# Patient Record
Sex: FEMALE | Race: WHITE | NOT HISPANIC OR LATINO | Employment: OTHER | URBAN - METROPOLITAN AREA
[De-identification: names, ages, dates, MRNs, and addresses within clinical notes are randomized per-mention and may not be internally consistent; named-entity substitution may affect disease eponyms.]

---

## 2018-03-22 ENCOUNTER — APPOINTMENT (OUTPATIENT)
Dept: WOMENS IMAGING | Facility: HOSPITAL | Age: 73
End: 2018-03-22

## 2018-03-22 PROCEDURE — 77067 SCR MAMMO BI INCL CAD: CPT | Performed by: RADIOLOGY

## 2018-12-17 ENCOUNTER — OFFICE VISIT (OUTPATIENT)
Dept: CARDIAC SURGERY | Facility: CLINIC | Age: 73
End: 2018-12-17

## 2018-12-17 VITALS
HEIGHT: 62 IN | WEIGHT: 106 LBS | HEART RATE: 84 BPM | DIASTOLIC BLOOD PRESSURE: 50 MMHG | TEMPERATURE: 98 F | SYSTOLIC BLOOD PRESSURE: 130 MMHG | BODY MASS INDEX: 19.51 KG/M2 | OXYGEN SATURATION: 98 %

## 2018-12-17 DIAGNOSIS — I65.23 CAROTID STENOSIS, ASYMPTOMATIC, BILATERAL: Primary | ICD-10-CM

## 2018-12-17 DIAGNOSIS — I65.23 BILATERAL CAROTID ARTERY STENOSIS: Primary | ICD-10-CM

## 2018-12-17 PROCEDURE — 99214 OFFICE O/P EST MOD 30 MIN: CPT | Performed by: THORACIC SURGERY (CARDIOTHORACIC VASCULAR SURGERY)

## 2018-12-17 RX ORDER — ONDANSETRON 4 MG/1
4 TABLET, FILM COATED ORAL EVERY 8 HOURS PRN
COMMUNITY
End: 2021-06-17

## 2018-12-17 RX ORDER — SOLIFENACIN SUCCINATE 5 MG/1
TABLET, FILM COATED ORAL DAILY
COMMUNITY
End: 2021-06-17

## 2018-12-17 RX ORDER — LOSARTAN POTASSIUM 100 MG/1
100 TABLET ORAL DAILY
COMMUNITY
End: 2022-07-21 | Stop reason: HOSPADM

## 2018-12-17 RX ORDER — SIMETHICONE 125 MG
125 TABLET,CHEWABLE ORAL EVERY 6 HOURS PRN
COMMUNITY
End: 2021-06-17

## 2018-12-17 NOTE — PROGRESS NOTES
12/17/2018  Patient Information  Miryam OZUNA [address] [address]   1945  'PCP/Referring Physician'  Rigoberto Chamberlain MD  [phone]  Guillermo Westfall MD  [phone]  Chief Complaint   Patient presents with   • Consult     Old Patient Ref. by Dr. Westfall for Carotid Stenosis-Complains of Right Sided Posterior Neck Pain and Right Arm Pain       History of Present Illness:   This is a patient on whom I performed bilateral carotid endarterectomies.  Her carotid surgery for approximately January and April, 2015.  In the subsequent year, a duplex scan from an outside facility demonstrated early recurrent bilateral stenosis.  We then proceeded with a CT angiogram which indicated the duplex scan was incorrect and the patient had no significant carotid stenosis.  Now the patient is referred to me with another duplex scan from an outside facility demonstrating 80-99% narrowing.  The patient denies stroke, seizures, TIAs, amaurosis fugax, dysarthria or dysphagia.  This patient is deaf and is here with her son and a professional  today for sign language communication.  Patient Active Problem List   Diagnosis   • Carotid stenosis   • Bilateral carotid artery stenosis     Past Medical History:   Diagnosis Date   • Atrial fibrillation (CMS/HCC)    • Borderline diabetes    • Carotid artery stenosis    • CKD (chronic kidney disease)    • Deaf    • Diabetes mellitus (CMS/HCC)    • GERD (gastroesophageal reflux disease)    • Hyperlipidemia    • Hypertension    • TIA (transient ischemic attack)      Past Surgical History:   Procedure Laterality Date   • CAROTID ENDARTERECTOMY Bilateral    • CATARACT EXTRACTION     • CHOLECYSTECTOMY     • KNEE SURGERY Right        Current Outpatient Medications:   •  amLODIPine (NORVASC) 5 MG tablet, , Disp: , Rfl: 0  •  aspirin 81 MG tablet, Take  by mouth., Disp: , Rfl:    •  atorvastatin (LIPITOR) 10 MG tablet, Take  by mouth., Disp: , Rfl:   •  Cholecalciferol (VITAMIN D-3 PO), Take  by mouth., Disp: , Rfl:   •  docusate sodium (COLACE) 100 MG capsule, Take  by mouth., Disp: , Rfl:   •  doxazosin (CARDURA) 2 MG tablet, Take 4 mg by mouth., Disp: , Rfl:   •  ELIQUIS 2.5 MG tablet tablet, , Disp: , Rfl: 0  •  hydrALAZINE (APRESOLINE) 50 MG tablet, Take  by mouth., Disp: , Rfl:   •  losartan (COZAAR) 50 MG tablet, Take 50 mg by mouth Daily., Disp: , Rfl:   •  ondansetron (ZOFRAN) 4 MG tablet, Take 4 mg by mouth Every 8 (Eight) Hours As Needed for Nausea or Vomiting., Disp: , Rfl:   •  pantoprazole (PROTONIX) 40 MG EC tablet, take 1 tablet by mouth once daily, Disp: , Rfl: 0  •  simethicone (MYLICON) 125 MG chewable tablet, Chew 125 mg Every 6 (Six) Hours As Needed for Flatulence., Disp: , Rfl:   •  solifenacin (VESICARE) 5 MG tablet, Take  by mouth Daily., Disp: , Rfl:   •  vitamin B-12 (CYANOCOBALAMIN) 500 MCG tablet, Take  by mouth., Disp: , Rfl:   •  clopidogrel (PLAVIX) 75 MG tablet, Take  by mouth., Disp: , Rfl:   •  metoprolol succinate XL (TOPROL-XL) 100 MG 24 hr tablet, Take 50 mg by mouth., Disp: , Rfl:   •  omeprazole (PriLOSEC) 40 MG capsule, Take  by mouth., Disp: , Rfl:   No Known Allergies  Social History     Socioeconomic History   • Marital status:      Spouse name: Not on file   • Number of children: 2   • Years of education: Not on file   • Highest education level: Not on file   Social Needs   • Financial resource strain: Not on file   • Food insecurity - worry: Not on file   • Food insecurity - inability: Not on file   • Transportation needs - medical: Not on file   • Transportation needs - non-medical: Not on file   Occupational History   • Occupation: [employer]     Employer: RETIRED   Tobacco Use   • Smoking status: Never Smoker   • Smokeless tobacco: Never Used   Substance and Sexual Activity   • Alcohol use: No   • Drug use: No   • Sexual activity:  "Not on file   Other Topics Concern   • Not on file   Social History Narrative    Lives in [place].      Family History   Problem Relation Age of Onset   • Other Mother         enlarged heart   • Stroke Father      Review of Systems   Constitution: Positive for malaise/fatigue and weight loss (45-50 lbs in 1 1/2 years). Negative for chills, fever and night sweats.   HENT: Positive for hearing loss. Negative for odynophagia and sore throat.    Eyes: Positive for blurred vision.   Cardiovascular: Positive for palpitations (rapid heartbeat). Negative for chest pain, dyspnea on exertion, leg swelling and orthopnea.   Respiratory: Negative for cough and hemoptysis.    Endocrine: Negative for cold intolerance, heat intolerance, polydipsia, polyphagia and polyuria.   Hematologic/Lymphatic: Bruises/bleeds easily.   Skin: Negative for itching and rash.   Musculoskeletal: Positive for back pain. Negative for joint pain, joint swelling and myalgias.   Gastrointestinal: Positive for abdominal pain and constipation. Negative for diarrhea, hematemesis, hematochezia, melena, nausea and vomiting.   Genitourinary: Negative for dysuria, frequency and hematuria.   Neurological: Positive for dizziness and light-headedness. Negative for focal weakness, headaches, numbness and seizures.   Psychiatric/Behavioral: Negative for suicidal ideas. The patient is nervous/anxious.    All other systems reviewed and are negative.    Vitals:    12/17/18 0846   BP: 130/50   BP Location: Left arm   Patient Position: Sitting   Pulse: 84   Temp: 98 °F (36.7 °C)   SpO2: 98%   Weight: 48.1 kg (106 lb)   Height: 157.5 cm (62\")      Physical Exam   CONSTITUTIONAL: Alert, Well dressed, Well nourished, No acute distress  EYES: Sclera clean, Anicteric, Pupils equal  ENT: No nasal deviation, Trachea midline  NECK: No neck masses, Supple  LUNGS: No wheezing, Cough, non-congested  HEART: No rubs, No murmurs  GASTROINTESTINAL: Soft, non-distended, No masses, Non " tender  to palpation, normal bowel sounds  NEURO: No motor deficits, No sensory deficits, Cranial Nerves 2 through 12 grossly intact but inability to hear  PSYCHIATRIC: Oriented to person, place and time, No memory deficits, Mood appropriate  VASCULAR: No carotid bruits, Femoral pulses palpable and symmetric  MUSKULOSKELETAL: No extremity trauma or extremity asymmetry    Labs/Imaging:   I reviewed the outside duplex scan demonstrating significant carotid stenosis.    Assessment/Plan:   This patient has undergone bilateral carotid endarterectomies in 2015.  Approximately one year later a duplex scan had demonstrated recurrent early bilateral stenoses.  However, a CT angiogram confirmed no significant recurrent stenoses and therefore the duplex scan was  erroneous.  Now the patient presents with another duplex scan from the outside facility demonstrating early recurrent bilateral stenosis.  I discussed through the , and the patient's son, various options.  I do not believe we should proceed immediately with a CT angiogram.  First, I would like to proceed with a repeat carotid duplex scan in our facility.  If that repeat duplex scan also demonstrates possible carotid stenosis I will then proceed with a CT angiogram.  The patient and family are very agreeable to this plan.    Patient Active Problem List   Diagnosis   • Carotid stenosis   • Bilateral carotid artery stenosis     CC: MD Guillermo Griffith MD Debbie Moore, , editing for Bola Whitaker M.D.    I, Bola Whitaker MD, have read and agree with the editing done by Cat Bullock, .

## 2018-12-27 ENCOUNTER — HOSPITAL ENCOUNTER (OUTPATIENT)
Dept: CARDIOLOGY | Facility: HOSPITAL | Age: 73
Discharge: HOME | End: 2018-12-27
Admitting: PHYSICIAN ASSISTANT

## 2018-12-27 ENCOUNTER — APPOINTMENT (OUTPATIENT)
Dept: CARDIOLOGY | Facility: HOSPITAL | Age: 73
End: 2018-12-27

## 2018-12-27 VITALS — BODY MASS INDEX: 19.51 KG/M2 | HEIGHT: 62 IN | WEIGHT: 106.04 LBS

## 2018-12-27 DIAGNOSIS — I65.23 CAROTID STENOSIS, ASYMPTOMATIC, BILATERAL: ICD-10-CM

## 2018-12-27 LAB
BH CV XLRA MEAS LEFT CCA RATIO VEL: 74.2 CM/SEC
BH CV XLRA MEAS LEFT DIST CCA EDV: 25.3 CM/SEC
BH CV XLRA MEAS LEFT DIST CCA PSV: 88.2 CM/SEC
BH CV XLRA MEAS LEFT DIST ICA EDV: 36.3 CM/SEC
BH CV XLRA MEAS LEFT DIST ICA PSV: 170.9 CM/SEC
BH CV XLRA MEAS LEFT ICA RATIO VEL: 322 CM/SEC
BH CV XLRA MEAS LEFT ICA/CCA RATIO: 4.3
BH CV XLRA MEAS LEFT MID CCA EDV: 19.6 CM/SEC
BH CV XLRA MEAS LEFT MID CCA PSV: 74.6 CM/SEC
BH CV XLRA MEAS LEFT MID ICA EDV: 37.3 CM/SEC
BH CV XLRA MEAS LEFT MID ICA PSV: 324.1 CM/SEC
BH CV XLRA MEAS LEFT PROX CCA EDV: 23.6 CM/SEC
BH CV XLRA MEAS LEFT PROX CCA PSV: 75.5 CM/SEC
BH CV XLRA MEAS LEFT PROX ECA EDV: 15.1 CM/SEC
BH CV XLRA MEAS LEFT PROX ECA PSV: 227.5 CM/SEC
BH CV XLRA MEAS LEFT PROX ICA EDV: 70.7 CM/SEC
BH CV XLRA MEAS LEFT PROX ICA PSV: 316.3 CM/SEC
BH CV XLRA MEAS LEFT PROX SCLA EDV: 2.6 CM/SEC
BH CV XLRA MEAS LEFT PROX SCLA PSV: 142.3 CM/SEC
BH CV XLRA MEAS LEFT VERTEBRAL A EDV: 14.7 CM/SEC
BH CV XLRA MEAS LEFT VERTEBRAL A PSV: 59.4 CM/SEC
BH CV XLRA MEAS RIGHT CCA RATIO VEL: 112 CM/SEC
BH CV XLRA MEAS RIGHT DIST CCA EDV: 18.9 CM/SEC
BH CV XLRA MEAS RIGHT DIST CCA PSV: 106.8 CM/SEC
BH CV XLRA MEAS RIGHT DIST ICA EDV: 40.2 CM/SEC
BH CV XLRA MEAS RIGHT DIST ICA PSV: 183.5 CM/SEC
BH CV XLRA MEAS RIGHT ICA RATIO VEL: 199 CM/SEC
BH CV XLRA MEAS RIGHT ICA/CCA RATIO: 1.8
BH CV XLRA MEAS RIGHT MID CCA EDV: 17.5 CM/SEC
BH CV XLRA MEAS RIGHT MID CCA PSV: 113.1 CM/SEC
BH CV XLRA MEAS RIGHT MID ICA EDV: 55.3 CM/SEC
BH CV XLRA MEAS RIGHT MID ICA PSV: 199.9 CM/SEC
BH CV XLRA MEAS RIGHT PROX CCA EDV: 23 CM/SEC
BH CV XLRA MEAS RIGHT PROX CCA PSV: 125.7 CM/SEC
BH CV XLRA MEAS RIGHT PROX ECA EDV: 12.2 CM/SEC
BH CV XLRA MEAS RIGHT PROX ECA PSV: 254.9 CM/SEC
BH CV XLRA MEAS RIGHT PROX ICA EDV: 40.2 CM/SEC
BH CV XLRA MEAS RIGHT PROX ICA PSV: 181 CM/SEC
BH CV XLRA MEAS RIGHT PROX SCLA EDV: 3.4 CM/SEC
BH CV XLRA MEAS RIGHT PROX SCLA PSV: 145.9 CM/SEC
BH CV XLRA MEAS RIGHT VERTEBRAL A EDV: 23.6 CM/SEC
BH CV XLRA MEAS RIGHT VERTEBRAL A PSV: 143.8 CM/SEC
LEFT ARM BP: NORMAL MMHG
RIGHT ARM BP: NORMAL MMHG

## 2018-12-27 PROCEDURE — 93880 EXTRACRANIAL BILAT STUDY: CPT | Performed by: INTERNAL MEDICINE

## 2018-12-27 PROCEDURE — 93880 EXTRACRANIAL BILAT STUDY: CPT

## 2019-01-04 ENCOUNTER — TELEPHONE (OUTPATIENT)
Dept: CARDIAC SURGERY | Facility: CLINIC | Age: 74
End: 2019-01-04

## 2019-01-04 NOTE — TELEPHONE ENCOUNTER
S/w pts daughter gave her results via Dr. Whitaker will place pt in recall for 5 months. Told to call office if she has any problems prior to that.

## 2019-04-25 ENCOUNTER — TELEPHONE (OUTPATIENT)
Dept: CARDIAC SURGERY | Facility: CLINIC | Age: 74
End: 2019-04-25

## 2019-04-25 DIAGNOSIS — I65.23 CAROTID STENOSIS, ASYMPTOMATIC, BILATERAL: Primary | ICD-10-CM

## 2019-04-25 NOTE — TELEPHONE ENCOUNTER
Pt wanting to go head and come back to see Dr. Whitaker-pt need sign language interp. Verified pt's insurance, phone number and address.

## 2019-04-29 ENCOUNTER — TELEPHONE (OUTPATIENT)
Dept: CARDIAC SURGERY | Facility: CLINIC | Age: 74
End: 2019-04-29

## 2019-04-29 NOTE — TELEPHONE ENCOUNTER
I S/W Rani at our on-site sign language interpreters and I have scheduled someone to be here for the pt on 05/06/19 for her F/U with Dr. Whitaker.

## 2019-05-01 ENCOUNTER — HOSPITAL ENCOUNTER (OUTPATIENT)
Dept: CT IMAGING | Facility: HOSPITAL | Age: 74
Discharge: HOME | End: 2019-05-01
Admitting: PHYSICIAN ASSISTANT

## 2019-05-01 DIAGNOSIS — I65.23 CAROTID STENOSIS, ASYMPTOMATIC, BILATERAL: ICD-10-CM

## 2019-05-01 LAB — CREAT BLDA-MCNC: 1.4 MG/DL (ref 0.6–1.3)

## 2019-05-01 PROCEDURE — 82565 ASSAY OF CREATININE: CPT

## 2019-05-01 PROCEDURE — 0 IOPAMIDOL PER 1 ML: Performed by: PHYSICIAN ASSISTANT

## 2019-05-01 PROCEDURE — 70498 CT ANGIOGRAPHY NECK: CPT

## 2019-05-01 RX ADMIN — IOPAMIDOL 50 ML: 755 INJECTION, SOLUTION INTRAVENOUS at 12:52

## 2019-05-06 ENCOUNTER — OFFICE VISIT (OUTPATIENT)
Dept: CARDIAC SURGERY | Facility: CLINIC | Age: 74
End: 2019-05-06

## 2019-05-06 VITALS
BODY MASS INDEX: 19.91 KG/M2 | HEART RATE: 57 BPM | TEMPERATURE: 98.1 F | WEIGHT: 108.2 LBS | SYSTOLIC BLOOD PRESSURE: 170 MMHG | OXYGEN SATURATION: 99 % | DIASTOLIC BLOOD PRESSURE: 90 MMHG | HEIGHT: 62 IN

## 2019-05-06 DIAGNOSIS — I65.23 BILATERAL CAROTID ARTERY STENOSIS: Primary | ICD-10-CM

## 2019-05-06 PROCEDURE — 99213 OFFICE O/P EST LOW 20 MIN: CPT | Performed by: THORACIC SURGERY (CARDIOTHORACIC VASCULAR SURGERY)

## 2019-05-06 RX ORDER — ONDANSETRON 4 MG/1
1 TABLET, FILM COATED ORAL EVERY 8 HOURS
COMMUNITY
Start: 2019-03-13 | End: 2022-03-30

## 2019-05-06 RX ORDER — DOXAZOSIN MESYLATE 4 MG/1
8 TABLET ORAL NIGHTLY
Refills: 0 | COMMUNITY
Start: 2019-05-01 | End: 2022-06-09 | Stop reason: ALTCHOICE

## 2019-05-06 RX ORDER — LOSARTAN POTASSIUM 50 MG/1
1 TABLET ORAL
COMMUNITY
End: 2021-06-17

## 2019-05-06 RX ORDER — PANTOPRAZOLE SODIUM 40 MG/1
TABLET, DELAYED RELEASE ORAL
COMMUNITY
Start: 2017-12-15 | End: 2021-06-17

## 2019-05-06 RX ORDER — AMLODIPINE BESYLATE 5 MG/1
1 TABLET ORAL
COMMUNITY
Start: 2019-01-17 | End: 2021-06-17

## 2019-05-06 RX ORDER — HYDRALAZINE HYDROCHLORIDE 50 MG/1
TABLET, FILM COATED ORAL
COMMUNITY
Start: 2019-02-20 | End: 2021-06-17

## 2019-05-06 RX ORDER — OXYBUTYNIN CHLORIDE 5 MG/1
5 TABLET, EXTENDED RELEASE ORAL 2 TIMES DAILY
Refills: 0 | COMMUNITY
Start: 2019-03-19 | End: 2022-04-11

## 2019-05-06 RX ORDER — ATORVASTATIN CALCIUM 10 MG/1
TABLET, FILM COATED ORAL
COMMUNITY
Start: 2019-01-28 | End: 2021-06-17

## 2019-05-06 RX ORDER — LANOLIN ALCOHOL/MO/W.PET/CERES
5000 CREAM (GRAM) TOPICAL DAILY
COMMUNITY

## 2019-05-06 NOTE — PROGRESS NOTES
05/06/2019  Patient Information  Miryam OZUNA [facility] [place] [address]   1945  'PCP/Referring Physician'  Rigoberto Chamberlain MD  [phone]  No ref. provider found    Chief Complaint   Patient presents with   • Follow-up     Follow up for carotid stenosis       History of Present Illness:   This is a patient on whom I have previously performed bilateral carotid endarterectomies in approximately January and April ,2015.  She is now over 4 years out from surgery.  She has had a number of carotid duplex scans that demonstrate stenosis anywhere in the 80 to 99% range.  However, we follow this up with CT angiograms that always demonstrate the arteries are widely patent.  I believe it is the tortuosity of her arteries, as well as the small size of the arteries, that lead to the erroneous duplex scan findings.  The patient has had another duplex scan which has demonstrated bilateral carotid disease so we have obtained another CT angiogram and we are seeing the patient back in follow-up.  She is here with her .  There has no evidence of stroke, seizures or TIAs.      Patient Active Problem List   Diagnosis   • Carotid stenosis   • Bilateral carotid artery stenosis     Past Medical History:   Diagnosis Date   • Atrial fibrillation (CMS/HCC)    • Borderline diabetes    • Carotid artery stenosis    • CKD (chronic kidney disease)    • Deaf    • Diabetes mellitus (CMS/HCC)    • GERD (gastroesophageal reflux disease)    • Hyperlipidemia    • Hypertension    • TIA (transient ischemic attack)      Past Surgical History:   Procedure Laterality Date   • CAROTID ENDARTERECTOMY Bilateral    • CATARACT EXTRACTION     • CHOLECYSTECTOMY     • KNEE SURGERY Right        Current Outpatient Medications:   •  amLODIPine (NORVASC) 5 MG tablet, Take 1 tablet by mouth., Disp: , Rfl:   •  apixaban (ELIQUIS) 2.5  MG tablet tablet, Take 1 tablet by mouth Every 12 (Twelve) Hours., Disp: , Rfl:   •  aspirin 81 MG tablet, Take  by mouth., Disp: , Rfl:   •  atorvastatin (LIPITOR) 10 MG tablet, Take  by mouth., Disp: , Rfl:   •  Cholecalciferol (VITAMIN D-3 PO), Take  by mouth., Disp: , Rfl:   •  Cholecalciferol (VITAMIN D3) 5000 units tablet dispersible, 1 po qd, Disp: , Rfl:   •  docusate sodium (COLACE) 100 MG capsule, Take  by mouth., Disp: , Rfl:   •  doxazosin (CARDURA) 4 MG tablet, take 2 tablets by mouth every morning and 2 tablets every evening, Disp: , Rfl: 0  •  ELIQUIS 2.5 MG tablet tablet, , Disp: , Rfl: 0  •  hydrALAZINE (APRESOLINE) 50 MG tablet, Take  by mouth., Disp: , Rfl:   •  losartan (COZAAR) 50 MG tablet, Take 50 mg by mouth Daily., Disp: , Rfl:   •  ondansetron (ZOFRAN) 4 MG tablet, Take 4 mg by mouth Every 8 (Eight) Hours As Needed for Nausea or Vomiting., Disp: , Rfl:   •  ondansetron (ZOFRAN) 4 MG tablet, Take 1 tablet by mouth Every 8 (Eight) Hours., Disp: , Rfl:   •  oxybutynin XL (DITROPAN-XL) 5 MG 24 hr tablet, Take 5 mg by mouth 2 (Two) Times a Day., Disp: , Rfl: 0  •  pantoprazole (PROTONIX) 40 MG EC tablet, take 1 tablet by mouth once daily, Disp: , Rfl: 0  •  pantoprazole (PROTONIX) 40 MG EC tablet, take 1 tablet by oral route 30-60 min before breakfast and 30-60 min before dinner, Disp: , Rfl:   •  Polyethylene Glycol 3350 (MIRALAX PO), Take  by mouth Daily., Disp: , Rfl:   •  vitamin B-12 (CYANOCOBALAMIN) 1000 MCG tablet, Take 1 mcg by mouth Daily., Disp: , Rfl:   •  vitamin B-12 (CYANOCOBALAMIN) 500 MCG tablet, Take  by mouth., Disp: , Rfl:   •  amLODIPine (NORVASC) 5 MG tablet, , Disp: , Rfl: 0  •  aspirin 81 MG tablet, Take 1 tablet by mouth., Disp: , Rfl:   •  atorvastatin (LIPITOR) 10 MG tablet, take 1 tablet by mouth once daily, Disp: , Rfl:   •  clopidogrel (PLAVIX) 75 MG tablet, Take  by mouth., Disp: , Rfl:   •  doxazosin (CARDURA) 2 MG tablet, Take 4 mg by mouth., Disp: , Rfl:   •   hydrALAZINE (APRESOLINE) 50 MG tablet, take 2 tablets by mouth three times a day, Disp: , Rfl:   •  losartan (COZAAR) 50 MG tablet, Take 1 tablet by mouth., Disp: , Rfl:   •  metoprolol succinate XL (TOPROL-XL) 100 MG 24 hr tablet, Take 50 mg by mouth., Disp: , Rfl:   •  omeprazole (PriLOSEC) 40 MG capsule, Take  by mouth., Disp: , Rfl:   •  simethicone (MYLICON) 125 MG chewable tablet, Chew 125 mg Every 6 (Six) Hours As Needed for Flatulence., Disp: , Rfl:   •  solifenacin (VESICARE) 5 MG tablet, Take  by mouth Daily., Disp: , Rfl:   No Known Allergies  Social History     Socioeconomic History   • Marital status:      Spouse name: Not on file   • Number of children: 2   • Years of education: Not on file   • Highest education level: Not on file   Occupational History   • Occupation: [employer]     Employer: RETIRED   Tobacco Use   • Smoking status: Never Smoker   • Smokeless tobacco: Never Used   Substance and Sexual Activity   • Alcohol use: No   • Drug use: No   Social History Narrative    Lives in [place].      Family History   Problem Relation Age of Onset   • Other Mother         enlarged heart   • Stroke Father      Review of Systems   Constitution: Positive for chills and malaise/fatigue. Negative for diaphoresis, fever and weight loss.   HENT: Positive for hearing loss (deaf about age of 1 or 1). Negative for congestion, hoarse voice and sore throat.    Eyes: Positive for blurred vision. Negative for double vision.   Cardiovascular: Positive for chest pain (Chest pressure), leg swelling (right leg more than left) and near-syncope. Negative for claudication, dyspnea on exertion, palpitations and syncope.   Respiratory: Negative for cough, hemoptysis, shortness of breath and wheezing.    Hematologic/Lymphatic: Bruises/bleeds easily.   Skin: Negative for itching, poor wound healing and rash.   Musculoskeletal: Positive for arthritis, back pain, joint pain (right side), muscle weakness and neck pain  "(on right side). Negative for falls, joint swelling and muscle cramps.   Gastrointestinal: Positive for constipation (miralax daily) and nausea ( taking zofran as needed). Negative for abdominal pain, diarrhea, hematemesis and vomiting.   Genitourinary: Negative for dysuria, frequency, hematuria, hesitancy, incomplete emptying and urgency.   Neurological: Positive for dizziness, light-headedness and loss of balance. Negative for headaches, numbness and vertigo.   Psychiatric/Behavioral: Negative for depression, memory loss and substance abuse. The patient is not nervous/anxious.    Allergic/Immunologic: Negative for environmental allergies and HIV exposure.     Vitals:    05/06/19 0851   BP: 170/90   Pulse: 57   Temp: 98.1 °F (36.7 °C)   TempSrc: Temporal   SpO2: 99%   Weight: 49.1 kg (108 lb 3.2 oz)   Height: 157.5 cm (62\")      Physical Exam   CONSTITUTIONAL: Alert and conversant, Well dressed, Well nourished, No acute distress  EYES: Sclera clean, Anicteric, Pupils equal  ENT: No nasal deviation, Trachea midline  NECK: No neck masses, Supple  LUNGS: No wheezing, Cough, non-congested  HEART: No rubs, No murmurs  GASTROINTESTINAL: Soft, non-distended, No masses, Non tender  to palpation, normal bowel sounds  NEURO: No motor deficits, No sensory deficits, Cranial Nerves 2 through 12 grossly intact  PSYCHIATRIC: Oriented to person, place and time, No memory deficits, Mood appropriate  VASCULAR: No carotid bruits, Femoral pulses palpable and symmetric  MUSKULOSKELETAL: No extremity trauma or extremity asymmetry    Labs/Imaging:   I have reviewed the recent CT angiogram.  There is no significant carotid disease and it is unchanged from the exam performed in August, 2016, which also demonstrated no significant disease.    Assessment/Plan:   This patient is here with her  today.  She had bilateral carotid endarterectomies in 2015.  Her recent CT scan demonstrates no significant carotid artery disease and this " is completely stable from the scan nearly 3 years ago.  All of her duplex scans have been erroneous secondary to artery tortuosity and overall small size of her arteries.  She also has underlying renal insufficiency.  At this time, my recommendation is to proceed with no further imaging studies.  All the duplex scanning at a number of different facilities have all been erroneous.  CT angiogram will require radiation exposure and contrast administration which could be damaging to her already impaired renal function.  At 73 years of age, I would continue her statin and antiplatelet therapy.  We know that her carotid arteries have been clean for the nearly 4 years since her surgery and at this point I would perform no further surveillance.  Should the patient disagree with my opinion, she could seek that of other physicians and she is welcome and encouraged to do so.  However, at this time I would not obtain additional scans.    Patient Active Problem List   Diagnosis   • Carotid stenosis   • Bilateral carotid artery stenosis     CC: MD Cat Griffith, , editing for Bola Whitaker M.D.    I, Bola Whitaker MD, have read and agree with the editing done by minor Gravesist.

## 2021-01-01 ENCOUNTER — TRANSCRIBE ORDERS (OUTPATIENT)
Dept: CARDIOLOGY | Facility: CLINIC | Age: 76
End: 2021-01-01

## 2021-01-01 DIAGNOSIS — I65.23 CAROTID STENOSIS, ASYMPTOMATIC, BILATERAL: Primary | ICD-10-CM

## 2021-03-02 DIAGNOSIS — E78.2 MIXED HYPERLIPIDEMIA: Primary | ICD-10-CM

## 2021-03-02 DIAGNOSIS — I10 ESSENTIAL HYPERTENSION, BENIGN: ICD-10-CM

## 2021-03-02 RX ORDER — METOPROLOL SUCCINATE 50 MG/1
50 TABLET, EXTENDED RELEASE ORAL DAILY
Qty: 90 TABLET | Refills: 3 | Status: SHIPPED | OUTPATIENT
Start: 2021-03-02 | End: 2021-09-20 | Stop reason: ALTCHOICE

## 2021-03-02 RX ORDER — AMLODIPINE BESYLATE 10 MG/1
10 TABLET ORAL DAILY
Qty: 90 TABLET | Refills: 3 | Status: SHIPPED | OUTPATIENT
Start: 2021-03-02 | End: 2022-03-14

## 2021-06-17 ENCOUNTER — OFFICE VISIT (OUTPATIENT)
Dept: CARDIOLOGY | Facility: CLINIC | Age: 76
End: 2021-06-17

## 2021-06-17 VITALS
TEMPERATURE: 97 F | DIASTOLIC BLOOD PRESSURE: 74 MMHG | HEART RATE: 54 BPM | WEIGHT: 125 LBS | HEIGHT: 63 IN | SYSTOLIC BLOOD PRESSURE: 128 MMHG | BODY MASS INDEX: 22.15 KG/M2 | RESPIRATION RATE: 11 BRPM | OXYGEN SATURATION: 96 %

## 2021-06-17 DIAGNOSIS — I35.0 AORTIC STENOSIS, MODERATE: ICD-10-CM

## 2021-06-17 DIAGNOSIS — I49.5 SICK SINUS SYNDROME (HCC): ICD-10-CM

## 2021-06-17 DIAGNOSIS — I65.23 BILATERAL CAROTID ARTERY STENOSIS: Primary | ICD-10-CM

## 2021-06-17 DIAGNOSIS — I48.0 PAROXYSMAL ATRIAL FIBRILLATION (HCC): ICD-10-CM

## 2021-06-17 DIAGNOSIS — E78.5 HYPERLIPIDEMIA LDL GOAL <70: ICD-10-CM

## 2021-06-17 PROBLEM — I25.10 CORONARY ARTERY DISEASE INVOLVING NATIVE CORONARY ARTERY OF NATIVE HEART WITHOUT ANGINA PECTORIS: Status: ACTIVE | Noted: 2021-06-17

## 2021-06-17 PROCEDURE — 93000 ELECTROCARDIOGRAM COMPLETE: CPT | Performed by: INTERNAL MEDICINE

## 2021-06-17 PROCEDURE — 99204 OFFICE O/P NEW MOD 45 MIN: CPT | Performed by: INTERNAL MEDICINE

## 2021-06-18 NOTE — PROGRESS NOTES
[facility]  [facility] CARDIOLOGY  [address] [place] [address]  Dept: [phone]  Dept Fax: [phone]    Miryam Mckeon  1945    New Patient Office Note    History of Present Illness:  Miryam Mckeon is a 75 y.o. female who presents to the clinic for new patient, Shortness of Breath, and Dizziness. She has seen [facility] cardiology but her records are not available to me at this time, she has apparently CAD but never has had any stent, Carotid disease s.p bilateral carotid endarterectomy, aortic stenosis mild to moderate and PAF.,  She is on Eliquis 2,5 bid,and Metoprolol xl 50 mg, her main complaints for few years is dizziness, and also palpitations fast heart rate and slow heart rate, she has had Holter with some AF but not significant bradycardia, last EKG 2015 sinus bradycardia HR 52., her EKG today sinus bradycardia HR 44,, BP is 150.80 she takes Multiples meds for BP,Cardura 8 mg bid,Hydralazine 100 tid,Metoprolol xl 50 mg Losartan 100 mg Amlodipine 10 mg, will get a Holter Likely she needs a pacemaker,to be able to use AA    The following portions of the patient's history were reviewed and updated as appropriate: allergies, current medications, past family history, past medical history, past social history, past surgical history and problem list.    Medications:  amLODIPine  apixaban tablet  aspirin  atorvastatin  doxazosin  Eliquis tablet  hydrALAZINE  losartan  metoprolol succinate XL  MIRALAX PO  omeprazole  ondansetron  oxybutynin XL  vitamin B-12  VITAMIN D-3 PO    Subjective  No Known Allergies     Past Medical History:   Diagnosis Date   • Atrial fibrillation (CMS/HCC)    • Borderline diabetes    • Carotid artery stenosis    • CKD (chronic kidney disease)    • Deaf    • Diabetes mellitus (CMS/HCC)    • GERD (gastroesophageal reflux disease)    • Hyperlipidemia    • Hypertension    • TIA (transient ischemic attack)        Past Surgical History:  "  Procedure Laterality Date   • CAROTID ENDARTERECTOMY Bilateral    • CATARACT EXTRACTION     • CHOLECYSTECTOMY     • KNEE SURGERY Right        Family History   Problem Relation Age of Onset   • Other Mother         enlarged heart   • Stroke Father         Social History     Socioeconomic History   • Marital status:      Spouse name: Not on file   • Number of children: 2   • Years of education: Not on file   • Highest education level: Not on file   Tobacco Use   • Smoking status: Never Smoker   • Smokeless tobacco: Never Used   Substance and Sexual Activity   • Alcohol use: Never   • Drug use: Never   • Sexual activity: Defer       Review of Systems   Constitutional: Negative.    HENT: Negative.    Respiratory: Negative.    Cardiovascular: Positive for palpitations.   Endocrine: Negative.    Genitourinary: Negative.    Musculoskeletal: Negative.    Skin: Negative.    Allergic/Immunologic: Negative.    Neurological: Positive for dizziness.   Hematological: Negative.    Psychiatric/Behavioral: Negative.    All other systems reviewed and are negative.      Cardiovascular Procedures    ECHO/MUGA:   STRESS TESTS:   CARDIAC CATH:   DEVICES:   HOLTER:   CT/MRI:   VASCULAR:   CARDIOTHORACIC:     Objective  Vitals:    06/17/21 1307   BP: 128/74   BP Location: Left arm   Patient Position: Sitting   Cuff Size: Adult   Pulse: 54   Resp: 11   Temp: 97 °F (36.1 °C)   TempSrc: Infrared   SpO2: 96%   Weight: 56.7 kg (125 lb)   Height: 158.8 cm (62.5\")   PainSc: 0-No pain       Physical Exam  Constitutional:       Appearance: Healthy appearance. Not in distress.   Neck:      Vascular: No JVR. JVD normal.   Pulmonary:      Effort: Pulmonary effort is normal.      Breath sounds: Normal breath sounds. No wheezing. No rhonchi. No rales.   Chest:      Chest wall: Not tender to palpatation.   Cardiovascular:      PMI at left midclavicular line. Normal rate. Regular rhythm. Normal S1. Normal S2.      Murmurs: There is a harsh mid to " late systolic murmur at the URSB, radiating to the neck.      No gallop. No click. No rub.   Pulses:     Intact distal pulses.   Edema:     Peripheral edema absent.   Abdominal:      General: Bowel sounds are normal.      Palpations: Abdomen is soft.      Tenderness: There is no abdominal tenderness.   Musculoskeletal: Normal range of motion.         General: No tenderness. Skin:     General: Skin is warm and dry.   Neurological:      General: No focal deficit present.      Mental Status: Alert and oriented to person, place and time.          Diagnostic Data    ECG 12 Lead    Date/Time: 6/17/2021 8:28 PM  Performed by: Vito Cuba MD  Authorized by: Vito Cuba MD   Comparison: compared with previous ECG   Similar to previous ECG  Rhythm: sinus bradycardia  Rate: bradycardic  BPM: 47  QRS axis: normal    Clinical impression: abnormal EKG            Assessment and Plan  Diagnoses and all orders for this visit:    Bilateral carotid artery stenosis--She is s/p bilateral carotid endarterectomy last carotid has significant velocity but CTA did not show significant lesion,     Aortic stenosis, moderate- the Last echo GISELLE 1,0 Cm, 2020 asymptomatic, for now    Paroxysmal atrial fibrillation (CMS/HCC)- She has fast heart rate and slow, today HR is 44, on Metoprolol , will hold    Sick sinus syndrome (CMS/HCC)- Complaints of weakness, dizziness, fast and slow heart rate, Hr today 44 buy EKG, will get a Holter, will hold Toprol    Hyperlipidemia LDL goal <70 on Lipitor 10 mg    Other orders  -     Holter Monitor - 72 Hour Up To 15 Days; Future        No follow-ups on file.    Vito Cuba MD  06/17/2021

## 2021-07-22 ENCOUNTER — OFFICE VISIT (OUTPATIENT)
Dept: CARDIOLOGY | Facility: CLINIC | Age: 76
End: 2021-07-22

## 2021-07-22 VITALS
RESPIRATION RATE: 11 BRPM | HEIGHT: 63 IN | TEMPERATURE: 97 F | DIASTOLIC BLOOD PRESSURE: 74 MMHG | BODY MASS INDEX: 20.73 KG/M2 | HEART RATE: 86 BPM | WEIGHT: 117 LBS | OXYGEN SATURATION: 96 % | SYSTOLIC BLOOD PRESSURE: 126 MMHG

## 2021-07-22 DIAGNOSIS — E78.5 HYPERLIPIDEMIA LDL GOAL <70: ICD-10-CM

## 2021-07-22 DIAGNOSIS — I65.23 BILATERAL CAROTID ARTERY STENOSIS: ICD-10-CM

## 2021-07-22 DIAGNOSIS — I49.5 SICK SINUS SYNDROME (HCC): ICD-10-CM

## 2021-07-22 DIAGNOSIS — I25.10 CORONARY ARTERY DISEASE INVOLVING NATIVE CORONARY ARTERY OF NATIVE HEART WITHOUT ANGINA PECTORIS: ICD-10-CM

## 2021-07-22 DIAGNOSIS — I35.0 AORTIC STENOSIS, MODERATE: ICD-10-CM

## 2021-07-22 DIAGNOSIS — I48.0 PAROXYSMAL ATRIAL FIBRILLATION (HCC): Primary | ICD-10-CM

## 2021-07-22 PROCEDURE — 99214 OFFICE O/P EST MOD 30 MIN: CPT | Performed by: INTERNAL MEDICINE

## 2021-07-22 PROCEDURE — 93000 ELECTROCARDIOGRAM COMPLETE: CPT | Performed by: INTERNAL MEDICINE

## 2021-07-22 NOTE — PROGRESS NOTES
[facility] [place]  [facility] CARDIOLOGY  [address] [place] [address]  Dept: [phone]  Dept Fax: [phone]    Miryam Mckeon  1945    Follow Up Office Visit Note    History of Present Illness:  Miryam Mckeon is a 75 y.o. female who presents to the clinic for dizziness and also   palpitations she stop the Toprol due to low Hr 44 and today Hr is better 65, , she keeps having palpitations and dizziness, , I did a Holter with short episodes of PAF, I have given the choice to see a EP Dr Hussein and she would prefer to see him, I discuss with him today about the patient and he will graciously see her.     The following portions of the patient's history were reviewed and updated as appropriate: allergies, current medications, past family history, past medical history, past social history, past surgical history and problem list.    Medications:  amLODIPine  apixaban tablet  aspirin  atorvastatin  doxazosin  Eliquis tablet  hydrALAZINE  losartan  metoprolol succinate XL  MIRALAX PO  omeprazole  ondansetron  oxybutynin XL  vitamin B-12  VITAMIN D-3 PO    Subjective  No Known Allergies     Past Medical History:   Diagnosis Date   • Atrial fibrillation (CMS/HCC)    • Borderline diabetes    • Carotid artery stenosis    • CKD (chronic kidney disease)    • Deaf    • Diabetes mellitus (CMS/HCC)    • GERD (gastroesophageal reflux disease)    • Hyperlipidemia    • Hypertension    • TIA (transient ischemic attack)        Past Surgical History:   Procedure Laterality Date   • CAROTID ENDARTERECTOMY Bilateral    • CATARACT EXTRACTION     • CHOLECYSTECTOMY     • KNEE SURGERY Right        Family History   Problem Relation Age of Onset   • Other Mother         enlarged heart   • Stroke Father         Social History     Socioeconomic History   • Marital status:      Spouse name: Not on file   • Number of children: 2   • Years of education: Not on file   • Highest education level:  "Not on file   Tobacco Use   • Smoking status: Never Smoker   • Smokeless tobacco: Never Used   Substance and Sexual Activity   • Alcohol use: Never   • Drug use: Never   • Sexual activity: Defer       Review of Systems   Constitutional: Negative.    HENT: Negative.    Respiratory: Negative.    Cardiovascular: Positive for palpitations.   Endocrine: Negative.    Genitourinary: Negative.    Musculoskeletal: Negative.    Skin: Negative.    Allergic/Immunologic: Negative.    Neurological: Positive for light-headedness.   Hematological: Negative.    Psychiatric/Behavioral: Negative.        Cardiovascular Procedures    ECHO/MUGA:   STRESS TESTS:   CARDIAC CATH:   DEVICES:   HOLTER:   CT/MRI:   VASCULAR:   CARDIOTHORACIC:     Objective  Vitals:    07/22/21 1539   BP: 126/74   BP Location: Left arm   Patient Position: Sitting   Cuff Size: Adult   Pulse: 86   Resp: 11   Temp: 97 °F (36.1 °C)   TempSrc: Infrared   SpO2: 96%   Weight: 53.1 kg (117 lb)   Height: 158.8 cm (62.5\")   PainSc:   8   PainLoc: Leg     Body mass index is 21.06 kg/m².     Physical Exam  Constitutional:       Appearance: Healthy appearance. Not in distress.   Neck:      Vascular: No JVR. JVD normal.   Pulmonary:      Effort: Pulmonary effort is normal.      Breath sounds: Normal breath sounds. No wheezing. No rhonchi. No rales.   Chest:      Chest wall: Not tender to palpatation.   Cardiovascular:      PMI at left midclavicular line. Normal rate. Regular rhythm. Normal S1. Normal S2.      Murmurs: There is a harsh midsystolic murmur at the URSB, radiating to the neck.      No gallop. No click. No rub.   Pulses:     Intact distal pulses.   Edema:     Peripheral edema absent.   Abdominal:      General: Bowel sounds are normal.      Palpations: Abdomen is soft.      Tenderness: There is no abdominal tenderness.   Musculoskeletal: Normal range of motion.         General: No tenderness. Skin:     General: Skin is warm and dry.   Neurological:      General: No " focal deficit present.      Mental Status: Alert and oriented to person, place and time.          Diagnostic Data    ECG 12 Lead    Date/Time: 7/22/2021 4:35 PM  Performed by: Vito Cuba MD  Authorized by: Vito Cuba MD   Rhythm: sinus rhythm  Rate: normal  BPM: 65  QRS axis: normal  Other findings: left ventricular hypertrophy    Clinical impression: abnormal EKG            Assessment and Plan  Diagnoses and all orders for this visit:    Paroxysmal atrial fibrillation (CMS/HCC)- will sent her to see Dr Hussein, her HR is better after we dgalilea Toprol, but she is having complaints of palpitations,    Coronary artery disease involving native coronary artery of native heart without angina pectoris- Mild disease by cath     Sick sinus syndrome (CMS/HCC)- she has fast and slow heart rate , will see Dr Hussein    Hyperlipidemia LDL goal <70- on lipitor    Aortic stenosis, moderate- Mild AS, Linda 1.3cm     Bilateral carotid artery stenosis- she is s/p bilateral carotid endarterectomy, last CTA no significant disease          No follow-ups on file.    Vito Cuba MD  07/22/2021

## 2021-09-20 ENCOUNTER — OFFICE VISIT (OUTPATIENT)
Dept: CARDIOLOGY | Facility: CLINIC | Age: 76
End: 2021-09-20

## 2021-09-20 VITALS
OXYGEN SATURATION: 98 % | HEART RATE: 64 BPM | HEIGHT: 63 IN | SYSTOLIC BLOOD PRESSURE: 126 MMHG | DIASTOLIC BLOOD PRESSURE: 48 MMHG | BODY MASS INDEX: 20.91 KG/M2 | WEIGHT: 118 LBS

## 2021-09-20 DIAGNOSIS — I25.10 CORONARY ARTERY DISEASE INVOLVING NATIVE CORONARY ARTERY OF NATIVE HEART WITHOUT ANGINA PECTORIS: ICD-10-CM

## 2021-09-20 DIAGNOSIS — I10 ESSENTIAL HYPERTENSION: ICD-10-CM

## 2021-09-20 DIAGNOSIS — I49.5 SICK SINUS SYNDROME (HCC): Primary | ICD-10-CM

## 2021-09-20 DIAGNOSIS — I49.5 TACHY-BRADY SYNDROME (HCC): ICD-10-CM

## 2021-09-20 DIAGNOSIS — I48.0 PAROXYSMAL ATRIAL FIBRILLATION (HCC): ICD-10-CM

## 2021-09-20 PROCEDURE — 93000 ELECTROCARDIOGRAM COMPLETE: CPT | Performed by: PHYSICIAN ASSISTANT

## 2021-09-20 PROCEDURE — 99214 OFFICE O/P EST MOD 30 MIN: CPT | Performed by: INTERNAL MEDICINE

## 2021-09-20 RX ORDER — LATANOPROST 50 UG/ML
1 SOLUTION/ DROPS OPHTHALMIC NIGHTLY
COMMUNITY
End: 2022-08-15

## 2021-09-20 RX ORDER — SIMETHICONE 125 MG
125 TABLET,CHEWABLE ORAL EVERY 6 HOURS PRN
COMMUNITY

## 2021-09-20 NOTE — PROGRESS NOTES
Cardiac Electrophysiology Outpatient Consult Note            [facility]    Consult Note     Miryam Mckeon  [id number]    Primary Care Physician: Rigoberto Chamberlain MD    Referred By: Vito Cuba, *    Subjective     Chief Complaint:   Diagnoses and all orders for this visit:    1. Sick sinus syndrome (CMS/HCC) (Primary)  -     ECG 12 Lead  -     Case Request EP Lab: Pacemaker DC new, hold eliquis X 2 days prior, needs     2. Paroxysmal atrial fibrillation (CMS/HCC)  -     ECG 12 Lead    3. Tachy-alfosno syndrome (CMS/HCC)  -     ECG 12 Lead  -     Case Request EP Lab: Pacemaker DC new, hold eliquis X 2 days prior, needs     4. Coronary artery disease involving native coronary artery of native heart without angina pectoris    5. Essential hypertension      Chief Complaint   Patient presents with   • PAF     CONSULT   • Coronary Artery Disease   • SSS       History of Present Illness:   Miryam Mckeon is a 75 y.o. female who presents to my electrophysiology clinic for evaluation of Tachybrady syndrome and Afib. She is hearing impaired  and her family member is able act as a . She states she started have palpitations for a couple of years but recently these have started getting worse.  She wore a monitor [employer] in 2017 revealing episodes of SVT, and bradycardia. She was started on BB months ago for palpitations but presented to Dr. Sewell's office with significant severe Bradycardia. Her BB was stopped and a follow up monitor was placed 7/2021.  This  revealed episodes of AFib, SVT, and PAC's and well as day time bradycardia.  She has been on Eliquis since her CVA in 2002. She denies any chest pain or sob with exertion suggesting angina . She denies any sudden dizziness, near syncope or syncope. She does her own house work but does get tired easily.     Problem List:  1. Afib/Tachybrady syndrome   a. Palpitations,  Cardiac Monitors back in 2017 revealing Tachybrady cardia.    b. Holter monitor revealing PAF, AT, and PAC's, episodes of bradycardia 7/9/2021  2. CAD  a. [facility] Moderate CAD 2009.   b. Echocardiogram 2/2020 Normal EF, Moderate AS.   3. VHD: Moderate AS, GISELLE 1.3cm  4. Bilateral CEA's 2015  5. CVA 2002-Eliquis  6. Symptomatic Bradycardia  7. HTN  8. GERD      Review of Systems:   Negative except what is in HPI    Past Medical History:   Past Medical History:   Diagnosis Date   • Atrial fibrillation (CMS/HCC)    • Borderline diabetes    • Carotid artery stenosis    • CKD (chronic kidney disease)    • Deaf    • Diabetes mellitus (CMS/HCC)    • GERD (gastroesophageal reflux disease)    • Hyperlipidemia    • Hypertension    • TIA (transient ischemic attack)        Past Surgical History:   Past Surgical History:   Procedure Laterality Date   • CAROTID ENDARTERECTOMY Bilateral    • CATARACT EXTRACTION     • CHOLECYSTECTOMY     • KNEE SURGERY Right        Family History:   Family History   Problem Relation Age of Onset   • Other Mother         enlarged heart   • Stroke Father        Social History:   Social History     Socioeconomic History   • Marital status:      Spouse name: Not on file   • Number of children: 2   • Years of education: Not on file   • Highest education level: Not on file   Tobacco Use   • Smoking status: Never Smoker   • Smokeless tobacco: Never Used   Vaping Use   • Vaping Use: Never used   Substance and Sexual Activity   • Alcohol use: Never   • Drug use: Never   • Sexual activity: Defer       Medications:     Current Outpatient Medications:   •  amLODIPine (NORVASC) 10 MG tablet, Take 1 tablet by mouth Daily., Disp: 90 tablet, Rfl: 3  •  apixaban (ELIQUIS) 2.5 MG tablet tablet, Take 1 tablet by mouth Every 12 (Twelve) Hours., Disp: , Rfl:   •  aspirin 81 MG tablet, Take  by mouth., Disp: , Rfl:   •  atorvastatin (LIPITOR) 10 MG tablet, Take 10 mg by mouth Every Night., Disp: , Rfl:   •   "Cholecalciferol (VITAMIN D-3 PO), Take 2,000 Units by mouth Daily., Disp: , Rfl:   •  doxazosin (CARDURA) 4 MG tablet, Take 4 mg by mouth. 2 TABLETS bid, Disp: , Rfl: 0  •  ELIQUIS 2.5 MG tablet tablet, , Disp: , Rfl: 0  •  hydrALAZINE (APRESOLINE) 50 MG tablet, Take 50 mg by mouth. 2 TABLETS TID, Disp: , Rfl:   •  latanoprost (XALATAN) 0.005 % ophthalmic solution, Administer 1 drop to both eyes Every Night., Disp: , Rfl:   •  losartan (COZAAR) 100 MG tablet, Take 100 mg by mouth Daily., Disp: , Rfl:   •  ondansetron (ZOFRAN) 4 MG tablet, Take 1 tablet by mouth Every 8 (Eight) Hours., Disp: , Rfl:   •  oxybutynin XL (DITROPAN-XL) 5 MG 24 hr tablet, Take 5 mg by mouth 2 (Two) Times a Day., Disp: , Rfl: 0  •  Polyethylene Glycol 3350 (MIRALAX PO), Take  by mouth Daily., Disp: , Rfl:   •  simethicone (MYLICON) 125 MG chewable tablet, Chew 125 mg Every 6 (Six) Hours As Needed for Flatulence. PRN, Disp: , Rfl:   •  vitamin B-12 (CYANOCOBALAMIN) 1000 MCG tablet, Take 5,000 mcg by mouth Daily., Disp: , Rfl:   •  omeprazole (PriLOSEC) 40 MG capsule, Take  by mouth., Disp: , Rfl:     Allergies:   No Known Allergies    Objective   Vital Signs:   Vitals:    09/20/21 1450   BP: 126/48   Pulse: 64   SpO2: 98%   Weight: 53.5 kg (118 lb)   Height: 158.8 cm (62.52\")       PHYSICAL EXAM  General appearance: Awake, alert, cooperative  Head: Normocephalic, without obvious abnormality, atraumatic  Eyes: Conjunctivae/corneas clear, EOMs intact  Neck: no adenopathy, no carotid bruit, no JVD and thyroid: not enlarged  Lungs: clear to auscultation bilaterally and no rhonchi or crackles\", ' symmetric  Heart: systolic murmur: systolic ejection 2/6, harsh at apex  Abdomen: Soft, non-tender, bowel sounds normal,  no organomegaly  Extremities: extremities normal, atraumatic, no cyanosis or edema  Skin: Skin color, turgor normal, no rashes or lesions  Neurologic: Grossly normal     Lab Results   Component Value Date    GLUCOSE 90 04/09/2015    " CALCIUM 8.7 04/09/2015     04/09/2015    K 3.7 04/09/2015    CO2 27 04/09/2015     04/09/2015    BUN 16 04/09/2015    CREATININE 1.40 (H) 05/01/2019    ANIONGAP 8 04/09/2015     Lab Results   Component Value Date    WBC 5.14 04/09/2015    HGB 8.6 (L) 04/09/2015    HCT 26.5 (L) 04/09/2015    MCV 95.0 04/09/2015     (L) 04/09/2015     Lab Results   Component Value Date    INR 1.04 04/06/2015    INR 1.06 01/13/2015    PROTIME 10.9 04/06/2015    PROTIME 11.4 01/13/2015     No results found for: TSH, F1ZYNOV, O0JMCUI, THYROIDAB    Cardiac Testing:      I personally viewed and interpreted the patient's EKG/Telemetry/lab data      ECG 12 Lead    Date/Time: 9/20/2021 3:27 PM  Performed by: Thad Valenzuela PA  Authorized by: Thad Valenzuela PA   Rhythm: sinus rhythm  Rate: normal  Conduction: conduction normal  ST Segments: ST segments normal  T Waves: T waves normal  QRS axis: normal  Other: no other findings    Clinical impression: normal ECG            Tobacco Cessation: N/A  Obstructive Sleep Apnea Screening: Completed    Assessment & Plan    Diagnoses and all orders for this visit:    1. Sick sinus syndrome (CMS/HCC) (Primary)  -     ECG 12 Lead  -     Case Request EP Lab: Pacemaker DC new, hold eliquis X 2 days prior, needs     2. Paroxysmal atrial fibrillation (CMS/HCC)  -     ECG 12 Lead    3. Tachy-alfnoso syndrome (CMS/HCC)  -     ECG 12 Lead  -     Case Request EP Lab: Pacemaker DC new, hold eliquis X 2 days prior, needs     4. Coronary artery disease involving native coronary artery of native heart without angina pectoris    5. Essential hypertension         Diagnosis Plan   1. Sick sinus syndrome (CMS/HCC)   symptomatic sinus bradycardia.  Chronotropic incompetence.  Strong indication for permanent pacemaker.        Risk-benefit for transvenous pacemaker discussed in detail.    American  is the daughter.  Our American sign language   that had been scheduled for the office visit was unable to come due to scheduling difficulties.  After discussion with the patient the patient agreed to have her daughter stand and is a American .    Risk-benefit profile of pacemaker discussed in detail via .    She wishes to proceed.    After pacemaker implantation will likely transition from metoprolol over to Cardizem.    Hold Eliquis for 2 days preoperatively.    The patient and I made a mutually shared decision ( shared decision making model ) that the patient would be best served by proceeding with the above invasive heart procedure.  This is a high risk invasive cardiac procedure which comes with significant risk of morbidity and mortality.  This patient has significant underlying  heart disease.  Miryam OZUNA Yadieltruman understands these risks and   has made an informed decision to proceed.         2. Paroxysmal atrial fibrillation (CMS/HCC)   unclear whether she is significantly symptomatic from rapid A. fib.  The pacemaker will help delineate this after its implanted.  We will combine this with Cardizem for rate control.  Unclear if she will benefit from rhythm control strategy with pharmacotherapy at this point.    [id number] significantly elevated.  Recommend lifelong anticoagulation.  Tolerating apixaban well.   3. Tachy-alfonso syndrome (CMS/HCC)   rapid A. fib alternating with sinus bradycardia.  See above.       4. Coronary artery disease involving native coronary artery of native heart without angina pectoris   stable.  No symptoms.  Aspirin.  Lipitor.   5. Essential hypertension   blood pressure doing well.     Body mass index is 21.22 kg/m².    I spent 48 minutes in consultation with this patient which included more than 65% of this time in direct face-to-face counseling, physical examination and discussion of my assessment and findings and shared decision making with the patient.  The remainder of the time  not spent face to face was performing one, some or all of the following actions:  preparing to see this patient ( eg. Review of tests),  ordering medications, tests or procedures ), care coordination, discussion of the plan with other healthcare providers, documenting clinical information in Epic well as independently interpreting results and communicating results to patient, family and or caregiver.  All time noted occurred on the date of service.    Follow Up:       Thank you for allowing me to participate in the care of your patient. Please to not hesitate to contact me with additional questions or concerns.      Troy Hussein DO, FACC, RS  Cardiac Electrophysiologist  [facility] / [facility]

## 2021-09-21 RX ORDER — OXYBUTYNIN CHLORIDE 5 MG/1
TABLET, EXTENDED RELEASE ORAL
Qty: 60 TABLET | OUTPATIENT
Start: 2021-09-21

## 2021-09-21 NOTE — TELEPHONE ENCOUNTER
Patient called about medication because she missed a call... I informed her that she will need to contact her PCP for this refill

## 2021-09-28 DIAGNOSIS — R00.1 BRADYCARDIA, SINUS: Primary | ICD-10-CM

## 2022-01-27 ENCOUNTER — OFFICE VISIT (OUTPATIENT)
Dept: CARDIOLOGY | Facility: CLINIC | Age: 77
End: 2022-01-27

## 2022-01-27 VITALS
HEART RATE: 63 BPM | RESPIRATION RATE: 12 BRPM | SYSTOLIC BLOOD PRESSURE: 122 MMHG | WEIGHT: 121 LBS | OXYGEN SATURATION: 99 % | BODY MASS INDEX: 21.44 KG/M2 | DIASTOLIC BLOOD PRESSURE: 74 MMHG | HEIGHT: 63 IN | TEMPERATURE: 97 F

## 2022-01-27 DIAGNOSIS — I49.5 TACHY-BRADY SYNDROME: ICD-10-CM

## 2022-01-27 DIAGNOSIS — I35.0 AORTIC STENOSIS, MODERATE: ICD-10-CM

## 2022-01-27 DIAGNOSIS — I65.23 BILATERAL CAROTID ARTERY STENOSIS: ICD-10-CM

## 2022-01-27 DIAGNOSIS — E78.5 HYPERLIPIDEMIA LDL GOAL <70: ICD-10-CM

## 2022-01-27 DIAGNOSIS — I10 ESSENTIAL HYPERTENSION: ICD-10-CM

## 2022-01-27 DIAGNOSIS — I48.0 PAROXYSMAL ATRIAL FIBRILLATION: Primary | ICD-10-CM

## 2022-01-27 DIAGNOSIS — I25.10 CORONARY ARTERY DISEASE INVOLVING NATIVE CORONARY ARTERY OF NATIVE HEART WITHOUT ANGINA PECTORIS: ICD-10-CM

## 2022-01-27 PROCEDURE — 93000 ELECTROCARDIOGRAM COMPLETE: CPT | Performed by: INTERNAL MEDICINE

## 2022-01-27 PROCEDURE — 99214 OFFICE O/P EST MOD 30 MIN: CPT | Performed by: INTERNAL MEDICINE

## 2022-01-27 NOTE — PROGRESS NOTES
[facility] [place]  [facility] CARDIOLOGY  [address] [place] [address]  Dept: [phone]  Dept Fax: [phone]    Miryam Mckeon  1945    Follow Up Office Visit Note    History of Present Illness:  Miryam Mckeon is a 76 y.o. female who presents to the clinic for Follow-up. SSS- She has low and fast Hr, has seen Dr Hussein and he thinks, she will need to have a Pacemaker but hut she is not willing to have it yet, denies any chest pain, palpitations, dizziness, syncope deepa    The following portions of the patient's history were reviewed and updated as appropriate: allergies, current medications, past family history, past medical history, past social history, past surgical history and problem list.    Medications:  amLODIPine  apixaban tablet  aspirin  atorvastatin  doxazosin  hydrALAZINE  latanoprost  losartan  MIRALAX PO  omeprazole  ondansetron  oxybutynin XL  simethicone  vitamin B-12  VITAMIN D-3 PO    Subjective  No Known Allergies     Past Medical History:   Diagnosis Date   • Atrial fibrillation (CMS/HCC)    • Borderline diabetes    • Carotid artery stenosis    • CKD (chronic kidney disease)    • Deaf    • Diabetes mellitus (CMS/HCC)    • GERD (gastroesophageal reflux disease)    • Hyperlipidemia    • Hypertension    • TIA (transient ischemic attack)        Past Surgical History:   Procedure Laterality Date   • CAROTID ENDARTERECTOMY Bilateral    • CATARACT EXTRACTION     • CHOLECYSTECTOMY     • KNEE SURGERY Right        Family History   Problem Relation Age of Onset   • Other Mother         enlarged heart   • Stroke Father         Social History     Socioeconomic History   • Marital status:    • Number of children: 2   Tobacco Use   • Smoking status: Never Smoker   • Smokeless tobacco: Never Used   Vaping Use   • Vaping Use: Never used   Substance and Sexual Activity   • Alcohol use: Never   • Drug use: Never   • Sexual activity: Defer       Review of  "Systems   Constitutional: Negative.    HENT: Negative.    Respiratory: Negative.    Cardiovascular: Negative.    Endocrine: Negative.    Genitourinary: Negative.    Musculoskeletal: Negative.    Skin: Negative.    Allergic/Immunologic: Negative.    Neurological: Negative.    Hematological: Negative.    Psychiatric/Behavioral: Negative.    All other systems reviewed and are negative.      Cardiovascular Procedures    ECHO/MUGA:   STRESS TESTS:   CARDIAC CATH:   DEVICES:   HOLTER:   CT/MRI:   VASCULAR:   CARDIOTHORACIC:     Objective  Vitals:    01/27/22 1451   BP: 122/74   BP Location: Right arm   Patient Position: Sitting   Cuff Size: Adult   Pulse: 63   Resp: 12   Temp: 97 °F (36.1 °C)   TempSrc: Infrared   SpO2: 99%   Weight: 54.9 kg (121 lb)   Height: 158.8 cm (62.5\")   PainSc: 0-No pain     Body mass index is 21.78 kg/m².     Physical Exam  Constitutional:       Appearance: Healthy appearance. Not in distress.   Neck:      Vascular: No JVR. JVD normal.   Pulmonary:      Effort: Pulmonary effort is normal.      Breath sounds: Normal breath sounds. No wheezing. No rhonchi. No rales.   Chest:      Chest wall: Not tender to palpatation.   Cardiovascular:      PMI at left midclavicular line. Normal rate. Regular rhythm. Normal S1. Normal S2.      Murmurs: There is no murmur.      No gallop. No click. No rub.   Pulses:     Intact distal pulses.   Edema:     Peripheral edema absent.   Abdominal:      General: Bowel sounds are normal.      Palpations: Abdomen is soft.      Tenderness: There is no abdominal tenderness.   Musculoskeletal: Normal range of motion.         General: No tenderness. Skin:     General: Skin is warm and dry.   Neurological:      General: No focal deficit present.      Mental Status: Alert and oriented to person, place and time.          Diagnostic Data    ECG 12 Lead    Date/Time: 1/27/2022 3:26 PM  Performed by: Vito Cuba MD  Authorized by: Vito Cuba MD   Comparison: " compared with previous ECG from 9/20/2021  Rhythm: sinus rhythm  Rate: normal  BPM: 61  Conduction: left posterior fascicular block  QRS axis: normal and right    Clinical impression: abnormal EKG            Assessment and Plan  Diagnoses and all orders for this visit:    Paroxysmal atrial fibrillation (HCC)--Only on Blood thinner , peña not decided yet about pacer, denies any complaints  On Eliquis 2,5 bid  -     CBC & Differential; Future  -     Comprehensive Metabolic Panel; Future    Tachy-alfonso syndrome (HCC)- Waiting for her decision about pacer, has seen Dr Hussein  -     CBC & Differential; Future  -     Comprehensive Metabolic Panel; Future    Essential hypertension- BP is good 135.80, on Cardura 4 mg, hydralazine 50 mg bid, Amlodipine 10 mg, Losartan 100 mg     Hyperlipidemia LDL goal <70- On Lipitor   -     Lipid Panel; Future    Coronary artery disease involving native coronary artery of native heart without angina pectoris- Mild disease by cath, denies any chest pain    Aortic stenosis, moderate- GISELLE 1,3 in 2020, will get an echo  -     Adult Transthoracic Echo Complete W/ Cont if Necessary Per Protocol; Future    Bilateral carotid artery stenosis- Mild disease last carotid doppler, prior bilateral carotid endarterectomy , will get a doppler   -     Duplex Carotid Ultrasound CAR; Future         Return in about 6 months (around 7/27/2022) for Recheck.    Vito Cuba MD  01/27/2022

## 2022-02-18 ENCOUNTER — LAB (OUTPATIENT)
Dept: CARDIOLOGY | Facility: CLINIC | Age: 77
End: 2022-02-18

## 2022-02-18 DIAGNOSIS — I48.0 PAROXYSMAL ATRIAL FIBRILLATION: ICD-10-CM

## 2022-02-18 DIAGNOSIS — E78.5 HYPERLIPIDEMIA LDL GOAL <70: ICD-10-CM

## 2022-02-18 DIAGNOSIS — I49.5 TACHY-BRADY SYNDROME: ICD-10-CM

## 2022-02-19 LAB
ALBUMIN SERPL-MCNC: 4.3 G/DL (ref 3.7–4.7)
ALBUMIN/GLOB SERPL: 1.8 {RATIO} (ref 1.2–2.2)
ALP SERPL-CCNC: 130 IU/L (ref 44–121)
ALT SERPL-CCNC: 15 IU/L (ref 0–32)
AST SERPL-CCNC: 22 IU/L (ref 0–40)
BASOPHILS # BLD AUTO: 0 X10E3/UL (ref 0–0.2)
BASOPHILS NFR BLD AUTO: 1 %
BILIRUB SERPL-MCNC: 0.5 MG/DL (ref 0–1.2)
BUN SERPL-MCNC: 39 MG/DL (ref 8–27)
BUN/CREAT SERPL: 31 (ref 12–28)
CALCIUM SERPL-MCNC: 10.1 MG/DL (ref 8.7–10.3)
CHLORIDE SERPL-SCNC: 108 MMOL/L (ref 96–106)
CHOLEST SERPL-MCNC: 112 MG/DL (ref 100–199)
CO2 SERPL-SCNC: 20 MMOL/L (ref 20–29)
CREAT SERPL-MCNC: 1.27 MG/DL (ref 0.57–1)
EOSINOPHIL # BLD AUTO: 0.1 X10E3/UL (ref 0–0.4)
EOSINOPHIL NFR BLD AUTO: 2 %
ERYTHROCYTE [DISTWIDTH] IN BLOOD BY AUTOMATED COUNT: 11.7 % (ref 11.7–15.4)
GLOBULIN SER CALC-MCNC: 2.4 G/DL (ref 1.5–4.5)
GLUCOSE SERPL-MCNC: 107 MG/DL (ref 65–99)
HCT VFR BLD AUTO: 34.4 % (ref 34–46.6)
HDLC SERPL-MCNC: 55 MG/DL
HGB BLD-MCNC: 11.6 G/DL (ref 11.1–15.9)
IMM GRANULOCYTES # BLD AUTO: 0 X10E3/UL (ref 0–0.1)
IMM GRANULOCYTES NFR BLD AUTO: 0 %
LDLC SERPL CALC-MCNC: 45 MG/DL (ref 0–99)
LYMPHOCYTES # BLD AUTO: 1.5 X10E3/UL (ref 0.7–3.1)
LYMPHOCYTES NFR BLD AUTO: 35 %
MCH RBC QN AUTO: 31.9 PG (ref 26.6–33)
MCHC RBC AUTO-ENTMCNC: 33.7 G/DL (ref 31.5–35.7)
MCV RBC AUTO: 95 FL (ref 79–97)
MONOCYTES # BLD AUTO: 0.4 X10E3/UL (ref 0.1–0.9)
MONOCYTES NFR BLD AUTO: 9 %
NEUTROPHILS # BLD AUTO: 2.2 X10E3/UL (ref 1.4–7)
NEUTROPHILS NFR BLD AUTO: 53 %
PLATELET # BLD AUTO: 141 X10E3/UL (ref 150–450)
POTASSIUM SERPL-SCNC: 5.2 MMOL/L (ref 3.5–5.2)
PROT SERPL-MCNC: 6.7 G/DL (ref 6–8.5)
RBC # BLD AUTO: 3.64 X10E6/UL (ref 3.77–5.28)
SODIUM SERPL-SCNC: 146 MMOL/L (ref 134–144)
TRIGL SERPL-MCNC: 48 MG/DL (ref 0–149)
VLDLC SERPL CALC-MCNC: 12 MG/DL (ref 5–40)
WBC # BLD AUTO: 4.2 X10E3/UL (ref 3.4–10.8)

## 2022-02-21 ENCOUNTER — TELEPHONE (OUTPATIENT)
Dept: CARDIOLOGY | Facility: CLINIC | Age: 77
End: 2022-02-21

## 2022-02-21 NOTE — TELEPHONE ENCOUNTER
Spoke with daughter and advised her that we need to schedule an office visit to discuss Ms. Chun's ECHO results and develop a plan.  Pt is coming on Friday, February 25th @ 2pm

## 2022-02-21 NOTE — TELEPHONE ENCOUNTER
----- Message from Vito Cuba MD sent at 2/19/2022  7:51 PM EST -----  Please talk to her daughter and ask her that we need to discusses findings on the echo , , the best is she will come for an appointment, and we make decision and we plan

## 2022-02-25 ENCOUNTER — OFFICE VISIT (OUTPATIENT)
Dept: CARDIOLOGY | Facility: CLINIC | Age: 77
End: 2022-02-25

## 2022-02-25 VITALS
WEIGHT: 119 LBS | TEMPERATURE: 97.1 F | DIASTOLIC BLOOD PRESSURE: 60 MMHG | OXYGEN SATURATION: 98 % | RESPIRATION RATE: 15 BRPM | SYSTOLIC BLOOD PRESSURE: 162 MMHG | BODY MASS INDEX: 21.09 KG/M2 | HEART RATE: 62 BPM | HEIGHT: 63 IN

## 2022-02-25 DIAGNOSIS — I48.0 PAROXYSMAL ATRIAL FIBRILLATION: Primary | ICD-10-CM

## 2022-02-25 DIAGNOSIS — E78.5 HYPERLIPIDEMIA LDL GOAL <70: ICD-10-CM

## 2022-02-25 DIAGNOSIS — I65.23 BILATERAL CAROTID ARTERY STENOSIS: ICD-10-CM

## 2022-02-25 DIAGNOSIS — I35.0 AORTIC STENOSIS, MODERATE: ICD-10-CM

## 2022-02-25 DIAGNOSIS — I10 ESSENTIAL HYPERTENSION: ICD-10-CM

## 2022-02-25 PROCEDURE — 99214 OFFICE O/P EST MOD 30 MIN: CPT | Performed by: INTERNAL MEDICINE

## 2022-02-25 NOTE — PROGRESS NOTES
[facility] [place]  [facility]OLOG[facility]  [address] [place] [address]  Dept: [phone]  Dept Fax: [phone]    Miryam Mckeon  1945    Follow Up Office Visit Note    History of Present Illness:  Miryam Mckeon is a 76 y.o. female who presents to the clinic for Follow-up.Aortic stenosis- The GISELLE is now severe 1,0 cm, mean gradient 48, but she denies any chest pain, syncope, or SOB, will set her to have an echo in 6 month unless she has complaints earlier,    The following portions of the patient's history were reviewed and updated as appropriate: allergies, current medications, past family history, past medical history, past social history, past surgical history and problem list.    Medications:  amLODIPine  apixaban tablet  aspirin  atorvastatin  doxazosin  hydrALAZINE  latanoprost  losartan  MIRALAX PO  ondansetron  oxybutynin XL  simethicone  vitamin B-12    Subjective  No Known Allergies     Past Medical History:   Diagnosis Date   • Atrial fibrillation (HCC)    • Borderline diabetes    • Carotid artery stenosis    • CKD (chronic kidney disease)    • Deaf    • Diabetes mellitus (HCC)    • GERD (gastroesophageal reflux disease)    • Hyperlipidemia    • Hypertension    • TIA (transient ischemic attack)        Past Surgical History:   Procedure Laterality Date   • CAROTID ENDARTERECTOMY Bilateral    • CATARACT EXTRACTION     • CHOLECYSTECTOMY     • KNEE SURGERY Right        Family History   Problem Relation Age of Onset   • Other Mother         enlarged heart   • Stroke Father         Social History     Socioeconomic History   • Marital status:    • Number of children: 2   Tobacco Use   • Smoking status: Never Smoker   • Smokeless tobacco: Never Used   Vaping Use   • Vaping Use: Never used   Substance and Sexual Activity   • Alcohol use: Never   • Drug use: Never   • Sexual activity: Defer       Review of Systems   Constitutional: Negative.    HENT:  "Negative.    Respiratory: Negative.    Cardiovascular: Negative.    Endocrine: Negative.    Genitourinary: Negative.    Musculoskeletal: Negative.    Skin: Negative.    Allergic/Immunologic: Negative.    Neurological: Negative.    Hematological: Negative.    Psychiatric/Behavioral: Negative.        Cardiovascular Procedures    ECHO/MUGA:   STRESS TESTS:   CARDIAC CATH:   DEVICES:   HOLTER:   CT/MRI:   VASCULAR:   CARDIOTHORACIC:     Objective  Vitals:    02/25/22 1353   BP: 162/60   BP Location: Right arm   Patient Position: Sitting   Cuff Size: Adult   Pulse: 62   Resp: 15   Temp: 97.1 °F (36.2 °C)   TempSrc: Infrared   SpO2: 98%   Weight: 54 kg (119 lb)   Height: 158.8 cm (62.5\")   PainSc: 0-No pain     Body mass index is 21.42 kg/m².     Physical Exam  Constitutional:       Appearance: Healthy appearance. Not in distress.   Neck:      Vascular: No JVR. JVD normal.   Pulmonary:      Effort: Pulmonary effort is normal.      Breath sounds: Normal breath sounds. No wheezing. No rhonchi. No rales.   Chest:      Chest wall: Not tender to palpatation.   Cardiovascular:      PMI at left midclavicular line. Normal rate. Regular rhythm. Normal S1. Normal S2.      Murmurs: There is a grade 4/6 harsh midsystolic murmur at the URSB, radiating to the neck.      No gallop. No click. No rub.   Pulses:     Intact distal pulses.   Edema:     Peripheral edema absent.   Abdominal:      General: Bowel sounds are normal.      Palpations: Abdomen is soft.      Tenderness: There is no abdominal tenderness.   Musculoskeletal: Normal range of motion.         General: No tenderness. Skin:     General: Skin is warm and dry.   Neurological:      General: No focal deficit present.      Mental Status: Alert and oriented to person, place and time.          Diagnostic Data  Procedures    Assessment and Plan  Diagnoses and all orders for this visit:    Paroxysmal atrial fibrillation (HCC)- No major complaints, she is considering now the pacer., " On Eliquis 2,5 bid     Aortic stenosis, Severe, no complaints will keep watching, repeat Echo 6 months   -     Adult Transthoracic Echo Complete W/ Cont if Necessary Per Protocol; Future    Bilateral carotid artery stenosis- Mild to moderate disease bilateral, will keep watching     Hyperlipidemia LDL goal <70- On lipitor    Essential hypertension- Bp seems fine on Multiples meds  Cardura 4 mg, Hydralazine 50 tid, Amlodipine 10 mg and Losartan 100 mg         Return in about 6 months (around 8/25/2022) for Recheck.    Vito Cuba MD  02/25/2022

## 2022-03-12 DIAGNOSIS — E78.2 MIXED HYPERLIPIDEMIA: ICD-10-CM

## 2022-03-14 ENCOUNTER — OFFICE VISIT (OUTPATIENT)
Dept: CARDIOLOGY | Facility: CLINIC | Age: 77
End: 2022-03-14

## 2022-03-14 VITALS
OXYGEN SATURATION: 97 % | HEIGHT: 63 IN | WEIGHT: 123 LBS | SYSTOLIC BLOOD PRESSURE: 132 MMHG | BODY MASS INDEX: 21.79 KG/M2 | DIASTOLIC BLOOD PRESSURE: 40 MMHG | HEART RATE: 62 BPM

## 2022-03-14 DIAGNOSIS — I49.5 TACHY-BRADY SYNDROME: ICD-10-CM

## 2022-03-14 DIAGNOSIS — I10 ESSENTIAL HYPERTENSION: ICD-10-CM

## 2022-03-14 DIAGNOSIS — I49.5 SICK SINUS SYNDROME: ICD-10-CM

## 2022-03-14 DIAGNOSIS — I35.0 AORTIC STENOSIS, MODERATE: ICD-10-CM

## 2022-03-14 DIAGNOSIS — I48.0 PAROXYSMAL ATRIAL FIBRILLATION: Primary | ICD-10-CM

## 2022-03-14 PROCEDURE — 99214 OFFICE O/P EST MOD 30 MIN: CPT | Performed by: INTERNAL MEDICINE

## 2022-03-14 RX ORDER — MELATONIN
1000 DAILY
COMMUNITY

## 2022-03-14 RX ORDER — AMLODIPINE BESYLATE 10 MG/1
10 TABLET ORAL DAILY
Qty: 90 TABLET | Refills: 3 | Status: SHIPPED | OUTPATIENT
Start: 2022-03-14 | End: 2022-07-21 | Stop reason: HOSPADM

## 2022-03-14 NOTE — PROGRESS NOTES
Cardiac Electrophysiology Outpatient Follow Up Note            [facility] at [facility]    Follow Up Office Visit      Miryam Mckeon  [id number]  03/14/2022  [unfilled]  [unfilled]    Primary Care Physician: Rigoberto Chamberlain MD    Referred By: No ref. provider found    Subjective     Chief Complaint:   Diagnoses and all orders for this visit:    1. Paroxysmal atrial fibrillation (HCC) (Primary)  -     Case Request EP Lab: DDD PPM Implant ([facility]), DNS Eliquis    2. Sick sinus syndrome (HCC)  -     Case Request EP Lab: DDD PPM Implant ([facility]), DNS Eliquis    3. Aortic stenosis, moderate  -     Ambulatory Referral to Cardiology    4. Essential hypertension    5. Tachy-alfonso syndrome (HCC)      Chief Complaint   Patient presents with   • Sick sinus syndrome (CMS/HCC)       History of Present Illness:   Miryam Mckeon is a 76 y.o. female who presents to my electrophysiology clinic for follow up of above complaints.  Interview facilitated with American  and daughter.    Miryam is continue to feel progressively more short of breath with exertion.  She is tired all the time.  Her heart rate is slow.  She does not feel very good.    Doing any kind of activity climbing stairs walking around the house makes her feel even more fatigued.  At this time she tells me she is open to receiving a pacemaker.    Informs me that Dr. Cuba has told her that she has a valve which is quite narrow which will likely need an intervention.    Denies any kinds of syncopal episode.  Denies heart failure symptoms.  Occasionally has some chest pressure in the center of her chest.      Review of Systems:   Constitutional: No fevers or chills, no recent weight gain or weight loss or fatigue  Eyes: No visual loss, blurred vision, double vision, yellow sclerae.  ENT: No headaches, hearing loss, vertigo, congestion or sore throat.   Cardiovascular: Per HPI  Respiratory: No  cough or wheezing, no sputum production, no hematemesis   Gastrointestinal: No abdominal pain, no nausea, vomiting, constipation, diarrhea, melena.   Genitourinary: No dysuria, hematuria or increased frequency.  Musculoskeletal:  No gait disturbance, weakness or joint pain or stiffness  Integumentary: No rashes, urticaria, ulcers or sores.   Neurological: No headache, dizziness, syncope, paralysis, ataxia, no prior CVA/TIA      Past Medical History:   Past Medical History:   Diagnosis Date   • Atrial fibrillation (HCC)    • Borderline diabetes    • Carotid artery stenosis    • CKD (chronic kidney disease)    • Deaf    • Diabetes mellitus (HCC)    • GERD (gastroesophageal reflux disease)    • Hyperlipidemia    • Hypertension    • TIA (transient ischemic attack)        Past Surgical History:   Past Surgical History:   Procedure Laterality Date   • CAROTID ENDARTERECTOMY Bilateral    • CATARACT EXTRACTION     • CHOLECYSTECTOMY     • KNEE SURGERY Right        Family History:   Family History   Problem Relation Age of Onset   • Other Mother         enlarged heart   • Stroke Father        Social History:   Social History     Socioeconomic History   • Marital status:    • Number of children: 2   Tobacco Use   • Smoking status: Never Smoker   • Smokeless tobacco: Never Used   Vaping Use   • Vaping Use: Never used   Substance and Sexual Activity   • Alcohol use: Never   • Drug use: Never   • Sexual activity: Defer       Medications:     Current Outpatient Medications:   •  amLODIPine (NORVASC) 10 MG tablet, TAKE 1 TABLET BY MOUTH DAILY, Disp: 90 tablet, Rfl: 3  •  apixaban (ELIQUIS) 2.5 MG tablet tablet, Take 1 tablet by mouth Every 12 (Twelve) Hours., Disp: , Rfl:   •  aspirin 81 MG tablet, Take  by mouth., Disp: , Rfl:   •  cholecalciferol (VITAMIN D3) 25 MCG (1000 UT) tablet, Take 2,000 Units by mouth Daily., Disp: , Rfl:   •  doxazosin (CARDURA) 4 MG tablet, Take 4 mg by mouth. 2 TABLETS bid, Disp: , Rfl: 0  •   "hydrALAZINE (APRESOLINE) 50 MG tablet, Take 100 mg by mouth every night at bedtime., Disp: , Rfl:   •  latanoprost (XALATAN) 0.005 % ophthalmic solution, Administer 1 drop to both eyes Every Night., Disp: , Rfl:   •  losartan (COZAAR) 100 MG tablet, Take 100 mg by mouth Daily., Disp: , Rfl:   •  ondansetron (ZOFRAN) 4 MG tablet, Take 1 tablet by mouth Every 8 (Eight) Hours., Disp: , Rfl:   •  Polyethylene Glycol 3350 (MIRALAX PO), Take  by mouth Daily., Disp: , Rfl:   •  simethicone (MYLICON) 125 MG chewable tablet, Chew 125 mg Every 6 (Six) Hours As Needed for Flatulence. PRN, Disp: , Rfl:   •  vitamin B-12 (CYANOCOBALAMIN) 1000 MCG tablet, Take 5,000 mcg by mouth Daily., Disp: , Rfl:   •  atorvastatin (LIPITOR) 10 MG tablet, Take 10 mg by mouth Every Night., Disp: , Rfl:   •  oxybutynin XL (DITROPAN-XL) 5 MG 24 hr tablet, Take 5 mg by mouth 2 (Two) Times a Day., Disp: , Rfl: 0    Allergies:   No Known Allergies    Objective   Vital Signs:   Vitals:    03/14/22 1540   BP: 132/40   BP Location: Right arm   Patient Position: Sitting   Pulse: 62   SpO2: 97%   Weight: 55.8 kg (123 lb)   Height: 158.8 cm (62.5\")       PHYSICAL EXAM  General appearance: Awake, alert, cooperative  Head: Normocephalic, without obvious abnormality, atraumatic  Eyes: Conjunctivae/corneas clear, EOMs intact  Neck: no adenopathy, no carotid bruit, no JVD and thyroid: not enlarged  Lungs: clear to auscultation bilaterally and no rhonchi or crackles\", ' symmetric  Heart: 3 out of 6 systolic ejection blowing murmur.  Diminished A2.  Abdomen: Soft, non-tender, bowel sounds normal,  no organomegaly  Extremities: extremities normal, atraumatic, no cyanosis or edema  Skin: Skin color, turgor normal, no rashes or lesions  Neurologic: Grossly normal     Lab Results   Component Value Date    GLUCOSE 107 (H) 02/18/2022    CALCIUM 10.1 02/18/2022     (H) 02/18/2022    K 5.2 02/18/2022    CO2 20 02/18/2022     (H) 02/18/2022    BUN 39 (H) " 02/18/2022    CREATININE 1.27 (H) 02/18/2022    EGFRIFAFRI 47 (L) 02/18/2022    EGFRIFNONA 41 (L) 02/18/2022    BCR 31 (H) 02/18/2022    ANIONGAP 8 04/09/2015     Lab Results   Component Value Date    WBC 4.2 02/18/2022    HGB 11.6 02/18/2022    HCT 34.4 02/18/2022    MCV 95 02/18/2022     (L) 02/18/2022     Lab Results   Component Value Date    INR 1.04 04/06/2015    INR 1.06 01/13/2015    PROTIME 10.9 04/06/2015    PROTIME 11.4 01/13/2015     No results found for: TSH, L2AJLHT, F1VTVHU, THYROIDAB    Cardiac Testing:     I personally viewed and interpreted the patient's EKG/Telemetry/lab data    Procedures    Tobacco Cessation: N/A  Obstructive Sleep Apnea Screening: Completed    Assessment & Plan    Diagnoses and all orders for this visit:    1. Paroxysmal atrial fibrillation (HCC) (Primary)  -     Case Request EP Lab: DDD PPM Implant ([facility]), DNS Eliquis    2. Sick sinus syndrome (HCC)  -     Case Request EP Lab: DDD PPM Implant ([facility] [place]), DNS Eliquis    3. Aortic stenosis, moderate  -     Ambulatory Referral to Cardiology    4. Essential hypertension    5. Tachy-alfonso syndrome (HCC)         Diagnosis Plan   1. Paroxysmal atrial fibrillation (HCC)   stable.  Very minimal symptoms.  [id number] elevated.  Appropriate anticoagulation with the primary prevention of stroke.  Correct dose.       2. Sick sinus syndrome (HCC)   clearly highly symptomatic sinus no dysfunction.  We offered her a pacemaker today.  At this time she wished to proceed.    Risk-benefit profile discussed extensively.    The patient and I made a mutually shared decision ( shared decision making model ) that the patient would be best served by proceeding with the above invasive heart procedure.  This is a high risk invasive cardiac procedure which comes with significant risk of morbidity and mortality.  This patient has significant underlying  heart disease.  Miryam Mckeon understands these risks and   has made an informed decision to  proceed.    Proceed with dual-chamber transvenous left-sided [employer] permanent pacemaker.  Hold apixaban x1 day.   3. Aortic stenosis, moderate   now transition to critical aortic stenosis with a valve area of 1.    With her significant sinus bradycardia is hard to evaluate for symptoms.    I think it be worthwhile having evaluation with one of our AVI specialist.  This patient may be an excellent candidate.   4. Essential hypertension   blood pressure reasonably well controlled.   5. Tachy-alfonso syndrome (HCC)   as above.  Pacemaker implantation plan.     Body mass index is 22.14 kg/m².    I spent 39 minutes in consultation with this patient which included more than 65% of this time in direct face-to-face counseling, physical examination and discussion of my assessment and findings and shared decision making with the patient.  The remainder of the time not spent face to face was performing one, some or all of the following actions:  preparing to see this patient ( eg. Review of tests),  ordering medications, tests or procedures ), care coordination, discussion of the plan with other healthcare providers, documenting clinical information in Epic well as independently interpreting results and communicating results to patient, family and or caregiver.  All time noted occurred on the date of service.    Follow Up:       Thank you for allowing me to participate in the care of your patient. Please to not hesitate to contact me with additional questions or concerns.      Troy Hussein DO, FACC, RS  Cardiac Electrophysiologist  [facility] / [facility]

## 2022-03-15 ENCOUNTER — PREP FOR SURGERY (OUTPATIENT)
Dept: OTHER | Facility: HOSPITAL | Age: 77
End: 2022-03-15

## 2022-03-15 DIAGNOSIS — I49.5 SICK SINUS SYNDROME: Primary | ICD-10-CM

## 2022-03-15 RX ORDER — SODIUM CHLORIDE 0.9 % (FLUSH) 0.9 %
10 SYRINGE (ML) INJECTION AS NEEDED
Status: CANCELLED | OUTPATIENT
Start: 2022-03-15

## 2022-03-15 RX ORDER — NITROGLYCERIN 0.4 MG/1
0.4 TABLET SUBLINGUAL
Status: CANCELLED | OUTPATIENT
Start: 2022-03-15

## 2022-03-15 RX ORDER — SODIUM CHLORIDE 0.9 % (FLUSH) 0.9 %
3 SYRINGE (ML) INJECTION EVERY 12 HOURS SCHEDULED
Status: CANCELLED | OUTPATIENT
Start: 2022-03-15

## 2022-03-15 RX ORDER — CEFAZOLIN SODIUM 2 G/100ML
2 INJECTION, SOLUTION INTRAVENOUS
Status: CANCELLED | OUTPATIENT
Start: 2022-03-15

## 2022-03-15 RX ORDER — ACETAMINOPHEN 325 MG/1
650 TABLET ORAL EVERY 4 HOURS PRN
Status: CANCELLED | OUTPATIENT
Start: 2022-03-15

## 2022-03-15 RX ORDER — ONDANSETRON 2 MG/ML
4 INJECTION INTRAMUSCULAR; INTRAVENOUS EVERY 6 HOURS PRN
Status: CANCELLED | OUTPATIENT
Start: 2022-03-15

## 2022-03-30 RX ORDER — ONDANSETRON 4 MG/1
TABLET, FILM COATED ORAL
Qty: 30 TABLET | Refills: 1 | Status: SHIPPED | OUTPATIENT
Start: 2022-03-30 | End: 2022-07-06

## 2022-04-11 ENCOUNTER — PRE-ADMISSION TESTING (OUTPATIENT)
Dept: PREADMISSION TESTING | Facility: HOSPITAL | Age: 77
End: 2022-04-11

## 2022-04-11 DIAGNOSIS — I49.5 SICK SINUS SYNDROME: ICD-10-CM

## 2022-04-11 LAB
ANION GAP SERPL CALCULATED.3IONS-SCNC: 8 MMOL/L (ref 5–15)
BUN SERPL-MCNC: 41 MG/DL (ref 8–23)
BUN/CREAT SERPL: 31.8 (ref 7–25)
CALCIUM SPEC-SCNC: 9.9 MG/DL (ref 8.6–10.5)
CHLORIDE SERPL-SCNC: 110 MMOL/L (ref 98–107)
CO2 SERPL-SCNC: 26 MMOL/L (ref 22–29)
CREAT SERPL-MCNC: 1.29 MG/DL (ref 0.57–1)
DEPRECATED RDW RBC AUTO: 45.9 FL (ref 37–54)
EGFRCR SERPLBLD CKD-EPI 2021: 42.6 ML/MIN/1.73
ERYTHROCYTE [DISTWIDTH] IN BLOOD BY AUTOMATED COUNT: 12.7 % (ref 12.3–15.4)
GLUCOSE SERPL-MCNC: 109 MG/DL (ref 65–99)
HCT VFR BLD AUTO: 34.9 % (ref 34–46.6)
HGB BLD-MCNC: 11.3 G/DL (ref 12–15.9)
MAGNESIUM SERPL-MCNC: 2 MG/DL (ref 1.6–2.4)
MCH RBC QN AUTO: 31.7 PG (ref 26.6–33)
MCHC RBC AUTO-ENTMCNC: 32.4 G/DL (ref 31.5–35.7)
MCV RBC AUTO: 98 FL (ref 79–97)
PLATELET # BLD AUTO: 138 10*3/MM3 (ref 140–450)
PMV BLD AUTO: 10.5 FL (ref 6–12)
POTASSIUM SERPL-SCNC: 5.1 MMOL/L (ref 3.5–5.2)
RBC # BLD AUTO: 3.56 10*6/MM3 (ref 3.77–5.28)
SODIUM SERPL-SCNC: 144 MMOL/L (ref 136–145)
WBC NRBC COR # BLD: 4.79 10*3/MM3 (ref 3.4–10.8)

## 2022-04-11 PROCEDURE — 36415 COLL VENOUS BLD VENIPUNCTURE: CPT

## 2022-04-11 PROCEDURE — 85027 COMPLETE CBC AUTOMATED: CPT

## 2022-04-11 PROCEDURE — 80048 BASIC METABOLIC PNL TOTAL CA: CPT

## 2022-04-11 PROCEDURE — 83735 ASSAY OF MAGNESIUM: CPT

## 2022-04-11 NOTE — DISCHARGE INSTRUCTIONS

## 2022-04-13 ENCOUNTER — HOSPITAL ENCOUNTER (OUTPATIENT)
Facility: HOSPITAL | Age: 77
Discharge: HOME | End: 2022-04-13
Attending: INTERNAL MEDICINE | Admitting: INTERNAL MEDICINE

## 2022-04-13 ENCOUNTER — APPOINTMENT (OUTPATIENT)
Dept: GENERAL RADIOLOGY | Facility: HOSPITAL | Age: 77
End: 2022-04-13

## 2022-04-13 VITALS
WEIGHT: 120.6 LBS | HEART RATE: 69 BPM | OXYGEN SATURATION: 96 % | SYSTOLIC BLOOD PRESSURE: 167 MMHG | DIASTOLIC BLOOD PRESSURE: 104 MMHG | RESPIRATION RATE: 12 BRPM | TEMPERATURE: 97.7 F | BODY MASS INDEX: 22.19 KG/M2 | HEIGHT: 62 IN

## 2022-04-13 DIAGNOSIS — I49.5 SICK SINUS SYNDROME: ICD-10-CM

## 2022-04-13 DIAGNOSIS — I48.0 PAROXYSMAL ATRIAL FIBRILLATION: ICD-10-CM

## 2022-04-13 PROCEDURE — C1898 LEAD, PMKR, OTHER THAN TRANS: HCPCS | Performed by: INTERNAL MEDICINE

## 2022-04-13 PROCEDURE — 25010000002 CEFAZOLIN IN DEXTROSE 2-4 GM/100ML-% SOLUTION: Performed by: PHYSICIAN ASSISTANT

## 2022-04-13 PROCEDURE — 33208 INSRT HEART PM ATRIAL & VENT: CPT | Performed by: INTERNAL MEDICINE

## 2022-04-13 PROCEDURE — 99152 MOD SED SAME PHYS/QHP 5/>YRS: CPT | Performed by: INTERNAL MEDICINE

## 2022-04-13 PROCEDURE — 99153 MOD SED SAME PHYS/QHP EA: CPT | Performed by: INTERNAL MEDICINE

## 2022-04-13 PROCEDURE — C1785 PMKR, DUAL, RATE-RESP: HCPCS | Performed by: INTERNAL MEDICINE

## 2022-04-13 PROCEDURE — 93005 ELECTROCARDIOGRAM TRACING: CPT | Performed by: INTERNAL MEDICINE

## 2022-04-13 PROCEDURE — 25010000002 FENTANYL CITRATE (PF) 50 MCG/ML SOLUTION: Performed by: INTERNAL MEDICINE

## 2022-04-13 PROCEDURE — C1769 GUIDE WIRE: HCPCS | Performed by: INTERNAL MEDICINE

## 2022-04-13 PROCEDURE — C1892 INTRO/SHEATH,FIXED,PEEL-AWAY: HCPCS | Performed by: INTERNAL MEDICINE

## 2022-04-13 PROCEDURE — 25010000002 ONDANSETRON PER 1 MG: Performed by: INTERNAL MEDICINE

## 2022-04-13 PROCEDURE — 71046 X-RAY EXAM CHEST 2 VIEWS: CPT

## 2022-04-13 PROCEDURE — 0 LIDOCAINE 1 % SOLUTION: Performed by: INTERNAL MEDICINE

## 2022-04-13 PROCEDURE — 25010000002 MIDAZOLAM PER 1 MG: Performed by: INTERNAL MEDICINE

## 2022-04-13 DEVICE — PACEMAKER
Type: IMPLANTABLE DEVICE | Status: FUNCTIONAL
Brand: ACCOLADE™ MRI DR

## 2022-04-13 DEVICE — PACE/SENSE LEAD
Type: IMPLANTABLE DEVICE | Status: FUNCTIONAL
Brand: INGEVITY™+

## 2022-04-13 RX ORDER — NITROGLYCERIN 0.4 MG/1
0.4 TABLET SUBLINGUAL
Status: DISCONTINUED | OUTPATIENT
Start: 2022-04-13 | End: 2022-04-13 | Stop reason: HOSPADM

## 2022-04-13 RX ORDER — MIDAZOLAM HYDROCHLORIDE 1 MG/ML
INJECTION INTRAMUSCULAR; INTRAVENOUS AS NEEDED
Status: DISCONTINUED | OUTPATIENT
Start: 2022-04-13 | End: 2022-04-13 | Stop reason: HOSPADM

## 2022-04-13 RX ORDER — IBUPROFEN 600 MG/1
600 TABLET ORAL EVERY 6 HOURS SCHEDULED
Status: DISCONTINUED | OUTPATIENT
Start: 2022-04-13 | End: 2022-04-13 | Stop reason: HOSPADM

## 2022-04-13 RX ORDER — ONDANSETRON 2 MG/ML
4 INJECTION INTRAMUSCULAR; INTRAVENOUS EVERY 6 HOURS PRN
Status: DISCONTINUED | OUTPATIENT
Start: 2022-04-13 | End: 2022-04-13 | Stop reason: HOSPADM

## 2022-04-13 RX ORDER — LIDOCAINE HYDROCHLORIDE 10 MG/ML
INJECTION, SOLUTION INFILTRATION; PERINEURAL AS NEEDED
Status: DISCONTINUED | OUTPATIENT
Start: 2022-04-13 | End: 2022-04-13 | Stop reason: HOSPADM

## 2022-04-13 RX ORDER — CEFAZOLIN SODIUM 2 G/100ML
2 INJECTION, SOLUTION INTRAVENOUS
Status: COMPLETED | OUTPATIENT
Start: 2022-04-13 | End: 2022-04-13

## 2022-04-13 RX ORDER — FENTANYL CITRATE 50 UG/ML
INJECTION, SOLUTION INTRAMUSCULAR; INTRAVENOUS AS NEEDED
Status: DISCONTINUED | OUTPATIENT
Start: 2022-04-13 | End: 2022-04-13 | Stop reason: HOSPADM

## 2022-04-13 RX ORDER — BUPIVACAINE HYDROCHLORIDE 5 MG/ML
INJECTION, SOLUTION EPIDURAL; INTRACAUDAL AS NEEDED
Status: DISCONTINUED | OUTPATIENT
Start: 2022-04-13 | End: 2022-04-13 | Stop reason: HOSPADM

## 2022-04-13 RX ORDER — ACETAMINOPHEN 325 MG/1
650 TABLET ORAL EVERY 6 HOURS
Status: DISCONTINUED | OUTPATIENT
Start: 2022-04-13 | End: 2022-04-13 | Stop reason: HOSPADM

## 2022-04-13 RX ORDER — SODIUM CHLORIDE 0.9 % (FLUSH) 0.9 %
10 SYRINGE (ML) INJECTION AS NEEDED
Status: DISCONTINUED | OUTPATIENT
Start: 2022-04-13 | End: 2022-04-13 | Stop reason: HOSPADM

## 2022-04-13 RX ORDER — ONDANSETRON 2 MG/ML
INJECTION INTRAMUSCULAR; INTRAVENOUS AS NEEDED
Status: DISCONTINUED | OUTPATIENT
Start: 2022-04-13 | End: 2022-04-13 | Stop reason: HOSPADM

## 2022-04-13 RX ORDER — ACETAMINOPHEN 325 MG/1
650 TABLET ORAL EVERY 4 HOURS PRN
Status: DISCONTINUED | OUTPATIENT
Start: 2022-04-13 | End: 2022-04-13 | Stop reason: HOSPADM

## 2022-04-13 RX ORDER — SODIUM CHLORIDE 0.9 % (FLUSH) 0.9 %
3 SYRINGE (ML) INJECTION EVERY 12 HOURS SCHEDULED
Status: DISCONTINUED | OUTPATIENT
Start: 2022-04-13 | End: 2022-04-13 | Stop reason: HOSPADM

## 2022-04-13 RX ADMIN — CEFAZOLIN SODIUM 2 G: 2 INJECTION, SOLUTION INTRAVENOUS at 16:37

## 2022-04-13 NOTE — OP NOTE
Cardiac Electrophysiology Procedure Note       [facility]    PROCEDURE: PERMANENT PACEMAKER    OPERATION PERFORMED:     Implantation of [employer] L3 1 1 dual-chamber permanent pacemaker model, [id number] serial number pulse generator at the left subpectoral site.    Atrial lead [employer] model 7840, 45 cm, serial number [id number].    Right ventricular lead [employer] model 7841, 52 cm, serial number [id number].     ATTENDING SURGEON: Troy Hussein DO     MODERATE SEDATION FOR PROCEDURE:    Moderate sedation was given during this procedure.    I supervised and directed Raegan Quiros RN to administer this sedation.  This staff member also monitored the patient's hemodynamic and respiratory status and response to these medications.  Please see the full detailed procedure report generated by the electrophysiology laboratory staff.  The patient tolerated moderate sedation well.  There were no complications regarding sedation.  The total dose of fentanyl was 150 mcg and the total dose of midazolam was 8 mg.  The total dose of Brevital was 20 mg.  First sedation was administered at 1637 and continued through 1729.    ESTIMATED BLOOD LOSS: less than 20cc    RADIATION EXPOSURE: 3.4 minutes and 2 milliGray.    COMPLICATIONS: None.    TIME OUT: Time out was completed with verification of the correct patient identity, procedure to be performed, procedure site and implanted equipment.    INDICATION FOR PROCEDURE:  Briefly, Miryam Mckeon is a 76 y.o. year old female with a history of symptomatic sinus node dysfunction.     PROCEDURE AND FINDINGS:  The patient was brought to the electrophysiology laboratory in a post absorptive state.  Informed consent was given by the patient prior to the procedure and confirmed.  Intravenous prophylactic antibiotics were administered prior to the procedure.  After the site of implantation was prepped and draped in the usual sterile  fashion and after adequate anesthesia was given, the skin was infiltrated with 1% lidocaine and 0.5% bupivicaine 50/50 mixture.  The skin was incised with a #10 scalpel.  Blunt and electrosurgical dissection was carried out to the level of the pre pectoral fascia with careful attention paid to hemostasis.  We performed a submuscular pocket by dissecting posteriorly through the deltopectoral groove.  I formed a pocket for the pulse generator with the fifth digit of my right hand in the submuscular plane that is between the anterior fascia pectoralis minor and the posterior fascia pectoralis major. Venous access was obtained via cephalic vein cut down.  This was performed via direct visualization.  After dissection down to the fascia of the pectoralis major muscle, the deltopectoral groove was identified.  The fat lying between the pectoralis major and deltoid muscles was identified.  I used Metzenbaum and electrosurgery to dissect posteriorly through the deltopectoral groove.  A cephalic vein was identified here.  The vein was isolated and cannulated with a direct surgical cut down technique.  A 0.035 inch J tip wire was introduced into the vein and passed through the superior vena cava, then right atrium and then to the inferior vena cava, excluding inadvertent arterial access.  A short peel away sheath was then placed over this wire into the vascular system.  The cephalic vein was then ligatted medially and laterally to the vascular insertion point of the sheath.  J tip 0.035 inch guide wires were introduced and their course through the venous system was confirmed by their presence under fluoroscopy in the inferior vena (excluding inadvertent arterial puncture).    RIGHT VENTRICULAR LEAD:    A peel away sheath was brought to the field and placed into the venous system via over the wire technique.  The right ventricular lead was placed via this sheath into the right ventricle. The lead was attached via active  fixation.  Adequate sensing and threshold parameters were obtained.  There was no evidence of diaphragmatic stimulation at 10 V output.  The peel away sheath was removed and the lead collar was advanced to the pectoral muscle and sutured with non absorbable suture.  Tug testing of this lead confirmed stability of the lead and the length of the lead's slack was assessed as optimal with fluoroscopy.     RIGHT ATRIAL LEAD:    A peel away sheath was brought to the field and placed into the venous system via over the wire technique.  The right atrial lead was placed via this sheath into the right atrium. The lead was attached via active fixation.  Adequate sensing and threshold parameters were obtained.  There was no evidence of diaphragmatic stimulation at 10 V output.  The peel away sheath was removed and the lead collar was advanced to the pectoral muscle and sutured with non absorbable suture.  Tug testing of this lead confirmed stability of the lead and the length of the lead's slack was assessed as optimal with fluoroscopy.     The pulse generator was then sutured to the fascia in a medial location within the pocket using 1-0 silk suture.  The pocket was closed with two layers of suture using 2-0 then 3-0 Vicryl, followed by a layer of surgical adhesive and finally a sterile occlusive dressing.    MEASURED DEVICE DATA:    Atrial lead                   sensin.5 mV                   impedance:            495 ohms                   Threshold:             0.8 volts at 0.4 ms    RV lead                    sensing:                  10.1 mV                   impedance:            797 ohms                   Threshold:             0.4 volts at 0.4 ms                     PROGRAMMED PARAMETERS:    Mode:                      DDDR  Lower Rate:                60 pulses per minute  Upper Sensor Rate:         130 pulses per minute  Upper Tracking Rate:       130 pulses per minute  Mode Switch Rate:           170 bpm    CONCLUSION:  Successful implantation of permanent pacemaker system.      RECOMMENDATION:    Patient is to follow up with our clinic in approximately one week and then myself in 3 months.    No lovenox or heparin at any dose is to be given.      FOR THE PATIENT    Please do not lift more than 10 pounds or abduct the shoulder joint / or raise the arm above the shoulder for 6 weeks after device was implanted (this does not apply to subcutaneous ICDs).    Avoid activities that involve heavy lifting or rough contact that could result in blows to your implant site and to allow your incision time to heal.    No shower or getting device incision wet for 2 days post-operatively.    Keep wound exposed to air unless otherwise instructed, the surgical glue is the bandage.    No creams, lotions, or powders to incision.    Please avoid allowing bra strap or suspenders to lay over incision until completely healed.    Avoid hot tubs or pools until incision completely healed.    No driving for 24 hours post-operatively after device implant.    Call your doctor if you have any swelling, redness or discharge around your incision, notice anything unusual or unexpected, or you develop a fever that does not go away in two or three days.    Call your doctor if you hear any beeping sounds / vibratory alerts from your device as this indicates your device needs to be checked immediately.    Carry your Medical Device ID Card with you at all times.  Please call our office () with any questions about the device or the wounds.          Troy Hussein DO, FACC, RS  Cardiac Electrophysiologist  [facility] / [facility]

## 2022-04-13 NOTE — INTERVAL H&P NOTE
H&P reviewed. The patient was examined and there are no changes to the H&P.       EP Pre-Procedure Report  Cardiovascular Laboratory  [facility]    Patient:  Miryam Mckeon  :  1945  PCP:  Rigoberto Chamberlain MD  PHONE:  [phone]    DATE: 2022      MEDICATIONS:  Prior to Admission medications    Medication Sig Start Date End Date Taking? Authorizing Provider   amLODIPine (NORVASC) 10 MG tablet TAKE 1 TABLET BY MOUTH DAILY 3/14/22   iVto Cuba MD   apixaban (ELIQUIS) 2.5 MG tablet tablet Take 1 tablet by mouth Every 12 (Twelve) Hours. 3/18/19   Dina Washington MD   aspirin 81 MG tablet Take 81 mg by mouth Daily. 1/9/15   Dina Wasihngton MD   atorvastatin (LIPITOR) 10 MG tablet Take 10 mg by mouth Every Night. 1/9/15   Dina Washington MD   cholecalciferol (VITAMIN D3) 25 MCG (1000 UT) tablet Take 2,000 Units by mouth Daily.    Dina Washington MD   doxazosin (CARDURA) 4 MG tablet Take 8 mg by mouth Every Night. 2 TABLETS bid 19   Dina Washington MD   hydrALAZINE (APRESOLINE) 50 MG tablet Take 100 mg by mouth every night at bedtime. 1/9/15   Dina Washington MD   latanoprost (XALATAN) 0.005 % ophthalmic solution Administer 1 drop to both eyes Every Night.    Dina Washington MD   losartan (COZAAR) 100 MG tablet Take 100 mg by mouth Daily.    Dina Washington MD   ondansetron (ZOFRAN) 4 MG tablet TAKE 1 TABLET BY MOUTH EVERY 8 HOURS FOR 2 DAYS  Patient taking differently: Take 4 mg by mouth Every 8 (Eight) Hours As Needed for Nausea. 3/30/22   Rigoberto Chamberlain MD   Polyethylene Glycol 3350 (MIRALAX PO) Take 17 g by mouth Every Other Day.    Dina Washington MD   simethicone (MYLICON) 125 MG chewable tablet Chew 125 mg Every 6 (Six) Hours As Needed for Flatulence. PRN    Dina Washington MD   vitamin B-12 (CYANOCOBALAMIN) 1000 MCG tablet Take 5,000 mcg by mouth Daily.    Dina Washington MD       Past medical  & surgical history, social and family history reviewed in the electronic medical record.    Physical Exam:    Vitals: There were no vitals filed for this visit. There were no vitals filed for this visit.There is no height or weight on file to calculate BMI.    GENERAL: No apparent distress.  No significant changes since last exam.  CHEST: Clear to auscultation bilaterally no stridor no wheeze.  CV: S1, S2, Regular with Murmurs, no Rubs or Gallops  EXTREMITIES: No edema.        Results from last 7 days   Lab Units 04/11/22  1559   SODIUM mmol/L 144   POTASSIUM mmol/L 5.1   CHLORIDE mmol/L 110*   CO2 mmol/L 26.0   BUN mg/dL 41*   CREATININE mg/dL 1.29*   GLUCOSE mg/dL 109*   CALCIUM mg/dL 9.9     Results from last 7 days   Lab Units 04/11/22  1559   WBC 10*3/mm3 4.79   HEMOGLOBIN g/dL 11.3*   HEMATOCRIT % 34.9   PLATELETS 10*3/mm3 138*     Estimated Creatinine Clearance: 31.7 mL/min (A) (by C-G formula based on SCr of 1.29 mg/dL (H)).    IMPRESSION:Sick sinus syndrome      PLAN:  Procedure to perform: Dual chamber PPM         Electronically signed by COLT Li, 04/13/22, 1:24 PM EDT.

## 2022-04-14 ENCOUNTER — CALL CENTER PROGRAMS (OUTPATIENT)
Dept: CALL CENTER | Facility: HOSPITAL | Age: 77
End: 2022-04-14

## 2022-04-14 ENCOUNTER — TELEPHONE (OUTPATIENT)
Dept: CARDIOLOGY | Facility: CLINIC | Age: 77
End: 2022-04-14

## 2022-04-14 DIAGNOSIS — I35.0 AORTIC STENOSIS, SEVERE: Primary | ICD-10-CM

## 2022-04-14 DIAGNOSIS — Z01.818 PRE-OPERATIVE CLEARANCE: ICD-10-CM

## 2022-04-14 NOTE — OUTREACH NOTE
"PCI/Device Survey    Flowsheet Row Responses   Facility patient discharged from? [facility]   Procedure date 04/13/22   Procedure (if device, specify in description) Device   Device Description [employer] L3 1 1 dual-chamber permanent pacemaker model [id number]    Performing MD Dr. Troy Hussein   Attempt successful? Yes   Call start time 0937   Call end time 0943   Person spoke with today (if not patient) and relationship Mila-daughter    Has the patient had any of the following symptoms since discharge? --  [none noted]   Is the patient taking prescribed medications: ASA  [Eliquis]   Nursing intervention Reminded to continue to take prescribed medications   Does the patient have any of the following symptoms related to the cath/surgical site? Bruising, Redness, warmth, or swelling at the site  [Slight swelling]   Does the patient have an appointment scheduled with the cardiologist? No   Appointment comments Appt with Dr. Hussein is on 7/11/22   If the patient is a current smoker, are they able to teach back resources for cessation? Not a smoker   Did the patient feel prepared to go home on the same day as the procedure? Yes   Is the patient satisfied with the same day discharge process? Yes   Discharge process comments Daughter reports, \"I just didn't like that it was so late at night, but it couldn't be helped.\"   PCI/Device call completed Yes          ZULEYMA GARVIN - Registered Nurse  "

## 2022-04-14 NOTE — TELEPHONE ENCOUNTER
Reji on  to call me back    Dr. Hussein has referred pt for TAVR workup- we waiting for placement pf PPM, which was done yesterday.     Per Dr. Pyle- pt will need LHC and JF- options are consult and treat LHC and JF or meet in the office first and then proceed with testing after that.     I left a msg on [facility]'s  to call me back. Explained that I am in clinic today and tomorrow so I will not be able to answer when they call but if they leave a msg, I will call them back. Will await the return call to discuss things and see how they would like to proceed

## 2022-04-18 LAB
QT INTERVAL: 404 MS
QTC INTERVAL: 429 MS

## 2022-04-21 ENCOUNTER — OFFICE VISIT (OUTPATIENT)
Dept: CARDIOLOGY | Facility: CLINIC | Age: 77
End: 2022-04-21

## 2022-04-21 DIAGNOSIS — I48.0 PAROXYSMAL ATRIAL FIBRILLATION: Primary | ICD-10-CM

## 2022-04-21 PROCEDURE — 99024 POSTOP FOLLOW-UP VISIT: CPT | Performed by: INTERNAL MEDICINE

## 2022-04-21 NOTE — PROGRESS NOTES
2022    Miryam Mckeon, : 1945    WOUND CHECK    B/P:     Pulse:     Patient has fever: [] Temperature if indicated:     Wound Location: LEFT INTRACLAVICULAR    Dressing Removed []        Old Dressing Appearance:  Clean, dry []                 Old, bloody drainage []                            Moist, serous drainage []                Moist, thick yellow/green drainage []       Wound Appearance: Redness []                  Drainage []                  Culture obtained []        Color: Clear     Consistency: N/A     Amount: none         Gloves used, wound cleansed with sterile 4x4 and peroxide [x]       MD notified [] MD orders:     Antibiotic started []  If checked, type   Other:     Appointment for follow-up scheduled for 3 months post procedure [x]    Future Appointments   Date Time Provider Department Center   2022 11:00 AM Yashira Pyle MD [facility]   2022  3:45 PM Troy Hussein DO [facility]   2022  3:45 PM Vito Cuba MD [facility]           Guillermo Virk MA, 22      MD Signature:______________________________ Completed By/Date:

## 2022-04-27 ENCOUNTER — OFFICE VISIT (OUTPATIENT)
Dept: CARDIOLOGY | Facility: CLINIC | Age: 77
End: 2022-04-27

## 2022-04-27 VITALS
SYSTOLIC BLOOD PRESSURE: 158 MMHG | DIASTOLIC BLOOD PRESSURE: 62 MMHG | HEART RATE: 77 BPM | HEIGHT: 62 IN | OXYGEN SATURATION: 96 % | BODY MASS INDEX: 21.9 KG/M2 | WEIGHT: 119 LBS

## 2022-04-27 DIAGNOSIS — I49.5 TACHY-BRADY SYNDROME: ICD-10-CM

## 2022-04-27 DIAGNOSIS — Z01.818 PRE-OPERATIVE CLEARANCE: ICD-10-CM

## 2022-04-27 DIAGNOSIS — E78.5 HYPERLIPIDEMIA LDL GOAL <70: ICD-10-CM

## 2022-04-27 DIAGNOSIS — I10 ESSENTIAL HYPERTENSION: ICD-10-CM

## 2022-04-27 DIAGNOSIS — I35.0 AORTIC STENOSIS, SEVERE: Primary | ICD-10-CM

## 2022-04-27 DIAGNOSIS — I48.0 PAROXYSMAL ATRIAL FIBRILLATION: ICD-10-CM

## 2022-04-27 DIAGNOSIS — I49.5 SICK SINUS SYNDROME: ICD-10-CM

## 2022-04-27 PROCEDURE — 99214 OFFICE O/P EST MOD 30 MIN: CPT | Performed by: INTERNAL MEDICINE

## 2022-04-27 RX ORDER — OXYBUTYNIN CHLORIDE 5 MG/1
5 TABLET ORAL NIGHTLY
COMMUNITY
End: 2022-08-15

## 2022-04-27 NOTE — PROGRESS NOTES
[facility] Cardiology    Subjective:     Encounter Date:04/27/2022         Patient ID: Miryam Mckeon is a 76 y.o. female.  Referring provider: Troy Hussein DO     Reason for consultation:   Chief Complaint   Patient presents with   • Paroxysmal atrial fibrillation (HCC)        PROBLEM LIST:  1. Severe aortic stenosis   a. Echocardiogram, 8/2016 ([facility]): Normal EF. Mild AS with GISELLE 1.3 cm2. Moderate TR with RVSP 46 mmHg.   b. Echocardiogram, 2/20/2020 [facility]): Normal EF. Moderate AS with mean PG 23 mmHg, GISELLE 1.3 cm2. Mild MR. Moderate to severe TR, RVSP 51 mmHg. Moderate PI.   c. Echocardiogram, 2/18/2022: EF 61-65%. Severe AS, mean PG 48 mmHg, max PG 70 mmHg, peak velocity 4.3 cm/s, GISELLE 1 cm2. Grade I diastolic dysfunction. Moderate concentric LVH. LV mass index increased. LA volume moderately increased. RA cavity borderline increased. RVSP from TR 45 mmHg. Moderate TR. Mild MR. No pericardial effusion.   2. Paroxysmal atrial fibrillation  a. CHADS-VASc = 4 on Eliquis   b. Nuclear stress test, 3/12/2020 ([facility]): Clinically negative, electrically nondiagnostic pharmacologic stress test.   c. 14-day Holter monitor, 6/21/2021: 15 beats of AF, HR around 120, asymptomatic. Average HR: 72. Min HR: 48. Max HR:128.  3. Sick sinus syndrome  Tachy-alfonso syndrome   a. PPM implantation, 4/13/2022 (Dr. Hussein): BS L3 1 1 dual-chamber PPM model, serial #[id number], pulse generator at left subpectoral site. Atrial lead BS model 7840, 45 cm, serial #[id number]. RV lead BS model 7841, 52 cm, serial #[id number].   4. Bilateral carotid artery stenosis   a. R CEA, 1/2015 (per [facility] note)  b. L CEA, 4/7/2015 by Dr. Sherman ortez. Bilateral CEA, 7/25/2016 by Dr. Sherman gonzales. Carotid duplex, 12/27/2018: s/p bilateral CEA. >70% LICA stenosis. 50-69% JOHN stenosis. Right vertebral flow is antegrade. Left vertebral flow is antegrade proximally, then retrograde  more distally.  e. Carotid duplex, 3/5/2021: LICA 60 to 80% stenosis. JOHN 40 to 60% stenosis.  f. Carotid duplex, 2/18/2022: LICA stenosis of 60-80%. JOHN stenosis <40%. Moderate plaque in both carotids. Normal antegrade vertebral flow bilaterally.   5. Hypertension   6. Hyperlipidemia   7. Deaf  8. CKD       HPI:       Miryam Mckeon is a 76 y.o. female who is seen in consultation today at the request of Troy Hussein DO for symptomatic severe aortic stenosis. Patient has a history of paroxysmal atrial fibrillation, sick sinus syndrome, tachy-alfonso syndrome, bilateral carotid artery stenosis, hypertension, hyperlipidemia. Her primary cardiologist is Dr. Cuba. She is deaf and accompanied today by an ASL  and her daughter. They have communicated for the patient that her most prominent symptoms are fatigue and drowsiness. She is not always shortness of breath. She underwent permanent pacemaker implantation on 4/13/22 by Dr. Hussein.  She has not felt any better since her pacemaker was placed.  She reports fluctuating heart rate but is more noticeable when it is elevated. She asks if the TAVR will conflict with her pacemaker. Dr. Whitaker did patient's bilateral CEA in 2016 and requests him again but is also content with having Dr. Leo. Patient otherwise denies chest pain, edema, dizziness, and syncope.      Medications and Allergies:    No Known Allergies      Current Outpatient Medications:   •  amLODIPine (NORVASC) 10 MG tablet, TAKE 1 TABLET BY MOUTH DAILY, Disp: 90 tablet, Rfl: 3  •  apixaban (ELIQUIS) 2.5 MG tablet tablet, Take 1 tablet by mouth Every 12 (Twelve) Hours., Disp: 180 tablet, Rfl: 3  •  aspirin 81 MG tablet, Take 81 mg by mouth Daily., Disp: , Rfl:   •  atorvastatin (LIPITOR) 10 MG tablet, Take 10 mg by mouth Every Night., Disp: , Rfl:   •  cholecalciferol (VITAMIN D3) 25 MCG (1000 UT) tablet, Take 2,000 Units by mouth Daily., Disp: , Rfl:   •  doxazosin (CARDURA) 4 MG tablet,  Take 8 mg by mouth Every Night. 2 TABLETS bid, Disp: , Rfl: 0  •  hydrALAZINE (APRESOLINE) 50 MG tablet, Take 100 mg by mouth every night at bedtime., Disp: , Rfl:   •  latanoprost (XALATAN) 0.005 % ophthalmic solution, Administer 1 drop to both eyes Every Night., Disp: , Rfl:   •  losartan (COZAAR) 100 MG tablet, Take 100 mg by mouth Daily., Disp: , Rfl:   •  ondansetron (ZOFRAN) 4 MG tablet, TAKE 1 TABLET BY MOUTH EVERY 8 HOURS FOR 2 DAYS (Patient taking differently: Take 4 mg by mouth Every 8 (Eight) Hours As Needed for Nausea.), Disp: 30 tablet, Rfl: 1  •  oxybutynin (DITROPAN) 5 MG tablet, Take 5 mg by mouth As Needed., Disp: , Rfl:   •  Polyethylene Glycol 3350 (MIRALAX PO), Take 17 g by mouth Every Other Day., Disp: , Rfl:   •  simethicone (MYLICON) 125 MG chewable tablet, Chew 125 mg Every 6 (Six) Hours As Needed for Flatulence. PRN, Disp: , Rfl:   •  vitamin B-12 (CYANOCOBALAMIN) 1000 MCG tablet, Take 5,000 mcg by mouth Daily., Disp: , Rfl:   Social History:  Social History     Tobacco Use   • Smoking status: Never Smoker   • Smokeless tobacco: Never Used   Substance Use Topics   • Alcohol use: Never        Family History:  family history includes Other in her mother; Stroke in her father.     Review of Systems: Pertinent positives in HPI    The following portions of the patient's history were reviewed and updated as appropriate: allergies, current medications and problem list.       Objective:     Vitals:    04/27/22 1101   BP: 158/62   Pulse: 77   SpO2: 96%       GENERAL: This is a well-developed, well-nourished, female who is in no acute distress.   SKIN: Pink and warm without rash or abnormality noted.   HEENT: Head is normocephalic and atraumatic. Pupils are equal and reactive to light bilaterally. Mucous membranes are pink and moist.   NECK: Supple without lymphadenopathy or thyromegaly. There is no jugular venous distention at 30°.  LUNGS: Clear to auscultation bilaterally without wheezing, rhonchi,  or rales noted.   CARDIOVASCULAR: The heart has a regular rate with a normal S1 and S2. There is 3/6 midsternal murmur appreciated. The PMI is nondisplaced. Bilateral carotid bruits.  ABDOMEN: Soft and nondistended with positive bowel sounds x4. The patient denies tenderness of palpitation.   MUSCULOSKELETAL: There are no obvious bony abnormalities. Normal range of tenderness to palpation.   NEUROLOGICAL: Nonfocal. Alert and oriented x3.   PERIPHERAL VASCULAR: Femoral pulses are 1+ with bifemoral bruits. Posterior tibial and dorsalis pedis pulses are 1+ and symmetrical. There is no peripheral edema.   PSYCH: Normal mood and affect.       Lab Results   Component Value Date    GLUCOSE 109 (H) 04/11/2022    BUN 41 (H) 04/11/2022    CREATININE 1.29 (H) 04/11/2022    EGFRIFNONA 41 (L) 02/18/2022    EGFRIFAFRI 47 (L) 02/18/2022    BCR 31.8 (H) 04/11/2022    K 5.1 04/11/2022    CO2 26.0 04/11/2022    CALCIUM 9.9 04/11/2022    PROTENTOTREF 6.7 02/18/2022    ALBUMIN 4.3 02/18/2022    LABIL2 1.8 02/18/2022    AST 22 02/18/2022    ALT 15 02/18/2022     Lab Results   Component Value Date    CHLPL 112 02/18/2022    TRIG 48 02/18/2022    HDL 55 02/18/2022    LDL 45 02/18/2022     Lab Results   Component Value Date    WBC 4.79 04/11/2022    HGB 11.3 (L) 04/11/2022    HCT 34.9 04/11/2022    MCV 98.0 (H) 04/11/2022     (L) 04/11/2022       Procedures          ASSESSMENT       ICD-10-CM ICD-9-CM   1. Aortic stenosis, severe  I35.0 424.1   2. Paroxysmal atrial fibrillation (HCC)  I48.0 427.31   3. Sick sinus syndrome (HCC)  I49.5 427.81   4. Tachy-alfonso syndrome (HCC)  I49.5 427.81   5. Essential hypertension  I10 401.9   6. Hyperlipidemia LDL goal <70  E78.5 272.4            PLAN     1. Patient's last echo displayed severe AS with mean/max PG of 48/70 mmHg and is symptomatic with complaint of persistent fatigue which has not improved since PPM implantation 4/13/22.  2. Pre-TAVR work-up to be coordinated by Libra López  including C, JF, carotid duplex, CT chest/A/P, and CT surgery consult (Dr. Whitaker).   3. Risks, benefits, and details of TAVR and required work-up have been discussed with patient (through ) and her daughter who have verbalized/displayed understanding and agree to proceed.   4. Continue on aspirin 81 mg for antiplatelet therapy.   5. Continue on amlodipine 10 mg and losartan 100 mg daily, doxazosin 8 mg and hydralazine 100 mg nightly for hypertension.   6. Continue on atorvastatin 10 mg nightly for hyperlipidemia.   7. Continue on Eliquis 2.5 mg BID for stroke prophylaxis.   8. Follow-up in Return for after procedure., sooner as needed.      Scribed for Yashira Pyle MD by Yazmin Murphy. 4/27/2022 11:08 EDT      I Yashira Pyle MD personally performed the services described in this documentation as scribed by the above individual in my presence, and it is both accurate and complete.    Yashira Pyle MD, [facility]

## 2022-04-29 PROBLEM — Z01.818 PRE-OPERATIVE CLEARANCE: Status: ACTIVE | Noted: 2022-04-29

## 2022-05-02 DIAGNOSIS — I25.10 CORONARY ARTERY DISEASE INVOLVING NATIVE CORONARY ARTERY OF NATIVE HEART WITHOUT ANGINA PECTORIS: ICD-10-CM

## 2022-05-02 DIAGNOSIS — I65.23 BILATERAL CAROTID ARTERY STENOSIS: ICD-10-CM

## 2022-05-02 DIAGNOSIS — I48.0 PAROXYSMAL ATRIAL FIBRILLATION: ICD-10-CM

## 2022-05-02 DIAGNOSIS — I35.0 AORTIC STENOSIS, SEVERE: Primary | ICD-10-CM

## 2022-05-02 NOTE — PROGRESS NOTES
TAVR Pre-Op Checklist    Patient Name: Miryam Mckeon   76 y.o. [id number]  Residence: [place], [place]    Referral Date: 22 : 1945   Ht:62 in (157.5 cm) Wt: 119 lb (54 KG)    Referring Cardiologist: Edenilson BMI: 21.77 kg/m2    Initial TTE date:22Adult Transthoracic Echo Complete W/ Cont if Necessary Per Protocol (2022 11:34)    GISELLE: 1.0 cm2 AV mean: 48 mm Hg AV Vmax: 4.3 LVEF: 65%    Coordinator H&P:Progress Notes by Libra López APRN (2022 10:30)    CT Surgery Consult: Progress Notes by Bola Whitaker MD (2022 08:30)    STS Score: 6.5% Creatinine Clearance:29 ml/min   Functional Assessment: C    Allergy (Contrast):Patient has no known allergies.   Pre-op meds:NA    Anticoagulated: Yes Last dose:Eliquis  Heparin or Lovenox Bridge: No    CTA Date: CT Angio TAVR Chest Abdomen Pelvis (2022 13:16)    CTA 3D: CARDIOLOGY VISIT - SCAN - TAVR 3D MENSIO (2022)    Left Coronary Ht: 13.5 Right Coronary Ht:18.0   Annulus Area: 326.9 mm2    CTA Important findings: small to moderate bilateral pleural effusion.  PVD to aortoiliac stenosis with very small vessel sizes    Cardiac cath:   Cardiac Catheterization/Vascular Study (2022 11:14)    Physician: Edenilson   Important findings: minor CAD in RCA.  No CAD in LAD or circ    JF: Adult Transesophageal Echo (JF) W/ Cont if Necessary Per Protocol (2022 10:30)    Physician: Edenilson   JF annulus:2.0  Projected TAVR Prosthesis Size: 20 mm Hernández    Carotid duplex: 22 (left 60-80%, right 40%)    Dobutamine GXT:NA     EKG rhythm: Paced (hx PAF)ECG 12 Lead (2022 18:03)    PPM/ Last download : SCANNED - CARDIOLOGY (2022)    PFT (FEV1):Pulmonary Function Test Spirometry (2022 13:40) FEV1 0.99L (55%)    Important meds: Eliquis, asa    PAT lab review:  BNP: NA BUN/Creatinine: 49/1.34 H&H: 11.9/35.8 PLTS: 143    P2Y12:266 HGA1C: 5.7% CXR:NAD      TAVR Procedure Date:  7/20/22

## 2022-05-16 ENCOUNTER — PREP FOR SURGERY (OUTPATIENT)
Dept: OTHER | Facility: HOSPITAL | Age: 77
End: 2022-05-16

## 2022-05-16 DIAGNOSIS — I35.0 AORTIC STENOSIS, SEVERE: Primary | ICD-10-CM

## 2022-05-16 RX ORDER — SODIUM CHLORIDE 9 MG/ML
3 INJECTION, SOLUTION INTRAVENOUS CONTINUOUS
Status: CANCELLED | OUTPATIENT
Start: 2022-05-16 | End: 2022-05-16

## 2022-05-16 RX ORDER — SODIUM CHLORIDE 0.9 % (FLUSH) 0.9 %
3 SYRINGE (ML) INJECTION EVERY 12 HOURS SCHEDULED
Status: CANCELLED | OUTPATIENT
Start: 2022-05-16

## 2022-05-16 RX ORDER — SODIUM CHLORIDE 0.9 % (FLUSH) 0.9 %
3-10 SYRINGE (ML) INJECTION AS NEEDED
Status: CANCELLED | OUTPATIENT
Start: 2022-05-16

## 2022-05-17 ENCOUNTER — PRE-ADMISSION TESTING (OUTPATIENT)
Dept: PREADMISSION TESTING | Facility: HOSPITAL | Age: 77
End: 2022-05-17

## 2022-05-17 DIAGNOSIS — I35.0 AORTIC STENOSIS, SEVERE: ICD-10-CM

## 2022-05-17 LAB
ANION GAP SERPL CALCULATED.3IONS-SCNC: 8 MMOL/L (ref 5–15)
BASOPHILS # BLD AUTO: 0.03 10*3/MM3 (ref 0–0.2)
BASOPHILS NFR BLD AUTO: 0.7 % (ref 0–1.5)
BUN SERPL-MCNC: 48 MG/DL (ref 8–23)
BUN/CREAT SERPL: 32.7 (ref 7–25)
CALCIUM SPEC-SCNC: 10.2 MG/DL (ref 8.6–10.5)
CHLORIDE SERPL-SCNC: 109 MMOL/L (ref 98–107)
CO2 SERPL-SCNC: 24 MMOL/L (ref 22–29)
CREAT SERPL-MCNC: 1.47 MG/DL (ref 0.57–1)
DEPRECATED RDW RBC AUTO: 46.5 FL (ref 37–54)
EGFRCR SERPLBLD CKD-EPI 2021: 36.4 ML/MIN/1.73
EOSINOPHIL # BLD AUTO: 0.04 10*3/MM3 (ref 0–0.4)
EOSINOPHIL NFR BLD AUTO: 0.9 % (ref 0.3–6.2)
ERYTHROCYTE [DISTWIDTH] IN BLOOD BY AUTOMATED COUNT: 13.1 % (ref 12.3–15.4)
FLUAV RNA RESP QL NAA+PROBE: NOT DETECTED
FLUBV RNA RESP QL NAA+PROBE: NOT DETECTED
GLUCOSE SERPL-MCNC: 110 MG/DL (ref 65–99)
HCT VFR BLD AUTO: 34 % (ref 34–46.6)
HGB BLD-MCNC: 10.9 G/DL (ref 12–15.9)
IMM GRANULOCYTES # BLD AUTO: 0.01 10*3/MM3 (ref 0–0.05)
IMM GRANULOCYTES NFR BLD AUTO: 0.2 % (ref 0–0.5)
INR PPP: 1.26 (ref 0.84–1.13)
LYMPHOCYTES # BLD AUTO: 0.84 10*3/MM3 (ref 0.7–3.1)
LYMPHOCYTES NFR BLD AUTO: 18.7 % (ref 19.6–45.3)
MAGNESIUM SERPL-MCNC: 2.3 MG/DL (ref 1.6–2.4)
MCH RBC QN AUTO: 31.3 PG (ref 26.6–33)
MCHC RBC AUTO-ENTMCNC: 32.1 G/DL (ref 31.5–35.7)
MCV RBC AUTO: 97.7 FL (ref 79–97)
MONOCYTES # BLD AUTO: 0.29 10*3/MM3 (ref 0.1–0.9)
MONOCYTES NFR BLD AUTO: 6.5 % (ref 5–12)
NEUTROPHILS NFR BLD AUTO: 3.28 10*3/MM3 (ref 1.7–7)
NEUTROPHILS NFR BLD AUTO: 73 % (ref 42.7–76)
NRBC BLD AUTO-RTO: 0 /100 WBC (ref 0–0.2)
PLATELET # BLD AUTO: 142 10*3/MM3 (ref 140–450)
PMV BLD AUTO: 11.4 FL (ref 6–12)
POTASSIUM SERPL-SCNC: 5.1 MMOL/L (ref 3.5–5.2)
PROTHROMBIN TIME: 15.8 SECONDS (ref 11.4–14.4)
RBC # BLD AUTO: 3.48 10*6/MM3 (ref 3.77–5.28)
RSV RNA NPH QL NAA+NON-PROBE: NOT DETECTED
SARS-COV-2 RNA RESP QL NAA+PROBE: NOT DETECTED
SODIUM SERPL-SCNC: 141 MMOL/L (ref 136–145)
WBC NRBC COR # BLD: 4.49 10*3/MM3 (ref 3.4–10.8)

## 2022-05-17 PROCEDURE — 85025 COMPLETE CBC W/AUTO DIFF WBC: CPT

## 2022-05-17 PROCEDURE — 36415 COLL VENOUS BLD VENIPUNCTURE: CPT

## 2022-05-17 PROCEDURE — 87637 SARSCOV2&INF A&B&RSV AMP PRB: CPT

## 2022-05-17 PROCEDURE — 80048 BASIC METABOLIC PNL TOTAL CA: CPT

## 2022-05-17 PROCEDURE — C9803 HOPD COVID-19 SPEC COLLECT: HCPCS

## 2022-05-17 PROCEDURE — 83735 ASSAY OF MAGNESIUM: CPT

## 2022-05-17 PROCEDURE — 85610 PROTHROMBIN TIME: CPT

## 2022-05-17 NOTE — PAT
COVID test and blood work collected in PAT.    Patient and daughter were under the impression that an  wound be provided for today's PAT appointment.  The iPad was offered but declined due to patient preference.  After speaking with the sign language agency, it was determined that she was not on the schedule for an  today.  Daughter was available to interpret and written instructions were provided.  I have personally notified the [employer] [place] to ensure that this patient is on the schedule to have an  for her CVOU procedures on 5/20/22.

## 2022-05-17 NOTE — DISCHARGE INSTRUCTIONS
"Dear Patient,    Do NOT eat, drink, or smoke after midnight the night before your procedure.   Take your medications as instructed by your doctor.    Glasses and jewelry may be worn, but dentures must be removed prior to your procedure.    Leave any items you consider valuable at home.      MORNING of your Procedure, please bring the following:     -Photo ID and insurance card(s)    -ALL medications in their ORIGINAL CONTAINERS    -Co-pay and/or deductible required by your insurance   -Copy of living will or power of  document (if not brought to    Pre-Admission Testing department)   -CPAP mask and tubing, not your machine (if applicable)    -Relaxation aids (music, books, magazines)   -Skin Prep Instruction Sheet (if applicable)   -Relaxation Aids    Check in on the 2nd floor in the [address] [facility].  Your procedure will be performed in the cath lab or EP lab.  During your procedure, your family will wait in the cath lab waiting area where you checked in.      Need to make arrangements for transportation prior to discharge.    A handout regarding \"Heart Healthy Eating\" was provided today to encourage healthy eating habits.    Booklet published by [employer] was given in Pre-Admission testing.  This booklet is for informational purposes only.  If you have any questions about your procedure, please speak with your physician.      Please note:  If you are scheduled to have one of the following procedures: Pulmonary Vein Ablation, Lead Extraction, MitraClip, Cerebral Coilings or Embolization, please let your family know that after your procedure you will be going to recovery unit on the 2nd floor of the [facility].  When the physician is finished speaking with your family after your procedure is completed, your family will be directed or escorted to the surgery waiting area in the [facility].  This is where your family will wait until you are given a room assignment and then your family will be directed to the " appropriate unit.

## 2022-05-18 ENCOUNTER — TELEPHONE (OUTPATIENT)
Dept: CARDIOLOGY | Facility: CLINIC | Age: 77
End: 2022-05-18

## 2022-05-18 NOTE — TELEPHONE ENCOUNTER
Called to check in with Mrs Mckeon in regards to [employer] home monitor reading showing afib since 5/13/22.  No answer.  Left message for a return call.

## 2022-05-20 ENCOUNTER — HOSPITAL ENCOUNTER (OUTPATIENT)
Facility: HOSPITAL | Age: 77
Setting detail: HOSPITAL OUTPATIENT SURGERY
Discharge: HOME | End: 2022-05-20
Attending: INTERNAL MEDICINE | Admitting: INTERNAL MEDICINE

## 2022-05-20 ENCOUNTER — HOSPITAL ENCOUNTER (OUTPATIENT)
Dept: CARDIOLOGY | Facility: HOSPITAL | Age: 77
Discharge: HOME | End: 2022-05-20

## 2022-05-20 VITALS
WEIGHT: 122.4 LBS | HEIGHT: 61 IN | OXYGEN SATURATION: 97 % | DIASTOLIC BLOOD PRESSURE: 61 MMHG | HEART RATE: 85 BPM | SYSTOLIC BLOOD PRESSURE: 122 MMHG | RESPIRATION RATE: 16 BRPM | TEMPERATURE: 97 F | BODY MASS INDEX: 23.11 KG/M2

## 2022-05-20 VITALS — BODY MASS INDEX: 21.9 KG/M2 | HEIGHT: 62 IN | WEIGHT: 119 LBS

## 2022-05-20 DIAGNOSIS — I35.0 AORTIC STENOSIS, SEVERE: ICD-10-CM

## 2022-05-20 DIAGNOSIS — Z01.818 PRE-OPERATIVE CLEARANCE: ICD-10-CM

## 2022-05-20 LAB
ANION GAP SERPL CALCULATED.3IONS-SCNC: 10 MMOL/L (ref 5–15)
BASOPHILS # BLD AUTO: 0.03 10*3/MM3 (ref 0–0.2)
BASOPHILS NFR BLD AUTO: 0.6 % (ref 0–1.5)
BH CV ECHO MEAS - AO MAX PG: 67.5 MMHG
BH CV ECHO MEAS - AO MEAN PG: 38.9 MMHG
BH CV ECHO MEAS - AO V2 MAX: 410.7 CM/SEC
BH CV ECHO MEAS - AO V2 VTI: 80.1 CM
BH CV ECHO MEAS - AVA(I,D): 1.14 CM2
BH CV ECHO MEAS - LV MAX PG: 4.5 MMHG
BH CV ECHO MEAS - LV MEAN PG: 2.13 MMHG
BH CV ECHO MEAS - LV V1 MAX: 105.6 CM/SEC
BH CV ECHO MEAS - LV V1 VTI: 25 CM
BH CV ECHO MEAS - LVOT AREA: 3.7 CM2
BH CV ECHO MEAS - LVOT DIAM: 2.16 CM
BH CV ECHO MEAS - SV(LVOT): 91.2 ML
BH CV VAS BP LEFT ARM: NORMAL MMHG
BUN SERPL-MCNC: 34 MG/DL (ref 8–23)
BUN/CREAT SERPL: 24.6 (ref 7–25)
CALCIUM SPEC-SCNC: 10 MG/DL (ref 8.6–10.5)
CHLORIDE SERPL-SCNC: 105 MMOL/L (ref 98–107)
CHOLEST SERPL-MCNC: 96 MG/DL (ref 0–200)
CO2 SERPL-SCNC: 23 MMOL/L (ref 22–29)
CREAT BLDA-MCNC: 1.4 MG/DL (ref 0.6–1.3)
CREAT SERPL-MCNC: 1.38 MG/DL (ref 0.57–1)
DEPRECATED RDW RBC AUTO: 45.9 FL (ref 37–54)
EGFRCR SERPLBLD CKD-EPI 2021: 39.3 ML/MIN/1.73
EOSINOPHIL # BLD AUTO: 0.09 10*3/MM3 (ref 0–0.4)
EOSINOPHIL NFR BLD AUTO: 1.7 % (ref 0.3–6.2)
ERYTHROCYTE [DISTWIDTH] IN BLOOD BY AUTOMATED COUNT: 12.9 % (ref 12.3–15.4)
GLUCOSE SERPL-MCNC: 111 MG/DL (ref 65–99)
HCT VFR BLD AUTO: 30.4 % (ref 34–46.6)
HDLC SERPL-MCNC: 55 MG/DL (ref 40–60)
HGB BLD-MCNC: 9.9 G/DL (ref 12–15.9)
IMM GRANULOCYTES # BLD AUTO: 0.03 10*3/MM3 (ref 0–0.05)
IMM GRANULOCYTES NFR BLD AUTO: 0.6 % (ref 0–0.5)
INR PPP: 1.2 (ref 0.84–1.13)
LDLC SERPL CALC-MCNC: 29 MG/DL (ref 0–100)
LDLC/HDLC SERPL: 0.57 {RATIO}
LV EF 2D ECHO EST: 60 %
LYMPHOCYTES # BLD AUTO: 1.54 10*3/MM3 (ref 0.7–3.1)
LYMPHOCYTES NFR BLD AUTO: 28.5 % (ref 19.6–45.3)
MAGNESIUM SERPL-MCNC: 2.1 MG/DL (ref 1.6–2.4)
MAXIMAL PREDICTED HEART RATE: 142 BPM
MCH RBC QN AUTO: 31.9 PG (ref 26.6–33)
MCHC RBC AUTO-ENTMCNC: 32.6 G/DL (ref 31.5–35.7)
MCV RBC AUTO: 98.1 FL (ref 79–97)
MONOCYTES # BLD AUTO: 0.4 10*3/MM3 (ref 0.1–0.9)
MONOCYTES NFR BLD AUTO: 7.4 % (ref 5–12)
NEUTROPHILS NFR BLD AUTO: 3.31 10*3/MM3 (ref 1.7–7)
NEUTROPHILS NFR BLD AUTO: 61.2 % (ref 42.7–76)
NRBC BLD AUTO-RTO: 0 /100 WBC (ref 0–0.2)
PLATELET # BLD AUTO: 114 10*3/MM3 (ref 140–450)
PMV BLD AUTO: 11.2 FL (ref 6–12)
POTASSIUM SERPL-SCNC: 4.5 MMOL/L (ref 3.5–5.2)
PROTHROMBIN TIME: 15.1 SECONDS (ref 11.4–14.4)
RBC # BLD AUTO: 3.1 10*6/MM3 (ref 3.77–5.28)
SODIUM SERPL-SCNC: 138 MMOL/L (ref 136–145)
STRESS TARGET HR: 121 BPM
TRIGL SERPL-MCNC: 48 MG/DL (ref 0–150)
VLDLC SERPL-MCNC: 12 MG/DL (ref 5–40)
WBC NRBC COR # BLD: 5.4 10*3/MM3 (ref 3.4–10.8)

## 2022-05-20 PROCEDURE — 93312 ECHO TRANSESOPHAGEAL: CPT | Performed by: INTERNAL MEDICINE

## 2022-05-20 PROCEDURE — 93321 DOPPLER ECHO F-UP/LMTD STD: CPT

## 2022-05-20 PROCEDURE — C1894 INTRO/SHEATH, NON-LASER: HCPCS | Performed by: INTERNAL MEDICINE

## 2022-05-20 PROCEDURE — 93312 ECHO TRANSESOPHAGEAL: CPT

## 2022-05-20 PROCEDURE — 85610 PROTHROMBIN TIME: CPT | Performed by: NURSE PRACTITIONER

## 2022-05-20 PROCEDURE — 0 IOPAMIDOL PER 1 ML: Performed by: INTERNAL MEDICINE

## 2022-05-20 PROCEDURE — 25010000002 MIDAZOLAM PER 1 MG: Performed by: INTERNAL MEDICINE

## 2022-05-20 PROCEDURE — 93454 CORONARY ARTERY ANGIO S&I: CPT | Performed by: INTERNAL MEDICINE

## 2022-05-20 PROCEDURE — 85025 COMPLETE CBC W/AUTO DIFF WBC: CPT | Performed by: NURSE PRACTITIONER

## 2022-05-20 PROCEDURE — 93325 DOPPLER ECHO COLOR FLOW MAPG: CPT | Performed by: INTERNAL MEDICINE

## 2022-05-20 PROCEDURE — 93321 DOPPLER ECHO F-UP/LMTD STD: CPT | Performed by: INTERNAL MEDICINE

## 2022-05-20 PROCEDURE — 99152 MOD SED SAME PHYS/QHP 5/>YRS: CPT | Performed by: INTERNAL MEDICINE

## 2022-05-20 PROCEDURE — 83735 ASSAY OF MAGNESIUM: CPT | Performed by: NURSE PRACTITIONER

## 2022-05-20 PROCEDURE — 25010000002 FENTANYL CITRATE (PF) 50 MCG/ML SOLUTION: Performed by: INTERNAL MEDICINE

## 2022-05-20 PROCEDURE — 80048 BASIC METABOLIC PNL TOTAL CA: CPT | Performed by: NURSE PRACTITIONER

## 2022-05-20 PROCEDURE — 36415 COLL VENOUS BLD VENIPUNCTURE: CPT

## 2022-05-20 PROCEDURE — 80061 LIPID PANEL: CPT | Performed by: NURSE PRACTITIONER

## 2022-05-20 PROCEDURE — 99152 MOD SED SAME PHYS/QHP 5/>YRS: CPT

## 2022-05-20 PROCEDURE — 0 LIDOCAINE 1 % SOLUTION: Performed by: INTERNAL MEDICINE

## 2022-05-20 PROCEDURE — 82565 ASSAY OF CREATININE: CPT

## 2022-05-20 PROCEDURE — 93325 DOPPLER ECHO COLOR FLOW MAPG: CPT

## 2022-05-20 PROCEDURE — C1769 GUIDE WIRE: HCPCS | Performed by: INTERNAL MEDICINE

## 2022-05-20 PROCEDURE — 25010000002 AMIODARONE IN DEXTROSE 5% 150-4.21 MG/100ML-% SOLUTION: Performed by: INTERNAL MEDICINE

## 2022-05-20 RX ORDER — MIDAZOLAM HYDROCHLORIDE 1 MG/ML
INJECTION INTRAMUSCULAR; INTRAVENOUS
Status: COMPLETED | OUTPATIENT
Start: 2022-05-20 | End: 2022-05-20

## 2022-05-20 RX ORDER — MIDAZOLAM HYDROCHLORIDE 1 MG/ML
INJECTION INTRAMUSCULAR; INTRAVENOUS AS NEEDED
Status: DISCONTINUED | OUTPATIENT
Start: 2022-05-20 | End: 2022-05-20 | Stop reason: HOSPADM

## 2022-05-20 RX ORDER — FENTANYL CITRATE 50 UG/ML
INJECTION, SOLUTION INTRAMUSCULAR; INTRAVENOUS AS NEEDED
Status: DISCONTINUED | OUTPATIENT
Start: 2022-05-20 | End: 2022-05-20 | Stop reason: HOSPADM

## 2022-05-20 RX ORDER — NALOXONE HYDROCHLORIDE 0.4 MG/ML
INJECTION, SOLUTION INTRAMUSCULAR; INTRAVENOUS; SUBCUTANEOUS
Status: DISCONTINUED
Start: 2022-05-20 | End: 2022-05-20 | Stop reason: WASHOUT

## 2022-05-20 RX ORDER — LIDOCAINE HYDROCHLORIDE 10 MG/ML
INJECTION, SOLUTION INFILTRATION; PERINEURAL AS NEEDED
Status: DISCONTINUED | OUTPATIENT
Start: 2022-05-20 | End: 2022-05-20 | Stop reason: HOSPADM

## 2022-05-20 RX ORDER — MIDAZOLAM HYDROCHLORIDE 1 MG/ML
INJECTION INTRAMUSCULAR; INTRAVENOUS
Status: DISCONTINUED
Start: 2022-05-20 | End: 2022-05-20 | Stop reason: HOSPADM

## 2022-05-20 RX ORDER — SODIUM CHLORIDE 9 MG/ML
100 INJECTION, SOLUTION INTRAVENOUS CONTINUOUS
Status: ACTIVE | OUTPATIENT
Start: 2022-05-20 | End: 2022-05-20

## 2022-05-20 RX ORDER — ALPRAZOLAM 0.25 MG/1
0.25 TABLET ORAL 3 TIMES DAILY PRN
Status: DISCONTINUED | OUTPATIENT
Start: 2022-05-20 | End: 2022-05-20 | Stop reason: HOSPADM

## 2022-05-20 RX ORDER — SODIUM CHLORIDE 0.9 % (FLUSH) 0.9 %
3 SYRINGE (ML) INJECTION EVERY 12 HOURS SCHEDULED
Status: DISCONTINUED | OUTPATIENT
Start: 2022-05-20 | End: 2022-05-20 | Stop reason: HOSPADM

## 2022-05-20 RX ORDER — HYDROCODONE BITARTRATE AND ACETAMINOPHEN 7.5; 325 MG/1; MG/1
1 TABLET ORAL EVERY 4 HOURS PRN
Status: DISCONTINUED | OUTPATIENT
Start: 2022-05-20 | End: 2022-05-20 | Stop reason: HOSPADM

## 2022-05-20 RX ORDER — SODIUM CHLORIDE 0.9 % (FLUSH) 0.9 %
3-10 SYRINGE (ML) INJECTION AS NEEDED
Status: DISCONTINUED | OUTPATIENT
Start: 2022-05-20 | End: 2022-05-20 | Stop reason: HOSPADM

## 2022-05-20 RX ORDER — ACETAMINOPHEN 325 MG/1
650 TABLET ORAL EVERY 4 HOURS PRN
Status: DISCONTINUED | OUTPATIENT
Start: 2022-05-20 | End: 2022-05-20 | Stop reason: HOSPADM

## 2022-05-20 RX ORDER — SODIUM CHLORIDE 9 MG/ML
3 INJECTION, SOLUTION INTRAVENOUS CONTINUOUS
Status: ACTIVE | OUTPATIENT
Start: 2022-05-20 | End: 2022-05-20

## 2022-05-20 RX ORDER — SODIUM CHLORIDE 9 MG/ML
250 INJECTION, SOLUTION INTRAVENOUS ONCE AS NEEDED
Status: DISCONTINUED | OUTPATIENT
Start: 2022-05-20 | End: 2022-05-20 | Stop reason: HOSPADM

## 2022-05-20 RX ORDER — FENTANYL CITRATE 50 UG/ML
INJECTION, SOLUTION INTRAMUSCULAR; INTRAVENOUS
Status: DISCONTINUED
Start: 2022-05-20 | End: 2022-05-20 | Stop reason: HOSPADM

## 2022-05-20 RX ORDER — FENTANYL CITRATE 50 UG/ML
INJECTION, SOLUTION INTRAMUSCULAR; INTRAVENOUS
Status: COMPLETED | OUTPATIENT
Start: 2022-05-20 | End: 2022-05-20

## 2022-05-20 RX ORDER — FLUMAZENIL 0.1 MG/ML
INJECTION INTRAVENOUS
Status: DISCONTINUED
Start: 2022-05-20 | End: 2022-05-20 | Stop reason: WASHOUT

## 2022-05-20 RX ORDER — MORPHINE SULFATE 2 MG/ML
1 INJECTION, SOLUTION INTRAMUSCULAR; INTRAVENOUS EVERY 4 HOURS PRN
Status: DISCONTINUED | OUTPATIENT
Start: 2022-05-20 | End: 2022-05-20 | Stop reason: HOSPADM

## 2022-05-20 RX ORDER — NALOXONE HCL 0.4 MG/ML
0.4 VIAL (ML) INJECTION
Status: DISCONTINUED | OUTPATIENT
Start: 2022-05-20 | End: 2022-05-20 | Stop reason: HOSPADM

## 2022-05-20 RX ADMIN — FENTANYL CITRATE 50 MCG: 50 INJECTION, SOLUTION INTRAMUSCULAR; INTRAVENOUS at 10:06

## 2022-05-20 RX ADMIN — MIDAZOLAM 2 MG: 1 INJECTION INTRAMUSCULAR; INTRAVENOUS at 10:06

## 2022-05-20 RX ADMIN — FENTANYL CITRATE 50 MCG: 50 INJECTION, SOLUTION INTRAMUSCULAR; INTRAVENOUS at 10:03

## 2022-05-20 RX ADMIN — AMIODARONE HYDROCHLORIDE 150 MG: 1.5 INJECTION, SOLUTION INTRAVENOUS at 11:55

## 2022-05-20 RX ADMIN — MIDAZOLAM 2 MG: 1 INJECTION INTRAMUSCULAR; INTRAVENOUS at 10:03

## 2022-05-20 NOTE — INTERVAL H&P NOTE
H&P reviewed. The patient was examined and there are no changes to the H&P.  Patient presents today for left heart catheterization +/- PCI and transesophageal echocardiogram with Dr. Pyle as part of her evaluation for potential TAVR procedure.  Patient is deaf, requiring .   at bedside assisting in communication with patient.  Labs reviewed.  Right radial Barbeau type a, right femoral pulse 2+.  Since her last office visit the patient has continued to have fatigue, weakness and intermittent dyspnea. The risks, benefits, and alternatives of the procedure have been reviewed and the patient wishes to proceed.     Electronically signed by BALJIT Rivera, 05/20/22, 9:44 AM EDT.    Yashira Pyle MD, FACC

## 2022-05-23 ENCOUNTER — OFFICE VISIT (OUTPATIENT)
Dept: CARDIAC SURGERY | Facility: CLINIC | Age: 77
End: 2022-05-23

## 2022-05-23 VITALS
BODY MASS INDEX: 23 KG/M2 | WEIGHT: 125 LBS | TEMPERATURE: 97.1 F | HEIGHT: 62 IN | OXYGEN SATURATION: 95 % | SYSTOLIC BLOOD PRESSURE: 140 MMHG | DIASTOLIC BLOOD PRESSURE: 50 MMHG | HEART RATE: 63 BPM

## 2022-05-23 DIAGNOSIS — I35.0 AORTIC STENOSIS, SEVERE: Primary | ICD-10-CM

## 2022-05-23 PROCEDURE — 99205 OFFICE O/P NEW HI 60 MIN: CPT | Performed by: THORACIC SURGERY (CARDIOTHORACIC VASCULAR SURGERY)

## 2022-05-23 NOTE — PROGRESS NOTES
05/23/2022  Patient Information  Miryam Mckeon                                                                                          [address] [facility] [address]   1945  'PCP/Referring Physician'  Rigoberto Chamberlain MD  [phone]  Libra López, APRN  [phone]  Chief Complaint   Patient presents with   • Consult     Former Patient Ref by Libra LUI for TAVR Eval-Aortic Valve Stenosis-Complains of Dizziness, SOB, Chest Pain       History of Present Illness: This patient is a 76-year-old female who was referred to me to evaluate for transcatheter aortic valve replacement.  She is fairly well-known to me, as in years past I have performed bilateral carotid endarterectomies.  Today she is here with her son and her  because she is deaf and uses sign language.  She has had a worsening aortic valvular stenosis, which has been followed by echocardiogram since at least August, 2016.  She recently underwent a permanent pacemaker placement with the hopes that some of her symptoms would improve; however she still becomes short of breath with minimal activity.  Recent echocardiogram demonstrates a peak gradient across her valve of 70 mmHg with a velocity of 430 mm/s.  She has normal ventricular function and cardiac catheterization demonstrates no critical coronary disease.  Carotid duplex scan demonstrates moderate disease in one carotid artery but this has been detected previously on duplex scans and subsequent CT scans demonstrates those duplex readings are narrow.  She has no neurologic symptoms.  She has yet to undergo CT angiogram to evaluate for access      Patient Active Problem List   Diagnosis   • Bilateral carotid artery stenosis   • Aortic stenosis, severe   • Paroxysmal atrial fibrillation (HCC)   • Coronary artery disease involving native coronary artery of native heart without angina pectoris   • Sick sinus syndrome (HCC)   • Hyperlipidemia LDL goal <70   •  Essential hypertension   • Pre-operative clearance     Past Medical History:   Diagnosis Date   • Anxiety    • Aortic valve stenosis    • Atrial fibrillation (HCC)    • Borderline diabetes     ???   • Carotid artery stenosis    • CKD (chronic kidney disease)    • Deaf    • GERD (gastroesophageal reflux disease)    • Heart murmur    • Hyperlipidemia    • Hypertension    • Irregular heartbeat    • PONV (postoperative nausea and vomiting)    • Stroke (HCC)    • TIA (transient ischemic attack)      Past Surgical History:   Procedure Laterality Date   • CARDIAC CATHETERIZATION      05/20/2022 per dr. chow   • CARDIAC ELECTROPHYSIOLOGY PROCEDURE N/A 04/13/2022    Procedure: DDD PPM Implant (BSC), DNS Eliquis;  Surgeon: Troy Hussein DO;  Location: [facility] INVASIVE LOCATION;  Service: Cardiology;  Laterality: N/A;   • CAROTID ENDARTERECTOMY Bilateral    • CATARACT EXTRACTION     • CHOLECYSTECTOMY     • COLONOSCOPY     • KNEE SURGERY Right    • PACEMAKER IMPLANTATION     • JF      05/20/2022 per dr. chow       Current Outpatient Medications:   •  amLODIPine (NORVASC) 10 MG tablet, TAKE 1 TABLET BY MOUTH DAILY, Disp: 90 tablet, Rfl: 3  •  apixaban (ELIQUIS) 2.5 MG tablet tablet, Take 1 tablet by mouth Every 12 (Twelve) Hours. May restart the evening of 5/21/2022 (Patient taking differently: Take 2.5 mg by mouth Daily. May restart the evening of 5/21/2022), Disp: 180 tablet, Rfl: 3  •  aspirin 81 MG tablet, Take 81 mg by mouth Daily., Disp: , Rfl:   •  atorvastatin (LIPITOR) 10 MG tablet, Take 10 mg by mouth Every Night., Disp: , Rfl:   •  cholecalciferol (VITAMIN D3) 25 MCG (1000 UT) tablet, Take 2,000 Units by mouth Daily., Disp: , Rfl:   •  doxazosin (CARDURA) 4 MG tablet, Take 8 mg by mouth Every Night. 2 TABLETS bid, Disp: , Rfl: 0  •  hydrALAZINE (APRESOLINE) 50 MG tablet, Take 100 mg by mouth every night at bedtime., Disp: , Rfl:   •  latanoprost (XALATAN) 0.005 % ophthalmic solution, Administer  1 drop to both eyes Every Night., Disp: , Rfl:   •  losartan (COZAAR) 100 MG tablet, Take 100 mg by mouth Daily., Disp: , Rfl:   •  Polyethylene Glycol 3350 (MIRALAX PO), Take 17 g by mouth Every Other Day., Disp: , Rfl:   •  simethicone (MYLICON) 125 MG chewable tablet, Chew 125 mg Every 6 (Six) Hours As Needed for Flatulence. PRN, Disp: , Rfl:   •  Solifenacin Succinate (VESICARE PO), Take  by mouth As Needed. 4 mg, Disp: , Rfl:   •  vitamin B-12 (CYANOCOBALAMIN) 1000 MCG tablet, Take 5,000 mcg by mouth Daily., Disp: , Rfl:   •  ondansetron (ZOFRAN) 4 MG tablet, TAKE 1 TABLET BY MOUTH EVERY 8 HOURS FOR 2 DAYS (Patient taking differently: Take 4 mg by mouth Every 8 (Eight) Hours As Needed for Nausea.), Disp: 30 tablet, Rfl: 1  •  oxybutynin (DITROPAN) 5 MG tablet, Take 5 mg by mouth As Needed., Disp: , Rfl:   No Known Allergies  Social History     Socioeconomic History   • Marital status:    • Number of children: 2   Tobacco Use   • Smoking status: Never Smoker   • Smokeless tobacco: Never Used   Vaping Use   • Vaping Use: Never used   Substance and Sexual Activity   • Alcohol use: Never   • Drug use: Never   • Sexual activity: Defer     Family History   Problem Relation Age of Onset   • Other Mother         enlarged heart   • Stroke Father      Review of Systems   Constitutional: Positive for malaise/fatigue. Negative for chills, fever, night sweats and weight loss.   HENT: Positive for tinnitus. Negative for hearing loss, odynophagia and sore throat.    Cardiovascular: Positive for chest pain and dyspnea on exertion. Negative for leg swelling, orthopnea and palpitations.   Respiratory: Positive for shortness of breath. Negative for cough and hemoptysis.    Endocrine: Negative for cold intolerance, heat intolerance, polydipsia, polyphagia and polyuria.   Hematologic/Lymphatic: Bruises/bleeds easily.   Skin: Negative for itching and rash.   Musculoskeletal: Positive for myalgias, neck pain and stiffness.  "Negative for joint pain and joint swelling.   Gastrointestinal: Positive for constipation and heartburn. Negative for abdominal pain, diarrhea, hematemesis, hematochezia, melena, nausea and vomiting.   Genitourinary: Negative for dysuria, frequency and hematuria.   Neurological: Positive for dizziness, light-headedness, loss of balance and weakness. Negative for focal weakness, headaches, numbness and seizures.   Psychiatric/Behavioral: Negative for suicidal ideas.   All other systems reviewed and are negative.    Vitals:    05/23/22 0706   BP: 140/50   BP Location: Right arm   Patient Position: Sitting   Pulse: 63   Temp: 97.1 °F (36.2 °C)   SpO2: 95%   Weight: 56.7 kg (125 lb)   Height: 157.5 cm (62\")      Physical Exam   CONSTITUTIONAL: Alert and conversant, Well dressed, Well nourished, No acute distress  EYES: Sclera clean, Anicteric, Pupils equal  ENT: No nasal deviation, Trachea midline  NECK: No neck masses, Supple  LUNGS: No wheezing, Cough, non-congested  HEART: No rubs,   GASTROINTESTINAL: Soft, non-distended, No masses, Non tender  to palpation, normal bowel sounds  NEURO: No motor deficits, No sensory deficits, Cranial Nerves 2 through 12 grossly intact  PSYCHIATRIC: Oriented to person, place and time, No memory deficits, Mood appropriate  VASCULAR: No carotid bruits, Femoral pulses palpable and symmetric  MUSKULOSKELETAL: No extremity trauma or extremity asymmetry    The review of systems, past medical history, surgical history, family history, social history and vitals were reviewed by myself and corrected as needed.      Labs/Imaging:  I have reviewed the cardiac catheterization image as well as the echo cardiogram report.  I have also reviewed images and reports from prior carotid studies.    Assessment/Plan:   This patient is a 76-year-old female who has severe aortic valvular stenosis with a peak gradient across the valve of 70 mmHg and a velocity of 430 mm/s.  Catheterization demonstrates normal " coronary arteries.  She has no neurologic symptoms and I believe her carotid duplex scan does not require further work-up.  She still has not undergone CT angiogram to evaluate for femoral and iliac access and that is pending.  Today she asks multiple questions as did her son and these were answered to her satisfaction through her .  She agrees to proceed with surgery.  I made this extremely clear that there are absolutely no guarantees made as to outcome.  I explained the potential risk of stroke, bleeding, infection, death and anxious complications and she is agreeable to proceed.  I have indicated the typical hospital stay is 1 or 2 days.  I also described that a common reason for extended stay is the need for permanent pacemaker; however, she already has one of these implanted.  We will await the results of her CT angiogram, but as of now she is agreeable to proceed.      Cat Bullock editing behalf of Bola Whitaker MD.         I, Bola Whitaker MD, have read and agree with the editing done by Cat Bullock, .            Patient Active Problem List   Diagnosis   • Bilateral carotid artery stenosis   • Aortic stenosis, severe   • Paroxysmal atrial fibrillation (HCC)   • Coronary artery disease involving native coronary artery of native heart without angina pectoris   • Sick sinus syndrome (HCC)   • Hyperlipidemia LDL goal <70   • Essential hypertension   • Pre-operative clearance

## 2022-05-24 ENCOUNTER — TELEPHONE (OUTPATIENT)
Dept: CARDIOLOGY | Facility: HOSPITAL | Age: 77
End: 2022-05-24

## 2022-05-24 NOTE — TELEPHONE ENCOUNTER
TAVR BALJIT    Scheduled  for CTA TAVR protocol scheduled for 6/3/22.    Spoke with Marcela @ [employer] [phone].    Libra SMILEY

## 2022-05-26 ENCOUNTER — TELEPHONE (OUTPATIENT)
Dept: CARDIOLOGY | Facility: CLINIC | Age: 77
End: 2022-05-26

## 2022-05-29 PROCEDURE — 93296 REM INTERROG EVL PM/IDS: CPT | Performed by: INTERNAL MEDICINE

## 2022-05-29 PROCEDURE — 93294 REM INTERROG EVL PM/LDLS PM: CPT | Performed by: INTERNAL MEDICINE

## 2022-06-03 ENCOUNTER — HOSPITAL ENCOUNTER (OUTPATIENT)
Dept: CT IMAGING | Facility: HOSPITAL | Age: 77
Discharge: HOME | End: 2022-06-03
Admitting: NURSE PRACTITIONER

## 2022-06-03 VITALS
OXYGEN SATURATION: 97 % | WEIGHT: 124.4 LBS | RESPIRATION RATE: 18 BRPM | HEART RATE: 81 BPM | TEMPERATURE: 97.1 F | HEIGHT: 62 IN | BODY MASS INDEX: 22.89 KG/M2 | DIASTOLIC BLOOD PRESSURE: 75 MMHG | SYSTOLIC BLOOD PRESSURE: 128 MMHG

## 2022-06-03 DIAGNOSIS — Z86.73 STATUS POST CVA: ICD-10-CM

## 2022-06-03 DIAGNOSIS — I50.32 CHRONIC HEART FAILURE WITH PRESERVED EJECTION FRACTION: ICD-10-CM

## 2022-06-03 DIAGNOSIS — I48.0 PAROXYSMAL ATRIAL FIBRILLATION: Primary | ICD-10-CM

## 2022-06-03 DIAGNOSIS — I65.23 BILATERAL CAROTID ARTERY STENOSIS: ICD-10-CM

## 2022-06-03 DIAGNOSIS — I35.0 AORTIC STENOSIS, SEVERE: ICD-10-CM

## 2022-06-03 DIAGNOSIS — I49.5 SICK SINUS SYNDROME: ICD-10-CM

## 2022-06-03 DIAGNOSIS — I10 ESSENTIAL HYPERTENSION: ICD-10-CM

## 2022-06-03 DIAGNOSIS — I25.10 CORONARY ARTERY DISEASE INVOLVING NATIVE CORONARY ARTERY OF NATIVE HEART WITHOUT ANGINA PECTORIS: ICD-10-CM

## 2022-06-03 PROCEDURE — 82565 ASSAY OF CREATININE: CPT

## 2022-06-03 PROCEDURE — 63710000001 METOPROLOL TARTRATE 50 MG TABLET: Performed by: NURSE PRACTITIONER

## 2022-06-03 PROCEDURE — 74174 CTA ABD&PLVS W/CONTRAST: CPT

## 2022-06-03 PROCEDURE — 99215 OFFICE O/P EST HI 40 MIN: CPT | Performed by: NURSE PRACTITIONER

## 2022-06-03 PROCEDURE — 71275 CT ANGIOGRAPHY CHEST: CPT

## 2022-06-03 PROCEDURE — 0 IOPAMIDOL PER 1 ML: Performed by: NURSE PRACTITIONER

## 2022-06-03 PROCEDURE — A9270 NON-COVERED ITEM OR SERVICE: HCPCS | Performed by: NURSE PRACTITIONER

## 2022-06-03 RX ORDER — SODIUM CHLORIDE 0.9 % (FLUSH) 0.9 %
3 SYRINGE (ML) INJECTION EVERY 12 HOURS SCHEDULED
Status: DISCONTINUED | OUTPATIENT
Start: 2022-06-03 | End: 2022-06-04 | Stop reason: HOSPADM

## 2022-06-03 RX ORDER — METOPROLOL TARTRATE 50 MG/1
100 TABLET, FILM COATED ORAL ONCE AS NEEDED
Status: COMPLETED | OUTPATIENT
Start: 2022-06-03 | End: 2022-06-03

## 2022-06-03 RX ORDER — METOPROLOL TARTRATE 50 MG/1
50 TABLET, FILM COATED ORAL ONCE AS NEEDED
Status: COMPLETED | OUTPATIENT
Start: 2022-06-03 | End: 2022-06-03

## 2022-06-03 RX ORDER — FUROSEMIDE 20 MG/1
20 TABLET ORAL DAILY
Qty: 30 TABLET | Refills: 1 | Status: SHIPPED | OUTPATIENT
Start: 2022-06-03 | End: 2022-06-06

## 2022-06-03 RX ORDER — SODIUM CHLORIDE 0.9 % (FLUSH) 0.9 %
10 SYRINGE (ML) INJECTION AS NEEDED
Status: DISCONTINUED | OUTPATIENT
Start: 2022-06-03 | End: 2022-06-04 | Stop reason: HOSPADM

## 2022-06-03 RX ORDER — LIDOCAINE HYDROCHLORIDE 10 MG/ML
5 INJECTION, SOLUTION EPIDURAL; INFILTRATION; INTRACAUDAL; PERINEURAL AS NEEDED
Status: DISCONTINUED | OUTPATIENT
Start: 2022-06-03 | End: 2022-06-04 | Stop reason: HOSPADM

## 2022-06-03 RX ADMIN — IOPAMIDOL 100 ML: 755 INJECTION, SOLUTION INTRAVENOUS at 13:17

## 2022-06-03 RX ADMIN — METOPROLOL TARTRATE 50 MG: 50 TABLET ORAL at 10:33

## 2022-06-03 NOTE — PROGRESS NOTES
TAVR BALJIT    Afib with RVR noted during ROBERT visit today for CTA TAVR protocol.  Tolerated metoprolol tartrate.  Also peripheral edema noted along with bi-basilar rales on exam.  Rx for lasix 20 mg QD.  Refilled Eliquis per daughter request also.      Will call and re-check with patient on Monday re: AF/CHF symptoms.    Libra SMILEY

## 2022-06-03 NOTE — PROGRESS NOTES
TAVR APRN Evaluation    Miryam Mckeon, 1945, [id number]     06/03/22    PCP: Rigoberto Chamberlain MD  Primary Cardiologist: Vito Cuba MD    TAVR Team:  1.  Rigoberto Caban MD  2.  Otilio Leo MD  3.  Yashira Pyle MD  4.  Femi Ken MD  5.  Hubert Hoffman MD  6.  Bola Whitaker MD    Chief Complaint: Aortic stenosis/ SSS/ AF/ HFpEF      Identification: This is a 76 y.o. year old female from [address] [place] [address].    History of Present Illness: Ms. Mckeon was referred for consideration of TAVR due to persistent fatigue and BERMEO despite recently addressing atrial fibrillation/SSS with PPM placement per Dr. Hussein.   Problem List:   1. Severe aortic stenosis   a. Echocardiogram, 8/2016 ([facility]): Normal EF. Mild AS with GISELLE 1.3 cm2. Moderate TR with RVSP 46 mmHg.   b. Echocardiogram, 2/20/2020 [facility]): Normal EF. Moderate AS with mean PG 23 mmHg, GISELLE 1.3 cm2. Mild MR. Moderate to severe TR, RVSP 51 mmHg. Moderate PI.   c. Echocardiogram, 2/18/2022: EF 61-65%. Severe AS, mean PG 48 mmHg, max PG 70 mmHg, peak velocity 4.3 cm/s, GISELLE 1 cm2. Grade I diastolic dysfunction. Moderate concentric LVH. LV mass index increased. LA volume moderately increased. RA cavity borderline increased. RVSP from TR 45 mmHg. Moderate TR. Mild MR. No pericardial effusion.   d. Cardiac cath 5/20/22: Minor CAD.  20% stenosis in RCA.    e. JF 5/20/22: LVEF 60%, Aortic annulus 2.0 cm, Vmax 4.1 m/sec, AV mean 38.9 mm Hg, STJ 2.0 cm, Sinus of Valsalva 2.5 cm  2. Paroxysmal atrial fibrillation  a. CHADS-VASc = 4 on Eliquis   b. Nuclear stress test, 3/12/2020 ([facility]): Clinically negative, electrically nondiagnostic pharmacologic stress test.   c. 14-day Holter monitor, 6/21/2021: 15 beats of AF, HR around 120, asymptomatic. Average HR: 72. Min HR: 48. Max HR:128.  d. Afib with RVR 6/3/22.  Metoprolol re-initiated  3. Sick sinus syndrome  Tachy-alfonso syndrome   a. PPM  implantation, 4/13/2022 (Dr. Hussein): [facility] L3 1 1 dual-chamber PPM model, serial #[id number], pulse generator at left subpectoral site. Atrial lead [facility] model 7840, 45 cm, serial #[id number]. RV lead [facility] model 7841, 52 cm, serial #[id number].   4. Bilateral carotid artery stenosis   a. R CEA, 1/2015 (per [facility] note)  b. L CEA, 4/7/2015 by Dr. Whitaker   c. Bilateral CEA, 7/25/2016 by Dr. Whitaker   d. Carotid duplex, 12/27/2018: s/p bilateral CEA. >70% LICA stenosis. 50-69% JOHN stenosis. Right vertebral flow is antegrade. Left vertebral flow is antegrade proximally, then retrograde more distally.  e. Carotid duplex, 3/5/2021: LICA 60 to 80% stenosis. JOHN 40 to 60% stenosis.  f. Carotid duplex, 2/18/2022: LICA stenosis of 60-80%. JOHN stenosis <40%. Moderate plaque in both carotids. Normal antegrade vertebral flow bilaterally.   5. Hypertension   6. HFpEF   A.  NYHA class III-IV (orthopnea, BERMEO, near syncope, chest pressure)  7. Hyperlipidemia   8. Deafness   A.  Since age 1-2 related to febrile illness (whooping cough reported)   B.  Uses sign language/    C.  Attended [employer] in [place], [place]  9. CKD stage 3    A.  eGFR 39.3 as of 5/20/22  10.  Hx CVA 2002    A.  Right hemiparesis with aphasia.  [facility] + outpatient therapy until recovered    Patient Active Problem List   Diagnosis   • Bilateral carotid artery stenosis   • Aortic stenosis, severe   • Paroxysmal atrial fibrillation (HCC)   • Coronary artery disease involving native coronary artery of native heart without angina pectoris   • Sick sinus syndrome (HCC)   • Hyperlipidemia LDL goal <70   • Essential hypertension   • Pre-operative clearance   • Chronic heart failure with preserved ejection fraction (HCC)   • Status post CVA       Past Surgical History:  Past Surgical History:   Procedure Laterality Date   • CARDIAC CATHETERIZATION      05/20/2022 per dr. chow   • CARDIAC CATHETERIZATION Left 5/20/2022     Procedure: Left Heart Cath;  Surgeon: Yashira Pyle MD;  Location:  [facility];  Service: Cardiology;  Laterality: Left;   • CARDIAC ELECTROPHYSIOLOGY PROCEDURE N/A 04/13/2022    Procedure: DDD PPM Implant (BSC), DNS Eliquis;  Surgeon: Troy Hussein DO;  Location:  [facility] EP INVASIVE [facility];  Service: Cardiology;  Laterality: N/A;   • CAROTID ENDARTERECTOMY Bilateral    • CATARACT EXTRACTION     • CHOLECYSTECTOMY     • COLONOSCOPY     • KNEE SURGERY Right    • PACEMAKER IMPLANTATION     • JF      05/20/2022 per dr. pyle       Allergies:  Patient has no known allergies.    Social History:  Social History     Socioeconomic History   • Marital status:    • Number of children: 2   • Highest education level: High school graduate   Tobacco Use   • Smoking status: Never Smoker   • Smokeless tobacco: Never Used   Vaping Use   • Vaping Use: Never used   Substance and Sexual Activity   • Alcohol use: Never   • Drug use: Never   • Sexual activity: Defer       Last Dental Exam: 5/11/22  Requires pre-op Dental Referral: no    Current Medications:  Medication Sig Start Date End Date Taking? Authorizing Provider   amLODIPine (NORVASC) 10 MG tablet TAKE 1 TABLET BY MOUTH DAILY 3/14/22   Vito Cuba MD   apixaban (ELIQUIS) 2.5 MG tablet tablet Take 1 tablet by mouth Every 12 (Twelve) Hours. May restart the evening of 5/21/2022  Patient taking differently: Take 2.5 mg by mouth Daily. May restart the evening of 5/21/2022 5/20/22   Yashira Pyle MD   aspirin 81 MG tablet Take 81 mg by mouth Daily. 1/9/15   Dina Washington MD   atorvastatin (LIPITOR) 10 MG tablet Take 10 mg by mouth Every Night. 1/9/15   Dina Washington MD   cholecalciferol (VITAMIN D3) 25 MCG (1000 UT) tablet Take 2,000 Units by mouth Daily.    Dina Washington MD   doxazosin (CARDURA) 4 MG tablet Take 8 mg by mouth Every Night. 2 TABLETS bid 5/1/19   Dina Washington MD    hydrALAZINE (APRESOLINE) 50 MG tablet Take 100 mg by mouth every night at bedtime. 1/9/15   Dina Washington MD   latanoprost (XALATAN) 0.005 % ophthalmic solution Administer 1 drop to both eyes Every Night.    Dina Washington MD   losartan (COZAAR) 100 MG tablet Take 100 mg by mouth Daily.    Dina Washington MD   ondansetron (ZOFRAN) 4 MG tablet TAKE 1 TABLET BY MOUTH EVERY 8 HOURS FOR 2 DAYS  Patient taking differently: Take 4 mg by mouth Every 8 (Eight) Hours As Needed for Nausea. 3/30/22   Rigoberto Chamberlain MD   oxybutynin (DITROPAN) 5 MG tablet Take 5 mg by mouth As Needed.    Dina Washington MD   Polyethylene Glycol 3350 (MIRALAX PO) Take 17 g by mouth Every Other Day.    Dina Washington MD   simethicone (MYLICON) 125 MG chewable tablet Chew 125 mg Every 6 (Six) Hours As Needed for Flatulence. PRN    Dina Washington MD   Solifenacin Succinate (VESICARE PO) Take  by mouth As Needed. 4 mg    Dina Washington MD   vitamin B-12 (CYANOCOBALAMIN) 1000 MCG tablet Take 5,000 mcg by mouth Daily.    Dina Washington MD       Review of Systems:  Review of Systems   Constitutional: Positive for malaise/fatigue.   HENT: Positive for hearing loss.    Eyes: Negative.    Cardiovascular: Positive for chest pain, dyspnea on exertion, irregular heartbeat, leg swelling, near-syncope, orthopnea, palpitations and paroxysmal nocturnal dyspnea. Negative for claudication, cyanosis and syncope.   Respiratory: Positive for shortness of breath and sleep disturbances due to breathing.    Endocrine: Positive for cold intolerance.   Hematologic/Lymphatic: Bruises/bleeds easily.   Skin: Negative.    Musculoskeletal: Negative.    Gastrointestinal: Negative.    Genitourinary: Negative.    Neurological: Positive for dizziness, light-headedness, loss of balance and weakness.   Psychiatric/Behavioral: The patient is nervous/anxious.    Allergic/Immunologic: Negative.         Physical Exam:  Vitals  "reviewed.   Constitutional:       Appearance: Not in distress. Frail. Chronically ill-appearing.   Eyes:      Pupils: Pupils are equal, round, and reactive to light.   HENT:         Comments: Deaf.    Neck:      Thyroid: No thyromegaly.      Lymphadenopathy: No cervical adenopathy.   Pulmonary:      Effort: Pulmonary effort is normal.      Breath sounds: No wheezing. No rhonchi. Bibasilar Rales present.   Cardiovascular:      PMI at left midclavicular line. Tachycardia present. Irregularly irregular rhythm.      Murmurs: There is a grade 3/6 high frequency harsh, blowing midsystolic murmur at the URSB, radiating to the neck.      No gallop. No click. No rub.   Pulses:     Intact distal pulses.   Edema:     Peripheral edema present.     Pretibial: bilateral 1+ edema of the pretibial area.     Ankle: bilateral 1+ edema of the ankle.     Feet: bilateral 1+ edema of the feet.  Abdominal:      General: Bowel sounds are normal.      Palpations: Abdomen is soft.   Musculoskeletal: Normal range of motion.         General: No deformity.      Extremities: No clubbing present.     Cervical back: Normal range of motion. Skin:     General: Skin is warm and dry.   Neurological:      Mental Status: Alert and oriented to person, place and time.         Vitals:    06/03/22 0940 06/03/22 1033 06/03/22 1122 06/03/22 1330   BP: 135/73 135/73 115/73 128/75   BP Location: Left arm      Pulse: 118 114 107 81   Resp: 18      Temp: 97.1 °F (36.2 °C)      TempSrc: Tympanic      SpO2: 97%      Weight: 56.4 kg (124 lb 6.4 oz)      Height: 157.5 cm (62\")          Diagnostic Data:  Transthoracic echo: 2/18/22  · Estimated left ventricular EF = 65% Estimated left ventricular EF was in agreement with the calculated left ventricular EF. Left ventricular ejection fraction appears to be 61 - 65%.  · Left ventricular diastolic function is consistent with (grade I) impaired relaxation.  · Left ventricular wall thickness is " consistent with moderate concentric hypertrophy.  · Left ventricular mass index is increased.  · Severe aortic valve stenosis is present. Aortic valve area is 1 cm2.  · Left atrial volume is moderately increased.  · The right atrial cavity is borderline dilated.  · Peak velocity of the flow distal to the aortic valve is 4.3 cm/s. Aortic valve maximum pressure gradient is 70 mmHg. Aortic valve mean pressure gradient is 48 mmHg.  · Estimated right ventricular systolic pressure from tricuspid regurgitation is moderately elevated (45-55 mmHg). Calculated right ventricular systolic pressure from tricuspid regurgitation is 45 mmHg.  · Moderate TR, Severe AS GISELLE 1.0 cm, Mild MR  · No pericardial effusion        Transesophageal echo: 5/20/22     · Estimated left ventricular EF = 60% Left ventricular systolic function is normal.  · Left ventricular wall thickness is consistent with mild concentric hypertrophy.  · Severe aortic valve stenosis is present. Aortic annulus 2.0 cm.  · Peak velocity of the flow distal to the aortic valve is 410.7 cm/s. Aortic valve mean pressure gradient is 38.9 mmHg.  · Mild mitral valve regurgitation is present.  · Trace tricuspid valve regurgitation is present.        Cardiac Cath: 5/20/22    FINAL IMPRESSION:  · Minor coronary artery disease in the RCA with no disease in the LAD or circumflex vessels.  · Severe aortic stenosis     RECOMMENDATIONS:  · The patient may proceed with TAVR from a coronary artery standpoint.     Indications: Severe aortic stenosis.  Evaluation for TAVR.     Access: Right femoral  Procedure narrative:  The patient was brought to the catheterization lab in a fasting condition.  Access site was prepped and draped in standard sterile fashion.  Lidocaine was injected and arterial access was obtained by percutaneous anterior wall puncture technique.  A 6 Czech arterial sheath was placed in the right femoral artery using a modified Seldinger technique.  Selective coronary  arteriography was performed using the Martha technique with a 6 [place] 4 curved Martha right catheter and a 6 [place] 4 curved Martha left catheter.  Nonionic contrast was used and was injected manually.  No left ventriculogram was performed.  No left heart pressures were obtained.                  Contrast: 35 ml     Angiographic Findings:    LMCA: The left main coronary artery gives rise to LAD and circumflex vessels and is free of disease.     LAD: The LAD gives rise to a moderate first diagonal branch moderate second diagonal branch several small diagonal branches and terminates as a moderate apical recurrent branch.  The LAD and its branches contain no disease.     Circumflex: The circumflex coronary artery is small and nondominant and gives rise to 2 moderate obtuse marginal branches.  The circumflex and its branches contain no disease.     RCA: The right coronary artery is a large dominant vessel giving rise to the PDA and a large posterolateral branch.  The right coronary artery contains a 20% irregularity in the mid vessel and is otherwise free of disease.      CTA Chest, Abdomen, and Pelvis:  6/3/2022    FINDINGS:  History indicates severe aortic stenosis. Unenhanced images show dense  and extensive aortic valve calcification and moderate coronary artery  calcification.     Angiographic images show maximal diameter of the ascending thoracic  aorta to be approximately 2.5 cm. No dissection is seen. There is  moderate atherosclerotic calcification of the thoracoabdominal aorta and  iliac vessels but no evidence of significant aortoiliac stenosis or  proximal superficial femoral stenosis. There is no evidence of aneurysm.     No thrombus is seen in the atrial appendages. Contrast opacification of  the pulmonary arteries is sufficient to exclude any large emboli. No  mediastinal mass or adenopathy is seen. There are small to moderate  bilateral pleural effusions. No distinct pericardial effusion.  Evaluation  of the lung fields demonstrates bilateral lower lobe volume  loss adjacent to the pleural effusions.     There is diffuse fatty liver change. Prior cholecystectomy. Spleen,  pancreas, adrenal glands, and kidneys appear within normal limits. No  upper abdominal adenopathy or acute inflammatory focus is seen. Small  volume ascites is present in the dependent pelvis. Large and small bowel  loops are normal in caliber and normal in appearance.     Regarding the lower abdomen and pelvis, the bladder is nondistended and  normal in appearance. Terminal ileum and cecum appear normal. The pelvic  viscera are unremarkable. Bony structures appear to be intact.     IMPRESSION:  1. CTA of the chest, abdomen and pelvis, with image data saved per TAVR  protocol.  2. Dense calcification of the aortic valve leaflets. No evidence of  thoracic aortic aneurysm or dissection. No evidence of significant  aortoiliac luminal stenosis.  3. Small moderate bilateral pleural effusions are present with adjacent  bilateral lower lobe volume loss. There is also small volume ascites  present in the dependent pelvis.     This report was finalized on 6/3/2022 2:55 PM by Thad Solomon.    Carotid Doppler: 2/18/22  · There is 60 to 80% stenosis LICA  · Less than 40% stenosis in the JOHN  · Moderate plaques in both primary carotid arteries  · Normal antegrade vertebral flow bilateral            Functional Assessment Data:    KCCQ12 Questionnaire Score: (see scanned copy):20/70 = NYHA class III - IV      [place] Basic Activities of Daily Living (ADL) Scale    Bathing (sponge bath, tub bath, or shower).  Receives either no assistance,   or assistance with bathing only on body part.   Yes    Dressing Gets clothes and dresses without any assistance, except for  tying shoes.        Yes    Toileting Goes to toilet room, uses toilet, arranges clothes, and returns  without any assistance (may use cane or walker for support and may use  bedpan / urinal at  night.      Yes    Transferring Moves into and out of bed and chair without assistance  (may use cane or walker)      Yes    Continence Controls bowel and bladder completely without occasional  accidents        Yes    Feeding Feeds self without assistance (except for help with cutting meat  or buttering bread)       Yes        Total (Number of Yesses of 6) 6/6      [place] Instrumental Activities of Daily Living Scale (IADL)    Ability to Use Telephone   Operates telephone on own initiative.  Looks up and dials numbers, etc.  ·        1    · Shopping  · Completely unable to shop    0    Food Preparation  Plans, prepares, and serves adequate meals independently  ·         1    Housekeeping  Performs light daily tasks such as dish washing, bed making  ·         1    · Laundry  All laundry must be done by others   0    Mode of Transportation  · Travels independently on public transportation or drives own car  ·         1    Responsibility for Own Medications  · Is responsible for taking medications in correct dosages at correct time  ·         1    Ability to Handle Finances  · Manages financial matters independently (budgets, writes checks,   pays rent, bills, goes to bank), collects and keeps track of income          1     Total (Number of Instrumental Activities of 8) 6/8         Five Meter Walk Test    Utilized Walking Aid? No     Walk 1: 10.3 s/5m     Walk 2: 11.9 s/5m     Walk 3: 11.2 s/5m    Five Meter Walk Average: 11.1 s/5m    Gait Speed: Slow (Average > 6 s/5m)       STS risk of mortality with open AVR    http://riskcalc.sts.org/stswebriskcalc/calculate       Creatinine Clearance: 29 ml/min  https://reference.[employer]/calculator/creatinine-clearance-cockcroft-gault    Assessment/ Plan:     ICD-10-CM ICD-9-CM   1. Paroxysmal atrial fibrillation (HCC).  CHADS VASC = 4.   Eliquis 2.5 mg BID.  Re-educated patient regarding importance of taking this medication Q12 hrs and not just @ HS to help prevent  "stroke.  Also, re-adding metoprolol tartrate today due to Afib RVR noted.  I48.0 427.31   2. Aortic stenosis, severe.  Elderly high risk patient with prior CEA and prior CVA.  Patient has met with Dr. Whitaker and with Cardiology.  She wishes to proceed with TAVR following review of today's CTA scan. I35.0 424.1   3. Bilateral carotid artery stenosis.  S/P CEA 2015.  Moderate recurrent left ICA stenosis  I65.23 433.10     433.30   4. Coronary artery disease, non-obstructive  I25.10 414.01   5. Sick sinus syndrome (HCC) s/p PPM 4/13/22 [employer] device I49.5 427.81   6. Essential hypertension I10 401.9   7. Chronic heart failure with preserved ejection fraction (HCC).  Treating Afib with RVR and adding low dose Lasix today should improve symptoms until TAVR can be performed July.  Discussed s/sx for which to present to ER that may signal HF would require admission:  Syncope, dyspnea at rest, chest pain. I50.32 428.9   8. Status post CVA 2002.  Eliquis. Z86.73 V12.54       TAVR education materials reviewed with patient/ daughter today.   present.  This included printed brochure detailing pathophysiology of aortic stenosis, patient specific aortic stenosis symptoms, and treatment options (medical therapy, surgical aortic valve replacement, and transcatheter aortic valve replacement).  A model of the valve also used to explain TAVR.      We discussed patient's goals of care: \"to feel better and remain independent\".  We reviewed the [facility] Shared Decision Making Tool for treatment of Aortic Stenosis to again compare/contrast the options of TAVR/ SAVR/ medical management.  Mrs. Mckeon does not wish to pursue medical management alone.  She understands she is high risk for SAVR.  Patient states she understands the risks associated with TAVR as explained by Dr. Whitaker.  We briefly reviewed these.       Patient has Living Will (Y) and Power of  (Y).     Our talk also " included procedural details, procedure risks, anticipated pre-op and post-op expectations, as well as follow up visit schedule @ one month and one year.  Further discussion and final decision making will occur with Multi- Disciplinary Heart Team following the completion and review of pre-requisite testing.  Tentative date for TAVR 7/18/22.    BALJIT Sahu, 06/06/22, 11:32 EDT

## 2022-06-06 ENCOUNTER — TELEPHONE (OUTPATIENT)
Dept: CARDIOLOGY | Facility: HOSPITAL | Age: 77
End: 2022-06-06

## 2022-06-06 RX ORDER — FUROSEMIDE 20 MG/1
TABLET ORAL
Qty: 90 TABLET | Refills: 1 | Status: SHIPPED | OUTPATIENT
Start: 2022-06-06 | End: 2022-08-01 | Stop reason: SDUPTHER

## 2022-06-06 NOTE — TELEPHONE ENCOUNTER
"TAVR APRN    Telephone follow up w/ daughter, Mila re: new meds for Mrs. Mckeon.  Mila states patient reports feeling \"the same\".  Advised CTA report to evaluate the valve & arterial measurements is still pending but CTA images did identify small to moderate bilateral pleural effusions.  The lasix will definitely help relieve this process until TAVR completed.  A small dose was prescribed to begin with since she is naive to diuretics.  This can be adjusted PRN.      Please call me @ [facility] is BP seems low or is she needs to come in a be seen for her breathing/ afib in the next few weeks.    Libra López APRN  "

## 2022-06-09 NOTE — PROGRESS NOTES
CHONG SMILEY    Patient's daughter called to report BP and pulse numbers since addition of metoprolol tartrate and lasix last week.      6/5 101/57 pulse 97, 6/6 88/53 pulse 89, 6/7 94/56 pulse 84, and 6/8 90/56 pulse 77.      Daughter states she is feeling a little better.    Advised hold doxazosin 8 mg QPM and continue to monitor BP/ pulse daily.  Goal SBP is 100-130 mm Hg and HR < 100 bpm.    Libra SMILEY

## 2022-06-11 LAB — CREAT BLDA-MCNC: 1.3 MG/DL (ref 0.6–1.3)

## 2022-06-23 DIAGNOSIS — I35.0 AORTIC STENOSIS, SEVERE: Primary | ICD-10-CM

## 2022-06-27 ENCOUNTER — OFFICE VISIT (OUTPATIENT)
Dept: CARDIAC SURGERY | Facility: CLINIC | Age: 77
End: 2022-06-27

## 2022-06-27 VITALS
BODY MASS INDEX: 20.8 KG/M2 | SYSTOLIC BLOOD PRESSURE: 117 MMHG | OXYGEN SATURATION: 98 % | WEIGHT: 113 LBS | TEMPERATURE: 97.5 F | DIASTOLIC BLOOD PRESSURE: 72 MMHG | HEIGHT: 62 IN | HEART RATE: 135 BPM

## 2022-06-27 DIAGNOSIS — I35.9 AORTIC VALVE DISORDER: Primary | ICD-10-CM

## 2022-06-27 PROCEDURE — 99212 OFFICE O/P EST SF 10 MIN: CPT | Performed by: THORACIC SURGERY (CARDIOTHORACIC VASCULAR SURGERY)

## 2022-06-27 NOTE — PROGRESS NOTES
06/27/2022  Patient Information  Miryam Mckeon                                                                                          [address] [facility] [address]   1945  'PCP/Referring Physician'  Rigoberto Chamberlain MD  [phone]  No ref. provider found    Chief Complaint   Patient presents with   • Consult     Patient referred for aortic valve stenosis,complains of fatigue,shortness of breath and dizziness.       History of Present Illness:   The patient is a 76-year-old female who comes in with her family and her  today to discuss her transcatheter valve.  She has already seen me and other members of the TAVR team, however, at that time, the only test that had not been completed was a CT angiography.  Her left femoral artery and her subclavian arteries are too small to deploy the valve and also her carotids have had previous carotid surgery.  Her right iliac and femoral system are also borderline and extremely small and there is a chance that we may not be able to have adequate access to this valve and I wanted to discuss that with the patient and her family.      Patient Active Problem List   Diagnosis   • Bilateral carotid artery stenosis   • Aortic stenosis, severe   • Paroxysmal atrial fibrillation (HCC)   • Coronary artery disease involving native coronary artery of native heart without angina pectoris   • Sick sinus syndrome (HCC)   • Hyperlipidemia LDL goal <70   • Essential hypertension   • Pre-operative clearance   • Chronic heart failure with preserved ejection fraction (HCC)   • Status post CVA   • Aortic valve disorder     Past Medical History:   Diagnosis Date   • Anxiety    • Aortic valve stenosis    • Atrial fibrillation (HCC)    • Borderline diabetes     ???   • Carotid artery stenosis    • CKD (chronic kidney disease)    • Deaf    • GERD (gastroesophageal reflux disease)    • Heart murmur    • Hyperlipidemia    • Hypertension    • Irregular heartbeat    • PONV  (postoperative nausea and vomiting)    • Stroke (HCC)    • TIA (transient ischemic attack)      Past Surgical History:   Procedure Laterality Date   • CARDIAC CATHETERIZATION      05/20/2022 per dr. chow   • CARDIAC CATHETERIZATION Left 5/20/2022    Procedure: Left Heart Cath;  Surgeon: Yashira Chow MD;  Location:  [facility];  Service: Cardiology;  Laterality: Left;   • CARDIAC ELECTROPHYSIOLOGY PROCEDURE N/A 04/13/2022    Procedure: DDD PPM Implant (BSC), DNS Eliquis;  Surgeon: Troy Hussein DO;  Location:  [facility];  Service: Cardiology;  Laterality: N/A;   • CAROTID ENDARTERECTOMY Bilateral    • CATARACT EXTRACTION     • CHOLECYSTECTOMY     • COLONOSCOPY     • KNEE SURGERY Right    • PACEMAKER IMPLANTATION     • JF      05/20/2022 per dr. chow       Current Outpatient Medications:   •  amLODIPine (NORVASC) 10 MG tablet, TAKE 1 TABLET BY MOUTH DAILY, Disp: 90 tablet, Rfl: 3  •  apixaban (ELIQUIS) 2.5 MG tablet tablet, Take 1 tablet by mouth Every 12 (Twelve) Hours. For afib, Disp: 180 tablet, Rfl: 1  •  aspirin 81 MG tablet, Take 81 mg by mouth Daily., Disp: , Rfl:   •  atorvastatin (LIPITOR) 10 MG tablet, Take 10 mg by mouth Every Night., Disp: , Rfl:   •  cholecalciferol (VITAMIN D3) 25 MCG (1000 UT) tablet, Take 2,000 Units by mouth Daily., Disp: , Rfl:   •  furosemide (LASIX) 20 MG tablet, TAKE 1 TABLET BY MOUTH DAILY FOR FLUID RETENTION, Disp: 90 tablet, Rfl: 1  •  hydrALAZINE (APRESOLINE) 50 MG tablet, Take 100 mg by mouth every night at bedtime., Disp: , Rfl:   •  latanoprost (XALATAN) 0.005 % ophthalmic solution, Administer 1 drop to both eyes Every Night., Disp: , Rfl:   •  losartan (COZAAR) 100 MG tablet, Take 100 mg by mouth Daily., Disp: , Rfl:   •  metoprolol tartrate (LOPRESSOR) 25 MG tablet, Take 1 tablet by mouth 2 (Two) Times a Day. For afib, Disp: 60 tablet, Rfl: 2  •  ondansetron (ZOFRAN) 4 MG tablet, TAKE 1 TABLET BY MOUTH  EVERY 8 HOURS FOR 2 DAYS (Patient taking differently: Take 4 mg by mouth Every 8 (Eight) Hours As Needed for Nausea.), Disp: 30 tablet, Rfl: 1  •  oxybutynin (DITROPAN) 5 MG tablet, Take 5 mg by mouth As Needed., Disp: , Rfl:   •  Polyethylene Glycol 3350 (MIRALAX PO), Take 17 g by mouth Every Other Day., Disp: , Rfl:   •  simethicone (MYLICON) 125 MG chewable tablet, Chew 125 mg Every 6 (Six) Hours As Needed for Flatulence. PRN, Disp: , Rfl:   •  Solifenacin Succinate (VESICARE PO), Take  by mouth As Needed. 4 mg, Disp: , Rfl:   •  vitamin B-12 (CYANOCOBALAMIN) 1000 MCG tablet, Take 5,000 mcg by mouth Daily., Disp: , Rfl:   No Known Allergies  Social History     Socioeconomic History   • Marital status:    • Number of children: 2   • Highest education level: High school graduate   Tobacco Use   • Smoking status: Never Smoker   • Smokeless tobacco: Never Used   Vaping Use   • Vaping Use: Never used   Substance and Sexual Activity   • Alcohol use: Never   • Drug use: Never   • Sexual activity: Defer     Family History   Problem Relation Age of Onset   • Other Mother         enlarged heart   • Stroke Father      Review of Systems   Constitutional: Positive for malaise/fatigue. Negative for chills, fever, night sweats and weight loss.   HENT: Positive for hearing loss (deaf). Negative for odynophagia and sore throat.    Cardiovascular: Positive for chest pain, dyspnea on exertion, leg swelling and palpitations. Negative for orthopnea.   Respiratory: Negative.  Negative for cough and hemoptysis.    Endocrine: Negative for cold intolerance, heat intolerance, polydipsia, polyphagia and polyuria.   Hematologic/Lymphatic: Negative.  Does not bruise/bleed easily.   Skin: Negative.  Negative for itching and rash.   Musculoskeletal: Positive for joint pain and muscle cramps. Negative for joint swelling and myalgias.   Gastrointestinal: Positive for abdominal pain. Negative for constipation, diarrhea, hematemesis,  "hematochezia, melena, nausea and vomiting.   Genitourinary: Positive for frequency and nocturia. Negative for dysuria and hematuria.   Neurological: Positive for dizziness, light-headedness, loss of balance and weakness. Negative for focal weakness, headaches, numbness and seizures.   Psychiatric/Behavioral: Negative for suicidal ideas. The patient is nervous/anxious.    Allergic/Immunologic: Negative.    All other systems reviewed and are negative.    Vitals:    06/27/22 0823   BP: 117/72   BP Location: Left arm   Patient Position: Sitting   Pulse: (!) 135   Temp: 97.5 °F (36.4 °C)   SpO2: 98%   Weight: 51.3 kg (113 lb)   Height: 157.5 cm (62\")      Physical Exam   CONSTITUTIONAL: Alert and conversant, Well dressed, Well nourished, No acute distress  EYES: Sclera clean, Anicteric, Pupils equal  ENT: No nasal deviation, Trachea midline  NECK: No neck masses, Supple  LUNGS: No wheezing, Cough, non-congested  HEART: No rubs,   GASTROINTESTINAL: Soft, non-distended, No masses, Non tender  to palpation, normal bowel sounds  NEURO: No motor deficits, No sensory deficits, Cranial Nerves 2 through 12 grossly intact  PSYCHIATRIC: Oriented to person, place and time, No memory deficits, Mood appropriate  VASCULAR: No carotid bruits, Femoral pulses palpable and symmetric  MUSKULOSKELETAL: No extremity trauma or extremity asymmetry    The ROS, past medical history, surgical history, family history, social history and vitals were reviewed by myself and corrected as needed.      Labs/Imaging:  I reviewed the CT angiogram images.    Assessment/Plan:   The patient is a 76-year-old female who presents today to discuss her transcatheter valve. She, her family and her  are here today so I can discuss the access difficulties with the patient.  We believe her right iliac and femoral system are borderline and there is a very reasonable chance we will not be able to have access for this valve placement and deployment.  My plan " is to perform a right femoral cutdown and then we will carefully try to proceed and possibly have to open this artery with catheter-based means.  They are very well informed that this procedure could result in arterial rupture.  I have indicated these can often be repaired but there is always a risk of death and limb loss and that absolutely no guarantees can be made.  They understand there is a possibility that we will leave the room with the valve not deployed.  At this point, they are thinking over their options and want to remain on the schedule for surgery and if they decide against surgery they will notify us.    Patient Active Problem List   Diagnosis   • Bilateral carotid artery stenosis   • Aortic stenosis, severe   • Paroxysmal atrial fibrillation (HCC)   • Coronary artery disease involving native coronary artery of native heart without angina pectoris   • Sick sinus syndrome (HCC)   • Hyperlipidemia LDL goal <70   • Essential hypertension   • Pre-operative clearance   • Chronic heart failure with preserved ejection fraction (HCC)   • Status post CVA   • Aortic valve disorder         CC: MD Cristina Mondragon editing for Bola Whitaker MD        I, Bola Whitaker MD, have read and agree with the editing done by Cristina Mosqueda, .

## 2022-07-06 RX ORDER — ONDANSETRON 4 MG/1
4 TABLET, FILM COATED ORAL EVERY 8 HOURS PRN
Qty: 90 TABLET | Refills: 0 | Status: SHIPPED | OUTPATIENT
Start: 2022-07-06 | End: 2023-01-11 | Stop reason: SDUPTHER

## 2022-07-11 ENCOUNTER — OFFICE VISIT (OUTPATIENT)
Dept: CARDIOLOGY | Facility: CLINIC | Age: 77
End: 2022-07-11

## 2022-07-11 VITALS
OXYGEN SATURATION: 98 % | HEIGHT: 62 IN | DIASTOLIC BLOOD PRESSURE: 78 MMHG | WEIGHT: 114.6 LBS | HEART RATE: 75 BPM | SYSTOLIC BLOOD PRESSURE: 120 MMHG | BODY MASS INDEX: 21.09 KG/M2

## 2022-07-11 DIAGNOSIS — I49.5 SICK SINUS SYNDROME: ICD-10-CM

## 2022-07-11 DIAGNOSIS — I35.0 AORTIC STENOSIS, SEVERE: Primary | ICD-10-CM

## 2022-07-11 DIAGNOSIS — Z95.0 PRESENCE OF CARDIAC PACEMAKER: ICD-10-CM

## 2022-07-11 DIAGNOSIS — I48.0 PAROXYSMAL ATRIAL FIBRILLATION: ICD-10-CM

## 2022-07-11 DIAGNOSIS — E78.5 HYPERLIPIDEMIA LDL GOAL <70: ICD-10-CM

## 2022-07-11 PROBLEM — I35.9 AORTIC VALVE DISORDER: Status: RESOLVED | Noted: 2022-06-27 | Resolved: 2022-07-11

## 2022-07-11 PROBLEM — Z01.818 PRE-OPERATIVE CLEARANCE: Status: RESOLVED | Noted: 2022-04-29 | Resolved: 2022-07-11

## 2022-07-11 PROCEDURE — 93280 PM DEVICE PROGR EVAL DUAL: CPT | Performed by: INTERNAL MEDICINE

## 2022-07-11 PROCEDURE — 99024 POSTOP FOLLOW-UP VISIT: CPT | Performed by: INTERNAL MEDICINE

## 2022-07-11 NOTE — PROGRESS NOTES
Cardiac Electrophysiology Outpatient Follow Up Note            [facility]    Follow Up Office Visit      Miryam Mckeon  [id number]  07/11/2022  [unfilled]  [unfilled]    Primary Care Physician: Rigoberto Chamberlain MD    Referred By: No ref. provider found    Subjective     Chief Complaint:   Diagnoses and all orders for this visit:    1. Aortic stenosis, severe (Primary)    2. Paroxysmal atrial fibrillation (HCC)    3. Sick sinus syndrome (HCC)    4. Hyperlipidemia LDL goal <70    5. Presence of cardiac pacemaker      Chief Complaint   Patient presents with   • Sick sinus syndrome    • Paroxysmal atrial fibrillation        History of Present Illness:   Miryam Mckeon is a 76 y.o. female who presents to my electrophysiology clinic for follow up of above complaints.  She is here with her daughter.  American  as well.  Feels a lot better since receiving her pacemaker.  Looking forward to her TAVR procedure next week.      Review of Systems:   Constitutional: No fevers or chills, no recent weight gain or weight loss or fatigue  Eyes: No visual loss, blurred vision, double vision, yellow sclerae.  ENT: No headaches, hearing loss, vertigo, congestion or sore throat.   Cardiovascular: Per HPI  Respiratory: No cough or wheezing, no sputum production, no hematemesis   Past Medical History:   Past Medical History:   Diagnosis Date   • Anxiety    • Aortic valve stenosis    • Atrial fibrillation (HCC)    • Borderline diabetes     ???   • Carotid artery stenosis    • CKD (chronic kidney disease)    • Deaf    • GERD (gastroesophageal reflux disease)    • Heart murmur    • Hyperlipidemia    • Hypertension    • Irregular heartbeat    • PONV (postoperative nausea and vomiting)    • Stroke (HCC)    • TIA (transient ischemic attack)        Past Surgical History:   Past Surgical History:   Procedure Laterality Date   • CARDIAC CATHETERIZATION       05/20/2022 per dr. chow   • CARDIAC CATHETERIZATION Left 5/20/2022    Procedure: Left Heart Cath;  Surgeon: Yashira Chow MD;  Location:  [facility];  Service: Cardiology;  Laterality: Left;   • CARDIAC ELECTROPHYSIOLOGY PROCEDURE N/A 04/13/2022    Procedure: DDD PPM Implant (BSC), DNS Eliquis;  Surgeon: Troy Hussein DO;  Location:  [facility];  Service: Cardiology;  Laterality: N/A;   • CAROTID ENDARTERECTOMY Bilateral    • CATARACT EXTRACTION     • CHOLECYSTECTOMY     • COLONOSCOPY     • KNEE SURGERY Right    • PACEMAKER IMPLANTATION     • JF      05/20/2022 per dr. chow       Family History:   Family History   Problem Relation Age of Onset   • Other Mother         enlarged heart   • Stroke Father        Social History:   Social History     Socioeconomic History   • Marital status:    • Number of children: 2   • Highest education level: High school graduate   Tobacco Use   • Smoking status: Never Smoker   • Smokeless tobacco: Never Used   Vaping Use   • Vaping Use: Never used   Substance and Sexual Activity   • Alcohol use: Never   • Drug use: Never   • Sexual activity: Defer       Medications:     Current Outpatient Medications:   •  amLODIPine (NORVASC) 10 MG tablet, TAKE 1 TABLET BY MOUTH DAILY, Disp: 90 tablet, Rfl: 3  •  apixaban (ELIQUIS) 2.5 MG tablet tablet, Take 1 tablet by mouth Every 12 (Twelve) Hours. For afib, Disp: 180 tablet, Rfl: 1  •  aspirin 81 MG tablet, Take 81 mg by mouth Daily., Disp: , Rfl:   •  atorvastatin (LIPITOR) 10 MG tablet, Take 10 mg by mouth Every Night., Disp: , Rfl:   •  cholecalciferol (VITAMIN D3) 25 MCG (1000 UT) tablet, Take 2,000 Units by mouth Daily., Disp: , Rfl:   •  furosemide (LASIX) 20 MG tablet, TAKE 1 TABLET BY MOUTH DAILY FOR FLUID RETENTION, Disp: 90 tablet, Rfl: 1  •  hydrALAZINE (APRESOLINE) 50 MG tablet, Take 100 mg by mouth every night at bedtime., Disp: , Rfl:   •  latanoprost  "(XALATAN) 0.005 % ophthalmic solution, Administer 1 drop to both eyes Every Night., Disp: , Rfl:   •  losartan (COZAAR) 100 MG tablet, Take 100 mg by mouth Daily., Disp: , Rfl:   •  metoprolol tartrate (LOPRESSOR) 25 MG tablet, Take 1 tablet by mouth 2 (Two) Times a Day. For afib, Disp: 60 tablet, Rfl: 2  •  ondansetron (ZOFRAN) 4 MG tablet, Take 1 tablet by mouth Every 8 (Eight) Hours As Needed for Nausea., Disp: 90 tablet, Rfl: 0  •  vitamin B-12 (CYANOCOBALAMIN) 1000 MCG tablet, Take 5,000 mcg by mouth Daily., Disp: , Rfl:   •  oxybutynin (DITROPAN) 5 MG tablet, Take 5 mg by mouth As Needed., Disp: , Rfl:   •  Polyethylene Glycol 3350 (MIRALAX PO), Take 17 g by mouth Every Other Day., Disp: , Rfl:   •  simethicone (MYLICON) 125 MG chewable tablet, Chew 125 mg Every 6 (Six) Hours As Needed for Flatulence. PRN, Disp: , Rfl:   •  Solifenacin Succinate (VESICARE PO), Take  by mouth As Needed. 4 mg, Disp: , Rfl:     Allergies:   No Known Allergies    Objective   Vital Signs:   Vitals:    07/11/22 1549   BP: 120/78   BP Location: Right arm   Patient Position: Sitting   Pulse: 75   SpO2: 98%   Weight: 52 kg (114 lb 9.6 oz)   Height: 157.5 cm (62\")       PHYSICAL EXAM  General appearance: Awake, alert, cooperative  Head: Normocephalic, without obvious abnormality, atraumatic  Eyes: Conjunctivae/corneas clear, EOMs intact  Neck: no adenopathy, no carotid bruit, no JVD and thyroid: not enlarged  Lungs: clear to auscultation bilaterally and no rhonchi or crackles\", ' symmetric  Heart: Regular rate and rhythm with 3 out of 6 systolic ejection murmur diminished A2  Abdomen: Soft, non-tender, bowel sounds normal,  no organomegaly  Extremities: extremities normal, atraumatic, no cyanosis or edema  Skin: Skin color, turgor normal, no rashes or lesions  Neurologic: Grossly normal     Lab Results   Component Value Date    GLUCOSE 111 (H) 05/20/2022    CALCIUM 10.0 05/20/2022     05/20/2022    K 4.5 05/20/2022    CO2 23.0 " 05/20/2022     05/20/2022    BUN 34 (H) 05/20/2022    CREATININE 1.30 06/03/2022    EGFRIFAFRI 47 (L) 02/18/2022    EGFRIFNONA 41 (L) 02/18/2022    BCR 24.6 05/20/2022    ANIONGAP 10.0 05/20/2022     Lab Results   Component Value Date    WBC 5.40 05/20/2022    HGB 9.9 (L) 05/20/2022    HCT 30.4 (L) 05/20/2022    MCV 98.1 (H) 05/20/2022     (L) 05/20/2022     Lab Results   Component Value Date    INR 1.20 (H) 05/20/2022    INR 1.26 (H) 05/17/2022    INR 1.04 04/06/2015    PROTIME 15.1 (H) 05/20/2022    PROTIME 15.8 (H) 05/17/2022    PROTIME 10.9 04/06/2015     No results found for: TSH, O4TNMMF, X7NPBMP, THYROIDAB    Cardiac Testing:     I personally viewed and interpreted the patient's EKG/Telemetry/lab data    Procedures    Tobacco Cessation: N/A  Obstructive Sleep Apnea Screening: Completed    Assessment & Plan    Diagnoses and all orders for this visit:    1. Aortic stenosis, severe (Primary)    2. Paroxysmal atrial fibrillation (HCC)    3. Sick sinus syndrome (HCC)    4. Hyperlipidemia LDL goal <70    5. Presence of cardiac pacemaker         Diagnosis Plan   1. Aortic stenosis, severe   plan for transcatheter aortic valve replacement on July 20.   2. Paroxysmal atrial fibrillation (HCC)   paroxysmal.  Paucity of symptoms.  Anticoagulation.  No indication for aggressive rhythm control strategy at this point.    Anticoagulation for the secondary prevention of stroke.   3. Sick sinus syndrome (HCC)   dual-chamber permanent pacemaker.  Left-sided submuscular.  Significant symptomatic improvement since receiving her pacemaker.  Feels great.   4. Hyperlipidemia LDL goal <70   noted   5. Presence of cardiac pacemaker   [employer] dual-chamber permanent pacemaker.  Device interrogation reveals appropriate pacing sensing and impedance values.      This patient's Cardiac Implanted Electronic Device was manually interrogated and reprogrammed during the patient encounter today.  Iterative programming  changes were manually made to determine the sensing threshold, pacing threshold, lead impedance as well as underlying cardiac rhythm.  These programming changes were not limited to but included some or all of the following when appropriate: pacing mode, programmed AV delays, blanking periods, and refractory periods.  Data obtained as a result of these manual programing changes informed the patient's CIED permanent programming.     Body mass index is 20.96 kg/m².    I spent 38 minutes in consultation with this patient which included more than 65% of this time in direct face-to-face counseling, physical examination and discussion of my assessment and findings and shared decision making with the patient.  The remainder of the time not spent face to face was performing one, some or all of the following actions:  preparing to see this patient ( eg. Review of tests),  ordering medications, tests or procedures ), care coordination, discussion of the plan with other healthcare providers, documenting clinical information in Epic well as independently interpreting results and communicating results to patient, family and or caregiver.  All time noted occurred on the date of service.    Follow Up:       Thank you for allowing me to participate in the care of your patient. Please to not hesitate to contact me with additional questions or concerns.      Troy Hussein, DO, FACC, RS  Cardiac Electrophysiologist  [facility] / [facility]

## 2022-07-13 ENCOUNTER — PREP FOR SURGERY (OUTPATIENT)
Dept: OTHER | Facility: HOSPITAL | Age: 77
End: 2022-07-13

## 2022-07-13 DIAGNOSIS — I35.0 AORTIC STENOSIS, SEVERE: Primary | ICD-10-CM

## 2022-07-13 RX ORDER — CHLORHEXIDINE GLUCONATE 500 MG/1
1 CLOTH TOPICAL EVERY 12 HOURS PRN
Status: CANCELLED | OUTPATIENT
Start: 2022-07-20

## 2022-07-13 RX ORDER — NITROGLYCERIN 0.4 MG/1
0.4 TABLET SUBLINGUAL
Status: CANCELLED | OUTPATIENT
Start: 2022-07-20

## 2022-07-13 RX ORDER — ASPIRIN 325 MG
325 TABLET ORAL NIGHTLY
Status: CANCELLED | OUTPATIENT
Start: 2022-07-19 | End: 2022-07-20

## 2022-07-13 RX ORDER — CHLORHEXIDINE GLUCONATE 0.12 MG/ML
15 RINSE ORAL ONCE
Status: CANCELLED | OUTPATIENT
Start: 2022-07-20 | End: 2022-07-20

## 2022-07-13 RX ORDER — CHLORHEXIDINE GLUCONATE 500 MG/1
1 CLOTH TOPICAL EVERY 12 HOURS PRN
Status: CANCELLED | OUTPATIENT
Start: 2022-07-19

## 2022-07-19 ENCOUNTER — HOSPITAL ENCOUNTER (OUTPATIENT)
Dept: PULMONOLOGY | Facility: HOSPITAL | Age: 77
Discharge: HOME | End: 2022-07-19

## 2022-07-19 ENCOUNTER — HOSPITAL ENCOUNTER (OUTPATIENT)
Dept: GENERAL RADIOLOGY | Facility: HOSPITAL | Age: 77
Discharge: HOME | End: 2022-07-19

## 2022-07-19 ENCOUNTER — ANESTHESIA EVENT (OUTPATIENT)
Dept: PERIOP | Facility: HOSPITAL | Age: 77
End: 2022-07-19

## 2022-07-19 ENCOUNTER — PRE-ADMISSION TESTING (OUTPATIENT)
Dept: PREADMISSION TESTING | Facility: HOSPITAL | Age: 77
End: 2022-07-19

## 2022-07-19 VITALS — WEIGHT: 115.52 LBS | OXYGEN SATURATION: 99 % | HEIGHT: 62 IN | BODY MASS INDEX: 21.26 KG/M2

## 2022-07-19 DIAGNOSIS — I35.0 AORTIC STENOSIS, SEVERE: ICD-10-CM

## 2022-07-19 LAB
ABO GROUP BLD: NORMAL
ALBUMIN SERPL-MCNC: 4.4 G/DL (ref 3.5–5.2)
ALBUMIN/GLOB SERPL: 1.8 G/DL
ALP SERPL-CCNC: 124 U/L (ref 39–117)
ALT SERPL W P-5'-P-CCNC: 15 U/L (ref 1–33)
AMPHET+METHAMPHET UR QL: NEGATIVE
AMPHETAMINES UR QL: NEGATIVE
ANION GAP SERPL CALCULATED.3IONS-SCNC: 8 MMOL/L (ref 5–15)
APTT PPP: 26.7 SECONDS (ref 22–39)
AST SERPL-CCNC: 19 U/L (ref 1–32)
BARBITURATES UR QL SCN: NEGATIVE
BASOPHILS # BLD AUTO: 0.03 10*3/MM3 (ref 0–0.2)
BASOPHILS NFR BLD AUTO: 0.7 % (ref 0–1.5)
BENZODIAZ UR QL SCN: NEGATIVE
BILIRUB SERPL-MCNC: 0.6 MG/DL (ref 0–1.2)
BLD GP AB SCN SERPL QL: NEGATIVE
BUN SERPL-MCNC: 49 MG/DL (ref 8–23)
BUN/CREAT SERPL: 36.6 (ref 7–25)
BUPRENORPHINE SERPL-MCNC: NEGATIVE NG/ML
CALCIUM SPEC-SCNC: 10.3 MG/DL (ref 8.6–10.5)
CANNABINOIDS SERPL QL: NEGATIVE
CHLORIDE SERPL-SCNC: 98 MMOL/L (ref 98–107)
CO2 SERPL-SCNC: 30 MMOL/L (ref 22–29)
COCAINE UR QL: NEGATIVE
CREAT SERPL-MCNC: 1.34 MG/DL (ref 0.57–1)
DEPRECATED RDW RBC AUTO: 43.8 FL (ref 37–54)
EGFRCR SERPLBLD CKD-EPI 2021: 40.7 ML/MIN/1.73
EOSINOPHIL # BLD AUTO: 0.09 10*3/MM3 (ref 0–0.4)
EOSINOPHIL NFR BLD AUTO: 2.2 % (ref 0.3–6.2)
ERYTHROCYTE [DISTWIDTH] IN BLOOD BY AUTOMATED COUNT: 12.6 % (ref 12.3–15.4)
FLUAV RNA RESP QL NAA+PROBE: NOT DETECTED
FLUBV RNA RESP QL NAA+PROBE: NOT DETECTED
GLOBULIN UR ELPH-MCNC: 2.5 GM/DL
GLUCOSE SERPL-MCNC: 119 MG/DL (ref 65–99)
HBA1C MFR BLD: 5.7 % (ref 4.8–5.6)
HCT VFR BLD AUTO: 35.8 % (ref 34–46.6)
HGB BLD-MCNC: 11.9 G/DL (ref 12–15.9)
IMM GRANULOCYTES # BLD AUTO: 0.02 10*3/MM3 (ref 0–0.05)
IMM GRANULOCYTES NFR BLD AUTO: 0.5 % (ref 0–0.5)
INR PPP: 1.3 (ref 0.84–1.13)
LYMPHOCYTES # BLD AUTO: 0.9 10*3/MM3 (ref 0.7–3.1)
LYMPHOCYTES NFR BLD AUTO: 22.1 % (ref 19.6–45.3)
MAGNESIUM SERPL-MCNC: 2 MG/DL (ref 1.6–2.4)
MCH RBC QN AUTO: 31.8 PG (ref 26.6–33)
MCHC RBC AUTO-ENTMCNC: 33.2 G/DL (ref 31.5–35.7)
MCV RBC AUTO: 95.7 FL (ref 79–97)
METHADONE UR QL SCN: NEGATIVE
MONOCYTES # BLD AUTO: 0.35 10*3/MM3 (ref 0.1–0.9)
MONOCYTES NFR BLD AUTO: 8.6 % (ref 5–12)
NEUTROPHILS NFR BLD AUTO: 2.68 10*3/MM3 (ref 1.7–7)
NEUTROPHILS NFR BLD AUTO: 65.9 % (ref 42.7–76)
NRBC BLD AUTO-RTO: 0 /100 WBC (ref 0–0.2)
OPIATES UR QL: NEGATIVE
OXYCODONE UR QL SCN: NEGATIVE
PA ADP PRP-ACNC: 266 PRU
PCP UR QL SCN: NEGATIVE
PLATELET # BLD AUTO: 143 10*3/MM3 (ref 140–450)
PMV BLD AUTO: 10.9 FL (ref 6–12)
POTASSIUM SERPL-SCNC: 4.8 MMOL/L (ref 3.5–5.2)
PROPOXYPH UR QL: NEGATIVE
PROT SERPL-MCNC: 6.9 G/DL (ref 6–8.5)
PROTHROMBIN TIME: 16.1 SECONDS (ref 11.4–14.4)
RBC # BLD AUTO: 3.74 10*6/MM3 (ref 3.77–5.28)
RH BLD: NEGATIVE
SARS-COV-2 RNA RESP QL NAA+PROBE: NOT DETECTED
SODIUM SERPL-SCNC: 136 MMOL/L (ref 136–145)
T&S EXPIRATION DATE: NORMAL
TRICYCLICS UR QL SCN: NEGATIVE
WBC NRBC COR # BLD: 4.07 10*3/MM3 (ref 3.4–10.8)

## 2022-07-19 PROCEDURE — 94010 BREATHING CAPACITY TEST: CPT | Performed by: INTERNAL MEDICINE

## 2022-07-19 PROCEDURE — 85610 PROTHROMBIN TIME: CPT

## 2022-07-19 PROCEDURE — 86923 COMPATIBILITY TEST ELECTRIC: CPT

## 2022-07-19 PROCEDURE — 83735 ASSAY OF MAGNESIUM: CPT

## 2022-07-19 PROCEDURE — 87636 SARSCOV2 & INF A&B AMP PRB: CPT

## 2022-07-19 PROCEDURE — 85576 BLOOD PLATELET AGGREGATION: CPT

## 2022-07-19 PROCEDURE — 85025 COMPLETE CBC W/AUTO DIFF WBC: CPT

## 2022-07-19 PROCEDURE — 80053 COMPREHEN METABOLIC PANEL: CPT

## 2022-07-19 PROCEDURE — 36415 COLL VENOUS BLD VENIPUNCTURE: CPT

## 2022-07-19 PROCEDURE — 86900 BLOOD TYPING SEROLOGIC ABO: CPT

## 2022-07-19 PROCEDURE — C9803 HOPD COVID-19 SPEC COLLECT: HCPCS

## 2022-07-19 PROCEDURE — 86901 BLOOD TYPING SEROLOGIC RH(D): CPT

## 2022-07-19 PROCEDURE — 83036 HEMOGLOBIN GLYCOSYLATED A1C: CPT

## 2022-07-19 PROCEDURE — 85730 THROMBOPLASTIN TIME PARTIAL: CPT

## 2022-07-19 PROCEDURE — 80306 DRUG TEST PRSMV INSTRMNT: CPT

## 2022-07-19 PROCEDURE — 80305 DRUG TEST PRSMV DIR OPT OBS: CPT | Performed by: PHYSICIAN ASSISTANT

## 2022-07-19 PROCEDURE — 94010 BREATHING CAPACITY TEST: CPT

## 2022-07-19 PROCEDURE — 86850 RBC ANTIBODY SCREEN: CPT

## 2022-07-19 PROCEDURE — 71046 X-RAY EXAM CHEST 2 VIEWS: CPT

## 2022-07-19 RX ORDER — CHLORHEXIDINE GLUCONATE 500 MG/1
1 CLOTH TOPICAL EVERY 12 HOURS PRN
Status: DISCONTINUED | OUTPATIENT
Start: 2022-07-19 | End: 2022-07-20

## 2022-07-19 RX ORDER — ASPIRIN 325 MG
325 TABLET ORAL NIGHTLY
Status: DISCONTINUED | OUTPATIENT
Start: 2022-07-19 | End: 2022-07-20

## 2022-07-19 RX ORDER — DOXAZOSIN MESYLATE 4 MG/1
4 TABLET ORAL NIGHTLY
COMMUNITY
Start: 2022-07-11 | End: 2022-07-21 | Stop reason: HOSPADM

## 2022-07-20 ENCOUNTER — ANESTHESIA (OUTPATIENT)
Dept: PERIOP | Facility: HOSPITAL | Age: 77
End: 2022-07-20

## 2022-07-20 ENCOUNTER — ANESTHESIA EVENT (OUTPATIENT)
Dept: PERIOP | Facility: HOSPITAL | Age: 77
End: 2022-07-20

## 2022-07-20 ENCOUNTER — HOSPITAL ENCOUNTER (INPATIENT)
Facility: HOSPITAL | Age: 77
LOS: 1 days | Discharge: HOME HEALTH | End: 2022-07-21
Attending: THORACIC SURGERY (CARDIOTHORACIC VASCULAR SURGERY) | Admitting: THORACIC SURGERY (CARDIOTHORACIC VASCULAR SURGERY)

## 2022-07-20 ENCOUNTER — ANESTHESIA EVENT CONVERTED (OUTPATIENT)
Dept: ANESTHESIOLOGY | Facility: HOSPITAL | Age: 77
End: 2022-07-20

## 2022-07-20 ENCOUNTER — ANCILLARY PROCEDURE (OUTPATIENT)
Dept: PERIOP | Facility: HOSPITAL | Age: 77
End: 2022-07-20

## 2022-07-20 ENCOUNTER — APPOINTMENT (OUTPATIENT)
Dept: GENERAL RADIOLOGY | Facility: HOSPITAL | Age: 77
End: 2022-07-20

## 2022-07-20 DIAGNOSIS — I35.0 AORTIC STENOSIS, SEVERE: ICD-10-CM

## 2022-07-20 DIAGNOSIS — I25.10 CORONARY ARTERY DISEASE INVOLVING NATIVE CORONARY ARTERY OF NATIVE HEART WITHOUT ANGINA PECTORIS: ICD-10-CM

## 2022-07-20 DIAGNOSIS — Z78.9 IMPAIRED MOBILITY AND ACTIVITIES OF DAILY LIVING: ICD-10-CM

## 2022-07-20 DIAGNOSIS — Z86.73 STATUS POST CVA: ICD-10-CM

## 2022-07-20 DIAGNOSIS — I50.32 CHRONIC HEART FAILURE WITH PRESERVED EJECTION FRACTION: ICD-10-CM

## 2022-07-20 DIAGNOSIS — I35.0 AORTIC STENOSIS, SEVERE: Primary | ICD-10-CM

## 2022-07-20 DIAGNOSIS — I48.0 PAROXYSMAL ATRIAL FIBRILLATION: ICD-10-CM

## 2022-07-20 DIAGNOSIS — I10 ESSENTIAL HYPERTENSION: ICD-10-CM

## 2022-07-20 DIAGNOSIS — Z74.09 IMPAIRED MOBILITY AND ACTIVITIES OF DAILY LIVING: ICD-10-CM

## 2022-07-20 LAB
ABO GROUP BLD: NORMAL
ACT BLD: 132 SECONDS (ref 82–152)
ACT BLD: 271 SECONDS (ref 82–152)
ANION GAP SERPL CALCULATED.3IONS-SCNC: 12 MMOL/L (ref 5–15)
ANION GAP SERPL CALCULATED.3IONS-SCNC: 12 MMOL/L (ref 5–15)
ANION GAP SERPL CALCULATED.3IONS-SCNC: 9 MMOL/L (ref 5–15)
APTT PPP: 31.7 SECONDS (ref 22–39)
APTT PPP: >200 SECONDS (ref 22–39)
BH BB BLOOD EXPIRATION DATE: NORMAL
BH BB BLOOD TYPE BARCODE: 9500
BH BB DISPENSE STATUS: NORMAL
BH BB PRODUCT CODE: NORMAL
BH BB UNIT NUMBER: NORMAL
BUN SERPL-MCNC: 51 MG/DL (ref 8–23)
BUN SERPL-MCNC: 53 MG/DL (ref 8–23)
BUN SERPL-MCNC: 60 MG/DL (ref 8–23)
BUN/CREAT SERPL: 37.3 (ref 7–25)
BUN/CREAT SERPL: 40.5 (ref 7–25)
BUN/CREAT SERPL: 41.1 (ref 7–25)
CALCIUM SPEC-SCNC: 8.3 MG/DL (ref 8.6–10.5)
CALCIUM SPEC-SCNC: 8.9 MG/DL (ref 8.6–10.5)
CALCIUM SPEC-SCNC: 9 MG/DL (ref 8.6–10.5)
CHLORIDE SERPL-SCNC: 100 MMOL/L (ref 98–107)
CHLORIDE SERPL-SCNC: 104 MMOL/L (ref 98–107)
CHLORIDE SERPL-SCNC: 107 MMOL/L (ref 98–107)
CO2 SERPL-SCNC: 18 MMOL/L (ref 22–29)
CO2 SERPL-SCNC: 19 MMOL/L (ref 22–29)
CO2 SERPL-SCNC: 21 MMOL/L (ref 22–29)
COTININE UR QL SCN: NEGATIVE NG/ML
CREAT SERPL-MCNC: 1.26 MG/DL (ref 0.57–1)
CREAT SERPL-MCNC: 1.42 MG/DL (ref 0.57–1)
CREAT SERPL-MCNC: 1.46 MG/DL (ref 0.57–1)
CROSSMATCH INTERPRETATION: NORMAL
DEPRECATED RDW RBC AUTO: 44.4 FL (ref 37–54)
DEPRECATED RDW RBC AUTO: 44.5 FL (ref 37–54)
EGFRCR SERPLBLD CKD-EPI 2021: 36.7 ML/MIN/1.73
EGFRCR SERPLBLD CKD-EPI 2021: 37.9 ML/MIN/1.73
EGFRCR SERPLBLD CKD-EPI 2021: 43.8 ML/MIN/1.73
ERYTHROCYTE [DISTWIDTH] IN BLOOD BY AUTOMATED COUNT: 12.6 % (ref 12.3–15.4)
ERYTHROCYTE [DISTWIDTH] IN BLOOD BY AUTOMATED COUNT: 12.9 % (ref 12.3–15.4)
GLUCOSE BLDC GLUCOMTR-MCNC: 143 MG/DL (ref 70–130)
GLUCOSE SERPL-MCNC: 124 MG/DL (ref 65–99)
GLUCOSE SERPL-MCNC: 148 MG/DL (ref 65–99)
GLUCOSE SERPL-MCNC: 151 MG/DL (ref 65–99)
HCT VFR BLD AUTO: 24.1 % (ref 34–46.6)
HCT VFR BLD AUTO: 25.1 % (ref 34–46.6)
HCT VFR BLD AUTO: 26.6 % (ref 34–46.6)
HCT VFR BLD AUTO: 32.5 % (ref 34–46.6)
HGB BLD-MCNC: 11.1 G/DL (ref 12–15.9)
HGB BLD-MCNC: 8 G/DL (ref 12–15.9)
HGB BLD-MCNC: 8 G/DL (ref 12–15.9)
HGB BLD-MCNC: 8.8 G/DL (ref 12–15.9)
Lab: NORMAL
MAGNESIUM SERPL-MCNC: 1.6 MG/DL (ref 1.6–2.4)
MCH RBC QN AUTO: 31.5 PG (ref 26.6–33)
MCH RBC QN AUTO: 31.7 PG (ref 26.6–33)
MCHC RBC AUTO-ENTMCNC: 33.1 G/DL (ref 31.5–35.7)
MCHC RBC AUTO-ENTMCNC: 33.2 G/DL (ref 31.5–35.7)
MCV RBC AUTO: 94.9 FL (ref 79–97)
MCV RBC AUTO: 95.7 FL (ref 79–97)
PLATELET # BLD AUTO: 110 10*3/MM3 (ref 140–450)
PLATELET # BLD AUTO: 98 10*3/MM3 (ref 140–450)
PMV BLD AUTO: 11.1 FL (ref 6–12)
PMV BLD AUTO: 11.7 FL (ref 6–12)
POTASSIUM SERPL-SCNC: 4 MMOL/L (ref 3.5–5.2)
POTASSIUM SERPL-SCNC: 4 MMOL/L (ref 3.5–5.2)
POTASSIUM SERPL-SCNC: 4.5 MMOL/L (ref 3.5–5.2)
QT INTERVAL: 398 MS
QT INTERVAL: 490 MS
QTC INTERVAL: 492 MS
QTC INTERVAL: 529 MS
RBC # BLD AUTO: 2.54 10*6/MM3 (ref 3.77–5.28)
RBC # BLD AUTO: 2.78 10*6/MM3 (ref 3.77–5.28)
RH BLD: NEGATIVE
SODIUM SERPL-SCNC: 130 MMOL/L (ref 136–145)
SODIUM SERPL-SCNC: 135 MMOL/L (ref 136–145)
SODIUM SERPL-SCNC: 137 MMOL/L (ref 136–145)
UNIT  ABO: NORMAL
UNIT  RH: NORMAL
WBC NRBC COR # BLD: 3.98 10*3/MM3 (ref 3.4–10.8)
WBC NRBC COR # BLD: 4.02 10*3/MM3 (ref 3.4–10.8)

## 2022-07-20 PROCEDURE — 35860 EXPLORE LIMB VESSELS: CPT | Performed by: THORACIC SURGERY (CARDIOTHORACIC VASCULAR SURGERY)

## 2022-07-20 PROCEDURE — C1725 CATH, TRANSLUMIN NON-LASER: HCPCS | Performed by: THORACIC SURGERY (CARDIOTHORACIC VASCULAR SURGERY)

## 2022-07-20 PROCEDURE — 83735 ASSAY OF MAGNESIUM: CPT | Performed by: INTERNAL MEDICINE

## 2022-07-20 PROCEDURE — 25010000002 PROTAMINE SULFATE PER 10 MG: Performed by: THORACIC SURGERY (CARDIOTHORACIC VASCULAR SURGERY)

## 2022-07-20 PROCEDURE — 85730 THROMBOPLASTIN TIME PARTIAL: CPT | Performed by: STUDENT IN AN ORGANIZED HEALTH CARE EDUCATION/TRAINING PROGRAM

## 2022-07-20 PROCEDURE — 25010000002 MAGNESIUM SULFATE 2 GM/50ML SOLUTION: Performed by: NURSE PRACTITIONER

## 2022-07-20 PROCEDURE — 25010000002 MIDAZOLAM PER 1 MG: Performed by: ANESTHESIOLOGY

## 2022-07-20 PROCEDURE — C1889 IMPLANT/INSERT DEVICE, NOC: HCPCS | Performed by: THORACIC SURGERY (CARDIOTHORACIC VASCULAR SURGERY)

## 2022-07-20 PROCEDURE — C1769 GUIDE WIRE: HCPCS | Performed by: THORACIC SURGERY (CARDIOTHORACIC VASCULAR SURGERY)

## 2022-07-20 PROCEDURE — 82803 BLOOD GASES ANY COMBINATION: CPT

## 2022-07-20 PROCEDURE — 0 IOPAMIDOL PER 1 ML: Performed by: THORACIC SURGERY (CARDIOTHORACIC VASCULAR SURGERY)

## 2022-07-20 PROCEDURE — 85027 COMPLETE CBC AUTOMATED: CPT | Performed by: THORACIC SURGERY (CARDIOTHORACIC VASCULAR SURGERY)

## 2022-07-20 PROCEDURE — P9100 PATHOGEN TEST FOR PLATELETS: HCPCS

## 2022-07-20 PROCEDURE — 36430 TRANSFUSION BLD/BLD COMPNT: CPT

## 2022-07-20 PROCEDURE — 25010000002 SUCCINYLCHOLINE PER 20 MG: Performed by: NURSE ANESTHETIST, CERTIFIED REGISTERED

## 2022-07-20 PROCEDURE — 80048 BASIC METABOLIC PNL TOTAL CA: CPT | Performed by: INTERNAL MEDICINE

## 2022-07-20 PROCEDURE — 25010000002 HEPARIN (PORCINE) PER 1000 UNITS: Performed by: ANESTHESIOLOGY

## 2022-07-20 PROCEDURE — 33361 REPLACE AORTIC VALVE PERQ: CPT | Performed by: THORACIC SURGERY (CARDIOTHORACIC VASCULAR SURGERY)

## 2022-07-20 PROCEDURE — P9016 RBC LEUKOCYTES REDUCED: HCPCS

## 2022-07-20 PROCEDURE — 25010000002 ONDANSETRON PER 1 MG: Performed by: STUDENT IN AN ORGANIZED HEALTH CARE EDUCATION/TRAINING PROGRAM

## 2022-07-20 PROCEDURE — 85347 COAGULATION TIME ACTIVATED: CPT

## 2022-07-20 PROCEDURE — 86900 BLOOD TYPING SEROLOGIC ABO: CPT

## 2022-07-20 PROCEDURE — 047C3DZ DILATION OF RIGHT COMMON ILIAC ARTERY WITH INTRALUMINAL DEVICE, PERCUTANEOUS APPROACH: ICD-10-PCS | Performed by: THORACIC SURGERY (CARDIOTHORACIC VASCULAR SURGERY)

## 2022-07-20 PROCEDURE — 82330 ASSAY OF CALCIUM: CPT

## 2022-07-20 PROCEDURE — C1894 INTRO/SHEATH, NON-LASER: HCPCS | Performed by: THORACIC SURGERY (CARDIOTHORACIC VASCULAR SURGERY)

## 2022-07-20 PROCEDURE — 85014 HEMATOCRIT: CPT

## 2022-07-20 PROCEDURE — C1887 CATHETER, GUIDING: HCPCS | Performed by: THORACIC SURGERY (CARDIOTHORACIC VASCULAR SURGERY)

## 2022-07-20 PROCEDURE — 0 LIDOCAINE 1 % SOLUTION: Performed by: THORACIC SURGERY (CARDIOTHORACIC VASCULAR SURGERY)

## 2022-07-20 PROCEDURE — 93010 ELECTROCARDIOGRAM REPORT: CPT | Performed by: INTERNAL MEDICINE

## 2022-07-20 PROCEDURE — 25010000002 PHENYLEPHRINE 10 MG/ML SOLUTION 1 ML VIAL: Performed by: ANESTHESIOLOGY

## 2022-07-20 PROCEDURE — 93010 ELECTROCARDIOGRAM REPORT: CPT | Performed by: STUDENT IN AN ORGANIZED HEALTH CARE EDUCATION/TRAINING PROGRAM

## 2022-07-20 PROCEDURE — 5A1223Z PERFORMANCE OF CARDIAC PACING, CONTINUOUS: ICD-10-PCS | Performed by: THORACIC SURGERY (CARDIOTHORACIC VASCULAR SURGERY)

## 2022-07-20 PROCEDURE — 25010000002 PROTAMINE SULFATE PER 10 MG: Performed by: NURSE ANESTHETIST, CERTIFIED REGISTERED

## 2022-07-20 PROCEDURE — 82947 ASSAY GLUCOSE BLOOD QUANT: CPT

## 2022-07-20 PROCEDURE — 99222 1ST HOSP IP/OBS MODERATE 55: CPT | Performed by: INTERNAL MEDICINE

## 2022-07-20 PROCEDURE — 25010000002 PHENYLEPHRINE 10 MG/ML SOLUTION 1 ML VIAL: Performed by: NURSE ANESTHETIST, CERTIFIED REGISTERED

## 2022-07-20 PROCEDURE — 04QK0ZZ REPAIR RIGHT FEMORAL ARTERY, OPEN APPROACH: ICD-10-PCS | Performed by: THORACIC SURGERY (CARDIOTHORACIC VASCULAR SURGERY)

## 2022-07-20 PROCEDURE — 25010000002 PHENYLEPHRINE 10 MG/ML SOLUTION 5 ML VIAL: Performed by: THORACIC SURGERY (CARDIOTHORACIC VASCULAR SURGERY)

## 2022-07-20 PROCEDURE — C1874 STENT, COATED/COV W/DEL SYS: HCPCS | Performed by: THORACIC SURGERY (CARDIOTHORACIC VASCULAR SURGERY)

## 2022-07-20 PROCEDURE — 02RF38Z REPLACEMENT OF AORTIC VALVE WITH ZOOPLASTIC TISSUE, PERCUTANEOUS APPROACH: ICD-10-PCS | Performed by: THORACIC SURGERY (CARDIOTHORACIC VASCULAR SURGERY)

## 2022-07-20 PROCEDURE — S0260 H&P FOR SURGERY: HCPCS | Performed by: THORACIC SURGERY (CARDIOTHORACIC VASCULAR SURGERY)

## 2022-07-20 PROCEDURE — 25010000002 PROTAMINE SULFATE PER 10 MG

## 2022-07-20 PROCEDURE — 33361 REPLACE AORTIC VALVE PERQ: CPT | Performed by: INTERNAL MEDICINE

## 2022-07-20 PROCEDURE — 93355 ECHO TRANSESOPHAGEAL (TEE): CPT

## 2022-07-20 PROCEDURE — 84295 ASSAY OF SERUM SODIUM: CPT

## 2022-07-20 PROCEDURE — 93005 ELECTROCARDIOGRAM TRACING: CPT | Performed by: STUDENT IN AN ORGANIZED HEALTH CARE EDUCATION/TRAINING PROGRAM

## 2022-07-20 PROCEDURE — 85018 HEMOGLOBIN: CPT | Performed by: THORACIC SURGERY (CARDIOTHORACIC VASCULAR SURGERY)

## 2022-07-20 PROCEDURE — 85027 COMPLETE CBC AUTOMATED: CPT | Performed by: STUDENT IN AN ORGANIZED HEALTH CARE EDUCATION/TRAINING PROGRAM

## 2022-07-20 PROCEDURE — 25010000002 ONDANSETRON PER 1 MG: Performed by: ANESTHESIOLOGY

## 2022-07-20 PROCEDURE — 0 CEFUROXIME SODIUM 1.5 G RECONSTITUTED SOLUTION: Performed by: THORACIC SURGERY (CARDIOTHORACIC VASCULAR SURGERY)

## 2022-07-20 PROCEDURE — 84132 ASSAY OF SERUM POTASSIUM: CPT

## 2022-07-20 PROCEDURE — 0 CEFUROXIME SODIUM 1.5 G RECONSTITUTED SOLUTION: Performed by: PHYSICIAN ASSISTANT

## 2022-07-20 PROCEDURE — 85730 THROMBOPLASTIN TIME PARTIAL: CPT | Performed by: THORACIC SURGERY (CARDIOTHORACIC VASCULAR SURGERY)

## 2022-07-20 PROCEDURE — 86901 BLOOD TYPING SEROLOGIC RH(D): CPT

## 2022-07-20 PROCEDURE — 80048 BASIC METABOLIC PNL TOTAL CA: CPT | Performed by: STUDENT IN AN ORGANIZED HEALTH CARE EDUCATION/TRAINING PROGRAM

## 2022-07-20 PROCEDURE — 85014 HEMATOCRIT: CPT | Performed by: THORACIC SURGERY (CARDIOTHORACIC VASCULAR SURGERY)

## 2022-07-20 PROCEDURE — 93005 ELECTROCARDIOGRAM TRACING: CPT | Performed by: THORACIC SURGERY (CARDIOTHORACIC VASCULAR SURGERY)

## 2022-07-20 PROCEDURE — P9035 PLATELET PHERES LEUKOREDUCED: HCPCS

## 2022-07-20 PROCEDURE — 80048 BASIC METABOLIC PNL TOTAL CA: CPT | Performed by: THORACIC SURGERY (CARDIOTHORACIC VASCULAR SURGERY)

## 2022-07-20 PROCEDURE — 25010000002 HEPARIN (PORCINE) PER 1000 UNITS: Performed by: THORACIC SURGERY (CARDIOTHORACIC VASCULAR SURGERY)

## 2022-07-20 DEVICE — VIABAHN BX BALLOON EXP ENDO 8MMX59MM 8FR 80CMCATH HEPARIN
Type: IMPLANTABLE DEVICE | Site: ARTERY ILIAC | Status: FUNCTIONAL
Brand: GORE VIABAHN VBX BALLOON EXPANDABLE ENDO

## 2022-07-20 DEVICE — IMPLANTABLE DEVICE: Type: IMPLANTABLE DEVICE | Site: HEART | Status: FUNCTIONAL

## 2022-07-20 RX ORDER — PROTAMINE SULFATE 10 MG/ML
50 INJECTION, SOLUTION INTRAVENOUS ONCE
Status: COMPLETED | OUTPATIENT
Start: 2022-07-20 | End: 2022-07-20

## 2022-07-20 RX ORDER — LATANOPROST 50 UG/ML
1 SOLUTION/ DROPS OPHTHALMIC NIGHTLY
Status: DISCONTINUED | OUTPATIENT
Start: 2022-07-20 | End: 2022-07-21 | Stop reason: HOSPADM

## 2022-07-20 RX ORDER — BUPIVACAINE HCL/0.9 % NACL/PF 0.125 %
PLASTIC BAG, INJECTION (ML) EPIDURAL AS NEEDED
Status: DISCONTINUED | OUTPATIENT
Start: 2022-07-20 | End: 2022-07-20 | Stop reason: SURG

## 2022-07-20 RX ORDER — SUCCINYLCHOLINE CHLORIDE 20 MG/ML
INJECTION INTRAMUSCULAR; INTRAVENOUS AS NEEDED
Status: DISCONTINUED | OUTPATIENT
Start: 2022-07-20 | End: 2022-07-20 | Stop reason: SURG

## 2022-07-20 RX ORDER — LIDOCAINE HYDROCHLORIDE 10 MG/ML
INJECTION, SOLUTION EPIDURAL; INFILTRATION; INTRACAUDAL; PERINEURAL AS NEEDED
Status: DISCONTINUED | OUTPATIENT
Start: 2022-07-20 | End: 2022-07-20 | Stop reason: SURG

## 2022-07-20 RX ORDER — PROTAMINE SULFATE 10 MG/ML
100 INJECTION, SOLUTION INTRAVENOUS ONCE
Status: COMPLETED | OUTPATIENT
Start: 2022-07-20 | End: 2022-07-20

## 2022-07-20 RX ORDER — LIDOCAINE HYDROCHLORIDE 10 MG/ML
0.5 INJECTION, SOLUTION EPIDURAL; INFILTRATION; INTRACAUDAL; PERINEURAL ONCE AS NEEDED
Status: COMPLETED | OUTPATIENT
Start: 2022-07-20 | End: 2022-07-20

## 2022-07-20 RX ORDER — LABETALOL HYDROCHLORIDE 5 MG/ML
10 INJECTION, SOLUTION INTRAVENOUS
Status: DISCONTINUED | OUTPATIENT
Start: 2022-07-20 | End: 2022-07-21 | Stop reason: HOSPADM

## 2022-07-20 RX ORDER — ONDANSETRON 2 MG/ML
INJECTION INTRAMUSCULAR; INTRAVENOUS AS NEEDED
Status: DISCONTINUED | OUTPATIENT
Start: 2022-07-20 | End: 2022-07-20 | Stop reason: SURG

## 2022-07-20 RX ORDER — ETOMIDATE 2 MG/ML
INJECTION INTRAVENOUS AS NEEDED
Status: DISCONTINUED | OUTPATIENT
Start: 2022-07-20 | End: 2022-07-20 | Stop reason: SURG

## 2022-07-20 RX ORDER — ROCURONIUM BROMIDE 10 MG/ML
INJECTION, SOLUTION INTRAVENOUS AS NEEDED
Status: DISCONTINUED | OUTPATIENT
Start: 2022-07-20 | End: 2022-07-20 | Stop reason: SURG

## 2022-07-20 RX ORDER — PROTAMINE SULFATE 10 MG/ML
INJECTION, SOLUTION INTRAVENOUS
Status: COMPLETED
Start: 2022-07-20 | End: 2022-07-20

## 2022-07-20 RX ORDER — PROTAMINE SULFATE 10 MG/ML
INJECTION, SOLUTION INTRAVENOUS AS NEEDED
Status: DISCONTINUED | OUTPATIENT
Start: 2022-07-20 | End: 2022-07-20 | Stop reason: SURG

## 2022-07-20 RX ORDER — SODIUM CHLORIDE 9 MG/ML
9 INJECTION, SOLUTION INTRAVENOUS CONTINUOUS
Status: DISCONTINUED | OUTPATIENT
Start: 2022-07-20 | End: 2022-07-20

## 2022-07-20 RX ORDER — SODIUM CHLORIDE 9 MG/ML
250 INJECTION, SOLUTION INTRAVENOUS ONCE AS NEEDED
Status: DISCONTINUED | OUTPATIENT
Start: 2022-07-20 | End: 2022-07-21 | Stop reason: HOSPADM

## 2022-07-20 RX ORDER — ESMOLOL HYDROCHLORIDE 10 MG/ML
INJECTION INTRAVENOUS AS NEEDED
Status: DISCONTINUED | OUTPATIENT
Start: 2022-07-20 | End: 2022-07-20 | Stop reason: SURG

## 2022-07-20 RX ORDER — MAGNESIUM SULFATE HEPTAHYDRATE 40 MG/ML
2 INJECTION, SOLUTION INTRAVENOUS ONCE
Status: COMPLETED | OUTPATIENT
Start: 2022-07-20 | End: 2022-07-20

## 2022-07-20 RX ORDER — ACETAMINOPHEN 325 MG/1
650 TABLET ORAL EVERY 4 HOURS PRN
Status: DISCONTINUED | OUTPATIENT
Start: 2022-07-20 | End: 2022-07-21 | Stop reason: HOSPADM

## 2022-07-20 RX ORDER — DOCUSATE SODIUM 100 MG/1
100 CAPSULE, LIQUID FILLED ORAL 2 TIMES DAILY
Status: DISCONTINUED | OUTPATIENT
Start: 2022-07-20 | End: 2022-07-21 | Stop reason: HOSPADM

## 2022-07-20 RX ORDER — LIDOCAINE HYDROCHLORIDE 10 MG/ML
INJECTION, SOLUTION INFILTRATION; PERINEURAL AS NEEDED
Status: DISCONTINUED | OUTPATIENT
Start: 2022-07-20 | End: 2022-07-20 | Stop reason: HOSPADM

## 2022-07-20 RX ORDER — ASPIRIN 81 MG/1
81 TABLET ORAL NIGHTLY
Status: DISCONTINUED | OUTPATIENT
Start: 2022-07-20 | End: 2022-07-21 | Stop reason: HOSPADM

## 2022-07-20 RX ORDER — MORPHINE SULFATE 2 MG/ML
2 INJECTION, SOLUTION INTRAMUSCULAR; INTRAVENOUS EVERY 4 HOURS PRN
Status: DISCONTINUED | OUTPATIENT
Start: 2022-07-20 | End: 2022-07-21 | Stop reason: HOSPADM

## 2022-07-20 RX ORDER — AMLODIPINE BESYLATE 10 MG/1
10 TABLET ORAL NIGHTLY
Status: DISCONTINUED | OUTPATIENT
Start: 2022-07-20 | End: 2022-07-20

## 2022-07-20 RX ORDER — MIDAZOLAM HYDROCHLORIDE 1 MG/ML
0.5 INJECTION INTRAMUSCULAR; INTRAVENOUS
Status: DISCONTINUED | OUTPATIENT
Start: 2022-07-20 | End: 2022-07-20 | Stop reason: HOSPADM

## 2022-07-20 RX ORDER — SODIUM CHLORIDE 0.9 % (FLUSH) 0.9 %
10 SYRINGE (ML) INJECTION EVERY 12 HOURS SCHEDULED
Status: DISCONTINUED | OUTPATIENT
Start: 2022-07-20 | End: 2022-07-20 | Stop reason: HOSPADM

## 2022-07-20 RX ORDER — HEPARIN SODIUM 1000 [USP'U]/ML
INJECTION, SOLUTION INTRAVENOUS; SUBCUTANEOUS AS NEEDED
Status: DISCONTINUED | OUTPATIENT
Start: 2022-07-20 | End: 2022-07-20 | Stop reason: SURG

## 2022-07-20 RX ORDER — SODIUM CHLORIDE, SODIUM LACTATE, POTASSIUM CHLORIDE, CALCIUM CHLORIDE 600; 310; 30; 20 MG/100ML; MG/100ML; MG/100ML; MG/100ML
9 INJECTION, SOLUTION INTRAVENOUS CONTINUOUS
Status: DISCONTINUED | OUTPATIENT
Start: 2022-07-20 | End: 2022-07-20

## 2022-07-20 RX ORDER — FAMOTIDINE 10 MG/ML
20 INJECTION, SOLUTION INTRAVENOUS ONCE
Status: DISCONTINUED | OUTPATIENT
Start: 2022-07-20 | End: 2022-07-20 | Stop reason: HOSPADM

## 2022-07-20 RX ORDER — ATORVASTATIN CALCIUM 10 MG/1
10 TABLET, FILM COATED ORAL NIGHTLY
Status: DISCONTINUED | OUTPATIENT
Start: 2022-07-20 | End: 2022-07-21 | Stop reason: HOSPADM

## 2022-07-20 RX ORDER — SODIUM CHLORIDE 9 MG/ML
INJECTION, SOLUTION INTRAVENOUS AS NEEDED
Status: DISCONTINUED | OUTPATIENT
Start: 2022-07-20 | End: 2022-07-20 | Stop reason: HOSPADM

## 2022-07-20 RX ORDER — HYDRALAZINE HYDROCHLORIDE 20 MG/ML
10 INJECTION INTRAMUSCULAR; INTRAVENOUS EVERY 6 HOURS PRN
Status: DISCONTINUED | OUTPATIENT
Start: 2022-07-20 | End: 2022-07-21 | Stop reason: HOSPADM

## 2022-07-20 RX ORDER — CHLORHEXIDINE GLUCONATE 500 MG/1
1 CLOTH TOPICAL EVERY 12 HOURS PRN
Status: DISCONTINUED | OUTPATIENT
Start: 2022-07-20 | End: 2022-07-20 | Stop reason: HOSPADM

## 2022-07-20 RX ORDER — CALCIUM CHLORIDE 100 MG/ML
INJECTION INTRAVENOUS; INTRAVENTRICULAR AS NEEDED
Status: DISCONTINUED | OUTPATIENT
Start: 2022-07-20 | End: 2022-07-20 | Stop reason: SURG

## 2022-07-20 RX ORDER — FUROSEMIDE 20 MG/1
20 TABLET ORAL NIGHTLY
Status: DISCONTINUED | OUTPATIENT
Start: 2022-07-20 | End: 2022-07-20

## 2022-07-20 RX ORDER — NITROGLYCERIN 0.4 MG/1
0.4 TABLET SUBLINGUAL
Status: DISCONTINUED | OUTPATIENT
Start: 2022-07-20 | End: 2022-07-20 | Stop reason: HOSPADM

## 2022-07-20 RX ORDER — ASPIRIN 81 MG/1
81 TABLET ORAL DAILY
Status: DISCONTINUED | OUTPATIENT
Start: 2022-07-21 | End: 2022-07-20

## 2022-07-20 RX ORDER — CHLORHEXIDINE GLUCONATE 0.12 MG/ML
15 RINSE ORAL ONCE
Status: COMPLETED | OUTPATIENT
Start: 2022-07-20 | End: 2022-07-20

## 2022-07-20 RX ORDER — ONDANSETRON 4 MG/1
4 TABLET, FILM COATED ORAL EVERY 6 HOURS PRN
Status: DISCONTINUED | OUTPATIENT
Start: 2022-07-20 | End: 2022-07-21 | Stop reason: HOSPADM

## 2022-07-20 RX ORDER — MIDAZOLAM HYDROCHLORIDE 1 MG/ML
2 INJECTION INTRAMUSCULAR; INTRAVENOUS ONCE
Status: COMPLETED | OUTPATIENT
Start: 2022-07-20 | End: 2022-07-20

## 2022-07-20 RX ORDER — SODIUM CHLORIDE 9 MG/ML
100 INJECTION, SOLUTION INTRAVENOUS CONTINUOUS
Status: DISCONTINUED | OUTPATIENT
Start: 2022-07-20 | End: 2022-07-20

## 2022-07-20 RX ORDER — SODIUM CHLORIDE 0.9 % (FLUSH) 0.9 %
10 SYRINGE (ML) INJECTION AS NEEDED
Status: DISCONTINUED | OUTPATIENT
Start: 2022-07-20 | End: 2022-07-20 | Stop reason: HOSPADM

## 2022-07-20 RX ORDER — METOPROLOL TARTRATE 5 MG/5ML
2.5 INJECTION INTRAVENOUS ONCE
Status: COMPLETED | OUTPATIENT
Start: 2022-07-20 | End: 2022-07-20

## 2022-07-20 RX ORDER — FAMOTIDINE 20 MG/1
20 TABLET, FILM COATED ORAL ONCE
Status: COMPLETED | OUTPATIENT
Start: 2022-07-20 | End: 2022-07-20

## 2022-07-20 RX ORDER — ONDANSETRON 2 MG/ML
4 INJECTION INTRAMUSCULAR; INTRAVENOUS EVERY 6 HOURS PRN
Status: DISCONTINUED | OUTPATIENT
Start: 2022-07-20 | End: 2022-07-21 | Stop reason: HOSPADM

## 2022-07-20 RX ORDER — LOSARTAN POTASSIUM 50 MG/1
100 TABLET ORAL DAILY
Status: DISCONTINUED | OUTPATIENT
Start: 2022-07-20 | End: 2022-07-20

## 2022-07-20 RX ORDER — HYDROCODONE BITARTRATE AND ACETAMINOPHEN 5; 325 MG/1; MG/1
1 TABLET ORAL EVERY 6 HOURS PRN
Status: DISCONTINUED | OUTPATIENT
Start: 2022-07-20 | End: 2022-07-21 | Stop reason: HOSPADM

## 2022-07-20 RX ADMIN — Medication 200 MCG: at 13:05

## 2022-07-20 RX ADMIN — LIDOCAINE HYDROCHLORIDE 0.5 ML: 10 INJECTION, SOLUTION EPIDURAL; INFILTRATION; INTRACAUDAL; PERINEURAL at 07:34

## 2022-07-20 RX ADMIN — PHENYLEPHRINE HYDROCHLORIDE 1 MCG/KG/MIN: 10 INJECTION INTRAVENOUS at 08:58

## 2022-07-20 RX ADMIN — LATANOPROST 1 DROP: 50 SOLUTION OPHTHALMIC at 20:39

## 2022-07-20 RX ADMIN — Medication 200 MCG: at 09:07

## 2022-07-20 RX ADMIN — SUGAMMADEX 200 MG: 100 INJECTION, SOLUTION INTRAVENOUS at 09:47

## 2022-07-20 RX ADMIN — SUCCINYLCHOLINE CHLORIDE 120 MG: 20 INJECTION, SOLUTION INTRAMUSCULAR; INTRAVENOUS at 12:56

## 2022-07-20 RX ADMIN — SODIUM CHLORIDE 1.5 G: 900 INJECTION INTRAVENOUS at 15:11

## 2022-07-20 RX ADMIN — Medication 200 MCG: at 13:14

## 2022-07-20 RX ADMIN — PROTAMINE SULFATE 50 MG: 10 INJECTION, SOLUTION INTRAVENOUS at 14:07

## 2022-07-20 RX ADMIN — Medication 200 MCG: at 09:10

## 2022-07-20 RX ADMIN — Medication 200 MCG: at 09:17

## 2022-07-20 RX ADMIN — Medication 200 MCG: at 08:58

## 2022-07-20 RX ADMIN — ROCURONIUM BROMIDE 30 MG: 10 INJECTION, SOLUTION INTRAVENOUS at 13:00

## 2022-07-20 RX ADMIN — PROTAMINE SULFATE 50 MG: 10 INJECTION, SOLUTION INTRAVENOUS at 13:16

## 2022-07-20 RX ADMIN — SODIUM CHLORIDE 9 ML/HR: 9 INJECTION, SOLUTION INTRAVENOUS at 07:35

## 2022-07-20 RX ADMIN — ETOMIDATE 16 MG: 2 INJECTION, SOLUTION INTRAVENOUS at 08:31

## 2022-07-20 RX ADMIN — LIDOCAINE HYDROCHLORIDE 50 MG: 10 INJECTION, SOLUTION EPIDURAL; INFILTRATION; INTRACAUDAL; PERINEURAL at 12:56

## 2022-07-20 RX ADMIN — ROCURONIUM BROMIDE 50 MG: 10 INJECTION, SOLUTION INTRAVENOUS at 08:31

## 2022-07-20 RX ADMIN — DOCUSATE SODIUM 100 MG: 100 CAPSULE, LIQUID FILLED ORAL at 20:38

## 2022-07-20 RX ADMIN — CHLORHEXIDINE GLUCONATE 0.12% ORAL RINSE 15 ML: 1.2 LIQUID ORAL at 07:35

## 2022-07-20 RX ADMIN — MIDAZOLAM 2 MG: 1 INJECTION INTRAMUSCULAR; INTRAVENOUS at 07:30

## 2022-07-20 RX ADMIN — Medication 200 MCG: at 13:11

## 2022-07-20 RX ADMIN — ETOMIDATE 14 MG: 2 INJECTION, SOLUTION INTRAVENOUS at 12:56

## 2022-07-20 RX ADMIN — MAGNESIUM SULFATE HEPTAHYDRATE 2 G: 2 INJECTION, SOLUTION INTRAVENOUS at 22:01

## 2022-07-20 RX ADMIN — PROTAMINE SULFATE 100 MG: 10 INJECTION, SOLUTION INTRAVENOUS at 12:38

## 2022-07-20 RX ADMIN — PHENYLEPHRINE HYDROCHLORIDE 1 MCG/KG/MIN: 10 INJECTION INTRAVENOUS at 13:05

## 2022-07-20 RX ADMIN — FAMOTIDINE 20 MG: 20 TABLET ORAL at 07:34

## 2022-07-20 RX ADMIN — SUGAMMADEX 200 MG: 100 INJECTION, SOLUTION INTRAVENOUS at 13:43

## 2022-07-20 RX ADMIN — CALCIUM CHLORIDE 1 G: 100 INJECTION INTRAVENOUS; INTRAVENTRICULAR at 13:14

## 2022-07-20 RX ADMIN — METOPROLOL TARTRATE 2.5 MG: 5 INJECTION INTRAVENOUS at 18:13

## 2022-07-20 RX ADMIN — ESMOLOL HYDROCHLORIDE 10 MG: 10 INJECTION, SOLUTION INTRAVENOUS at 08:38

## 2022-07-20 RX ADMIN — ACETAMINOPHEN 325MG 650 MG: 325 TABLET ORAL at 17:13

## 2022-07-20 RX ADMIN — HEPARIN SODIUM 9000 UNITS: 1000 INJECTION, SOLUTION INTRAVENOUS; SUBCUTANEOUS at 08:58

## 2022-07-20 RX ADMIN — ATORVASTATIN CALCIUM 10 MG: 10 TABLET, FILM COATED ORAL at 20:38

## 2022-07-20 RX ADMIN — Medication 100 MCG: at 12:56

## 2022-07-20 RX ADMIN — ASPIRIN 81 MG: 81 TABLET, COATED ORAL at 20:38

## 2022-07-20 RX ADMIN — PHENYLEPHRINE HYDROCHLORIDE 0.5 MCG/KG/MIN: 10 INJECTION INTRAVENOUS at 14:56

## 2022-07-20 RX ADMIN — ONDANSETRON 4 MG: 2 INJECTION INTRAMUSCULAR; INTRAVENOUS at 17:20

## 2022-07-20 RX ADMIN — ONDANSETRON 4 MG: 2 INJECTION INTRAMUSCULAR; INTRAVENOUS at 09:27

## 2022-07-20 RX ADMIN — MUPIROCIN 1 APPLICATION: 20 OINTMENT TOPICAL at 07:34

## 2022-07-20 NOTE — ANESTHESIA PROCEDURE NOTES
Airway  Urgency: elective    Date/Time: 7/20/2022 8:33 AM  Airway not difficult    General Information and Staff    Patient location during procedure: OR  Anesthesiologist: Sherman Bunch MD  CRNA/CAA: Allan Louis CRNA    Indications and Patient Condition  Indications for airway management: airway protection    Preoxygenated: yes  MILS not maintained throughout  Mask difficulty assessment: 1 - vent by mask    Final Airway Details  Final airway type: endotracheal airway      Successful airway: ETT  Cuffed: yes   Successful intubation technique: direct laryngoscopy  Facilitating devices/methods: anterior pressure/BURP  Endotracheal tube insertion site: oral  Blade: Willard  Blade size: 3  ETT size (mm): 7.0  Cormack-Lehane Classification: grade I - full view of glottis  Placement verified by: chest auscultation and capnometry   Measured from: lips  ETT/EBT  to lips (cm): 20  Number of attempts at approach: 1  Assessment: lips, teeth, and gum same as pre-op and atraumatic intubation    Additional Comments  Negative epigastric sounds, Breath sound equal bilaterally with symmetric chest rise and fall

## 2022-07-20 NOTE — ANESTHESIA PROCEDURE NOTES
Arterial Line      Patient reassessed immediately prior to procedure    Patient location during procedure: pre-op  Stop Time:7/20/2022 7:09 AM       Line placed for hemodynamic monitoring.  Performed By   Anesthesiologist: Sherman Bunch MD  Preanesthetic Checklist  Completed: patient identified, IV checked, site marked, risks and benefits discussed, surgical consent, monitors and equipment checked, pre-op evaluation and timeout performed  Arterial Line Prep   Sterile Tech: cap, gloves and sterile barriers  Prep: ChloraPrep  Patient monitoring: blood pressure monitoring, continuous pulse oximetry and EKG  Arterial Line Procedure   Laterality:right  Location:  radial artery  Catheter size: 20 G   Guidance: palpation technique  Number of attempts: 1  Successful placement: yes  Post Assessment   Dressing Type: line sutured, occlusive dressing applied, secured with tape and wrist guard applied.   Complications no  Circ/Move/Sens Assessment: normal and unchanged.   Patient Tolerance: patient tolerated the procedure well with no apparent complications

## 2022-07-20 NOTE — ANESTHESIA PROCEDURE NOTES
Emergent/Open-Heart Anesthesia JF    Procedure Performed: Emergent/Open-Heart Anesthesia JF      Start Time:        End Time:   7/20/2022 9:38 AM      General Procedure Information  JF Placed for monitoring purposes only -- This is not a diagnostic JF  Physician Requesting Echo: Bola Whitaker MD  Location performed:  OR  Intubated  Bite block placed  Heart visualized  Probe Insertion:  Easy  Probe Type:  Multiplane  Modalities:  2D only, color flow mapping, continuous wave Doppler and pulse wave Doppler    Echocardiographic and Doppler Measurements    Ventricles    Left Ventricle:  Diastolic dimension 1.1 cm.  Global Function normal.          Valves    Aortic Valve:  Mean Gradient: 23 mmHg.            Aorta    Ascending Aorta:  Diameter 2.2 cm.                  Diastolic Function Measurements:  Diastolic Dysfunction Grade= indeterminate  E= ms  A= ms  E/A Ratio=   DT= ms  S/D=  IVRT=        Anesthesia Information      Echocardiogram Comments:       Severe as linda 0.6 cm2 by continuity equation  Good lvf  Pt is I n a fib   Post deployment stented bio prostetic valve in aortic position  Linda 1.25%  Mean gradient 6 mmhg  Good lvf  No evidence of pericardial effusion

## 2022-07-20 NOTE — ANESTHESIA POSTPROCEDURE EVALUATION
Patient: Miryam Mckeon    Procedure Summary     Date: 07/20/22 Room / Location:  [facility] OR 01 /  [facility]    Anesthesia Start: 0815 Anesthesia Stop:     Procedures:       TRANSCATHETER AORTIC VALVE REPLACEMENT with angioplasty and stent to the right common and external iliac artery (N/A Chest)      TRANSESOPHAGEAL ECHOCARDIOGRAM PER ANESTHESIA (N/A Chest)      Transcatheter Aortic Valve Replacement (N/A ) Diagnosis:       Aortic stenosis, severe      (Aortic stenosis, severe [I35.0])    Surgeons: Bola Whitaker MD; Yashira Pyle MD Provider: Sherman Bunch MD    Anesthesia Type: general ASA Status: 4          Anesthesia Type: general    Vitals  Vitals Value Taken Time   BP 99/62 07/20/22 1013   Temp     Pulse 90 07/20/22 1015   Resp     SpO2 94 % 07/20/22 1015   Vitals shown include unvalidated device data.        Post Anesthesia Care and Evaluation    Patient location during evaluation: ICU  Patient participation: complete - patient participated  Level of consciousness: responsive to physical stimuli  Pain score: 0  Pain management: adequate    Airway patency: patent  Anesthetic complications: No anesthetic complications  PONV Status: none  Cardiovascular status: acceptable  Respiratory status: acceptable, nasal cannula and spontaneous ventilation  Hydration status: acceptable    Comments: VS monitored en route to ICU. VSS throughout transport  No anesthesia care post op

## 2022-07-20 NOTE — ANESTHESIA PREPROCEDURE EVALUATION
Anesthesia Evaluation     Patient summary reviewed and Nursing notes reviewed   NPO Solid Status: > 8 hours  NPO Liquid Status: > 8 hours           Airway   Mallampati: II  TM distance: >3 FB  Neck ROM: full  No difficulty expected  Dental - normal exam     Pulmonary     breath sounds clear to auscultation  Cardiovascular   Exercise tolerance: poor (<4 METS)    Rhythm: regular  Rate: normal    (+) murmur,       Neuro/Psych  GI/Hepatic/Renal/Endo      Musculoskeletal     Abdominal    Substance History      OB/GYN          Other                      Anesthesia Plan    ASA 4     general     intravenous induction     Anesthetic plan, risks, benefits, and alternatives have been provided, discussed and informed consent has been obtained with: patient.    A line carlitos  Return to icu post op post op ventilation    CODE STATUS:

## 2022-07-21 ENCOUNTER — APPOINTMENT (OUTPATIENT)
Dept: GENERAL RADIOLOGY | Facility: HOSPITAL | Age: 77
End: 2022-07-21

## 2022-07-21 ENCOUNTER — READMISSION MANAGEMENT (OUTPATIENT)
Dept: CALL CENTER | Facility: HOSPITAL | Age: 77
End: 2022-07-21

## 2022-07-21 ENCOUNTER — HOME HEALTH ADMISSION (OUTPATIENT)
Dept: HOME HEALTH SERVICES | Facility: HOME HEALTHCARE | Age: 77
End: 2022-07-21

## 2022-07-21 VITALS
HEIGHT: 62 IN | SYSTOLIC BLOOD PRESSURE: 84 MMHG | TEMPERATURE: 98.4 F | BODY MASS INDEX: 21.16 KG/M2 | OXYGEN SATURATION: 93 % | WEIGHT: 115 LBS | DIASTOLIC BLOOD PRESSURE: 63 MMHG | RESPIRATION RATE: 18 BRPM | HEART RATE: 88 BPM

## 2022-07-21 LAB
ACT BLD: 121 SECONDS (ref 82–152)
ANION GAP SERPL CALCULATED.3IONS-SCNC: 10 MMOL/L (ref 5–15)
BASE EXCESS BLDA CALC-SCNC: 2 MMOL/L (ref -5–5)
BH BB BLOOD EXPIRATION DATE: NORMAL
BH BB BLOOD TYPE BARCODE: 6200
BH BB BLOOD TYPE BARCODE: 9500
BH BB BLOOD TYPE BARCODE: 9500
BH BB DISPENSE STATUS: NORMAL
BH BB PRODUCT CODE: NORMAL
BH BB UNIT NUMBER: NORMAL
BUN SERPL-MCNC: 45 MG/DL (ref 8–23)
BUN/CREAT SERPL: 31.7 (ref 7–25)
CA-I BLDA-SCNC: 1.1 MMOL/L (ref 1.2–1.32)
CALCIUM SPEC-SCNC: 9.2 MG/DL (ref 8.6–10.5)
CHLORIDE SERPL-SCNC: 104 MMOL/L (ref 98–107)
CO2 BLDA-SCNC: 28 MMOL/L (ref 24–29)
CO2 SERPL-SCNC: 20 MMOL/L (ref 22–29)
CREAT SERPL-MCNC: 1.42 MG/DL (ref 0.57–1)
CROSSMATCH INTERPRETATION: NORMAL
CROSSMATCH INTERPRETATION: NORMAL
DEPRECATED RDW RBC AUTO: 48.7 FL (ref 37–54)
EGFRCR SERPLBLD CKD-EPI 2021: 37.9 ML/MIN/1.73
ERYTHROCYTE [DISTWIDTH] IN BLOOD BY AUTOMATED COUNT: 15.1 % (ref 12.3–15.4)
GLUCOSE BLDC GLUCOMTR-MCNC: 114 MG/DL (ref 70–130)
GLUCOSE SERPL-MCNC: 120 MG/DL (ref 65–99)
HCO3 BLDA-SCNC: 27.1 MMOL/L (ref 22–26)
HCT VFR BLD AUTO: 30.9 % (ref 34–46.6)
HCT VFR BLDA CALC: 23 % (ref 38–51)
HGB BLD-MCNC: 11.1 G/DL (ref 12–15.9)
HGB BLDA-MCNC: 7.8 G/DL (ref 12–17)
MAGNESIUM SERPL-MCNC: 2.2 MG/DL (ref 1.6–2.4)
MCH RBC QN AUTO: 31.5 PG (ref 26.6–33)
MCHC RBC AUTO-ENTMCNC: 35.9 G/DL (ref 31.5–35.7)
MCV RBC AUTO: 87.8 FL (ref 79–97)
PCO2 BLDA: 43.2 MM HG (ref 35–45)
PH BLDA: 7.41 PH UNITS (ref 7.35–7.6)
PLATELET # BLD AUTO: 115 10*3/MM3 (ref 140–450)
PMV BLD AUTO: 11 FL (ref 6–12)
PO2 BLDA: 556 MMHG (ref 80–105)
POTASSIUM BLDA-SCNC: 3.8 MMOL/L (ref 3.5–4.9)
POTASSIUM SERPL-SCNC: 4.4 MMOL/L (ref 3.5–5.2)
RBC # BLD AUTO: 3.52 10*6/MM3 (ref 3.77–5.28)
SAO2 % BLDA: 100 % (ref 95–98)
SODIUM BLD-SCNC: 136 MMOL/L (ref 138–146)
SODIUM SERPL-SCNC: 134 MMOL/L (ref 136–145)
UNIT  ABO: NORMAL
UNIT  RH: NORMAL
WBC NRBC COR # BLD: 6.25 10*3/MM3 (ref 3.4–10.8)

## 2022-07-21 PROCEDURE — 99239 HOSP IP/OBS DSCHRG MGMT >30: CPT | Performed by: NURSE PRACTITIONER

## 2022-07-21 PROCEDURE — 0 CEFUROXIME SODIUM 1.5 G RECONSTITUTED SOLUTION: Performed by: THORACIC SURGERY (CARDIOTHORACIC VASCULAR SURGERY)

## 2022-07-21 PROCEDURE — 99231 SBSQ HOSP IP/OBS SF/LOW 25: CPT | Performed by: THORACIC SURGERY (CARDIOTHORACIC VASCULAR SURGERY)

## 2022-07-21 PROCEDURE — 83735 ASSAY OF MAGNESIUM: CPT | Performed by: NURSE PRACTITIONER

## 2022-07-21 PROCEDURE — 99232 SBSQ HOSP IP/OBS MODERATE 35: CPT | Performed by: INTERNAL MEDICINE

## 2022-07-21 PROCEDURE — 71045 X-RAY EXAM CHEST 1 VIEW: CPT

## 2022-07-21 PROCEDURE — 80048 BASIC METABOLIC PNL TOTAL CA: CPT | Performed by: STUDENT IN AN ORGANIZED HEALTH CARE EDUCATION/TRAINING PROGRAM

## 2022-07-21 PROCEDURE — 25010000002 DIGOXIN PER 500 MCG: Performed by: INTERNAL MEDICINE

## 2022-07-21 PROCEDURE — 85027 COMPLETE CBC AUTOMATED: CPT | Performed by: STUDENT IN AN ORGANIZED HEALTH CARE EDUCATION/TRAINING PROGRAM

## 2022-07-21 RX ORDER — DIGOXIN 0.25 MG/ML
250 INJECTION INTRAMUSCULAR; INTRAVENOUS ONCE
Status: COMPLETED | OUTPATIENT
Start: 2022-07-21 | End: 2022-07-21

## 2022-07-21 RX ADMIN — ACETAMINOPHEN 325MG 650 MG: 325 TABLET ORAL at 00:15

## 2022-07-21 RX ADMIN — HYDROCODONE BITARTRATE AND ACETAMINOPHEN 1 TABLET: 5; 325 TABLET ORAL at 08:26

## 2022-07-21 RX ADMIN — DIGOXIN 250 MCG: 0.25 INJECTION INTRAMUSCULAR; INTRAVENOUS at 08:35

## 2022-07-21 RX ADMIN — SODIUM CHLORIDE 1.5 G: 900 INJECTION INTRAVENOUS at 00:12

## 2022-07-21 RX ADMIN — METOPROLOL TARTRATE 25 MG: 25 TABLET, FILM COATED ORAL at 08:25

## 2022-07-22 ENCOUNTER — HOME CARE VISIT (OUTPATIENT)
Dept: HOME HEALTH SERVICES | Facility: HOME HEALTHCARE | Age: 77
End: 2022-07-22

## 2022-07-22 VITALS
DIASTOLIC BLOOD PRESSURE: 56 MMHG | TEMPERATURE: 97 F | HEART RATE: 75 BPM | SYSTOLIC BLOOD PRESSURE: 120 MMHG | RESPIRATION RATE: 16 BRPM | OXYGEN SATURATION: 98 %

## 2022-07-22 PROCEDURE — G0495 RN CARE TRAIN/EDU IN HH: HCPCS

## 2022-07-22 NOTE — CASE COMMUNICATION
Patient has been admitted to [facility] for nursing and PT.  R groin dressing intact with no drainage noted.  Vitals are stable.  Pt and daughter educated to weigh pt daily and report wt gain of 3 pounds in 1 day or 5 in 1 week.  Pt encouraged to use walker as balance is poor and gait unsteady. PT to eval

## 2022-07-22 NOTE — OUTREACH NOTE
Prep Survey    Flowsheet Row Responses   [facility] patient discharged from? [place]   Is LACE score < 7 ? No   Emergency Room discharge w/ pulse ox? No   Eligibility Readm Mgmt   Discharge diagnosis severe aortic stenosis s/p TAVR   Does the patient have one of the following disease processes/diagnoses(primary or secondary)? General Surgery   Does the patient have Home health ordered? Yes   What is the Home health agency?  [facility]    Is there a DME ordered? No   Prep survey completed? Yes          JUAREZ OZUNA - Registered Nurse

## 2022-07-22 NOTE — HOME HEALTH
SOC Note:    Home Health ordered for: disciplines PT  SN    Reason for Hosp/Primary Dx/Co-morbidities:  Aortic Stenosis  S/P TAVR ( 7/20/22)  ,  Paroxysmal A fib on Eliquis, CAD, Hyperlipidemia,  Essential HTN, CHF,  Pacemaker    Focus of Care:  Post op education, assessment of R groin in the presence of a nonremovable dressing, teach pt /daughter to weigh daily and when to call MD, teach medications, monitor vitals    Current Functional status/mobility/DME: Unsteady gait with poor balance,   pt has a walker but does not uses it.  Chosing to use walls and furniture or assist of 1    HB status/Living Arrangements:  Requires assist of 1 to leave home.  Daughter is staying with pt in her own home    Skin Integrity/wound status:  R groin nonremovable dressing    Code Status: Full    Fall Risk:  [facility] of 7    POC confirmed with  Libra SMILEY via [employer] messaging on 7.22.22

## 2022-07-23 LAB
ACT BLD: 382 SECONDS (ref 82–152)
BH BB BLOOD EXPIRATION DATE: NORMAL
BH BB BLOOD TYPE BARCODE: 9500
BH BB DISPENSE STATUS: NORMAL
BH BB PRODUCT CODE: NORMAL
BH BB UNIT NUMBER: NORMAL
CROSSMATCH INTERPRETATION: NORMAL
UNIT  ABO: NORMAL
UNIT  RH: NORMAL

## 2022-07-25 ENCOUNTER — TELEPHONE (OUTPATIENT)
Dept: CARDIAC SURGERY | Facility: CLINIC | Age: 77
End: 2022-07-25

## 2022-07-25 ENCOUNTER — TELEPHONE (OUTPATIENT)
Dept: CARDIOLOGY | Facility: HOSPITAL | Age: 77
End: 2022-07-25

## 2022-07-25 ENCOUNTER — OFFICE VISIT (OUTPATIENT)
Dept: FAMILY MEDICINE CLINIC | Facility: CLINIC | Age: 77
End: 2022-07-25

## 2022-07-25 ENCOUNTER — READMISSION MANAGEMENT (OUTPATIENT)
Dept: CALL CENTER | Facility: HOSPITAL | Age: 77
End: 2022-07-25

## 2022-07-25 VITALS
RESPIRATION RATE: 14 BRPM | HEIGHT: 62 IN | WEIGHT: 120 LBS | BODY MASS INDEX: 22.08 KG/M2 | HEART RATE: 71 BPM | DIASTOLIC BLOOD PRESSURE: 80 MMHG | SYSTOLIC BLOOD PRESSURE: 148 MMHG | TEMPERATURE: 97.8 F

## 2022-07-25 DIAGNOSIS — R73.9 HYPERGLYCEMIA: ICD-10-CM

## 2022-07-25 DIAGNOSIS — R07.9 CHEST PAIN, UNSPECIFIED TYPE: ICD-10-CM

## 2022-07-25 DIAGNOSIS — E78.5 HYPERLIPIDEMIA LDL GOAL <70: ICD-10-CM

## 2022-07-25 DIAGNOSIS — I35.0 AORTIC STENOSIS, SEVERE: Primary | ICD-10-CM

## 2022-07-25 DIAGNOSIS — I10 ESSENTIAL HYPERTENSION: ICD-10-CM

## 2022-07-25 DIAGNOSIS — I48.91 ATRIAL FIBRILLATION, UNSPECIFIED TYPE: ICD-10-CM

## 2022-07-25 DIAGNOSIS — N18.32 CHRONIC KIDNEY DISEASE, STAGE 3B: ICD-10-CM

## 2022-07-25 PROCEDURE — 1111F DSCHRG MED/CURRENT MED MERGE: CPT | Performed by: FAMILY MEDICINE

## 2022-07-25 PROCEDURE — 99495 TRANSJ CARE MGMT MOD F2F 14D: CPT | Performed by: FAMILY MEDICINE

## 2022-07-25 PROCEDURE — 93000 ELECTROCARDIOGRAM COMPLETE: CPT | Performed by: FAMILY MEDICINE

## 2022-07-25 NOTE — TELEPHONE ENCOUNTER
Caller: CARINA RODRIGUEZ    Relationship: DAUGHTER     Best call back number: [phone]    What is the best time to reach you: ANYTIME AFTER 3PM     Who are you requesting to speak with (clinical staff, provider,  specific staff member): CLINICAL     Do you know the name of the person who called: CARINA    What was the call regarding: PT HAS A FOLLOW APPT ON 8/15/22 WITH SAMARA DAO, AND THEY ARE WANTING TO GET IN SOONER DUE TO THE DISCHARGE PAPERWORK STATING SHE NEEDS A FOLLOW UP APPT ONE WEEK AFTER HER SURGERY THAT WAS ON 7/20/22 WITH DR HERNANDEZ. THE DAUGHTER WANTS THE WOUND TO BE LOOKED AT AND WANTS TO SPEAK TO SOMEONE.    Do you require a callback: YES

## 2022-07-25 NOTE — PROGRESS NOTES
Patient Name: Miryam Mckeon  : 1945   MRN: [id number]     Chief Complaint:    Chief Complaint   Patient presents with   • Hospital Follow Up Visit     Pt was in [facility] 2022 for Aortic Stenosis severe, paroxysmal atrial fibrillation and coronary artery disease, discharged on 2022, medication reconciled and pt here for follow up with Dr. Chamberlain       History of Present Illness: Miryam Mckeon is a 76 y.o. female who is here today for follow up from TAVR  HPI        Review of Systems:   Review of Systems   Constitutional: Negative.    HENT: Negative.    Eyes: Negative.    Respiratory: Negative.    Cardiovascular: Negative.    Gastrointestinal: Negative.    Neurological: Negative.         Past Medical History:   Past Medical History:   Diagnosis Date   • Anxiety    • Aortic valve stenosis    • Atrial fibrillation (HCC)    • Borderline diabetes     ???   • Carotid artery stenosis    • CKD (chronic kidney disease)    • Deaf    • GERD (gastroesophageal reflux disease)    • Heart murmur    • Hyperlipidemia    • Hypertension    • Irregular heartbeat    • PONV (postoperative nausea and vomiting)    • Stroke (HCC)    • TIA (transient ischemic attack)        Past Surgical History:   Past Surgical History:   Procedure Laterality Date   • AORTIC VALVE REPAIR/REPLACEMENT N/A 2022    Procedure: TRANSCATHETER AORTIC VALVE REPLACEMENT with angioplasty and stent to the right common and external iliac artery;  Surgeon: Bola Whitaker MD;  Location: [facility];  Service: Cardiothoracic;  Laterality: N/A;  FL-15 MIN 12 SEC  DOSE- 92.41  CONTRAST- 120 ML ISOVUE   • AORTIC VALVE REPAIR/REPLACEMENT N/A 2022    Procedure: Transcatheter Aortic Valve Replacement;  Surgeon: Yashira Pyle MD;  Location: [facility];  Service: Cardiovascular;  Laterality: N/A;   • CARDIAC CATHETERIZATION      2022 per dr. pyle   • CARDIAC CATHETERIZATION Left  5/20/2022    Procedure: Left Heart Cath;  Surgeon: Yashira Chow MD;  Location: On license of [facility] CATH INVASIVE LOCATION;  Service: Cardiology;  Laterality: Left;   • CARDIAC ELECTROPHYSIOLOGY PROCEDURE N/A 04/13/2022    Procedure: DDD PPM Implant (BSC), DNS Eliquis;  Surgeon: Troy Hussein DO;  Location: On license of [facility] EP INVASIVE LOCATION;  Service: Cardiology;  Laterality: N/A;   • CAROTID ENDARTERECTOMY Bilateral    • CATARACT EXTRACTION     • CHOLECYSTECTOMY     • COLONOSCOPY     • FEMORAL ARTERY CUTDOWN Right 7/20/2022    Procedure: FEMORAL ARTERY CUTDOWN, ANGIOGRAM;  Surgeon: Bola Whitaker MD;  Location: On license of [facility] HYBRID [facility];  Service: Vascular;  Laterality: Right;  fl-1 m 12 sec  dose- 47.29 mgy  contrast- 50 ml isovue   • KNEE SURGERY Right    • PACEMAKER IMPLANTATION     • JF      05/20/2022 per dr. chow   • TRANSESOPHAGEAL ECHOCARDIOGRAM (JF) N/A 7/20/2022    Procedure: TRANSESOPHAGEAL ECHOCARDIOGRAM PER ANESTHESIA;  Surgeon: Bola Whitaker MD;  Location: [facility];  Service: Cardiothoracic;  Laterality: N/A;       Family History:   Family History   Problem Relation Age of Onset   • Other Mother         enlarged heart   • Stroke Father        Social History:   Social History     Socioeconomic History   • Marital status:    • Number of children: 2   • Highest education level: High school graduate   Tobacco Use   • Smoking status: Never Smoker   • Smokeless tobacco: Never Used   Vaping Use   • Vaping Use: Never used   Substance and Sexual Activity   • Alcohol use: Never   • Drug use: Never   • Sexual activity: Defer       Medications:     Current Outpatient Medications:   •  apixaban (ELIQUIS) 2.5 MG tablet tablet, Take 1 tablet by mouth Every 12 (Twelve) Hours. DO NOT RESUME TAKING UNTIL 7/24, Disp: 180 tablet, Rfl: 1  •  aspirin 81 MG chewable tablet, Chew 81 mg Daily., Disp: , Rfl:   •  atorvastatin (LIPITOR) 10 MG tablet, Take 10 mg by mouth Every Night., Disp: , Rfl:   •   "cholecalciferol (VITAMIN D3) 25 MCG (1000 UT) tablet, Take 2,000 Units by mouth Daily., Disp: , Rfl:   •  furosemide (LASIX) 20 MG tablet, TAKE 1 TABLET BY MOUTH DAILY FOR FLUID RETENTION, Disp: 90 tablet, Rfl: 1  •  latanoprost (XALATAN) 0.005 % ophthalmic solution, Administer 1 drop to both eyes Every Night., Disp: , Rfl:   •  metoprolol tartrate (LOPRESSOR) 25 MG tablet, Take 1 tablet by mouth 2 (Two) Times a Day. For afib, Disp: 60 tablet, Rfl: 2  •  ondansetron (ZOFRAN) 4 MG tablet, Take 1 tablet by mouth Every 8 (Eight) Hours As Needed for Nausea., Disp: 90 tablet, Rfl: 0  •  oxybutynin (DITROPAN) 5 MG tablet, Take 5 mg by mouth As Needed., Disp: , Rfl:   •  Polyethylene Glycol 3350 (MIRALAX PO), Take 17 g by mouth Every Other Day., Disp: , Rfl:   •  simethicone (MYLICON) 125 MG chewable tablet, Chew 125 mg Every 6 (Six) Hours As Needed for Flatulence. PRN, Disp: , Rfl:   •  Solifenacin Succinate (VESICARE PO), Take  by mouth As Needed. 4 mg, Disp: , Rfl:   •  vitamin B-12 (CYANOCOBALAMIN) 1000 MCG tablet, Take 5,000 mcg by mouth Daily., Disp: , Rfl:     Allergies:   No Known Allergies      Physical Exam:  Vital Signs:   Vitals:    07/25/22 1427   BP: 148/80   BP Location: Left arm   Patient Position: Sitting   Cuff Size: Adult   Pulse: 71   Resp: 14   Temp: 97.8 °F (36.6 °C)   TempSrc: Temporal   Weight: 54.4 kg (120 lb)   Height: 157.5 cm (62\")   PainSc:   8   PainLoc: Chest     Body mass index is 21.95 kg/m².     Physical Exam  Vitals and nursing note reviewed.   Constitutional:       Appearance: Normal appearance. She is normal weight.   HENT:      Head: Normocephalic and atraumatic.      Right Ear: Tympanic membrane, ear canal and external ear normal.      Left Ear: Tympanic membrane, ear canal and external ear normal.      Nose: Nose normal.      Mouth/Throat:      Mouth: Mucous membranes are dry.      Pharynx: Oropharynx is clear.   Eyes:      Extraocular Movements: Extraocular movements intact.      " Conjunctiva/sclera: Conjunctivae normal.      Pupils: Pupils are equal, round, and reactive to light.   Cardiovascular:      Rate and Rhythm: Normal rate. Rhythm irregular.      Pulses: Normal pulses.      Heart sounds: Normal heart sounds.   Pulmonary:      Effort: Pulmonary effort is normal.      Breath sounds: Normal breath sounds.   Musculoskeletal:      Cervical back: Normal range of motion and neck supple.   Feet:      Comments:      Neurological:      Mental Status: She is alert.           ECG 12 Lead    Date/Time: 7/25/2022 3:32 PM  Performed by: Rigoberto Chamberlain MD  Authorized by: Rigoberto Chamberlain MD   Comparison: not compared with previous ECG   Rhythm: paced  Ectopy: multifocal PVCs  Rate: normal  ST Segments: ST segments normal  QRS axis: normal  Other: no other findings  Other findings: non-specific ST-T wave changes    Clinical impression: non-specific ECG  Comments: No sign of acute ischemia              Assessment/Plan:   Diagnoses and all orders for this visit:    1. Aortic stenosis, severe s/p TAVR (7/20/2022) (Primary)  Assessment & Plan:  Patient has had chest pain since her TAVR.  She states she will feel like her depression to side of her chest that she pushes on it and it gets better.  She states that whenever she takes a deep breath they will hurt as well.  She had this in the hospital and they told her it was secondary to the valve replacement.  She states it is getting worse.  EKG shows a paced A. fib rhythm.  No acute sign of ischemia.  Physical exam was normal.  She will follow-up with me in 1 month.  I told her if her pain gets worse go to emergency room.      2. Essential hypertension  Assessment & Plan:  Discussed with patient to monitor their blood pressure and if systolic blood pressure goes above 140 or diastolic is above 90 to return to clinic.  Take medicines as directed, call for any problems, patient not having or any worrisome symptoms.          3. Hyperlipidemia LDL goal  <70    4. Hyperglycemia  Assessment & Plan:  We will follow      5. Chronic kidney disease, stage 3b (HCC)    6. Atrial fibrillation, unspecified type (HCC)  Assessment & Plan:  Patient on Eliquis      7. Chest pain, unspecified type           Follow Up:   Return in 1 month (on 8/25/2022) for Medicare Wellness.    Rigoberto Chamberlain MD  [facility]

## 2022-07-25 NOTE — TELEPHONE ENCOUNTER
I spoke with Mila concerning wound dressing changes and I will send the images of the incision to the Provider to view in the morning, Mila is aware that I will call her back after speaking with the provider.     Images were provided and Christal Blanchard viewed them. She asked that the Daughter monitor for back pain and drainage to make sure of increased brusing beyond what is already there. Daughter states that she will do as asked and knows to call this office back if she has any further questions.

## 2022-07-25 NOTE — ASSESSMENT & PLAN NOTE
Patient has had chest pain since her TAVR.  She states she will feel like her depression to side of her chest that she pushes on it and it gets better.  She states that whenever she takes a deep breath they will hurt as well.  She had this in the hospital and they told her it was secondary to the valve replacement.  She states it is getting worse.  EKG shows a paced A. fib rhythm.  No acute sign of ischemia.  Physical exam was normal.  She will follow-up with me in 1 month.  I told her if her pain gets worse go to emergency room.

## 2022-07-25 NOTE — OUTREACH NOTE
General Surgery Week 1 Survey    Flowsheet Row Responses   [facility] patient discharged from? [place]   Does the patient have one of the following disease processes/diagnoses(primary or secondary)? General Surgery   Week 1 attempt successful? Yes   Call start time 1712   Call end time 1719   General alerts for this patient Pt is deaf, uses video sign language phone   Discharge diagnosis severe aortic stenosis s/p TAVR   Person spoke with today (if not patient) and relationship Mila, daughter   Meds reviewed with patient/caregiver? Yes   Is the patient having any side effects they believe may be caused by any medication additions or changes? No   Does the patient have all medications related to this admission filled (includes all antibiotics, pain medications, etc.) Yes   Is the patient taking all medications as directed (includes completed medication regime)? Yes   Does the patient have a follow up appointment scheduled with their surgeon? Yes   Has the patient kept scheduled appointments due by today? Yes   What is the Home health agency?  [facility]    Has home health visited the patient within 72 hours of discharge? Yes   Psychosocial issues? No   Comments daughter took photo of incisions, will forward to surgeon's office for review   Did the patient receive a copy of their discharge instructions? Yes   Nursing interventions Reviewed instructions with patient   What is the patient's perception of their health status since discharge? Improving   Nursing interventions Nurse provided patient education   Is the patient /caregiver able to teach back basic post-op care? Continue use of incentive spirometry at least 1 week post discharge, Practice 'cough and deep breath', Drive as instructed by MD in discharge instructions, Take showers only when approved by MD-sponge bathe until then, No tub bath, swimming, or hot tub until instructed by MD, Keep incision areas clean,dry and protected, Do not remove  steri-strips, Lifting as instructed by MD in discharge instructions  [uses Respirex]   Is the patient/caregiver able to teach back signs and symptoms of incisional infection? Increased redness, swelling or pain at the incisonal site, Increased drainage or bleeding, Incisional warmth, Pus or odor from incision, Fever   Is the patient/caregiver able to teach back steps to recovery at home? Set small, achievable goals for return to baseline health, Rest and rebuild strength, gradually increase activity, Eat a well-balance diet   Is the patient/caregiver able to teach back the hierarchy of who to call/visit for symptoms/problems? PCP, Specialist, Home health nurse, Urgent Care, ED, 911 Yes   Week 1 call completed? Yes          BAYLEE MONTGOMERY - Registered Nurse

## 2022-07-26 ENCOUNTER — HOME CARE VISIT (OUTPATIENT)
Dept: HOME HEALTH SERVICES | Facility: HOME HEALTHCARE | Age: 77
End: 2022-07-26

## 2022-07-26 ENCOUNTER — TELEPHONE (OUTPATIENT)
Dept: CARDIAC SURGERY | Facility: CLINIC | Age: 77
End: 2022-07-26

## 2022-07-26 VITALS
DIASTOLIC BLOOD PRESSURE: 76 MMHG | SYSTOLIC BLOOD PRESSURE: 140 MMHG | RESPIRATION RATE: 16 BRPM | OXYGEN SATURATION: 99 % | HEART RATE: 88 BPM | TEMPERATURE: 97.2 F

## 2022-07-26 PROCEDURE — G0151 HHCP-SERV OF PT,EA 15 MIN: HCPCS

## 2022-07-26 NOTE — HOME HEALTH
SOC Note:   Home Health ordered for: disciplines PT SN   Reason for Hosp/Primary Dx/Co-morbidities: Aortic Stenosis S/P TAVR (7/20/22), Paroxysmal A fib on Eliquis, CAD, Hyperlipidemia, Essential HTN, CHF, Pacemaker   Focus of Care: PT for gait training, balance training, therapeutic exercise, pt education and HEP instruction related to TAVR  Current Functional status/mobility/DME:  Pt uses a FWW for gait in home, needs SBA/CGA for safety; pt has a FWW and a SPC  HB status/Living Arrangements: Pt lives alone, however, her daughter is staying with her temporarily.  Pt is homebound related to decreased endurance, requires the assitance of another person to safely leave home, increased risk of falls  Skin Integrity/wound status: R groin nonremovable dressing - tx by SN  Code Status: Full code  Fall Risk: high per Aashish

## 2022-07-27 ENCOUNTER — HOME CARE VISIT (OUTPATIENT)
Dept: HOME HEALTH SERVICES | Facility: HOME HEALTHCARE | Age: 77
End: 2022-07-27

## 2022-07-27 VITALS
TEMPERATURE: 97 F | SYSTOLIC BLOOD PRESSURE: 120 MMHG | OXYGEN SATURATION: 95 % | HEART RATE: 76 BPM | RESPIRATION RATE: 16 BRPM | DIASTOLIC BLOOD PRESSURE: 60 MMHG

## 2022-07-27 LAB
BASE EXCESS BLDA CALC-SCNC: -8 MMOL/L (ref -5–5)
CA-I BLDA-SCNC: 0.89 MMOL/L (ref 1.2–1.32)
CO2 BLDA-SCNC: 18 MMOL/L (ref 24–29)
GLUCOSE BLDC GLUCOMTR-MCNC: 143 MG/DL (ref 70–130)
HCO3 BLDA-SCNC: 17.1 MMOL/L (ref 22–26)
HCT VFR BLDA CALC: 18 % (ref 38–51)
HGB BLDA-MCNC: 6.1 G/DL (ref 12–17)
PCO2 BLDA: 30.3 MM HG (ref 35–45)
PH BLDA: 7.36 PH UNITS (ref 7.35–7.6)
PO2 BLDA: 261 MMHG (ref 80–105)
POTASSIUM BLDA-SCNC: 4.1 MMOL/L (ref 3.5–4.9)
SAO2 % BLDA: 100 % (ref 95–98)
SODIUM BLD-SCNC: 138 MMOL/L (ref 138–146)

## 2022-07-27 PROCEDURE — G0495 RN CARE TRAIN/EDU IN HH: HCPCS

## 2022-07-27 NOTE — HOME HEALTH
Pt was secheduled for nursing dc today, however the nonremovable dressing to the R groin came off yesturday and revealed a suture line that had some drainge.   Daughter called and sent pic to Dr Whitaker.  She is covering it with bandaids.  RN obtained wound care orders.  Will see pt for one more visit to assess status of surgical incision.  IF stable will discahrge form nursing.

## 2022-08-01 ENCOUNTER — OFFICE VISIT (OUTPATIENT)
Dept: CARDIOLOGY | Facility: HOSPITAL | Age: 77
End: 2022-08-01

## 2022-08-01 VITALS
SYSTOLIC BLOOD PRESSURE: 136 MMHG | HEART RATE: 81 BPM | BODY MASS INDEX: 20.39 KG/M2 | HEIGHT: 62 IN | DIASTOLIC BLOOD PRESSURE: 60 MMHG | WEIGHT: 110.8 LBS

## 2022-08-01 DIAGNOSIS — I50.32 CHRONIC HEART FAILURE WITH PRESERVED EJECTION FRACTION: ICD-10-CM

## 2022-08-01 DIAGNOSIS — I35.0 AORTIC STENOSIS, SEVERE: Primary | ICD-10-CM

## 2022-08-01 DIAGNOSIS — I48.0 PAROXYSMAL ATRIAL FIBRILLATION: ICD-10-CM

## 2022-08-01 DIAGNOSIS — Z95.0 PRESENCE OF CARDIAC PACEMAKER: ICD-10-CM

## 2022-08-01 DIAGNOSIS — I73.9 PVD (PERIPHERAL VASCULAR DISEASE) WITH CLAUDICATION: ICD-10-CM

## 2022-08-01 NOTE — PROGRESS NOTES
"[facility]  [facility]  Telemedicine note    08/01/2022     Miryam Mckeon  [address] [address]      1945    Rigoberto Chamberlain MD    Miryam Mckeon is a 76 y.o. female.      Subjective:     Chief Complaint:  Cardiac Valve Problem (S/p TAVR 7/20/22)       This was an telephone enabled telemedicine encounter.You have chosen to receive care through a telephone visit. Do you consent to use a telephone visit for your medical care today? Yes   present.     Ms. Mckeon follows up via telephone visit today s/p TAVR on 7/20/22.  She developed hematoma right groin shortly after arrival to ICU and was taken back to OR.  Dr. Whitaker identified a small arterial bleed in the subcutaneous tissues which he repaired.  She received blood products.  DC home next day.    Home Health Nursing and PT are coming in regularly.Patient's daughter is also present on the call.  Patient states her breathing is back to normal.  She has some discomfort in the center of her chest that hurts off and on, but no pain today.  Patient also states she is walking stronger.  Sometimes she can walk without walker inside her home.  Right groin surgical dressing came off on its own.  Patient or nurse is applying clean gauze dressing daily.  Mila, patient's daughter states the incision looks \"good\".  A little blood but no separation or redness. BP has been more stable since TAVR procedure (was labile before TAVR).             Patient Active Problem List   Diagnosis   • Bilateral carotid artery stenosis   • Aortic stenosis, severe s/p TAVR (7/20/2022)   • Atrial fibrillation (HCC)   • Coronary artery disease involving native coronary artery of native heart without angina pectoris   • Sick sinus syndrome (HCC)   • Hyperlipidemia LDL goal <70   • Essential hypertension   • Chronic heart failure with preserved ejection fraction (HCC)   • Status post CVA   • Presence of cardiac pacemaker " secondary to sick sinus syndrome   • Hyperglycemia   • Chronic kidney disease, stage 3b (HCC)   • Chest pain   • PVD (peripheral vascular disease) with claudication (HCC)       Past Medical History:   Diagnosis Date   • Anxiety    • Aortic valve stenosis    • Atrial fibrillation (HCC)    • Borderline diabetes     ???   • Carotid artery stenosis    • CKD (chronic kidney disease)    • Deaf    • GERD (gastroesophageal reflux disease)    • Heart murmur    • Hyperlipidemia    • Hypertension    • Irregular heartbeat    • PONV (postoperative nausea and vomiting)    • Stroke (HCC)    • TIA (transient ischemic attack)        Past Surgical History:   Procedure Laterality Date   • AORTIC VALVE REPAIR/REPLACEMENT N/A 7/20/2022    Procedure: TRANSCATHETER AORTIC VALVE REPLACEMENT with angioplasty and stent to the right common and external iliac artery;  Surgeon: Bola Whitaker MD;  Location: [facility];  Service: Cardiothoracic;  Laterality: N/A;  FL-15 MIN 12 SEC  DOSE- 92.41  CONTRAST- 120 ML ISOVUE   • AORTIC VALVE REPAIR/REPLACEMENT N/A 7/20/2022    Procedure: Transcatheter Aortic Valve Replacement;  Surgeon: Yashira Chow MD;  Location: [facility];  Service: Cardiovascular;  Laterality: N/A;   • CARDIAC CATHETERIZATION      05/20/2022 per dr. chow   • CARDIAC CATHETERIZATION Left 5/20/2022    Procedure: Left Heart Cath;  Surgeon: Yashira Chow MD;  Location: [facility] CATH INVASIVE LOCATION;  Service: Cardiology;  Laterality: Left;   • CARDIAC ELECTROPHYSIOLOGY PROCEDURE N/A 04/13/2022    Procedure: DDD PPM Implant (BSC), DNS Eliquis;  Surgeon: Troy Hussein DO;  Location: [facility] EP INVASIVE LOCATION;  Service: Cardiology;  Laterality: N/A;   • CAROTID ENDARTERECTOMY Bilateral    • CATARACT EXTRACTION     • CHOLECYSTECTOMY     • COLONOSCOPY     • FEMORAL ARTERY CUTDOWN Right 7/20/2022    Procedure: FEMORAL ARTERY CUTDOWN, ANGIOGRAM;  Surgeon: Bola Whitaker MD;  Location:  [facility];  Service: Vascular;  Laterality: Right;  fl-1 m 12 sec  dose- 47.29 mgy  contrast- 50 ml isovue   • KNEE SURGERY Right    • PACEMAKER IMPLANTATION     • JF      05/20/2022 per dr. chow   • TRANSESOPHAGEAL ECHOCARDIOGRAM (JF) N/A 7/20/2022    Procedure: TRANSESOPHAGEAL ECHOCARDIOGRAM PER ANESTHESIA;  Surgeon: Bola Whitaker MD;  [facility]: [facility];  Service: Cardiothoracic;  Laterality: N/A;       Family History   Problem Relation Age of Onset   • Other Mother         enlarged heart   • Stroke Father        Social History     Socioeconomic History   • Marital status:    • Number of children: 2   • Highest education level: High school graduate   Tobacco Use   • Smoking status: Never Smoker   • Smokeless tobacco: Never Used   Vaping Use   • Vaping Use: Never used   Substance and Sexual Activity   • Alcohol use: Never   • Drug use: Never   • Sexual activity: Defer       No Known Allergies      Current Outpatient Medications:   •  apixaban (ELIQUIS) 2.5 MG tablet tablet, Take 1 tablet by mouth Every 12 (Twelve) Hours. DO NOT RESUME TAKING UNTIL 7/24, Disp: 180 tablet, Rfl: 1  •  aspirin 81 MG chewable tablet, Chew 81 mg Daily., Disp: , Rfl:   •  atorvastatin (LIPITOR) 10 MG tablet, Take 10 mg by mouth Every Night., Disp: , Rfl:   •  cholecalciferol (VITAMIN D3) 25 MCG (1000 UT) tablet, Take 2,000 Units by mouth Daily., Disp: , Rfl:   •  furosemide (LASIX) 20 MG tablet, TAKE 1 TABLET BY MOUTH DAILY FOR FLUID RETENTION, Disp: 90 tablet, Rfl: 1  •  latanoprost (XALATAN) 0.005 % ophthalmic solution, Administer 1 drop to both eyes Every Night., Disp: , Rfl:   •  metoprolol tartrate (LOPRESSOR) 25 MG tablet, Take 1 tablet by mouth 2 (Two) Times a Day. For afib, Disp: 60 tablet, Rfl: 2  •  ondansetron (ZOFRAN) 4 MG tablet, Take 1 tablet by mouth Every 8 (Eight) Hours As Needed for Nausea., Disp: 90 tablet, Rfl: 0  •  oxybutynin (DITROPAN) 5 MG tablet, Take 5 mg by mouth Every  "Night., Disp: , Rfl:   •  Polyethylene Glycol 3350 (MIRALAX PO), Take 17 g by mouth Daily As Needed., Disp: , Rfl:   •  simethicone (MYLICON) 125 MG chewable tablet, Chew 125 mg Every 6 (Six) Hours As Needed for Flatulence. PRN, Disp: , Rfl:   •  Solifenacin Succinate (VESICARE PO), Take  by mouth As Needed. 4 mg, Disp: , Rfl:   •  vitamin B-12 (CYANOCOBALAMIN) 1000 MCG tablet, Take 5,000 mcg by mouth Daily., Disp: , Rfl:     The following portions of the patient's history were reviewed today and updated as appropriate: allergies, current medications, past family history, past medical history, past social history, past surgical history and problem list     Review of Systems   Constitutional: Positive for malaise/fatigue.        Improving    Eyes: Negative.    Cardiovascular: Positive for chest pain and dyspnea on exertion. Negative for claudication, cyanosis, irregular heartbeat, leg swelling, near-syncope, orthopnea, palpitations, paroxysmal nocturnal dyspnea and syncope.        Reports chest pain intermittently since PPM insertion similar to what she reports today.  No pain today.   Respiratory: Negative for cough and shortness of breath.    Endocrine: Positive for cold intolerance.   Hematologic/Lymphatic: Does not bruise/bleed easily.   Skin: Negative for poor wound healing.   Genitourinary: Negative.    Neurological: Negative for dizziness.   Psychiatric/Behavioral: The patient does not have insomnia.    Allergic/Immunologic: Negative.        Objective:     Vitals:    08/01/22 1213   BP: 136/60   Pulse: 81   Weight: 50.3 kg (110 lb 12.8 oz)   Height: 157.5 cm (62\")       Body mass index is 20.27 kg/m².    Vitals reviewed.         Lab and Diagnostic Review:  Discharge Summary by Libra López APRN (07/21/2022 10:15)        Assessment and Plan:   1. Aortic stenosis, severe s/p TAVR (7/20/2022)  - steady recovery @ home  - continue Home Health Nursing and PT  - CT Surgery follow up 8/15/22 to include [facility] visit " for KCQ12 and walk test.  - Cardiology follow up 8/18/22 to include post procedure echo per Dr. Cuba  - She is considering Cardiac Rehab  - Continue aspirin 81 mg daily    2. Paroxysmal atrial fibrillation (HCC)  - Has resumed Eliquis as directed  - Lopressor for rate control    3. Chronic heart failure with preserved ejection fraction (HCC)  - symptomatically improved since TAVR  - lasix low dose @ 20 mg daily      4. Presence of cardiac pacemaker secondary to sick sinus syndrome  - EP clinic follow up 7/2023    5. PVD (peripheral vascular disease) with claudication (HCC)  - s/p reoperation for arterial bleeding s/p TAVR  - successful peripheral stenting right iliac system prior to aortic valve prosthesis deployment  - aspirin, statin  - no claudication or s/sx incision infection      This visit has been scheduled as a telephone visit. Total time of discussion was 27 minutes.      It has been a pleasure to participate in the care of this patient.  Patient was instructed to call the Heart [facility] Center with any questions, concerns, or worsening symptoms.

## 2022-08-02 ENCOUNTER — HOME CARE VISIT (OUTPATIENT)
Dept: HOME HEALTH SERVICES | Facility: HOME HEALTHCARE | Age: 77
End: 2022-08-02

## 2022-08-02 VITALS
OXYGEN SATURATION: 97 % | DIASTOLIC BLOOD PRESSURE: 78 MMHG | SYSTOLIC BLOOD PRESSURE: 148 MMHG | RESPIRATION RATE: 16 BRPM | TEMPERATURE: 97.7 F | HEART RATE: 81 BPM

## 2022-08-02 PROCEDURE — G0180 MD CERTIFICATION HHA PATIENT: HCPCS | Performed by: NURSE PRACTITIONER

## 2022-08-02 PROCEDURE — G0151 HHCP-SERV OF PT,EA 15 MIN: HCPCS

## 2022-08-02 RX ORDER — FUROSEMIDE 20 MG/1
20 TABLET ORAL DAILY
Qty: 90 TABLET | Refills: 1
Start: 2022-08-02 | End: 2022-08-03 | Stop reason: SDUPTHER

## 2022-08-03 ENCOUNTER — READMISSION MANAGEMENT (OUTPATIENT)
Dept: CALL CENTER | Facility: HOSPITAL | Age: 77
End: 2022-08-03

## 2022-08-03 ENCOUNTER — HOME CARE VISIT (OUTPATIENT)
Dept: HOME HEALTH SERVICES | Facility: HOME HEALTHCARE | Age: 77
End: 2022-08-03

## 2022-08-03 VITALS
HEART RATE: 78 BPM | SYSTOLIC BLOOD PRESSURE: 122 MMHG | OXYGEN SATURATION: 97 % | TEMPERATURE: 97.1 F | DIASTOLIC BLOOD PRESSURE: 66 MMHG | RESPIRATION RATE: 16 BRPM

## 2022-08-03 PROCEDURE — G0495 RN CARE TRAIN/EDU IN HH: HCPCS

## 2022-08-03 RX ORDER — FUROSEMIDE 20 MG/1
20 TABLET ORAL DAILY
Qty: 90 TABLET | Refills: 0 | Status: SHIPPED | OUTPATIENT
Start: 2022-08-03

## 2022-08-03 RX ORDER — FUROSEMIDE 20 MG/1
20 TABLET ORAL DAILY
Qty: 30 TABLET | Refills: 1 | Status: SHIPPED | OUTPATIENT
Start: 2022-08-03 | End: 2022-08-03

## 2022-08-03 NOTE — PROGRESS NOTES
CHONG SMILEY    Daughter called to say refill was needed for lasix 20 mg daily to last until Cardiology follow up.  Refill sent to [facility].  Secondly, daughter reports foul smelling urine.  No fevers.  Advised to contact Dr. Chamberlain' office and request urine sample collection to assess for UTI.      Cat stated she would give Dr. Chamberlain' office a call.    Libra SMILEY

## 2022-08-03 NOTE — OUTREACH NOTE
General Surgery Week 2 Survey    Flowsheet Row Responses   [facility] patient discharged from? [facility]   Does the patient have one of the following disease processes/diagnoses(primary or secondary)? General Surgery   Week 2 attempt successful? Yes   Call start time 1516   Call end time 1519   General alerts for this patient Pt is deaf, uses video sign language phone   Discharge diagnosis severe aortic stenosis s/p TAVR   Person spoke with today (if not patient) and relationship Mila, daughter   Is the patient taking all medications as directed (includes completed medication regime)? Yes   Does the patient have a follow up appointment scheduled with their surgeon? Yes   Has the patient kept scheduled appointments due by today? Yes   Comments Has followed up with PCP   What is the Home health agency?  [facility]    Has home health visited the patient within 72 hours of discharge? Yes   What is the patient's perception of their health status since discharge? New symptoms unrelated to diagnosis   Is the patient/caregiver able to teach back signs and symptoms of incisional infection? Increased redness, swelling or pain at the incisonal site, Increased drainage or bleeding, Incisional warmth, Pus or odor from incision, Fever   Is the patient/caregiver able to teach back steps to recovery at home? Set small, achievable goals for return to baseline health, Rest and rebuild strength, gradually increase activity, Eat a well-balance diet   Is the patient/caregiver able to teach back the hierarchy of who to call/visit for symptoms/problems? PCP, Specialist, Home health nurse, Urgent Care, ED, 911 Yes   Additional teach back comments States she is doing well but has a ordor to her urine.  Advised to contact PCP to see if they can have home health do a urinalysis to check for a UTI.   Week 2 call completed? Yes   Wrap up additional comments Daughter to contact PCP regarding urine odor          CORRINE Weller  Nurse

## 2022-08-04 NOTE — HOME HEALTH
Routine Visit Note:    Skill/education provided: gait training in home with FWW and without AD, pt with improved stability in gait with use of AD; initiated dynamic standing balance tasks and added to/reviewed HEP with pt    Patient/caregiver response: pt fatigues easily, needs cues for pacing activity    Plan for next visit: advance standing tasks as tolerated; enter/exit home    Other pertinent info: none

## 2022-08-05 ENCOUNTER — HOME CARE VISIT (OUTPATIENT)
Dept: HOME HEALTH SERVICES | Facility: HOME HEALTHCARE | Age: 77
End: 2022-08-05

## 2022-08-05 PROCEDURE — G0151 HHCP-SERV OF PT,EA 15 MIN: HCPCS

## 2022-08-06 VITALS
HEART RATE: 82 BPM | DIASTOLIC BLOOD PRESSURE: 78 MMHG | TEMPERATURE: 96.9 F | OXYGEN SATURATION: 97 % | SYSTOLIC BLOOD PRESSURE: 138 MMHG | RESPIRATION RATE: 16 BRPM

## 2022-08-07 NOTE — HOME HEALTH
Routine Visit Note:    Skill/education provided: ther ex for LE strength and balance; gait training without AD     Patient/caregiver response: pt tolerated well, compliant with HEP    Plan for next visit: advance ther ex as pt tolerates    Other pertinent info: none

## 2022-08-09 ENCOUNTER — OFFICE VISIT (OUTPATIENT)
Dept: FAMILY MEDICINE CLINIC | Facility: CLINIC | Age: 77
End: 2022-08-09

## 2022-08-09 VITALS
OXYGEN SATURATION: 97 % | TEMPERATURE: 98 F | HEIGHT: 62 IN | RESPIRATION RATE: 12 BRPM | WEIGHT: 110 LBS | SYSTOLIC BLOOD PRESSURE: 110 MMHG | DIASTOLIC BLOOD PRESSURE: 72 MMHG | HEART RATE: 78 BPM | BODY MASS INDEX: 20.24 KG/M2

## 2022-08-09 DIAGNOSIS — I48.0 PAROXYSMAL ATRIAL FIBRILLATION: ICD-10-CM

## 2022-08-09 DIAGNOSIS — N30.00 ACUTE CYSTITIS WITHOUT HEMATURIA: Primary | ICD-10-CM

## 2022-08-09 DIAGNOSIS — I35.0 AORTIC STENOSIS, SEVERE: ICD-10-CM

## 2022-08-09 DIAGNOSIS — R73.9 HYPERGLYCEMIA: ICD-10-CM

## 2022-08-09 DIAGNOSIS — E78.5 HYPERLIPIDEMIA LDL GOAL <70: ICD-10-CM

## 2022-08-09 LAB
BILIRUB BLD-MCNC: ABNORMAL MG/DL
CLARITY, POC: CLEAR
COLOR UR: ABNORMAL
EXPIRATION DATE: ABNORMAL
GLUCOSE UR STRIP-MCNC: NEGATIVE MG/DL
KETONES UR QL: ABNORMAL
LEUKOCYTE EST, POC: ABNORMAL
Lab: ABNORMAL
NITRITE UR-MCNC: NEGATIVE MG/ML
PH UR: 5.5 [PH] (ref 5–8)
PROT UR STRIP-MCNC: ABNORMAL MG/DL
RBC # UR STRIP: ABNORMAL /UL
SP GR UR: 1.02 (ref 1–1.03)
UROBILINOGEN UR QL: NORMAL

## 2022-08-09 PROCEDURE — 99213 OFFICE O/P EST LOW 20 MIN: CPT | Performed by: FAMILY MEDICINE

## 2022-08-09 PROCEDURE — 81003 URINALYSIS AUTO W/O SCOPE: CPT | Performed by: FAMILY MEDICINE

## 2022-08-09 RX ORDER — NITROFURANTOIN 25; 75 MG/1; MG/1
100 CAPSULE ORAL 2 TIMES DAILY
Qty: 14 CAPSULE | Refills: 0 | Status: SHIPPED | OUTPATIENT
Start: 2022-08-09 | End: 2022-09-22

## 2022-08-09 NOTE — ASSESSMENT & PLAN NOTE
Patient will be seeing Dr. Whitaker and Dr. Her tanner in the next 2 weeks.  She will come back and see me in 2 months.  We will get fasting blood work before she comes in.

## 2022-08-09 NOTE — PROGRESS NOTES
Patient Name: Miryam Mckeon  : 1945   MRN: [id number]     Chief Complaint:    Chief Complaint   Patient presents with   • Urinary Tract Infection     Pt here to check to see if she has a UTI       History of Present Illness: Miryam Mckeon is a 76 y.o. female who is here today for follow up on TAVR  HPI        Review of Systems:   Review of Systems   Constitutional: Negative.    HENT: Negative.    Eyes: Negative.    Respiratory: Negative.    Cardiovascular: Negative.    Gastrointestinal: Negative.    Neurological: Negative.         Past Medical History:   Past Medical History:   Diagnosis Date   • Anxiety    • Aortic valve stenosis    • Atrial fibrillation (HCC)    • Borderline diabetes     ???   • Carotid artery stenosis    • CKD (chronic kidney disease)    • Deaf    • GERD (gastroesophageal reflux disease)    • Heart murmur    • Hyperlipidemia    • Hypertension    • Irregular heartbeat    • PONV (postoperative nausea and vomiting)    • Stroke (HCC)    • TIA (transient ischemic attack)        Past Surgical History:   Past Surgical History:   Procedure Laterality Date   • AORTIC VALVE REPAIR/REPLACEMENT N/A 2022    Procedure: TRANSCATHETER AORTIC VALVE REPLACEMENT with angioplasty and stent to the right common and external iliac artery;  Surgeon: Bola Whitaker MD;  Location:  [facility];  Service: Cardiothoracic;  Laterality: N/A;  FL-15 MIN 12 SEC  DOSE- 92.41  CONTRAST- 120 ML ISOVUE   • AORTIC VALVE REPAIR/REPLACEMENT N/A 2022    Procedure: Transcatheter Aortic Valve Replacement;  Surgeon: Yashira Chow MD;  [facility];  Service: Cardiovascular;  Laterality: N/A;   • CARDIAC CATHETERIZATION      2022 per dr. chow   • CARDIAC CATHETERIZATION Left 2022    Procedure: Left Heart Cath;  Surgeon: Yashira Chow MD;  Location:  [facility] [facility] [employer] CATH INVASIVE LOCATION;  Service: Cardiology;  Laterality: Left;   • CARDIAC  ELECTROPHYSIOLOGY PROCEDURE N/A 04/13/2022    Procedure: DDD PPM Implant (BSC), DNS Eliquis;  Surgeon: Troy Hussein DO;  Location: [facility] INVASIVE LOCATION;  Service: Cardiology;  Laterality: N/A;   • CAROTID ENDARTERECTOMY Bilateral    • CATARACT EXTRACTION     • CHOLECYSTECTOMY     • COLONOSCOPY     • FEMORAL ARTERY CUTDOWN Right 7/20/2022    Procedure: FEMORAL ARTERY CUTDOWN, ANGIOGRAM;  Surgeon: Bola Whitaker MD;  Location: [facility];  Service: Vascular;  Laterality: Right;  fl-1 m 12 sec  dose- 47.29 mgy  contrast- 50 ml isovue   • KNEE SURGERY Right    • PACEMAKER IMPLANTATION     • JF      05/20/2022 per dr. chow   • TRANSESOPHAGEAL ECHOCARDIOGRAM (JF) N/A 7/20/2022    Procedure: TRANSESOPHAGEAL ECHOCARDIOGRAM PER ANESTHESIA;  Surgeon: Bola Whitaker MD;  Location: [facility];  Service: Cardiothoracic;  Laterality: N/A;       Family History:   Family History   Problem Relation Age of Onset   • Other Mother         enlarged heart   • Stroke Father        Social History:   Social History     Socioeconomic History   • Marital status:    • Number of children: 2   • Highest education level: High school graduate   Tobacco Use   • Smoking status: Never Smoker   • Smokeless tobacco: Never Used   Vaping Use   • Vaping Use: Never used   Substance and Sexual Activity   • Alcohol use: Never   • Drug use: Never   • Sexual activity: Defer       Medications:     Current Outpatient Medications:   •  apixaban (ELIQUIS) 2.5 MG tablet tablet, Take 1 tablet by mouth Every 12 (Twelve) Hours. DO NOT RESUME TAKING UNTIL 7/24, Disp: 180 tablet, Rfl: 1  •  aspirin 81 MG chewable tablet, Chew 81 mg Daily., Disp: , Rfl:   •  atorvastatin (LIPITOR) 10 MG tablet, Take 10 mg by mouth Every Night., Disp: , Rfl:   •  cholecalciferol (VITAMIN D3) 25 MCG (1000 UT) tablet, Take 2,000 Units by mouth Daily., Disp: , Rfl:   •  furosemide (LASIX) 20 MG tablet, TAKE 1 TABLET BY MOUTH DAILY, Disp: 90  "tablet, Rfl: 0  •  latanoprost (XALATAN) 0.005 % ophthalmic solution, Administer 1 drop to both eyes Every Night., Disp: , Rfl:   •  metoprolol tartrate (LOPRESSOR) 25 MG tablet, Take 1 tablet by mouth 2 (Two) Times a Day. For afib, Disp: 60 tablet, Rfl: 2  •  ondansetron (ZOFRAN) 4 MG tablet, Take 1 tablet by mouth Every 8 (Eight) Hours As Needed for Nausea., Disp: 90 tablet, Rfl: 0  •  oxybutynin (DITROPAN) 5 MG tablet, Take 5 mg by mouth Every Night., Disp: , Rfl:   •  Polyethylene Glycol 3350 (MIRALAX PO), Take 17 g by mouth Daily As Needed., Disp: , Rfl:   •  simethicone (MYLICON) 125 MG chewable tablet, Chew 125 mg Every 6 (Six) Hours As Needed for Flatulence. PRN, Disp: , Rfl:   •  Solifenacin Succinate (VESICARE PO), Take  by mouth As Needed. 4 mg, Disp: , Rfl:   •  vitamin B-12 (CYANOCOBALAMIN) 1000 MCG tablet, Take 5,000 mcg by mouth Daily., Disp: , Rfl:   •  nitrofurantoin, macrocrystal-monohydrate, (Macrobid) 100 MG capsule, Take 1 capsule by mouth 2 (Two) Times a Day., Disp: 14 capsule, Rfl: 0    Allergies:   No Known Allergies      Physical Exam:  Vital Signs:   Vitals:    08/09/22 1506   BP: 110/72   BP Location: Left arm   Patient Position: Sitting   Cuff Size: Adult   Pulse: 78   Resp: 12   Temp: 98 °F (36.7 °C)   SpO2: 97%   Weight: 49.9 kg (110 lb)   Height: 157.5 cm (62\")   PainSc: 0-No pain     Body mass index is 20.12 kg/m².     Physical Exam  Vitals and nursing note reviewed.   Constitutional:       Appearance: Normal appearance. She is normal weight.   HENT:      Head: Normocephalic and atraumatic.      Right Ear: Tympanic membrane, ear canal and external ear normal.      Left Ear: Tympanic membrane, ear canal and external ear normal.      Nose: Nose normal.      Mouth/Throat:      Mouth: Mucous membranes are dry.      Pharynx: Oropharynx is clear.   Eyes:      Extraocular Movements: Extraocular movements intact.      Conjunctiva/sclera: Conjunctivae normal.      Pupils: Pupils are equal, " round, and reactive to light.   Cardiovascular:      Rate and Rhythm: Normal rate and regular rhythm.      Pulses: Normal pulses.      Heart sounds: Normal heart sounds.   Pulmonary:      Effort: Pulmonary effort is normal.      Breath sounds: Normal breath sounds.   Musculoskeletal:      Cervical back: Normal range of motion and neck supple.   Feet:      Comments:      Neurological:      Mental Status: She is alert.         Procedures      Assessment/Plan:   Diagnoses and all orders for this visit:    1. Acute cystitis without hematuria (Primary)  -     POCT urinalysis dipstick, automated  -     Urine Culture - Urine, Urine, Clean Catch; Future    2. Aortic stenosis, severe s/p TAVR (7/20/2022)    3. Paroxysmal atrial fibrillation (HCC)    4. Hyperlipidemia LDL goal <70    5. Hyperglycemia    Other orders  -     nitrofurantoin, macrocrystal-monohydrate, (Macrobid) 100 MG capsule; Take 1 capsule by mouth 2 (Two) Times a Day.  Dispense: 14 capsule; Refill: 0             Follow Up:   No follow-ups on file.    Rigoberto Chamberlain MD  [facility]     Answers for HPI/ROS submitted by the patient on 8/9/2022  Please describe your symptoms.: Urine test to see if UTI  Have you had these symptoms before?: No  How long have you been having these symptoms?: 5-7 days  Please describe any probable cause for these symptoms. : Surgery  What is the primary reason for your visit?: Other

## 2022-08-11 ENCOUNTER — READMISSION MANAGEMENT (OUTPATIENT)
Dept: CALL CENTER | Facility: HOSPITAL | Age: 77
End: 2022-08-11

## 2022-08-11 NOTE — OUTREACH NOTE
General Surgery Week 3 Survey    Flowsheet Row Responses   [facility] patient discharged from? [place]   Does the patient have one of the following disease processes/diagnoses(primary or secondary)? General Surgery   Week 3 attempt successful? Yes   Call start time 1327   Call end time 1331   Person spoke with today (if not patient) and relationship Mila-daughter.   Meds reviewed with patient/caregiver? Yes   Is the patient having any side effects they believe may be caused by any medication additions or changes? No   Does the patient have all medications related to this admission filled (includes all antibiotics, pain medications, etc.) Yes   Is the patient taking all medications as directed (includes completed medication regime)? Yes   Medication comments Started on antibiotic prescribed by PCP for UTI.   Does the patient have a follow up appointment scheduled with their surgeon? Yes   Has the patient kept scheduled appointments due by today? Yes   Comments Pedro freedman has surgeon appt 08/15/22.   Has home health visited the patient within 72 hours of discharge? Yes   Psychosocial issues? No   What is the patient's perception of their health status since discharge? Improving   Is the patient/caregiver able to teach back the hierarchy of who to call/visit for symptoms/problems? PCP, Specialist, Home health nurse, Urgent Care, ED, 911 Yes   Week 3 call completed? Yes   Wrap up additional comments Daughter  is doing well. Denies any s/s of infection or s/s of UTI today. States continues with antibiotics for UTI. Denies any needs.          HOME DAMON - Registered Nurse

## 2022-08-11 NOTE — ANESTHESIA PREPROCEDURE EVALUATION
Anesthesia Evaluation     Patient summary reviewed and Nursing notes reviewed   NPO Solid Status: > 8 hours  NPO Liquid Status: > 8 hours           Airway   Mallampati: II  TM distance: >3 FB  Neck ROM: full  Dental      Pulmonary    (+) decreased breath sounds,   Cardiovascular     ECG reviewed  Rhythm: regular  Rate: normal    (+) hypertension, CAD, dysrhythmias, PVD, hyperlipidemia,       Neuro/Psych  (+) TIA,    GI/Hepatic/Renal/Endo    (+)   renal disease,     Musculoskeletal     Abdominal    Substance History      OB/GYN          Other                        Anesthesia Plan    ASA 3     general     intravenous induction     Anesthetic plan, risks, benefits, and alternatives have been provided, discussed and informed consent has been obtained with: patient.    Plan discussed with CRNA.        CODE STATUS:

## 2022-08-12 ENCOUNTER — HOME CARE VISIT (OUTPATIENT)
Dept: HOME HEALTH SERVICES | Facility: HOME HEALTHCARE | Age: 77
End: 2022-08-12

## 2022-08-12 VITALS
OXYGEN SATURATION: 98 % | SYSTOLIC BLOOD PRESSURE: 140 MMHG | RESPIRATION RATE: 16 BRPM | HEART RATE: 110 BPM | DIASTOLIC BLOOD PRESSURE: 76 MMHG | TEMPERATURE: 97.6 F

## 2022-08-12 LAB
BACTERIA UR CULT: ABNORMAL
BACTERIA UR CULT: ABNORMAL

## 2022-08-12 PROCEDURE — G0157 HHC PT ASSISTANT EA 15: HCPCS

## 2022-08-14 NOTE — HOME HEALTH
Routine Visit Note:  present at all times for communication and education.    Skill/education provided: Seated, supine and standing exs for BLE strength and balance, and gait training.    Patient/caregiver response: Patient with complaint of fatigue during all interventions but otherwise tolerated progression well. No significant LOB.    Plan for next visit: Discharge or reassessment with supervising PT.    Other pertinent info: n/a

## 2022-08-15 ENCOUNTER — CLINICAL SUPPORT (OUTPATIENT)
Dept: CARDIOLOGY | Facility: HOSPITAL | Age: 77
End: 2022-08-15

## 2022-08-15 ENCOUNTER — OFFICE VISIT (OUTPATIENT)
Dept: CARDIAC SURGERY | Facility: CLINIC | Age: 77
End: 2022-08-15

## 2022-08-15 VITALS
DIASTOLIC BLOOD PRESSURE: 70 MMHG | OXYGEN SATURATION: 98 % | BODY MASS INDEX: 21.2 KG/M2 | WEIGHT: 108 LBS | HEART RATE: 104 BPM | HEIGHT: 60 IN | TEMPERATURE: 97.3 F | SYSTOLIC BLOOD PRESSURE: 130 MMHG

## 2022-08-15 DIAGNOSIS — I35.0 AORTIC STENOSIS, SEVERE: ICD-10-CM

## 2022-08-15 PROCEDURE — 99024 POSTOP FOLLOW-UP VISIT: CPT | Performed by: NURSE PRACTITIONER

## 2022-08-15 PROCEDURE — 71045 X-RAY EXAM CHEST 1 VIEW: CPT | Performed by: NURSE PRACTITIONER

## 2022-08-15 RX ORDER — SENNOSIDES 8.6 MG
650 CAPSULE ORAL EVERY 8 HOURS PRN
COMMUNITY

## 2022-08-15 NOTE — PROGRESS NOTES
[facility] Cardiothoracic Surgery Office Follow Up Note     Date of Encounter: 08/15/2022     YOB: 1945  Name: Miryam Mckeon    PCP: Rigoberto Chamberlain MD    Chief Complaint:    Chief Complaint   Patient presents with   • Post-op Follow-up     Hosp D/C TAVR 7/20/22-Aortic Valve Stenosis       History of Present Illness:      Miryam Mckeon is a 76 y.o. female with a history of hypertension, hyperlipidemia, CVA, CKD, bilateral carotid stenosis, afib, SSS s/p PPM and symptomatic aortic valvular stenosis s/p TAVR on 7/20/2022 with Dr. Whitaker with postprocedure bleeding s/p emergency reoperative right femoral artery cutdown with ligation of small bleeding artery in the subcu tissue overlying femoral artery on 7/20/2022 with Dr. Whitaker.  Patient presents today for post operative follow-up.  Since discharge, patient has not been feeling well.  She was recently diagnosed with urinary tract infection with antibiotics started Tuesday and over the last several days continues to be nauseated with symptoms of malaise/fatigue and poor intake.  Unsure if nausea is related to antibiotic.  She has not had any follow-up labs since discharge.  She has been initiated on 20 mg of Lasix daily due to some swelling and shortness of breath.  Plans to follow-up with Dr. Cuba on Thursday with repeat TTE.    Review of Systems:  Review of Systems   Constitutional: Positive for malaise/fatigue. Negative for chills, decreased appetite and fever.   Cardiovascular: Positive for chest pain (? from surgery) and palpitations. Negative for claudication, dyspnea on exertion, irregular heartbeat, leg swelling, near-syncope, orthopnea and syncope.   Respiratory: Positive for cough. Negative for hemoptysis, shortness of breath, sputum production and wheezing.    Hematologic/Lymphatic: Negative for bleeding problem. Bruises/bleeds easily.   Skin: Negative for color change, poor wound healing and rash.    Musculoskeletal: Positive for arthritis and joint pain. Negative for back pain and falls.   Gastrointestinal: Positive for nausea. Negative for abdominal pain, constipation, diarrhea and vomiting.   Neurological: Positive for dizziness and light-headedness. Negative for focal weakness, numbness and paresthesias.   Psychiatric/Behavioral: Negative for depression. The patient does not have insomnia.        Allergies:  No Known Allergies    Medications:      Current Outpatient Medications:   •  acetaminophen (TYLENOL) 650 MG 8 hr tablet, Take 650 mg by mouth Every 8 (Eight) Hours As Needed for Mild Pain ., Disp: , Rfl:   •  apixaban (ELIQUIS) 2.5 MG tablet tablet, Take 1 tablet by mouth Every 12 (Twelve) Hours. DO NOT RESUME TAKING UNTIL 7/24, Disp: 180 tablet, Rfl: 1  •  aspirin 81 MG chewable tablet, Chew 81 mg Daily., Disp: , Rfl:   •  atorvastatin (LIPITOR) 10 MG tablet, Take 10 mg by mouth Every Night., Disp: , Rfl:   •  cholecalciferol (VITAMIN D3) 25 MCG (1000 UT) tablet, Take 2,000 Units by mouth Daily., Disp: , Rfl:   •  furosemide (LASIX) 20 MG tablet, TAKE 1 TABLET BY MOUTH DAILY, Disp: 90 tablet, Rfl: 0  •  metoprolol tartrate (LOPRESSOR) 25 MG tablet, Take 1 tablet by mouth 2 (Two) Times a Day. For afib, Disp: 60 tablet, Rfl: 2  •  nitrofurantoin, macrocrystal-monohydrate, (Macrobid) 100 MG capsule, Take 1 capsule by mouth 2 (Two) Times a Day., Disp: 14 capsule, Rfl: 0  •  ondansetron (ZOFRAN) 4 MG tablet, Take 1 tablet by mouth Every 8 (Eight) Hours As Needed for Nausea., Disp: 90 tablet, Rfl: 0  •  simethicone (MYLICON) 125 MG chewable tablet, Chew 125 mg Every 6 (Six) Hours As Needed for Flatulence. PRN, Disp: , Rfl:   •  vitamin B-12 (CYANOCOBALAMIN) 1000 MCG tablet, Take 5,000 mcg by mouth Daily., Disp: , Rfl:     Social History     Socioeconomic History   • Marital status:    • Number of children: 2   • Highest education level: High school graduate   Tobacco Use   • Smoking status: Never  Smoker   • Smokeless tobacco: Never Used   Vaping Use   • Vaping Use: Never used   Substance and Sexual Activity   • Alcohol use: Never   • Drug use: Never   • Sexual activity: Defer       Family History   Problem Relation Age of Onset   • Other Mother         enlarged heart   • Stroke Father        Past Medical History:   Diagnosis Date   • Anxiety    • Aortic valve stenosis    • Atrial fibrillation (HCC)    • Borderline diabetes     ???   • Carotid artery stenosis    • CKD (chronic kidney disease)    • Deaf    • GERD (gastroesophageal reflux disease)    • Heart murmur    • Hyperlipidemia    • Hypertension    • Irregular heartbeat    • PONV (postoperative nausea and vomiting)    • Stroke (HCC)    • TIA (transient ischemic attack)        Past Surgical History:   Procedure Laterality Date   • AORTIC VALVE REPAIR/REPLACEMENT N/A 7/20/2022    Procedure: TRANSCATHETER AORTIC VALVE REPLACEMENT with angioplasty and stent to the right common and external iliac artery;  Surgeon: Bola Whitaker MD;  Location: [facility];  Service: Cardiothoracic;  Laterality: N/A;  FL-15 MIN 12 SEC  DOSE- 92.41  CONTRAST- 120 ML ISOVUE   • AORTIC VALVE REPAIR/REPLACEMENT N/A 7/20/2022    Procedure: Transcatheter Aortic Valve Replacement;  Surgeon: Yashira Chow MD;  Location: [facility];  Service: Cardiovascular;  Laterality: N/A;   • CARDIAC CATHETERIZATION      05/20/2022 per dr. chow   • CARDIAC CATHETERIZATION Left 5/20/2022    Procedure: Left Heart Cath;  Surgeon: Yashira Chow MD;  Location: [facility] CATH INVASIVE LOCATION;  Service: Cardiology;  Laterality: Left;   • CARDIAC ELECTROPHYSIOLOGY PROCEDURE N/A 04/13/2022    Procedure: DDD PPM Implant (BSC), DNS Eliquis;  Surgeon: Troy Hussein DO;  Location: [facility] EP INVASIVE LOCATION;  Service: Cardiology;  Laterality: N/A;   • CAROTID ENDARTERECTOMY Bilateral    • CATARACT EXTRACTION     • CHOLECYSTECTOMY     • COLONOSCOPY     • FEMORAL  "ARTERY CUTDOWN Right 7/20/2022    Procedure: FEMORAL ARTERY CUTDOWN, ANGIOGRAM;  Surgeon: Bola Whitaker MD;  Location: [facility];  Service: Vascular;  Laterality: Right;  fl-1 m 12 sec  dose- 47.29 mgy  contrast- 50 ml isovue   • KNEE SURGERY Right    • PACEMAKER IMPLANTATION     • JF      05/20/2022 per dr. chow   • TRANSESOPHAGEAL ECHOCARDIOGRAM (JF) N/A 7/20/2022    Procedure: TRANSESOPHAGEAL ECHOCARDIOGRAM PER ANESTHESIA;  Surgeon: Bola Whitaker MD;  Location: [facility];  Service: Cardiothoracic;  Laterality: N/A;       I have reviewed the following portions of the patient's history: allergies, current medications, past family history, past medical history, past social history, past surgical history and problem list and confirm it's accurate.    Physical Exam:  Vital Signs:    Vitals:    08/15/22 1018   BP: 130/70   BP Location: Left arm   Patient Position: Sitting   Pulse: 104   Temp: 97.3 °F (36.3 °C)   SpO2: 98%   Weight: 49 kg (108 lb)   Height: 152.4 cm (60\")     Body mass index is 21.09 kg/m².     Physical Exam  Vitals and nursing note reviewed.   Constitutional:       Appearance: She is well-groomed. She is ill-appearing.   HENT:      Head: Normocephalic and atraumatic.   Cardiovascular:      Rate and Rhythm: Normal rate. Rhythm irregular.      Heart sounds: S1 normal and S2 normal. No murmur heard.    No friction rub.   Pulmonary:      Comments: Unlabored, Bibasilar crackles  Musculoskeletal:      Cervical back: Neck supple.      Right lower leg: No edema.      Left lower leg: No edema.   Skin:     General: Skin is warm and dry.      Comments: Incisions:  Right groin Intact and left groin (ecchymosis)  No surrounding erythema, hematoma or induration   Neurological:      Mental Status: She is alert and oriented to person, place, and time.   Psychiatric:         Attention and Perception: Attention normal.         Mood and Affect: Mood normal.         Speech: Speech " normal.         Behavior: Behavior is cooperative.         Labs/Imaging:    XR Chest 1 View    Result Date: 7/21/2022   1. Status post TAPVR and left-sided transvenous pacemaker placement. 2. Cardiomegaly with a slight increase in passive congestion and basilar atelectasis compared with the last study.  This report was finalized on 7/21/2022 7:36 AM by Otilio Doan MD.      XR Chest Pa    Result Date: 8/15/2022  Left chest wall ICD projects unchanged. Aortic valve replacement projects in expected location on AP view. Some chronic interstitial changes are present. There is otherwise no focal opacity, significant effusion or distinct pneumothorax.  This report was finalized on 8/15/2022 11:03 AM by Thad Solomon.      Chest X-Ray PA & Lateral    Result Date: 7/19/2022  No acute process.  This report was finalized on 7/19/2022 2:09 PM by Chepe Graham MD.         Results for orders placed in visit on 02/18/22    Duplex Carotid Ultrasound CAR    Interpretation Summary  · There is 60 to 80% stenosis LICA  · Less than 40% stenosis in the JOHN  · Moderate plaques in both primary carotid arteries  · Normal antegrade vertebral flow bilateral      Results for orders placed during the hospital encounter of 07/20/22    Cardiac Catheterization/Vascular Study    Narrative  See dictated operative note    Assessment / Plan:  Diagnoses and all orders for this visit:    1. Aortic stenosis, severe     Miryam SULMA Mckeon is a 76 y.o. female with a history of hypertension, hyperlipidemia, CVA, CKD, afib, bilateral carotid stenosis, SSS s/p PPM and symptomatic aortic valvular stenosis s/p TAVR on 7/20/2022 with Dr. Whitaker with postprocedure bleeding s/p emergency reoperative right femoral artery cutdown with ligation of small bleeding artery in the subcu tissue overlying femoral artery on 7/20/2022 with Dr. Whitaker.  Patient with stable bilateral groins with chest x-ray showing bilateral atelectasis with increasing vascular  congestion.  I have encouraged patient to utilize incentive spirometer as well as continued rehab with home health.  She has been initiated on Lasix 20 mg with plans to follow-up with Dr. Cuba on Thursday with repeat TTE.  We will hold off on increasing Lasix due to history of CKD as well as recent illness/dehydration.  Patient is continuing to feel poorly with her recent UTI with malaise/fatigue and nausea/poor intake.  May be related to antibiotic related GI upset.  She has been taking her medication with food.  I have asked her to follow-up with her PCP, Dr. Chamberlain for evaluation including lab work.  We will plan to follow-up in 4 weeks with repeat chest x-ray.    BALJIT Phipps  [facility] Cardiothoracic Surgery

## 2022-08-15 NOTE — PROGRESS NOTES
Five Meter Walk Test    Miryam Mckeon  1945  [id number]  08/15/22    Utilized Walking Aid? Yes     Walk 1: 12.24 s/5m     Walk 2: 18.31 s/5m     Walk 3: 11.33 s/5m    Five Meter Walk Average: 13.96 s/5m    Gait Speed: Slow (Average > 6 s/5m)        Krystina Car MA, 08/15/22          KCQ12 score is 38/70 = NYHA class III HF symptoms.  Patient is enrolling in [facility] and should improve functional capacity in the near future.  Next TAVR clinic follow up 8/2023 w/ echo.    Libra SMILEY

## 2022-08-16 ENCOUNTER — HOME CARE VISIT (OUTPATIENT)
Dept: HOME HEALTH SERVICES | Facility: HOME HEALTHCARE | Age: 77
End: 2022-08-16

## 2022-08-16 ENCOUNTER — OFFICE VISIT (OUTPATIENT)
Dept: FAMILY MEDICINE CLINIC | Facility: CLINIC | Age: 77
End: 2022-08-16

## 2022-08-16 VITALS
SYSTOLIC BLOOD PRESSURE: 128 MMHG | RESPIRATION RATE: 16 BRPM | HEART RATE: 95 BPM | TEMPERATURE: 97.3 F | DIASTOLIC BLOOD PRESSURE: 78 MMHG | OXYGEN SATURATION: 93 %

## 2022-08-16 VITALS
RESPIRATION RATE: 15 BRPM | HEART RATE: 89 BPM | DIASTOLIC BLOOD PRESSURE: 80 MMHG | OXYGEN SATURATION: 97 % | SYSTOLIC BLOOD PRESSURE: 146 MMHG | TEMPERATURE: 98.4 F | BODY MASS INDEX: 20.5 KG/M2 | WEIGHT: 108.6 LBS | HEIGHT: 61 IN

## 2022-08-16 DIAGNOSIS — N10 ACUTE PYELONEPHRITIS: Primary | ICD-10-CM

## 2022-08-16 DIAGNOSIS — D50.8 OTHER IRON DEFICIENCY ANEMIA: ICD-10-CM

## 2022-08-16 DIAGNOSIS — R53.83 FATIGUE, UNSPECIFIED TYPE: ICD-10-CM

## 2022-08-16 LAB
BILIRUB BLD-MCNC: ABNORMAL MG/DL
CLARITY, POC: ABNORMAL
COLOR UR: ABNORMAL
EXPIRATION DATE: ABNORMAL
GLUCOSE UR STRIP-MCNC: NEGATIVE MG/DL
KETONES UR QL: ABNORMAL
LEUKOCYTE EST, POC: ABNORMAL
Lab: ABNORMAL
NITRITE UR-MCNC: NEGATIVE MG/ML
PH UR: 5.5 [PH] (ref 5–8)
PROT UR STRIP-MCNC: ABNORMAL MG/DL
RBC # UR STRIP: ABNORMAL /UL
SP GR UR: 1.02 (ref 1–1.03)
UROBILINOGEN UR QL: ABNORMAL

## 2022-08-16 PROCEDURE — G0151 HHCP-SERV OF PT,EA 15 MIN: HCPCS

## 2022-08-16 PROCEDURE — 99214 OFFICE O/P EST MOD 30 MIN: CPT | Performed by: PHYSICIAN ASSISTANT

## 2022-08-16 PROCEDURE — 36415 COLL VENOUS BLD VENIPUNCTURE: CPT | Performed by: PHYSICIAN ASSISTANT

## 2022-08-16 PROCEDURE — 81003 URINALYSIS AUTO W/O SCOPE: CPT | Performed by: PHYSICIAN ASSISTANT

## 2022-08-16 RX ORDER — AMOXICILLIN 500 MG/1
500 CAPSULE ORAL 2 TIMES DAILY
Qty: 20 CAPSULE | Refills: 0 | Status: SHIPPED | OUTPATIENT
Start: 2022-08-16 | End: 2022-09-22

## 2022-08-16 NOTE — PROGRESS NOTES
Answers for HPI/ROS submitted by the patient on 2022  Please describe your symptoms.: Surgeon wanted me checked and blood work  Have you had these symptoms before?: No  How long have you been having these symptoms?: 5-7 days  What is the primary reason for your visit?: Other          Patient Office Visit      Patient Name: Miryam Mckeon  : 1945   MRN: [id number]     Chief Complaint:    Chief Complaint   Patient presents with   • Fatigue       History of Present Illness: Miryam Mckeon is a 76 y.o. female who is here today with her daughter just not feeling good.  She recently had a aortic valve replacement.  She saw her cardiothoracic surgeon yesterday and he wanted her evaluated by primary care and wanted her to get some blood work.  She did recently have a urinary tract infection treated with Macrodantin but the culture came back with group B strep.  Patient has had some nausea and chills but daughter attributes the chills to cold intolerance.  Her surgeon thought he heard some fluid in her lungs and sent her for chest x-ray yesterday and they said this was okay.    Subjective      Review of Systems:   Review of Systems   Constitutional: Positive for chills and fatigue. Negative for fever.   Respiratory: Positive for cough ( She has had some cough since she had her surgery).    Genitourinary: Negative for dysuria and frequency.        Past Medical History:   Past Medical History:   Diagnosis Date   • Anxiety    • Aortic valve stenosis    • Atrial fibrillation (HCC)    • Borderline diabetes     ???   • Carotid artery stenosis    • CKD (chronic kidney disease)    • Deaf    • GERD (gastroesophageal reflux disease)    • Heart murmur    • Hyperlipidemia    • Hypertension    • Irregular heartbeat    • PONV (postoperative nausea and vomiting)    • Stroke (HCC)    • TIA (transient ischemic attack)        Past Surgical History:   Past Surgical History:   Procedure Laterality Date   • AORTIC VALVE  REPAIR/REPLACEMENT N/A 7/20/2022    Procedure: TRANSCATHETER AORTIC VALVE REPLACEMENT with angioplasty and stent to the right common and external iliac artery;  Surgeon: Bola Whitaker MD;  Location: [facility];  Service: Cardiothoracic;  Laterality: N/A;  FL-15 MIN 12 SEC  DOSE- 92.41  CONTRAST- 120 ML ISOVUE   • AORTIC VALVE REPAIR/REPLACEMENT N/A 7/20/2022    Procedure: Transcatheter Aortic Valve Replacement;  Surgeon: Yashira Chow MD;  Location: [facility];  Service: Cardiovascular;  Laterality: N/A;   • CARDIAC CATHETERIZATION      05/20/2022 per dr. chow   • CARDIAC CATHETERIZATION Left 5/20/2022    Procedure: Left Heart Cath;  Surgeon: Yashira Chow MD;  Location: [facility] CATH INVASIVE LOCATION;  Service: Cardiology;  Laterality: Left;   • CARDIAC ELECTROPHYSIOLOGY PROCEDURE N/A 04/13/2022    Procedure: DDD PPM Implant (BSC), DNS Eliquis;  Surgeon: Troy Hussein DO;  Location: [facility] EP INVASIVE LOCATION;  Service: Cardiology;  Laterality: N/A;   • CAROTID ENDARTERECTOMY Bilateral    • CATARACT EXTRACTION     • CHOLECYSTECTOMY     • COLONOSCOPY     • FEMORAL ARTERY CUTDOWN Right 7/20/2022    Procedure: FEMORAL ARTERY CUTDOWN, ANGIOGRAM;  Surgeon: Bola Whitaker MD;  Location: [facility];  Service: Vascular;  Laterality: Right;  fl-1 m 12 sec  dose- 47.29 mgy  contrast- 50 ml isovue   • KNEE SURGERY Right    • PACEMAKER IMPLANTATION     • JF      05/20/2022 per dr. chow   • TRANSESOPHAGEAL ECHOCARDIOGRAM (JF) N/A 7/20/2022    Procedure: TRANSESOPHAGEAL ECHOCARDIOGRAM PER ANESTHESIA;  Surgeon: Bola Whitaker MD;  Location: [facility];  Service: Cardiothoracic;  Laterality: N/A;       Family History:   Family History   Problem Relation Age of Onset   • Other Mother         enlarged heart   • Stroke Father        Social History:   Social History     Socioeconomic History   • Marital status:    • Number of children: 2   •  "Highest education level: High school graduate   Tobacco Use   • Smoking status: Never Smoker   • Smokeless tobacco: Never Used   Vaping Use   • Vaping Use: Never used   Substance and Sexual Activity   • Alcohol use: Never   • Drug use: Never   • Sexual activity: Defer       Allergies:   No Known Allergies    Objective     Physical Exam:  Vital Signs:   Vitals:    08/16/22 1013   BP: 146/80   BP Location: Right arm   Patient Position: Sitting   Cuff Size: Adult   Pulse: 89   Resp: 15   Temp: 98.4 °F (36.9 °C)   TempSrc: Temporal   SpO2: 97%   Weight: 49.3 kg (108 lb 9.6 oz)   Height: 154.9 cm (61\")     Body mass index is 20.52 kg/m².   BMI is within normal parameters. No other follow-up for BMI required.       Physical Exam  Constitutional:       Appearance: Normal appearance.   Cardiovascular:      Rate and Rhythm: Tachycardia present. Rhythm irregular.   Pulmonary:      Effort: Pulmonary effort is normal. No respiratory distress.      Breath sounds: Rales:  Some bibasilar dry crackles.   Abdominal:      Palpations: Abdomen is soft.      Tenderness: There is no abdominal tenderness. There is right CVA tenderness.   Neurological:      Mental Status: She is alert.         Procedures    Assessment / Plan      Assessment/Plan:   Diagnoses and all orders for this visit:    1. Acute pyelonephritis (Primary)  -     POCT urinalysis dipstick, automated  -     Urine Culture - Urine, Urine, Clean Catch; Future  -     Urine Culture - Urine, Urine, Clean Catch  -     amoxicillin (AMOXIL) 500 MG capsule; Take 1 capsule by mouth 2 (Two) Times a Day.  Dispense: 20 capsule; Refill: 0    2. Fatigue, unspecified type  -     CBC & Differential  -     Comprehensive Metabolic Panel  -     TSH  -     Vitamin B12  -     Folate    3. Other iron deficiency anemia  -     Iron Profile  -     Ferritin       Urine still looks infected.  Will treat with amoxicillin since recent culture showed group B strep.  We will also go ahead and check some " general blood work along with an iron profile since she had significant anemia due to blood loss when she had her surgery.    Medications:     Current Outpatient Medications:   •  acetaminophen (TYLENOL) 650 MG 8 hr tablet, Take 650 mg by mouth Every 8 (Eight) Hours As Needed for Mild Pain ., Disp: , Rfl:   •  apixaban (ELIQUIS) 2.5 MG tablet tablet, Take 1 tablet by mouth Every 12 (Twelve) Hours. DO NOT RESUME TAKING UNTIL 7/24, Disp: 180 tablet, Rfl: 1  •  aspirin 81 MG chewable tablet, Chew 81 mg Daily., Disp: , Rfl:   •  atorvastatin (LIPITOR) 10 MG tablet, Take 10 mg by mouth Every Night., Disp: , Rfl:   •  cholecalciferol (VITAMIN D3) 25 MCG (1000 UT) tablet, Take 2,000 Units by mouth Daily., Disp: , Rfl:   •  furosemide (LASIX) 20 MG tablet, TAKE 1 TABLET BY MOUTH DAILY, Disp: 90 tablet, Rfl: 0  •  metoprolol tartrate (LOPRESSOR) 25 MG tablet, Take 1 tablet by mouth 2 (Two) Times a Day. For afib, Disp: 60 tablet, Rfl: 2  •  nitrofurantoin, macrocrystal-monohydrate, (Macrobid) 100 MG capsule, Take 1 capsule by mouth 2 (Two) Times a Day., Disp: 14 capsule, Rfl: 0  •  ondansetron (ZOFRAN) 4 MG tablet, Take 1 tablet by mouth Every 8 (Eight) Hours As Needed for Nausea., Disp: 90 tablet, Rfl: 0  •  simethicone (MYLICON) 125 MG chewable tablet, Chew 125 mg Every 6 (Six) Hours As Needed for Flatulence. PRN, Disp: , Rfl:   •  vitamin B-12 (CYANOCOBALAMIN) 1000 MCG tablet, Take 5,000 mcg by mouth Daily., Disp: , Rfl:   •  amoxicillin (AMOXIL) 500 MG capsule, Take 1 capsule by mouth 2 (Two) Times a Day., Disp: 20 capsule, Rfl: 0        Follow Up:   Return if symptoms worsen or fail to improve.    Vanessa Vaughan PA-C   [facility]

## 2022-08-17 LAB
ALBUMIN SERPL-MCNC: 4 G/DL (ref 3.7–4.7)
ALBUMIN/GLOB SERPL: 1.5 {RATIO} (ref 1.2–2.2)
ALP SERPL-CCNC: 122 IU/L (ref 44–121)
ALT SERPL-CCNC: 9 IU/L (ref 0–32)
AST SERPL-CCNC: 17 IU/L (ref 0–40)
BASOPHILS # BLD AUTO: 0.1 X10E3/UL (ref 0–0.2)
BASOPHILS NFR BLD AUTO: 1 %
BILIRUB SERPL-MCNC: 1.1 MG/DL (ref 0–1.2)
BUN SERPL-MCNC: 36 MG/DL (ref 8–27)
BUN/CREAT SERPL: 26 (ref 12–28)
CALCIUM SERPL-MCNC: 10.2 MG/DL (ref 8.7–10.3)
CHLORIDE SERPL-SCNC: 97 MMOL/L (ref 96–106)
CO2 SERPL-SCNC: 26 MMOL/L (ref 20–29)
CREAT SERPL-MCNC: 1.41 MG/DL (ref 0.57–1)
EGFRCR-CYS SERPLBLD CKD-EPI 2021: 38 ML/MIN/1.73
EOSINOPHIL # BLD AUTO: 0.2 X10E3/UL (ref 0–0.4)
EOSINOPHIL NFR BLD AUTO: 2 %
ERYTHROCYTE [DISTWIDTH] IN BLOOD BY AUTOMATED COUNT: 14.9 % (ref 11.7–15.4)
FERRITIN SERPL-MCNC: 354 NG/ML (ref 15–150)
FOLATE SERPL-MCNC: 12.3 NG/ML
GLOBULIN SER CALC-MCNC: 2.6 G/DL (ref 1.5–4.5)
GLUCOSE SERPL-MCNC: 118 MG/DL (ref 65–99)
HCT VFR BLD AUTO: 35.4 % (ref 34–46.6)
HGB BLD-MCNC: 11.7 G/DL (ref 11.1–15.9)
IMM GRANULOCYTES # BLD AUTO: 0 X10E3/UL (ref 0–0.1)
IMM GRANULOCYTES NFR BLD AUTO: 0 %
IRON SATN MFR SERPL: 14 % (ref 15–55)
IRON SERPL-MCNC: 36 UG/DL (ref 27–139)
LYMPHOCYTES # BLD AUTO: 0.6 X10E3/UL (ref 0.7–3.1)
LYMPHOCYTES NFR BLD AUTO: 6 %
MCH RBC QN AUTO: 31.8 PG (ref 26.6–33)
MCHC RBC AUTO-ENTMCNC: 33.1 G/DL (ref 31.5–35.7)
MCV RBC AUTO: 96 FL (ref 79–97)
MONOCYTES # BLD AUTO: 0.7 X10E3/UL (ref 0.1–0.9)
MONOCYTES NFR BLD AUTO: 8 %
NEUTROPHILS # BLD AUTO: 7.8 X10E3/UL (ref 1.4–7)
NEUTROPHILS NFR BLD AUTO: 83 %
PLATELET # BLD AUTO: 136 X10E3/UL (ref 150–450)
POTASSIUM SERPL-SCNC: 4.2 MMOL/L (ref 3.5–5.2)
PROT SERPL-MCNC: 6.6 G/DL (ref 6–8.5)
RBC # BLD AUTO: 3.68 X10E6/UL (ref 3.77–5.28)
SODIUM SERPL-SCNC: 139 MMOL/L (ref 134–144)
TIBC SERPL-MCNC: 255 UG/DL (ref 250–450)
TSH SERPL DL<=0.005 MIU/L-ACNC: 2.92 UIU/ML (ref 0.45–4.5)
UIBC SERPL-MCNC: 219 UG/DL (ref 118–369)
VIT B12 SERPL-MCNC: >2000 PG/ML (ref 232–1245)
WBC # BLD AUTO: 9.4 X10E3/UL (ref 3.4–10.8)

## 2022-08-17 NOTE — HOME HEALTH
PT Reassessment Visit Note:    Skill/education provided: HEP review, therapeutic exercise for LE strength and balance; gait training in home with FWW    Patient/caregiver response: Pt fatigued easily today, daughter reports pt has had a UTI and changed antibiotics today; good progress noted toward POC goals    Plan for next visit: advance gait without AD, advance HEP    Other pertinent info: [facility]

## 2022-08-18 ENCOUNTER — OFFICE VISIT (OUTPATIENT)
Dept: CARDIOLOGY | Facility: CLINIC | Age: 77
End: 2022-08-18

## 2022-08-18 VITALS
OXYGEN SATURATION: 96 % | BODY MASS INDEX: 20.39 KG/M2 | WEIGHT: 108 LBS | DIASTOLIC BLOOD PRESSURE: 74 MMHG | SYSTOLIC BLOOD PRESSURE: 128 MMHG | RESPIRATION RATE: 18 BRPM | TEMPERATURE: 97 F | HEART RATE: 85 BPM | HEIGHT: 61 IN

## 2022-08-18 DIAGNOSIS — I65.23 BILATERAL CAROTID ARTERY STENOSIS: ICD-10-CM

## 2022-08-18 DIAGNOSIS — I48.0 PAROXYSMAL ATRIAL FIBRILLATION: ICD-10-CM

## 2022-08-18 DIAGNOSIS — Z95.0 PRESENCE OF CARDIAC PACEMAKER: ICD-10-CM

## 2022-08-18 DIAGNOSIS — I10 ESSENTIAL HYPERTENSION: ICD-10-CM

## 2022-08-18 DIAGNOSIS — I49.5 SICK SINUS SYNDROME: ICD-10-CM

## 2022-08-18 DIAGNOSIS — I35.0 AORTIC STENOSIS, SEVERE: Primary | ICD-10-CM

## 2022-08-18 DIAGNOSIS — E78.5 HYPERLIPIDEMIA LDL GOAL <70: ICD-10-CM

## 2022-08-18 PROBLEM — I73.9 PVD (PERIPHERAL VASCULAR DISEASE) WITH CLAUDICATION: Status: RESOLVED | Noted: 2022-06-01 | Resolved: 2022-08-18

## 2022-08-18 LAB
BACTERIA UR CULT: NORMAL
BACTERIA UR CULT: NORMAL

## 2022-08-18 PROCEDURE — 93000 ELECTROCARDIOGRAM COMPLETE: CPT | Performed by: INTERNAL MEDICINE

## 2022-08-18 PROCEDURE — 99214 OFFICE O/P EST MOD 30 MIN: CPT | Performed by: INTERNAL MEDICINE

## 2022-08-18 RX ORDER — METOPROLOL TARTRATE 50 MG/1
50 TABLET, FILM COATED ORAL 2 TIMES DAILY
Qty: 180 TABLET | Refills: 2 | Status: SHIPPED | OUTPATIENT
Start: 2022-08-18 | End: 2022-09-16 | Stop reason: SDUPTHER

## 2022-08-18 NOTE — PROGRESS NOTES
[facility] [place]  [facility] CARDIOLOGY  [address] [place] [address]  Dept: [phone]  Dept Fax: [phone]    Miryam Mckeon  1945    Follow Up Office Visit Note    History of Present Illness:  Miryam Mckeon is a 76 y.o. female who presents to the clinic for Follow-up. Aortic stenosis and SSS- she underwent pacemaker and later went for TAVR- she has done well, seems she has developed AF before the valve replacement, and today is tachy HR is 107, on Metoprolol 25 bid and Eliquis 2.5 bid, , will increase the Metoprolol to 50 mg bid, will try to slow down the HR to near 80    The following portions of the patient's history were reviewed and updated as appropriate: allergies, current medications, past family history, past medical history, past social history, past surgical history and problem list.    Medications:  acetaminophen  amoxicillin  apixaban tablet  aspirin  atorvastatin  cholecalciferol  furosemide  metoprolol tartrate  nitrofurantoin (macrocrystal-monohydrate)  ondansetron  simethicone  vitamin B-12    Subjective  No Known Allergies     Past Medical History:   Diagnosis Date   • Anxiety    • Aortic valve stenosis    • Atrial fibrillation (HCC)    • Borderline diabetes     ???   • Carotid artery stenosis    • CKD (chronic kidney disease)    • Deaf    • GERD (gastroesophageal reflux disease)    • Heart murmur    • Hyperlipidemia    • Hypertension    • Irregular heartbeat    • PONV (postoperative nausea and vomiting)    • Stroke (HCC)    • TIA (transient ischemic attack)        Past Surgical History:   Procedure Laterality Date   • AORTIC VALVE REPAIR/REPLACEMENT N/A 7/20/2022    Procedure: TRANSCATHETER AORTIC VALVE REPLACEMENT with angioplasty and stent to the right common and external iliac artery;  Surgeon: Bola Whitaker MD;  Location: [facility];  Service: Cardiothoracic;  Laterality: N/A;  FL-15 MIN 12 SEC  DOSE- 92.41  CONTRAST- 120 ML  ISOVUE   • AORTIC VALVE REPAIR/REPLACEMENT N/A 7/20/2022    Procedure: Transcatheter Aortic Valve Replacement;  Surgeon: Yashira Chow MD;  Location: [facility];  Service: Cardiovascular;  Laterality: N/A;   • CARDIAC CATHETERIZATION      05/20/2022 per dr. chow   • CARDIAC CATHETERIZATION Left 5/20/2022    Procedure: Left Heart Cath;  Surgeon: Yashira Chow MD;  Location: [facility] CATH INVASIVE LOCATION;  Service: Cardiology;  Laterality: Left;   • CARDIAC ELECTROPHYSIOLOGY PROCEDURE N/A 04/13/2022    Procedure: DDD PPM Implant ([facility]), DNS Eliquis;  Surgeon: Troy Hussein DO;  Location: [facility] EP INVASIVE LOCATION;  Service: Cardiology;  Laterality: N/A;   • CAROTID ENDARTERECTOMY Bilateral    • CATARACT EXTRACTION     • CHOLECYSTECTOMY     • COLONOSCOPY     • FEMORAL ARTERY CUTDOWN Right 7/20/2022    Procedure: FEMORAL ARTERY CUTDOWN, ANGIOGRAM;  Surgeon: Bola Whitaker MD;  Location: [facility];  Service: Vascular;  Laterality: Right;  fl-1 m 12 sec  dose- 47.29 mgy  contrast- 50 ml isovue   • KNEE SURGERY Right    • PACEMAKER IMPLANTATION     • JF      05/20/2022 per dr. chow   • TRANSESOPHAGEAL ECHOCARDIOGRAM (JF) N/A 7/20/2022    Procedure: TRANSESOPHAGEAL ECHOCARDIOGRAM PER ANESTHESIA;  Surgeon: Bola Whitaker MD;  Location: [facility];  Service: Cardiothoracic;  Laterality: N/A;       Family History   Problem Relation Age of Onset   • Other Mother         enlarged heart   • Stroke Father         Social History     Socioeconomic History   • Marital status:    • Number of children: 2   • Highest education level: High school graduate   Tobacco Use   • Smoking status: Never Smoker   • Smokeless tobacco: Never Used   Vaping Use   • Vaping Use: Never used   Substance and Sexual Activity   • Alcohol use: Never   • Drug use: Never   • Sexual activity: Defer       Review of Systems   Constitutional: Negative.    HENT: Negative.   "  Respiratory: Negative.    Cardiovascular: Negative.    Endocrine: Negative.    Genitourinary: Negative.    Musculoskeletal: Negative.    Skin: Negative.    Allergic/Immunologic: Negative.    Neurological: Negative.    Hematological: Negative.    Psychiatric/Behavioral: Negative.        Cardiovascular Procedures    ECHO/MUGA:   STRESS TESTS:   CARDIAC CATH:   DEVICES:   HOLTER:   CT/MRI:   VASCULAR:   CARDIOTHORACIC:     Objective  Vitals:    08/18/22 1541   BP: 128/74   BP Location: Left arm   Patient Position: Lying   Cuff Size: Adult   Pulse: 85   Resp: 18   Temp: 97 °F (36.1 °C)   TempSrc: Infrared   SpO2: 96%   Weight: 49 kg (108 lb)   Height: 154.9 cm (61\")   PainSc: 0-No pain     Body mass index is 20.41 kg/m².     Physical Exam  Vitals reviewed.   Constitutional:       Appearance: Healthy appearance. Not in distress.   Neck:      Vascular: No JVR. JVD normal.   Pulmonary:      Effort: Pulmonary effort is normal.      Breath sounds: Normal breath sounds. No wheezing. No rhonchi. No rales.   Chest:      Chest wall: Not tender to palpatation.   Cardiovascular:      PMI at left midclavicular line. Normal rate. Irregularly irregular rhythm. Normal S1. Normal S2.      Murmurs: There is no murmur.      No gallop. No click. No rub.   Pulses:     Intact distal pulses.   Edema:     Peripheral edema absent.   Abdominal:      General: Bowel sounds are normal.      Palpations: Abdomen is soft.      Tenderness: There is no abdominal tenderness.   Musculoskeletal: Normal range of motion.         General: No tenderness. Skin:     General: Skin is warm and dry.   Neurological:      General: No focal deficit present.      Mental Status: Alert and oriented to person, place and time.          Diagnostic Data    ECG 12 Lead    Date/Time: 8/18/2022 4:50 PM  Performed by: Vito Cuba MD  Authorized by: Vito Cuba MD   Comparison: compared with previous ECG from 7/25/2022  Similar to previous ECG  Rhythm: " atrial fibrillation  Rate: tachycardic  BPM: 107  QRS axis: normal    Clinical impression: abnormal EKG            Assessment and Plan  Diagnoses and all orders for this visit:    Aortic stenosis, severe s/p TAVR (7/20/2022)- She seems doing well, has had an echo today will review it    -     CBC & Differential  Persistent atrial fibrillation- Seems she has been in AF before the valve at least July 2022, today asymptomatic HR fast 107, will increase Metoprolol to 50 mg bid, keep Eliquis 2,5 bid   Carotid stenosis - She has left carotid endarterectomy in the last,   Hypertension- Her BP is 120.60, her BP has been stopped after the TAVR only on Metoprolol 25 bid,  Hyperlipidemia - on Lipitor 10 mg, tolerates well,  Pacemaker-- This seems working well , [employer]    Other orders  -     metoprolol tartrate (LOPRESSOR) 25 MG tablet; Take 2 tablets by mouth 2 (Two) Times a Day. For afib         Return in about 1 month (around 9/18/2022) for Recheck.    Vito Cuba MD  08/18/2022

## 2022-08-19 ENCOUNTER — TELEPHONE (OUTPATIENT)
Dept: CARDIOLOGY | Facility: CLINIC | Age: 77
End: 2022-08-19

## 2022-08-19 LAB
BASOPHILS # BLD AUTO: 0.1 X10E3/UL (ref 0–0.2)
BASOPHILS NFR BLD AUTO: 1 %
EOSINOPHIL # BLD AUTO: 0.3 X10E3/UL (ref 0–0.4)
EOSINOPHIL NFR BLD AUTO: 4 %
ERYTHROCYTE [DISTWIDTH] IN BLOOD BY AUTOMATED COUNT: 14.7 % (ref 11.7–15.4)
HCT VFR BLD AUTO: 37.3 % (ref 34–46.6)
HGB BLD-MCNC: 12 G/DL (ref 11.1–15.9)
IMM GRANULOCYTES # BLD AUTO: 0.1 X10E3/UL (ref 0–0.1)
IMM GRANULOCYTES NFR BLD AUTO: 1 %
LYMPHOCYTES # BLD AUTO: 1.3 X10E3/UL (ref 0.7–3.1)
LYMPHOCYTES NFR BLD AUTO: 23 %
MCH RBC QN AUTO: 31.3 PG (ref 26.6–33)
MCHC RBC AUTO-ENTMCNC: 32.2 G/DL (ref 31.5–35.7)
MCV RBC AUTO: 97 FL (ref 79–97)
MONOCYTES # BLD AUTO: 0.5 X10E3/UL (ref 0.1–0.9)
MONOCYTES NFR BLD AUTO: 8 %
NEUTROPHILS # BLD AUTO: 3.7 X10E3/UL (ref 1.4–7)
NEUTROPHILS NFR BLD AUTO: 63 %
PLATELET # BLD AUTO: 164 X10E3/UL (ref 150–450)
RBC # BLD AUTO: 3.83 X10E6/UL (ref 3.77–5.28)
WBC # BLD AUTO: 5.8 X10E3/UL (ref 3.4–10.8)

## 2022-08-19 RX ORDER — VALSARTAN 160 MG/1
160 TABLET ORAL DAILY
COMMUNITY
End: 2022-08-19 | Stop reason: SDUPTHER

## 2022-08-19 RX ORDER — VALSARTAN 160 MG/1
160 TABLET ORAL DAILY
Qty: 90 TABLET | Refills: 1 | Status: SHIPPED | OUTPATIENT
Start: 2022-08-19 | End: 2022-10-19 | Stop reason: DRUGHIGH

## 2022-08-19 NOTE — TELEPHONE ENCOUNTER
----- Message from Vito Cuba MD sent at 8/19/2022  4:41 PM EDT -----  The valve seems working well, but the heart is weaker around 40 to 45%, used to be stronger.,possible related to the AF and fast heart rate, we can start also Valsartan 160 mg,

## 2022-08-19 NOTE — TELEPHONE ENCOUNTER
Left a message for Mila, daughter, and advised that I would call her again on Monday with echo results.  Dr. hollingsworth did want her to start Valsartan BP medication 160mg daily and I will go ahead and call that in.

## 2022-08-22 ENCOUNTER — TELEPHONE (OUTPATIENT)
Dept: CARDIOLOGY | Facility: CLINIC | Age: 77
End: 2022-08-22

## 2022-08-22 NOTE — TELEPHONE ENCOUNTER
Spoke with daughter, Mila, and advised her that the echo showed the valve working well, however the heart is weaker in power to 40-45% and could be related to the AF and fast HR.  He is wanting to start valsartan to help with the heart failure and reduced EF.  This medication will help relax the vessels and  improved outcomes.  Take initially the first dose at bedtime in case causes dizziness.

## 2022-08-23 ENCOUNTER — TELEPHONE (OUTPATIENT)
Dept: CARDIOLOGY | Facility: CLINIC | Age: 77
End: 2022-08-23

## 2022-08-23 ENCOUNTER — HOME CARE VISIT (OUTPATIENT)
Dept: HOME HEALTH SERVICES | Facility: HOME HEALTHCARE | Age: 77
End: 2022-08-23

## 2022-08-23 VITALS
HEART RATE: 78 BPM | TEMPERATURE: 96.6 F | DIASTOLIC BLOOD PRESSURE: 86 MMHG | OXYGEN SATURATION: 98 % | SYSTOLIC BLOOD PRESSURE: 128 MMHG | RESPIRATION RATE: 16 BRPM

## 2022-08-23 PROCEDURE — G0151 HHCP-SERV OF PT,EA 15 MIN: HCPCS

## 2022-08-23 NOTE — TELEPHONE ENCOUNTER
Spoke with Mila, daughter, and advised her that Ms. Mckeon's echo showed that her valves are working well, but the EF, heart power is weaker and now 40-45%.  He believes the decrease can be related to the Afib and fast HR. He is starting her on a medication called Valsartan 160mg daily and this medication will help relax the heart muscle and has been shown to help outcomes with Heart failure.  She verbalized understanding.

## 2022-08-25 NOTE — HOME HEALTH
Routine Visit Note:    Skill/education provided: standing balance tasks; gait training in home with FWW and without AD; pt education re: fall prevention    Patient/caregiver response: Pt cooperative with PT interventions; improved tolerance    Plan for next visit: d/c if appropriate    Other pertinent info: none

## 2022-08-28 PROCEDURE — 93294 REM INTERROG EVL PM/LDLS PM: CPT | Performed by: INTERNAL MEDICINE

## 2022-08-28 PROCEDURE — 93296 REM INTERROG EVL PM/IDS: CPT | Performed by: INTERNAL MEDICINE

## 2022-08-30 ENCOUNTER — HOME CARE VISIT (OUTPATIENT)
Dept: HOME HEALTH SERVICES | Facility: HOME HEALTHCARE | Age: 77
End: 2022-08-30

## 2022-08-30 ENCOUNTER — OFFICE VISIT (OUTPATIENT)
Dept: FAMILY MEDICINE CLINIC | Facility: CLINIC | Age: 77
End: 2022-08-30

## 2022-08-30 VITALS
RESPIRATION RATE: 12 BRPM | OXYGEN SATURATION: 97 % | TEMPERATURE: 98.4 F | DIASTOLIC BLOOD PRESSURE: 78 MMHG | BODY MASS INDEX: 20.77 KG/M2 | WEIGHT: 110 LBS | SYSTOLIC BLOOD PRESSURE: 140 MMHG | HEART RATE: 90 BPM | HEIGHT: 61 IN

## 2022-08-30 VITALS
HEART RATE: 81 BPM | TEMPERATURE: 96 F | OXYGEN SATURATION: 98 % | SYSTOLIC BLOOD PRESSURE: 122 MMHG | RESPIRATION RATE: 16 BRPM | DIASTOLIC BLOOD PRESSURE: 78 MMHG

## 2022-08-30 DIAGNOSIS — E78.5 HYPERLIPIDEMIA LDL GOAL <70: ICD-10-CM

## 2022-08-30 DIAGNOSIS — Z28.82 PARENT REFUSES IMMUNIZATIONS: ICD-10-CM

## 2022-08-30 DIAGNOSIS — R73.9 HYPERGLYCEMIA: Primary | ICD-10-CM

## 2022-08-30 DIAGNOSIS — I35.0 AORTIC STENOSIS, SEVERE: ICD-10-CM

## 2022-08-30 DIAGNOSIS — N18.32 CHRONIC KIDNEY DISEASE, STAGE 3B: ICD-10-CM

## 2022-08-30 DIAGNOSIS — N30.01 ACUTE CYSTITIS WITH HEMATURIA: ICD-10-CM

## 2022-08-30 LAB
BILIRUB BLD-MCNC: NEGATIVE MG/DL
CLARITY, POC: CLEAR
COLOR UR: YELLOW
EXPIRATION DATE: ABNORMAL
GLUCOSE UR STRIP-MCNC: NEGATIVE MG/DL
KETONES UR QL: NEGATIVE
LEUKOCYTE EST, POC: NEGATIVE
Lab: ABNORMAL
NITRITE UR-MCNC: NEGATIVE MG/ML
PH UR: 5.5 [PH] (ref 5–8)
PROT UR STRIP-MCNC: NEGATIVE MG/DL
RBC # UR STRIP: ABNORMAL /UL
SP GR UR: 1.01 (ref 1–1.03)
UROBILINOGEN UR QL: NORMAL

## 2022-08-30 PROCEDURE — 99214 OFFICE O/P EST MOD 30 MIN: CPT | Performed by: FAMILY MEDICINE

## 2022-08-30 PROCEDURE — G0151 HHCP-SERV OF PT,EA 15 MIN: HCPCS

## 2022-08-30 PROCEDURE — 81003 URINALYSIS AUTO W/O SCOPE: CPT | Performed by: FAMILY MEDICINE

## 2022-08-30 NOTE — PROGRESS NOTES
Patient Name: Miryam Mckeon  : 1945   MRN: [id number]     Chief Complaint:    Chief Complaint   Patient presents with   • Urinary Tract Infection     Pt here for follow up on UTI       History of Present Illness: Miryam Mckeon is a 76 y.o. female who is here today for follow up on BG and BP  HPI        Review of Systems:   Review of Systems   Constitutional: Negative.    HENT: Negative.    Eyes: Negative.    Respiratory: Negative.    Cardiovascular: Negative.    Gastrointestinal: Negative.    Neurological: Negative.         Past Medical History:   Past Medical History:   Diagnosis Date   • Anxiety    • Aortic valve stenosis    • Atrial fibrillation (HCC)    • Borderline diabetes     ???   • Carotid artery stenosis    • CKD (chronic kidney disease)    • Deaf    • GERD (gastroesophageal reflux disease)    • Heart murmur    • Hyperlipidemia    • Hypertension    • Irregular heartbeat    • PONV (postoperative nausea and vomiting)    • Stroke (HCC)    • TIA (transient ischemic attack)        Past Surgical History:   Past Surgical History:   Procedure Laterality Date   • AORTIC VALVE REPAIR/REPLACEMENT N/A 2022    Procedure: TRANSCATHETER AORTIC VALVE REPLACEMENT with angioplasty and stent to the right common and external iliac artery;  Surgeon: Bola Whitaker MD;  Location:  [facility];  Service: Cardiothoracic;  Laterality: N/A;  FL-15 MIN 12 SEC  DOSE- 92.41  CONTRAST- 120 ML ISOVUE   • AORTIC VALVE REPAIR/REPLACEMENT N/A 2022    Procedure: Transcatheter Aortic Valve Replacement;  Surgeon: Yashira Chow MD;  Location:  [facility];  Service: Cardiovascular;  Laterality: N/A;   • CARDIAC CATHETERIZATION      2022 per dr. chow   • CARDIAC CATHETERIZATION Left 2022    Procedure: Left Heart Cath;  Surgeon: Yashira Chow MD;  Location:  [facility] CATH INVASIVE LOCATION;  Service: Cardiology;  Laterality: Left;   • CARDIAC  ELECTROPHYSIOLOGY PROCEDURE N/A 04/13/2022    Procedure: DDD PPM Implant ([facility]), DNS Eliquis;  Surgeon: Troy Hussein DO;  Location: [facility] INVASIVE LOCATION;  Service: Cardiology;  Laterality: N/A;   • CAROTID ENDARTERECTOMY Bilateral    • CATARACT EXTRACTION     • CHOLECYSTECTOMY     • COLONOSCOPY     • FEMORAL ARTERY CUTDOWN Right 7/20/2022    Procedure: FEMORAL ARTERY CUTDOWN, ANGIOGRAM;  Surgeon: Bola Whitaker MD;  Location: [facility];  Service: Vascular;  Laterality: Right;  fl-1 m 12 sec  dose- 47.29 mgy  contrast- 50 ml isovue   • KNEE SURGERY Right    • PACEMAKER IMPLANTATION     • JF      05/20/2022 per dr. chow   • TRANSESOPHAGEAL ECHOCARDIOGRAM (JF) N/A 7/20/2022    Procedure: TRANSESOPHAGEAL ECHOCARDIOGRAM PER ANESTHESIA;  Surgeon: Bola Whitaker MD;  Location: [facility];  Service: Cardiothoracic;  Laterality: N/A;       Family History:   Family History   Problem Relation Age of Onset   • Other Mother         enlarged heart   • Stroke Father        Social History:   Social History     Socioeconomic History   • Marital status:    • Number of children: 2   • Highest education level: High school graduate   Tobacco Use   • Smoking status: Never Smoker   • Smokeless tobacco: Never Used   Vaping Use   • Vaping Use: Never used   Substance and Sexual Activity   • Alcohol use: Never   • Drug use: Never   • Sexual activity: Defer       Medications:     Current Outpatient Medications:   •  acetaminophen (TYLENOL) 650 MG 8 hr tablet, Take 650 mg by mouth Every 8 (Eight) Hours As Needed for Mild Pain ., Disp: , Rfl:   •  apixaban (ELIQUIS) 2.5 MG tablet tablet, Take 1 tablet by mouth Every 12 (Twelve) Hours. DO NOT RESUME TAKING UNTIL 7/24, Disp: 180 tablet, Rfl: 1  •  aspirin 81 MG chewable tablet, Chew 81 mg Daily., Disp: , Rfl:   •  atorvastatin (LIPITOR) 10 MG tablet, Take 10 mg by mouth Every Night., Disp: , Rfl:   •  cholecalciferol (VITAMIN D3) 25 MCG (1000 UT)  "tablet, Take 2,000 Units by mouth Daily., Disp: , Rfl:   •  furosemide (LASIX) 20 MG tablet, TAKE 1 TABLET BY MOUTH DAILY, Disp: 90 tablet, Rfl: 0  •  metoprolol tartrate (LOPRESSOR) 50 MG tablet, Take 1 tablet by mouth 2 (Two) Times a Day. For afib, Disp: 180 tablet, Rfl: 2  •  ondansetron (ZOFRAN) 4 MG tablet, Take 1 tablet by mouth Every 8 (Eight) Hours As Needed for Nausea., Disp: 90 tablet, Rfl: 0  •  simethicone (MYLICON) 125 MG chewable tablet, Chew 125 mg Every 6 (Six) Hours As Needed for Flatulence. PRN, Disp: , Rfl:   •  valsartan (DIOVAN) 160 MG tablet, Take 1 tablet by mouth Daily., Disp: 90 tablet, Rfl: 1  •  vitamin B-12 (CYANOCOBALAMIN) 1000 MCG tablet, Take 5,000 mcg by mouth Daily., Disp: , Rfl:   •  amoxicillin (AMOXIL) 500 MG capsule, Take 1 capsule by mouth 2 (Two) Times a Day., Disp: 20 capsule, Rfl: 0  •  nitrofurantoin, macrocrystal-monohydrate, (Macrobid) 100 MG capsule, Take 1 capsule by mouth 2 (Two) Times a Day., Disp: 14 capsule, Rfl: 0    Allergies:   No Known Allergies      Physical Exam:  Vital Signs:   Vitals:    08/30/22 1453   BP: 140/78   BP Location: Left arm   Patient Position: Sitting   Cuff Size: Adult   Pulse: 90   Resp: 12   Temp: 98.4 °F (36.9 °C)   TempSrc: Temporal   SpO2: 97%   Weight: 49.9 kg (110 lb)   Height: 154.9 cm (61\")   PainSc: 0-No pain     Body mass index is 20.78 kg/m².     Physical Exam  Vitals and nursing note reviewed.   Constitutional:       Appearance: Normal appearance. She is normal weight.   HENT:      Head: Normocephalic and atraumatic.      Right Ear: Tympanic membrane, ear canal and external ear normal.      Left Ear: Tympanic membrane, ear canal and external ear normal.      Nose: Nose normal.      Mouth/Throat:      Mouth: Mucous membranes are dry.      Pharynx: Oropharynx is clear.   Eyes:      Extraocular Movements: Extraocular movements intact.      Conjunctiva/sclera: Conjunctivae normal.      Pupils: Pupils are equal, round, and reactive to " light.   Cardiovascular:      Rate and Rhythm: Normal rate and regular rhythm.      Pulses: Normal pulses.      Heart sounds: Normal heart sounds.   Pulmonary:      Effort: Pulmonary effort is normal.      Breath sounds: Normal breath sounds.   Musculoskeletal:      Cervical back: Normal range of motion and neck supple.   Feet:      Comments:      Neurological:      Mental Status: She is alert.         Procedures      Assessment/Plan:   Diagnoses and all orders for this visit:    1. Hyperglycemia (Primary)  Assessment & Plan:  Blood work in 6 months.  Blood glucose 118      2. Hyperlipidemia LDL goal <70  Assessment & Plan:  Blood work in 6 months      3. Chronic kidney disease, stage 3b (HCC)  Assessment & Plan:  GFR 36.Patient is instructed to not take any NSAIDs.  Medicines as directed.  Stay well-hydrated.        4. Aortic stenosis, severe s/p TAVR (7/20/2022)  Assessment & Plan:  Followed by cardiology      5. Acute cystitis with hematuria  -     Urine Culture - Urine, Urine, Clean Catch; Future  -     POCT urinalysis dipstick, automated  -     Urine Culture - Urine, Urine, Clean Catch    6. Parent refuses immunizations  Assessment & Plan:  Patient absolutely refuses COVID-vaccine and flu shot.  I discussed with her that I think it is a statistical certainty that if he do not take the vaccine or even if you do you will still get COVID as it is so prevalent.  She still refuses the vaccine.  She is aware she is at very high risk if she does get COVID.             Follow Up:   No follow-ups on file.    Rigoberto Chamberlain MD  [facility]     Answers for HPI/ROS submitted by the patient on 8/30/2022  Please describe your symptoms.: Follow up  Have you had these symptoms before?: Yes  How long have you been having these symptoms?: Greater than 2 weeks  What is the primary reason for your visit?: Other

## 2022-08-30 NOTE — ASSESSMENT & PLAN NOTE
GFR 36.Patient is instructed to not take any NSAIDs.  Medicines as directed.  Stay well-hydrated.

## 2022-08-30 NOTE — ASSESSMENT & PLAN NOTE
Patient absolutely refuses COVID-vaccine and flu shot.  I discussed with her that I think it is a statistical certainty that if he do not take the vaccine or even if you do you will still get COVID as it is so prevalent.  She still refuses the vaccine.  She is aware she is at very high risk if she does get COVID.

## 2022-08-31 ENCOUNTER — TELEPHONE (OUTPATIENT)
Dept: FAMILY MEDICINE CLINIC | Facility: CLINIC | Age: 77
End: 2022-08-31

## 2022-08-31 NOTE — HOME HEALTH
PT [facility] D/C Visit Note:    Skill/education provided: gait training in home without AD, safety education, HEP review; review of progress toward POC goals; completed D/C [facility]AS[facility]    Patient/caregiver response: pt has met all goals, in agreement with d/c today    Plan for next visit: n/a    Other pertinent info: PT recommending Cardiac Rehab, pt unsure but her daughter will contact her MD for referral if she decides to participate

## 2022-09-01 LAB
BACTERIA UR CULT: NORMAL
BACTERIA UR CULT: NORMAL

## 2022-09-01 NOTE — TELEPHONE ENCOUNTER
Paper work filled out and pt daughter notified to , left at  in folder,and copy up to be scanned to chart

## 2022-09-16 ENCOUNTER — OFFICE VISIT (OUTPATIENT)
Dept: CARDIOLOGY | Facility: CLINIC | Age: 77
End: 2022-09-16

## 2022-09-16 VITALS
SYSTOLIC BLOOD PRESSURE: 106 MMHG | TEMPERATURE: 96.4 F | DIASTOLIC BLOOD PRESSURE: 70 MMHG | RESPIRATION RATE: 16 BRPM | WEIGHT: 107 LBS | OXYGEN SATURATION: 99 % | HEIGHT: 61 IN | HEART RATE: 92 BPM | BODY MASS INDEX: 20.2 KG/M2

## 2022-09-16 DIAGNOSIS — I65.23 BILATERAL CAROTID ARTERY STENOSIS: ICD-10-CM

## 2022-09-16 DIAGNOSIS — I48.0 PAROXYSMAL ATRIAL FIBRILLATION: Primary | ICD-10-CM

## 2022-09-16 DIAGNOSIS — I35.0 AORTIC STENOSIS, SEVERE: ICD-10-CM

## 2022-09-16 DIAGNOSIS — I10 PRIMARY HYPERTENSION: ICD-10-CM

## 2022-09-16 DIAGNOSIS — E78.5 HYPERLIPIDEMIA LDL GOAL <70: ICD-10-CM

## 2022-09-16 DIAGNOSIS — Z95.0 PRESENCE OF CARDIAC PACEMAKER: ICD-10-CM

## 2022-09-16 PROCEDURE — 99214 OFFICE O/P EST MOD 30 MIN: CPT | Performed by: INTERNAL MEDICINE

## 2022-09-16 PROCEDURE — 93000 ELECTROCARDIOGRAM COMPLETE: CPT | Performed by: INTERNAL MEDICINE

## 2022-09-16 RX ORDER — METOPROLOL TARTRATE 100 MG/1
100 TABLET ORAL 2 TIMES DAILY
Qty: 180 TABLET | Refills: 3 | Status: SHIPPED | OUTPATIENT
Start: 2022-09-16

## 2022-09-16 NOTE — PROGRESS NOTES
[facility] [place]  [facility] CARDIOLOGY  [address] [place] [address]  Dept: [phone]  Dept Fax: [phone]    Miryam Mckeon  1945    Follow Up Office Visit Note    History of Present Illness:  Miryam Mckeon is a 76 y.o. female who presents to the clinic for Follow-up. Atrial Fibrillation- She feels better but her HR is still fast 117 we di increase the Metoprolol to 50 mg bid last time and now will further increase to 100 mg bid, she is on Eliquis 2.5mg bid, will see her back in 1 month    The following portions of the patient's history were reviewed and updated as appropriate: allergies, current medications, past family history, past medical history, past social history, past surgical history and problem list.    Medications:  acetaminophen  amoxicillin  apixaban tablet  aspirin  atorvastatin  cholecalciferol  furosemide  metoprolol tartrate  nitrofurantoin (macrocrystal-monohydrate)  ondansetron  simethicone  valsartan  vitamin B-12    Subjective  No Known Allergies     Past Medical History:   Diagnosis Date   • Anxiety    • Aortic valve stenosis    • Atrial fibrillation (HCC)    • Borderline diabetes     ???   • Carotid artery stenosis    • CKD (chronic kidney disease)    • Deaf    • GERD (gastroesophageal reflux disease)    • Heart murmur    • Hyperlipidemia    • Hypertension    • Irregular heartbeat    • PONV (postoperative nausea and vomiting)    • Stroke (HCC)    • TIA (transient ischemic attack)        Past Surgical History:   Procedure Laterality Date   • AORTIC VALVE REPAIR/REPLACEMENT N/A 7/20/2022    Procedure: TRANSCATHETER AORTIC VALVE REPLACEMENT with angioplasty and stent to the right common and external iliac artery;  Surgeon: Bola Whitaker MD;  Location: [facility];  Service: Cardiothoracic;  Laterality: N/A;  FL-15 MIN 12 SEC  DOSE- 92.41  CONTRAST- 120 ML ISOVUE   • AORTIC VALVE REPAIR/REPLACEMENT N/A 7/20/2022    Procedure:  Transcatheter Aortic Valve Replacement;  Surgeon: Yashira Chow MD;  Location: [facility];  Service: Cardiovascular;  Laterality: N/A;   • CARDIAC CATHETERIZATION      05/20/2022 per dr. chow   • CARDIAC CATHETERIZATION Left 5/20/2022    Procedure: Left Heart Cath;  Surgeon: Yashira Chow MD;  Location: [facility] CATH INVASIVE LOCATION;  Service: Cardiology;  Laterality: Left;   • CARDIAC ELECTROPHYSIOLOGY PROCEDURE N/A 04/13/2022    Procedure: DDD PPM Implant (BSC), DNS Eliquis;  Surgeon: Troy Hussein DO;  Location: [facility] EP INVASIVE LOCATION;  Service: Cardiology;  Laterality: N/A;   • CAROTID ENDARTERECTOMY Bilateral    • CATARACT EXTRACTION     • CHOLECYSTECTOMY     • COLONOSCOPY     • FEMORAL ARTERY CUTDOWN Right 7/20/2022    Procedure: FEMORAL ARTERY CUTDOWN, ANGIOGRAM;  Surgeon: Bola Whitaker MD;  Location: [facility];  Service: Vascular;  Laterality: Right;  fl-1 m 12 sec  dose- 47.29 mgy  contrast- 50 ml isovue   • KNEE SURGERY Right    • PACEMAKER IMPLANTATION     • JF      05/20/2022 per dr. chow   • TRANSESOPHAGEAL ECHOCARDIOGRAM (JF) N/A 7/20/2022    Procedure: TRANSESOPHAGEAL ECHOCARDIOGRAM PER ANESTHESIA;  Surgeon: Bola Whitaker MD;  Location: [facility];  Service: Cardiothoracic;  Laterality: N/A;       Family History   Problem Relation Age of Onset   • Other Mother         enlarged heart   • Stroke Father         Social History     Socioeconomic History   • Marital status:    • Number of children: 2   • Highest education level: High school graduate   Tobacco Use   • Smoking status: Never Smoker   • Smokeless tobacco: Never Used   Vaping Use   • Vaping Use: Never used   Substance and Sexual Activity   • Alcohol use: Never   • Drug use: Never   • Sexual activity: Defer       Review of Systems   Constitutional: Negative.    HENT: Negative.    Respiratory: Negative.    Cardiovascular: Negative.    Endocrine: Negative.   "  Genitourinary: Negative.    Musculoskeletal: Negative.    Skin: Negative.    Allergic/Immunologic: Negative.    Neurological: Negative.    Hematological: Negative.    Psychiatric/Behavioral: Negative.        Cardiovascular Procedures    ECHO/MUGA:   STRESS TESTS:   CARDIAC CATH:   DEVICES:   HOLTER:   CT/MRI:   VASCULAR:   CARDIOTHORACIC:     Objective  Vitals:    09/16/22 1525   BP: 106/70   BP Location: Right arm   Patient Position: Lying   Cuff Size: Small Adult   Pulse: 92   Resp: 16   Temp: 96.4 °F (35.8 °C)   TempSrc: Infrared   SpO2: 99%   Weight: 48.5 kg (107 lb)   Height: 154.9 cm (61\")   PainSc: 0-No pain     Body mass index is 20.22 kg/m².     Physical Exam  Constitutional:       Appearance: Healthy appearance. Not in distress.   Neck:      Vascular: No JVR. JVD normal.   Pulmonary:      Effort: Pulmonary effort is normal.      Breath sounds: Normal breath sounds. No wheezing. No rhonchi. No rales.   Chest:      Chest wall: Not tender to palpatation.   Cardiovascular:      PMI at left midclavicular line. Normal rate. Irregularly irregular rhythm. Normal S1. Normal S2.      Murmurs: There is no murmur.      No gallop. No click. No rub.   Pulses:     Intact distal pulses.   Edema:     Peripheral edema absent.   Abdominal:      General: Bowel sounds are normal.      Palpations: Abdomen is soft.      Tenderness: There is no abdominal tenderness.   Musculoskeletal: Normal range of motion.         General: No tenderness. Skin:     General: Skin is warm and dry.   Neurological:      General: No focal deficit present.      Mental Status: Alert and oriented to person, place and time.          Diagnostic Data    ECG 12 Lead    Date/Time: 9/16/2022 4:30 PM  Performed by: Vito Cuba MD  Authorized by: Vito Cuba MD   Comparison: compared with previous ECG from 8/18/2022  Rhythm: atrial fibrillation  Rate: tachycardic  BPM: 117  QRS axis: normal  Other findings: non-specific ST-T wave " changes    Clinical impression: abnormal EKG            Assessment and Plan  Diagnoses and all orders for this visit:    Paroxysmal atrial fibrillation (HCC)-will do just rate control, will increase Further the Metoprolol to 5=100 mg bid, keep Eliquis 2,5 bid,     Aortic stenosis, severe s/p TAVR (7/20/2022)- no major complaints     Bilateral carotid artery stenosis    Hyperlipidemia LDL goal <70- On Lipitor 10 mg    Primary hypertension- On Valsartan 160 mg and also Metoprolol now will be 100 mg bid , her Ef is lo 45% therefore will keep ARB     Presence of cardiac pacemaker secondary to sick sinus syndrome- This is working well     Other orders  -     metoprolol tartrate (LOPRESSOR) 100 MG tablet; Take 1 tablet by mouth 2 (Two) Times a Day. For afib         Return in about 1 month (around 10/16/2022) for Recheck.    Vito Cuba MD  09/16/2022

## 2022-09-22 ENCOUNTER — OFFICE VISIT (OUTPATIENT)
Dept: CARDIAC SURGERY | Facility: CLINIC | Age: 77
End: 2022-09-22

## 2022-09-22 VITALS
HEART RATE: 92 BPM | BODY MASS INDEX: 20.24 KG/M2 | HEIGHT: 61 IN | WEIGHT: 107.2 LBS | OXYGEN SATURATION: 98 % | TEMPERATURE: 97.5 F | DIASTOLIC BLOOD PRESSURE: 60 MMHG | SYSTOLIC BLOOD PRESSURE: 98 MMHG

## 2022-09-22 DIAGNOSIS — I35.0 AORTIC STENOSIS, SEVERE: Primary | ICD-10-CM

## 2022-09-22 PROCEDURE — 99024 POSTOP FOLLOW-UP VISIT: CPT | Performed by: NURSE PRACTITIONER

## 2022-09-22 PROCEDURE — 71046 X-RAY EXAM CHEST 2 VIEWS: CPT | Performed by: NURSE PRACTITIONER

## 2022-09-22 RX ORDER — LATANOPROST 50 UG/ML
SOLUTION/ DROPS OPHTHALMIC
COMMUNITY
Start: 2022-08-27

## 2022-09-22 NOTE — PROGRESS NOTES
[facility] Cardiothoracic Surgery Office Follow Up Note     Date of Encounter: 2022     Name: Miryam Mckeon  : 1945     Referred By: No ref. provider found  PCP: Rigoberto Chamberlain MD    Chief Complaint:    Chief Complaint   Patient presents with   • Cardiac Valve Problem     1 month follow-up with chest xray.        Subjective      History of Present Illness:    Miryam Mckeon is a 76 y.o. female non-smoker with history of HTN, HLD on statin therapy, CKD III, CVA, carotid disease, A. fib, SSS s/p PPM, and symptomatic aortic stenosis s/p TAVR on 2022 by Dr. Whitaker.  Patient had postprocedural bleeding s/p emergent reoperative right femoral artery cutdown with ligation of small bleeding artery in the subq tissue overlying the femoral artery same day.  Patient was seen postoperatively 8/15/2022 not feeling well after recently being diagnosed with UTI treated via antibiotics and Lasix for swelling and SOB. She presents today for re-eval and repeat CXR.  She denies any unusual chest pain.  She has been experiencing a fast heart rate and Dr. Flores has recently increased her metoprolol this past week.  She has not noticed much of a difference but has no associated symptoms such as dizziness, lightheadedness, or chest pain.  She does report her significant preoperative fatigue has resolved and she has no respiratory complaints.  She does still admit to having good days and bad days.  She is not very active and requires encouragement to ambulate daily.  She is anticipating podiatry appointment to discuss ingrown toenail treatment options.  They are waiting on this appointment prior to starting cardiac rehab. She is followed by cardiologist Dr Cuba and had repeat TTE showing appropriate TAVR valve function.    Review of Systems:  Review of Systems   Constitutional: Positive for malaise/fatigue. Negative for chills, decreased appetite, diaphoresis, fever, night sweats, weight gain  and weight loss.   HENT: Negative for hoarse voice.    Eyes: Negative for blurred vision, double vision and visual disturbance.   Cardiovascular: Positive for irregular heartbeat. Negative for chest pain, claudication, dyspnea on exertion, leg swelling, near-syncope, orthopnea, palpitations, paroxysmal nocturnal dyspnea and syncope.   Respiratory: Negative for cough, hemoptysis, shortness of breath, sputum production and wheezing.    Hematologic/Lymphatic: Negative for adenopathy and bleeding problem. Does not bruise/bleed easily.   Skin: Negative for color change, nail changes, poor wound healing and rash.   Musculoskeletal: Negative for back pain, falls and muscle cramps.   Gastrointestinal: Positive for dysphagia. Negative for abdominal pain and heartburn.   Genitourinary: Negative for flank pain.   Neurological: Positive for loss of balance. Negative for brief paralysis, disturbances in coordination, dizziness, focal weakness, headaches, light-headedness, numbness, paresthesias, sensory change, vertigo and weakness.   Psychiatric/Behavioral: Positive for memory loss (forgetfulness). Negative for depression and suicidal ideas.   Allergic/Immunologic: Negative for persistent infections.       I have reviewed the following portions of the patient's history: allergies, current medications, past family history, past medical history, past social history, past surgical history and problem list and confirm it's accurate.    Allergies:  No Known Allergies    Medications:      Current Outpatient Medications:   •  acetaminophen (TYLENOL) 650 MG 8 hr tablet, Take 650 mg by mouth Every 8 (Eight) Hours As Needed for Mild Pain ., Disp: , Rfl:   •  apixaban (ELIQUIS) 2.5 MG tablet tablet, Take 1 tablet by mouth Every 12 (Twelve) Hours. DO NOT RESUME TAKING UNTIL 7/24, Disp: 180 tablet, Rfl: 1  •  aspirin 81 MG chewable tablet, Chew 81 mg Daily., Disp: , Rfl:   •  atorvastatin (LIPITOR) 10 MG tablet, Take 10 mg by mouth Every  Night., Disp: , Rfl:   •  cholecalciferol (VITAMIN D3) 25 MCG (1000 UT) tablet, Take 2,000 Units by mouth Daily., Disp: , Rfl:   •  furosemide (LASIX) 20 MG tablet, TAKE 1 TABLET BY MOUTH DAILY, Disp: 90 tablet, Rfl: 0  •  latanoprost (XALATAN) 0.005 % ophthalmic solution, INSTILL 1 DROP IN BOTH EYES EVERY NIGHT AT BEDTIME, Disp: , Rfl:   •  metoprolol tartrate (LOPRESSOR) 100 MG tablet, Take 1 tablet by mouth 2 (Two) Times a Day. For afib, Disp: 180 tablet, Rfl: 3  •  ondansetron (ZOFRAN) 4 MG tablet, Take 1 tablet by mouth Every 8 (Eight) Hours As Needed for Nausea., Disp: 90 tablet, Rfl: 0  •  Polyethylene Glycol 3350 (MIRALAX PO), Take  by mouth., Disp: , Rfl:   •  simethicone (MYLICON) 125 MG chewable tablet, Chew 125 mg Every 6 (Six) Hours As Needed for Flatulence. PRN, Disp: , Rfl:   •  valsartan (DIOVAN) 160 MG tablet, Take 1 tablet by mouth Daily., Disp: 90 tablet, Rfl: 1  •  vitamin B-12 (CYANOCOBALAMIN) 1000 MCG tablet, Take 5,000 mcg by mouth Daily., Disp: , Rfl:     History:   Past Medical History:   Diagnosis Date   • Anxiety    • Aortic valve stenosis    • Atrial fibrillation (HCC)    • Borderline diabetes     ???   • Carotid artery stenosis    • CKD (chronic kidney disease)    • Deaf    • GERD (gastroesophageal reflux disease)    • Heart murmur    • Hyperlipidemia    • Hypertension    • Irregular heartbeat    • PONV (postoperative nausea and vomiting)    • Stroke (HCC)    • TIA (transient ischemic attack)        Past Surgical History:   Procedure Laterality Date   • AORTIC VALVE REPAIR/REPLACEMENT N/A 7/20/2022    Procedure: TRANSCATHETER AORTIC VALVE REPLACEMENT with angioplasty and stent to the right common and external iliac artery;  Surgeon: Bola Whitaker MD;  Location: [facility];  Service: Cardiothoracic;  Laterality: N/A;  FL-15 MIN 12 SEC  DOSE- 92.41  CONTRAST- 120 ML ISOVUE   • AORTIC VALVE REPAIR/REPLACEMENT N/A 7/20/2022    Procedure: Transcatheter Aortic Valve Replacement;   Surgeon: Yashira Chow MD;  Location: [facility];  Service: Cardiovascular;  Laterality: N/A;   • CARDIAC CATHETERIZATION      05/20/2022 per dr. chow   • CARDIAC CATHETERIZATION Left 5/20/2022    Procedure: Left Heart Cath;  Surgeon: Yashira Chow MD;  Location: [facility] CATH INVASIVE LOCATION;  Service: Cardiology;  Laterality: Left;   • CARDIAC ELECTROPHYSIOLOGY PROCEDURE N/A 04/13/2022    Procedure: DDD PPM Implant (BSC), DNS Eliquis;  Surgeon: Troy Hussein DO;  Location: [facility] EP INVASIVE LOCATION;  Service: Cardiology;  Laterality: N/A;   • CAROTID ENDARTERECTOMY Bilateral    • CATARACT EXTRACTION     • CHOLECYSTECTOMY     • COLONOSCOPY     • FEMORAL ARTERY CUTDOWN Right 7/20/2022    Procedure: FEMORAL ARTERY CUTDOWN, ANGIOGRAM;  Surgeon: Bola Whitaker MD;  Location: [facility];  Service: Vascular;  Laterality: Right;  fl-1 m 12 sec  dose- 47.29 mgy  contrast- 50 ml isovue   • KNEE SURGERY Right    • PACEMAKER IMPLANTATION     • JF      05/20/2022 per dr. chow   • TRANSESOPHAGEAL ECHOCARDIOGRAM (JF) N/A 7/20/2022    Procedure: TRANSESOPHAGEAL ECHOCARDIOGRAM PER ANESTHESIA;  Surgeon: Bola Whitaker MD;  Location: [facility];  Service: Cardiothoracic;  Laterality: N/A;       Social History     Socioeconomic History   • Marital status:    • Number of children: 2   • Highest education level: High school graduate   Tobacco Use   • Smoking status: Never Smoker   • Smokeless tobacco: Never Used   Vaping Use   • Vaping Use: Never used   Substance and Sexual Activity   • Alcohol use: Never   • Drug use: Never   • Sexual activity: Defer        Family History   Problem Relation Age of Onset   • Other Mother         enlarged heart   • Stroke Father        Objective     Physical Exam:  Vitals:    09/22/22 1433   BP: 98/60   BP Location: Right arm   Patient Position: Sitting   Pulse: 92   Temp: 97.5 °F (36.4 °C)   SpO2: 98%   Weight: 48.6 kg  "(107 lb 3.2 oz)   Height: 154.9 cm (61\")      Body mass index is 20.26 kg/m².    Physical Exam  Constitutional:       Appearance: Normal appearance.   Cardiovascular:      Rate and Rhythm: Tachycardia present.      Heart sounds: S1 normal and S2 normal.   Pulmonary:      Effort: Pulmonary effort is normal. No tachypnea, bradypnea, accessory muscle usage, prolonged expiration, respiratory distress or retractions.      Breath sounds: Normal breath sounds and air entry. No stridor, decreased air movement or transmitted upper airway sounds. No decreased breath sounds, wheezing, rhonchi or rales.   Musculoskeletal:      Right lower leg: No edema.      Left lower leg: No edema.   Skin:     General: Skin is warm and dry.      Coloration: Skin is pale.   Neurological:      Mental Status: She is alert and oriented to person, place, and time. Mental status is at baseline.         Imaging/Labs:  XR Chest 09/22/2022: Lungs fully expanded and well-inflated. No signs of pneumothorax, pleural effusion, or acute airspace consolidation noted. TAVR placement and PPM noted. Right nodule stable from comparison. Cardiomediastinal structures appear within normal limits. Personally reviewed.  Official radiology report pending    XR Chest 8/15/2022  Left chest wall ICD projects unchanged. Aortic valve replacement projects in expected location on AP view. Some chronic interstitial changes are present. There is otherwise no focal opacity, significant effusion or distinct pneumothorax.     Assessment / Plan      Assessment / Plan:  Diagnoses and all orders for this visit:    1. Aortic stenosis, severe s/p TAVR (7/20/2022) (Primary)       · symptomatic aortic stenosis s/p TAVR on 7/20/2022 by Dr. Whitaker.    · postprocedural bleeding s/p emergent reoperative right femoral artery cutdown with ligation of small bleeding artery in the subq tissue overlying the femoral artery same day.   · seen postoperatively 8/15/2022 not feeling well after " recently being diagnosed with UTI treated via antibiotics and Lasix for swelling and SOB.   · re-eval and repeat CXR.  No chest pain or x-ray complaints  · Repeat CXR with no pleural effusion.  TAVR placement noted.  · Repeat JF with appropriate TAVR valve function  · Tachycardia recent increase in metoprolol this past week by Dr. Cuba  · Patient admits she only drinks 16 ounces water per day I have encouraged her to increase this especially in setting of recent UTI.    · I have encouraged cardiac rehab.  If patient is displaying a central her podiatry appointment to discuss ingrown toenail  · Patient is stable from a postoperative standpoint and can follow-up with us on a as needed basis  · Follow-up with cardiology Dr. Galan as previously determined    In person  used for this encounter    Follow Up:   Return on as needed basis.   Or sooner for any further concerns or worsening sign and symptoms. If unable to reach us in the office please dial 911 or go to the nearest emergency department.      Danielle SMILEY  [facility] Cardiothoracic Surgery

## 2022-10-19 ENCOUNTER — OFFICE VISIT (OUTPATIENT)
Dept: CARDIOLOGY | Facility: CLINIC | Age: 77
End: 2022-10-19

## 2022-10-19 VITALS
SYSTOLIC BLOOD PRESSURE: 100 MMHG | TEMPERATURE: 96.4 F | BODY MASS INDEX: 20.12 KG/M2 | HEART RATE: 85 BPM | WEIGHT: 106.6 LBS | OXYGEN SATURATION: 98 % | RESPIRATION RATE: 16 BRPM | DIASTOLIC BLOOD PRESSURE: 72 MMHG | HEIGHT: 61 IN

## 2022-10-19 DIAGNOSIS — I35.0 AORTIC STENOSIS, SEVERE: Primary | ICD-10-CM

## 2022-10-19 DIAGNOSIS — I10 PRIMARY HYPERTENSION: ICD-10-CM

## 2022-10-19 DIAGNOSIS — E78.5 HYPERLIPIDEMIA LDL GOAL <70: ICD-10-CM

## 2022-10-19 DIAGNOSIS — I48.21 PERMANENT ATRIAL FIBRILLATION: ICD-10-CM

## 2022-10-19 DIAGNOSIS — Z95.0 PRESENCE OF CARDIAC PACEMAKER: ICD-10-CM

## 2022-10-19 DIAGNOSIS — I65.23 BILATERAL CAROTID ARTERY STENOSIS: ICD-10-CM

## 2022-10-19 PROBLEM — I50.32 CHRONIC HEART FAILURE WITH PRESERVED EJECTION FRACTION (HCC): Status: RESOLVED | Noted: 2022-05-20 | Resolved: 2022-10-19

## 2022-10-19 PROBLEM — I25.10 CORONARY ARTERY DISEASE INVOLVING NATIVE CORONARY ARTERY OF NATIVE HEART WITHOUT ANGINA PECTORIS: Status: RESOLVED | Noted: 2021-06-17 | Resolved: 2022-10-19

## 2022-10-19 PROCEDURE — 99214 OFFICE O/P EST MOD 30 MIN: CPT | Performed by: INTERNAL MEDICINE

## 2022-10-19 PROCEDURE — 93000 ELECTROCARDIOGRAM COMPLETE: CPT | Performed by: INTERNAL MEDICINE

## 2022-10-19 RX ORDER — DILTIAZEM HYDROCHLORIDE 180 MG/1
180 CAPSULE, COATED, EXTENDED RELEASE ORAL DAILY
Qty: 90 CAPSULE | Refills: 1 | Status: SHIPPED | OUTPATIENT
Start: 2022-10-19 | End: 2022-12-19 | Stop reason: SDUPTHER

## 2022-10-19 NOTE — PROGRESS NOTES
[facility] [place]  [facility] CARDIOLOGY  [address] [place] [address]  Dept: [phone]  Dept Fax: [phone]    Miryam Mckeon  1945    Follow Up Office Visit Note    History of Present Illness:  Miryam Mckeon is a 76 y.o. female who presents to the clinic for  Atrial fibrillation-She seems to be doing better, less SOB still has some palpitaions walking HR is better but till high 102, will add Cardizem 180 mg, she is on Metoprolol 100 mg bid, BP is 120.80. I will d.c Valsartan     The following portions of the patient's history were reviewed and updated as appropriate: allergies, current medications, past family history, past medical history, past social history, past surgical history and problem list.    Medications:  acetaminophen  apixaban tablet  aspirin  atorvastatin  cholecalciferol  furosemide  latanoprost  metoprolol tartrate  MIRALAX PO  ondansetron  simethicone  vitamin B-12    Subjective  No Known Allergies     Past Medical History:   Diagnosis Date   • Anxiety    • Aortic valve stenosis    • Atrial fibrillation (HCC)    • Borderline diabetes     ???   • Carotid artery stenosis    • CKD (chronic kidney disease)    • Deaf    • GERD (gastroesophageal reflux disease)    • Heart murmur    • Hyperlipidemia    • Hypertension    • Irregular heartbeat    • PONV (postoperative nausea and vomiting)    • Stroke (HCC)    • TIA (transient ischemic attack)        Past Surgical History:   Procedure Laterality Date   • AORTIC VALVE REPAIR/REPLACEMENT N/A 7/20/2022    Procedure: TRANSCATHETER AORTIC VALVE REPLACEMENT with angioplasty and stent to the right common and external iliac artery;  Surgeon: Bola Whitaker MD;  Location: [facility];  Service: Cardiothoracic;  Laterality: N/A;  FL-15 MIN 12 SEC  DOSE- 92.41  CONTRAST- 120 ML ISOVUE   • AORTIC VALVE REPAIR/REPLACEMENT N/A 7/20/2022    Procedure: Transcatheter Aortic Valve Replacement;  Surgeon:  Yashira Chow MD;  Location: [facility];  Service: Cardiovascular;  Laterality: N/A;   • CARDIAC CATHETERIZATION      05/20/2022 per dr. chow   • CARDIAC CATHETERIZATION Left 5/20/2022    Procedure: Left Heart Cath;  Surgeon: Yashira Chow MD;  Location: [facility] CATH INVASIVE LOCATION;  Service: Cardiology;  Laterality: Left;   • CARDIAC ELECTROPHYSIOLOGY PROCEDURE N/A 04/13/2022    Procedure: DDD PPM Implant (BSC), DNS Eliquis;  Surgeon: Troy Hussein DO;  Location: [facility] EP INVASIVE LOCATION;  Service: Cardiology;  Laterality: N/A;   • CAROTID ENDARTERECTOMY Bilateral    • CATARACT EXTRACTION     • CHOLECYSTECTOMY     • COLONOSCOPY     • FEMORAL ARTERY CUTDOWN Right 7/20/2022    Procedure: FEMORAL ARTERY CUTDOWN, ANGIOGRAM;  Surgeon: Bola Whitaker MD;  Location: [facility];  Service: Vascular;  Laterality: Right;  fl-1 m 12 sec  dose- 47.29 mgy  contrast- 50 ml isovue   • KNEE SURGERY Right    • PACEMAKER IMPLANTATION     • JF      05/20/2022 per dr. chow   • TRANSESOPHAGEAL ECHOCARDIOGRAM (JF) N/A 7/20/2022    Procedure: TRANSESOPHAGEAL ECHOCARDIOGRAM PER ANESTHESIA;  Surgeon: Bola Whitaker MD;  Location: [facility];  Service: Cardiothoracic;  Laterality: N/A;       Family History   Problem Relation Age of Onset   • Other Mother         enlarged heart   • Stroke Father         Social History     Socioeconomic History   • Marital status:    • Number of children: 2   • Highest education level: High school graduate   Tobacco Use   • Smoking status: Never   • Smokeless tobacco: Never   Vaping Use   • Vaping Use: Never used   Substance and Sexual Activity   • Alcohol use: Never   • Drug use: Never   • Sexual activity: Defer       Review of Systems   Constitutional: Negative.    HENT: Negative.    Respiratory: Negative.    Cardiovascular: Negative.    Endocrine: Negative.    Genitourinary: Negative.    Musculoskeletal: Negative.    Skin:  "Negative.    Allergic/Immunologic: Negative.    Neurological: Negative.    Hematological: Negative.    Psychiatric/Behavioral: Negative.        Cardiovascular Procedures    ECHO/MUGA:   STRESS TESTS:   CARDIAC CATH:   DEVICES:   HOLTER:   CT/MRI:   VASCULAR:   CARDIOTHORACIC:     Objective  Vitals:    10/19/22 1544   BP: 100/72   BP Location: Left arm   Patient Position: Sitting   Cuff Size: Adult   Pulse: 85   Resp: 16   Temp: 96.4 °F (35.8 °C)   TempSrc: Infrared   SpO2: 98%   Weight: 48.4 kg (106 lb 9.6 oz)   Height: 154.9 cm (61\")   PainSc: 0-No pain     Body mass index is 20.14 kg/m².     Physical Exam  Vitals reviewed.   Constitutional:       Appearance: Healthy appearance. Not in distress.   Neck:      Vascular: No JVR. JVD normal.   Pulmonary:      Effort: Pulmonary effort is normal.      Breath sounds: Normal breath sounds. No wheezing. No rhonchi. No rales.   Chest:      Chest wall: Not tender to palpatation.   Cardiovascular:      PMI at left midclavicular line. Normal rate. Irregularly irregular rhythm. Normal S1. Normal S2.      Murmurs: There is no murmur.      No gallop. No click. No rub.   Pulses:     Intact distal pulses.   Edema:     Peripheral edema absent.   Abdominal:      General: Bowel sounds are normal.      Palpations: Abdomen is soft.      Tenderness: There is no abdominal tenderness.   Musculoskeletal: Normal range of motion.         General: No tenderness. Skin:     General: Skin is warm and dry.   Neurological:      General: No focal deficit present.      Mental Status: Alert and oriented to person, place and time.          Diagnostic Data    ECG 12 Lead    Date/Time: 10/19/2022 4:26 PM  Performed by: Vito Cuba MD  Authorized by: Vito Cuba MD   Comparison: compared with previous ECG from 9/16/2022  Similar to previous ECG  Rhythm: atrial fibrillation  Rate: tachycardic  BPM: 103  QRS axis: normal  Other findings: poor R wave progression    Clinical impression: " abnormal EKG            Assessment and Plan  Diagnoses and all orders for this visit:    Aortic stenosis, severe s/p TAVR (7/20/2022)- doing good, no major complaints    Permanent atrial fibrillation (HCC)- Hr is better on Metoprolol 10 mg bid, will add Cardizem 180 mg    Bilateral carotid artery stenosis-        Primary hypertension- BP is fine 120.,60 will keep Metoprolol 100 mg bid and will use Cardizem 18-0 mg, will d.c valsartan     Hyperlipidemia LDL goal <70-On Lipitor 10 mg    Coronary artery disease involving native coronary artery of native heart without angina pectoris    Presence of cardiac pacemaker secondary to sick sinus syndrome- this is working fine         Return in about 2 months (around 12/19/2022) for Recheck.    Vito Cuba MD  10/19/2022

## 2022-11-27 PROCEDURE — 93296 REM INTERROG EVL PM/IDS: CPT | Performed by: INTERNAL MEDICINE

## 2022-11-27 PROCEDURE — 93294 REM INTERROG EVL PM/LDLS PM: CPT | Performed by: INTERNAL MEDICINE

## 2022-12-19 ENCOUNTER — OFFICE VISIT (OUTPATIENT)
Dept: CARDIOLOGY | Facility: CLINIC | Age: 77
End: 2022-12-19

## 2022-12-19 VITALS
BODY MASS INDEX: 20.01 KG/M2 | HEIGHT: 61 IN | TEMPERATURE: 96.6 F | SYSTOLIC BLOOD PRESSURE: 130 MMHG | DIASTOLIC BLOOD PRESSURE: 68 MMHG | RESPIRATION RATE: 18 BRPM | HEART RATE: 95 BPM | OXYGEN SATURATION: 97 % | WEIGHT: 106 LBS

## 2022-12-19 DIAGNOSIS — I48.21 PERMANENT ATRIAL FIBRILLATION: ICD-10-CM

## 2022-12-19 DIAGNOSIS — I65.23 BILATERAL CAROTID ARTERY STENOSIS: ICD-10-CM

## 2022-12-19 DIAGNOSIS — I10 PRIMARY HYPERTENSION: ICD-10-CM

## 2022-12-19 DIAGNOSIS — Z95.0 PRESENCE OF CARDIAC PACEMAKER: ICD-10-CM

## 2022-12-19 DIAGNOSIS — I35.0 AORTIC STENOSIS, SEVERE: Primary | ICD-10-CM

## 2022-12-19 DIAGNOSIS — E78.5 HYPERLIPIDEMIA LDL GOAL <70: ICD-10-CM

## 2022-12-19 PROCEDURE — 99214 OFFICE O/P EST MOD 30 MIN: CPT | Performed by: INTERNAL MEDICINE

## 2022-12-19 PROCEDURE — 93000 ELECTROCARDIOGRAM COMPLETE: CPT | Performed by: INTERNAL MEDICINE

## 2022-12-19 RX ORDER — DILTIAZEM HYDROCHLORIDE 300 MG/1
300 CAPSULE, COATED, EXTENDED RELEASE ORAL DAILY
Qty: 90 CAPSULE | Refills: 3 | Status: SHIPPED | OUTPATIENT
Start: 2022-12-19

## 2022-12-19 NOTE — PROGRESS NOTES
[facility] [place]  [facility] CARDIOLOGY  [address] [place] [address]  Dept: [phone]  Dept Fax: [phone]    Miryam Mckeon  1945    Follow Up Office Visit Note    History of Present Illness:  Miryam Mckeon is a 77 y.o. female who presents to the clinic for Follow-up and Chest Pain. Paroxysmal atrial fibrillation- BP is 125.80, she denies any palpitations but few weeks ago has had chest pain, for 1 minute at resting, she did not feel her heart was racing, her EKG atrial fibrillation, , her cath before the AVR has near normal coronary arteries, likely non cardiac chest pain    The following portions of the patient's history were reviewed and updated as appropriate: allergies, current medications, past family history, past medical history, past social history, past surgical history and problem list.    Medications:  acetaminophen  apixaban tablet  aspirin  atorvastatin  cholecalciferol  dilTIAZem CD  furosemide  latanoprost  metoprolol tartrate  MIRALAX PO  ondansetron  simethicone  vitamin B-12    Subjective  No Known Allergies     Past Medical History:   Diagnosis Date   • Anxiety    • Aortic valve stenosis    • Atrial fibrillation (HCC)    • Borderline diabetes     ???   • Carotid artery stenosis    • CKD (chronic kidney disease)    • Deaf    • GERD (gastroesophageal reflux disease)    • Heart murmur    • Hyperlipidemia    • Hypertension    • Irregular heartbeat    • PONV (postoperative nausea and vomiting)    • Stroke (HCC)    • TIA (transient ischemic attack)        Past Surgical History:   Procedure Laterality Date   • AORTIC VALVE REPAIR/REPLACEMENT N/A 7/20/2022    Procedure: TRANSCATHETER AORTIC VALVE REPLACEMENT with angioplasty and stent to the right common and external iliac artery;  Surgeon: Bola Whitaker MD;  Location: [facility];  Service: Cardiothoracic;  Laterality: N/A;  FL-15 MIN 12 SEC  DOSE- 92.41  CONTRAST- 120 ML ISOVUE    • AORTIC VALVE REPAIR/REPLACEMENT N/A 7/20/2022    Procedure: Transcatheter Aortic Valve Replacement;  Surgeon: Yashira Chow MD;  Location: [facility];  Service: Cardiovascular;  Laterality: N/A;   • CARDIAC CATHETERIZATION      05/20/2022 per dr. chow   • CARDIAC CATHETERIZATION Left 5/20/2022    Procedure: Left Heart Cath;  Surgeon: Yashira Chow MD;  Location: [facility] CATH INVASIVE LOCATION;  Service: Cardiology;  Laterality: Left;   • CARDIAC ELECTROPHYSIOLOGY PROCEDURE N/A 04/13/2022    Procedure: DDD PPM Implant (C), DNS Eliquis;  Surgeon: Troy Hussein DO;  Location: [facility] EP INVASIVE LOCATION;  Service: Cardiology;  Laterality: N/A;   • CAROTID ENDARTERECTOMY Bilateral    • CATARACT EXTRACTION     • CHOLECYSTECTOMY     • COLONOSCOPY     • FEMORAL ARTERY CUTDOWN Right 7/20/2022    Procedure: FEMORAL ARTERY CUTDOWN, ANGIOGRAM;  Surgeon: Bola Whitaker MD;  Location: [facility];  Service: Vascular;  Laterality: Right;  fl-1 m 12 sec  dose- 47.29 mgy  contrast- 50 ml isovue   • KNEE SURGERY Right    • PACEMAKER IMPLANTATION     • JF      05/20/2022 per dr. chow   • TRANSESOPHAGEAL ECHOCARDIOGRAM (JF) N/A 7/20/2022    Procedure: TRANSESOPHAGEAL ECHOCARDIOGRAM PER ANESTHESIA;  Surgeon: Bola Whitaker MD;  Location: [facility];  Service: Cardiothoracic;  Laterality: N/A;       Family History   Problem Relation Age of Onset   • Other Mother         enlarged heart   • Stroke Father         Social History     Socioeconomic History   • Marital status:    • Number of children: 2   • Highest education level: High school graduate   Tobacco Use   • Smoking status: Never   • Smokeless tobacco: Never   Vaping Use   • Vaping Use: Never used   Substance and Sexual Activity   • Alcohol use: Never   • Drug use: Never   • Sexual activity: Defer       Review of Systems   Constitutional: Negative.    HENT: Negative.    Respiratory: Negative.   "  Cardiovascular: Positive for chest pain.   Endocrine: Negative.    Genitourinary: Negative.    Musculoskeletal: Negative.    Skin: Negative.    Allergic/Immunologic: Negative.    Neurological: Negative.    Hematological: Negative.    Psychiatric/Behavioral: Negative.        Cardiovascular Procedures    ECHO/MUGA:   STRESS TESTS:   CARDIAC CATH:   DEVICES:   HOLTER:   CT/MRI:   VASCULAR:   CARDIOTHORACIC:     Objective  Vitals:    12/19/22 1533   BP: 130/68   BP Location: Right arm   Patient Position: Sitting   Cuff Size: Adult   Pulse: 95   Resp: 18   Temp: 96.6 °F (35.9 °C)   TempSrc: Infrared   SpO2: 97%   Weight: 48.1 kg (106 lb)   Height: 154.9 cm (61\")   PainSc: 0-No pain     Body mass index is 20.03 kg/m².     Physical Exam  Constitutional:       Appearance: Healthy appearance. Not in distress.   Neck:      Vascular: No JVR. JVD normal.   Pulmonary:      Effort: Pulmonary effort is normal.      Breath sounds: Normal breath sounds. No wheezing. No rhonchi. No rales.   Chest:      Chest wall: Not tender to palpatation.   Cardiovascular:      PMI at left midclavicular line. Normal rate. Irregularly irregular rhythm. Normal S1. Normal S2.      Murmurs: There is no murmur.      No gallop. No click. No rub.   Pulses:     Intact distal pulses.   Edema:     Peripheral edema absent.   Abdominal:      General: Bowel sounds are normal.      Palpations: Abdomen is soft.      Tenderness: There is no abdominal tenderness.   Musculoskeletal: Normal range of motion.         General: No tenderness. Skin:     General: Skin is warm and dry.   Neurological:      General: No focal deficit present.      Mental Status: Alert and oriented to person, place and time.          Diagnostic Data    ECG 12 Lead    Date/Time: 12/19/2022 4:10 PM  Performed by: Vito Cuba MD  Authorized by: Vito Cuba MD   Comparison: compared with previous ECG from 10/19/2022  Similar to previous ECG  Rhythm: atrial " fibrillation  Rate: tachycardic  BPM: 108  QRS axis: normal    Clinical impression: abnormal EKG            Assessment and Plan  Diagnoses and all orders for this visit:    Aortic stenosis, severe s/p TAVR (7/20/2022)- She seems doing well no SOB,   -     CBC & Differential  -     Comprehensive Metabolic Panel    Permanent atrial fibrillation (HCC)- rate control on Eliquis 2,5 bid, Cardizem 180 mg and also Metoprolol 100 mg bid, , as the Hr is 108. Will increase the Cardizem to 300 mg  -     CBC & Differential  -     Comprehensive Metabolic Panel    Presence of cardiac pacemaker secondary to sick sinus syndrome- this is working well   -     CBC & Differential  -     Comprehensive Metabolic Panel    Primary hypertension- BP is fine 125.80, on Metoprolol and Cardizem     Hyperlipidemia LDL goal <70- - on Lipitor 10 mg    Bilateral carotid artery stenosis- She has 60 to 80% stenosis LICA and less than 40% JOHN, by doppler this year, on medical treatment     Other orders  -     dilTIAZem CD (Cardizem CD) 300 MG 24 hr capsule; Take 1 capsule by mouth Daily.         Return in about 6 months (around 6/19/2023) for Recheck.    Vito Cuba MD  12/19/2022

## 2022-12-20 ENCOUNTER — TELEPHONE (OUTPATIENT)
Dept: CARDIOLOGY | Facility: CLINIC | Age: 77
End: 2022-12-20

## 2022-12-20 LAB
ALBUMIN SERPL-MCNC: 4.3 G/DL (ref 3.7–4.7)
ALBUMIN/GLOB SERPL: 1.9 {RATIO} (ref 1.2–2.2)
ALP SERPL-CCNC: 130 IU/L (ref 44–121)
ALT SERPL-CCNC: 13 IU/L (ref 0–32)
AST SERPL-CCNC: 21 IU/L (ref 0–40)
BASOPHILS # BLD AUTO: 0.1 X10E3/UL (ref 0–0.2)
BASOPHILS NFR BLD AUTO: 1 %
BILIRUB SERPL-MCNC: 0.7 MG/DL (ref 0–1.2)
BUN SERPL-MCNC: 43 MG/DL (ref 8–27)
BUN/CREAT SERPL: 30 (ref 12–28)
CALCIUM SERPL-MCNC: 10.3 MG/DL (ref 8.7–10.3)
CHLORIDE SERPL-SCNC: 103 MMOL/L (ref 96–106)
CO2 SERPL-SCNC: 29 MMOL/L (ref 20–29)
CREAT SERPL-MCNC: 1.43 MG/DL (ref 0.57–1)
EGFRCR SERPLBLD CKD-EPI 2021: 37 ML/MIN/1.73
EOSINOPHIL # BLD AUTO: 0.1 X10E3/UL (ref 0–0.4)
EOSINOPHIL NFR BLD AUTO: 2 %
ERYTHROCYTE [DISTWIDTH] IN BLOOD BY AUTOMATED COUNT: 13 % (ref 11.7–15.4)
GLOBULIN SER CALC-MCNC: 2.3 G/DL (ref 1.5–4.5)
GLUCOSE SERPL-MCNC: 90 MG/DL (ref 70–99)
HCT VFR BLD AUTO: 41 % (ref 34–46.6)
HGB BLD-MCNC: 13.6 G/DL (ref 11.1–15.9)
IMM GRANULOCYTES # BLD AUTO: 0 X10E3/UL (ref 0–0.1)
IMM GRANULOCYTES NFR BLD AUTO: 0 %
LYMPHOCYTES # BLD AUTO: 1.4 X10E3/UL (ref 0.7–3.1)
LYMPHOCYTES NFR BLD AUTO: 19 %
MCH RBC QN AUTO: 31.4 PG (ref 26.6–33)
MCHC RBC AUTO-ENTMCNC: 33.2 G/DL (ref 31.5–35.7)
MCV RBC AUTO: 95 FL (ref 79–97)
MONOCYTES # BLD AUTO: 0.6 X10E3/UL (ref 0.1–0.9)
MONOCYTES NFR BLD AUTO: 8 %
NEUTROPHILS # BLD AUTO: 5.1 X10E3/UL (ref 1.4–7)
NEUTROPHILS NFR BLD AUTO: 70 %
PLATELET # BLD AUTO: 134 X10E3/UL (ref 150–450)
POTASSIUM SERPL-SCNC: 4.4 MMOL/L (ref 3.5–5.2)
PROT SERPL-MCNC: 6.6 G/DL (ref 6–8.5)
RBC # BLD AUTO: 4.33 X10E6/UL (ref 3.77–5.28)
SODIUM SERPL-SCNC: 145 MMOL/L (ref 134–144)
WBC # BLD AUTO: 7.3 X10E3/UL (ref 3.4–10.8)

## 2023-01-01 ENCOUNTER — TELEPHONE (OUTPATIENT)
Dept: NEUROLOGY | Facility: CLINIC | Age: 78
End: 2023-01-01

## 2023-01-11 ENCOUNTER — OFFICE VISIT (OUTPATIENT)
Dept: FAMILY MEDICINE CLINIC | Facility: CLINIC | Age: 78
End: 2023-01-11
Payer: MEDICARE

## 2023-01-11 VITALS
BODY MASS INDEX: 18.44 KG/M2 | RESPIRATION RATE: 15 BRPM | HEIGHT: 64 IN | WEIGHT: 108 LBS | DIASTOLIC BLOOD PRESSURE: 80 MMHG | OXYGEN SATURATION: 98 % | SYSTOLIC BLOOD PRESSURE: 126 MMHG | HEART RATE: 72 BPM | TEMPERATURE: 98.3 F

## 2023-01-11 DIAGNOSIS — R11.0 CHRONIC NAUSEA: ICD-10-CM

## 2023-01-11 DIAGNOSIS — R13.10 SWALLOWING PAINFUL: Primary | ICD-10-CM

## 2023-01-11 DIAGNOSIS — M54.2 ANTERIOR NECK PAIN: ICD-10-CM

## 2023-01-11 PROCEDURE — 99214 OFFICE O/P EST MOD 30 MIN: CPT | Performed by: PHYSICIAN ASSISTANT

## 2023-01-11 PROCEDURE — 36415 COLL VENOUS BLD VENIPUNCTURE: CPT | Performed by: PHYSICIAN ASSISTANT

## 2023-01-11 RX ORDER — ONDANSETRON 4 MG/1
4 TABLET, FILM COATED ORAL EVERY 8 HOURS PRN
Qty: 90 TABLET | Refills: 0 | Status: SHIPPED | OUTPATIENT
Start: 2023-01-11

## 2023-01-11 RX ORDER — TRAMADOL HYDROCHLORIDE 50 MG/1
TABLET ORAL
Qty: 30 TABLET | Refills: 1 | Status: SHIPPED | OUTPATIENT
Start: 2023-01-11

## 2023-01-11 NOTE — PROGRESS NOTES
Patient Office Visit      Patient Name: Miryam Mckeon  : 1945   MRN: [id number]     Chief Complaint:    Chief Complaint   Patient presents with   • Neck Pain       History of Present Illness: Miryam Mckeon is a 77 y.o. female who is here today with her daughter with sudden onset of anterior and lateral neck pain that woke her up out of her sleep yesterday morning.  Her daughter says that it really hurts for her to swallow and she will hold her neck in the front when she has to swallow.  He has painful swallowing with liquids and solids.    Subjective      Review of Systems:         Past Medical History:   Past Medical History:   Diagnosis Date   • Anxiety    • Aortic valve stenosis    • Atrial fibrillation (HCC)    • Borderline diabetes     ???   • Carotid artery stenosis    • CKD (chronic kidney disease)    • Deaf    • GERD (gastroesophageal reflux disease)    • Heart murmur    • Hyperlipidemia    • Hypertension    • Irregular heartbeat    • PONV (postoperative nausea and vomiting)    • Stroke (HCC)    • TIA (transient ischemic attack)        Past Surgical History:   Past Surgical History:   Procedure Laterality Date   • AORTIC VALVE REPAIR/REPLACEMENT N/A 2022    Procedure: TRANSCATHETER AORTIC VALVE REPLACEMENT with angioplasty and stent to the right common and external iliac artery;  Surgeon: Bola Whitaker MD;  Location:  [facility];  Service: Cardiothoracic;  Laterality: N/A;  FL-15 MIN 12 SEC  DOSE- 92.41  CONTRAST- 120 ML ISOVUE   • AORTIC VALVE REPAIR/REPLACEMENT N/A 2022    Procedure: Transcatheter Aortic Valve Replacement;  Surgeon: Yashira Chow MD;  Location:  [facility];  Service: Cardiovascular;  Laterality: N/A;   • CARDIAC CATHETERIZATION      2022 per dr. chow   • CARDIAC CATHETERIZATION Left 2022    Procedure: Left Heart Cath;  Surgeon: Yashira Chow MD;  Location:  [facility] CATH INVASIVE LOCATION;  Service:  "Cardiology;  Laterality: Left;   • CARDIAC ELECTROPHYSIOLOGY PROCEDURE N/A 04/13/2022    Procedure: DDD PPM Implant (BSC), DNS Eliquis;  Surgeon: Troy Hussein DO;  Location: [facility] EP INVASIVE LOCATION;  Service: Cardiology;  Laterality: N/A;   • CAROTID ENDARTERECTOMY Bilateral    • CATARACT EXTRACTION     • CHOLECYSTECTOMY     • COLONOSCOPY     • FEMORAL ARTERY CUTDOWN Right 7/20/2022    Procedure: FEMORAL ARTERY CUTDOWN, ANGIOGRAM;  Surgeon: Bola Whitaker MD;  Location: [facility];  Service: Vascular;  Laterality: Right;  fl-1 m 12 sec  dose- 47.29 mgy  contrast- 50 ml isovue   • KNEE SURGERY Right    • PACEMAKER IMPLANTATION     • JF      05/20/2022 per dr. chow   • TRANSESOPHAGEAL ECHOCARDIOGRAM (JF) N/A 7/20/2022    Procedure: TRANSESOPHAGEAL ECHOCARDIOGRAM PER ANESTHESIA;  Surgeon: Bola Whitaker MD;  Location: [facility];  Service: Cardiothoracic;  Laterality: N/A;       Family History:   Family History   Problem Relation Age of Onset   • Other Mother         enlarged heart   • Stroke Father        Social History:   Social History     Socioeconomic History   • Marital status:    • Number of children: 2   • Highest education level: High school graduate   Tobacco Use   • Smoking status: Never   • Smokeless tobacco: Never   Vaping Use   • Vaping Use: Never used   Substance and Sexual Activity   • Alcohol use: Never   • Drug use: Never   • Sexual activity: Defer       Allergies:   No Known Allergies    Objective     Physical Exam:  Vital Signs:   Vitals:    01/11/23 1457   BP: 126/80   BP Location: Right arm   Patient Position: Sitting   Cuff Size: Large Adult   Pulse: 72   Resp: 15   Temp: 98.3 °F (36.8 °C)   TempSrc: Temporal   SpO2: 98%   Weight: 49 kg (108 lb)   Height: 162.6 cm (64\")     Body mass index is 18.54 kg/m².   BMI is within normal parameters. No other follow-up for BMI required.       Physical Exam  Constitutional:       Appearance: Normal appearance. "   Neck:      Thyroid: Thyroid tenderness present.   Neurological:      Mental Status: She is alert.         Procedures    Assessment / Plan      Assessment/Plan:   Diagnoses and all orders for this visit:    1. Swallowing painful (Primary)  -     TSH  -     T4, Free  -     CBC & Differential  -     Comprehensive Metabolic Panel  -     Cancel: US Thyroid; Future  -     Ambulatory Referral to ENT (Otolaryngology)  -     traMADol (Ultram) 50 MG tablet; 1/2 - 1 po tid prn pain  Dispense: 30 tablet; Refill: 1    2. Anterior neck pain  -     TSH  -     T4, Free  -     CBC & Differential  -     Comprehensive Metabolic Panel  -     Cancel: US Thyroid; Future  -     Ambulatory Referral to ENT (Otolaryngology)  -     traMADol (Ultram) 50 MG tablet; 1/2 - 1 po tid prn pain  Dispense: 30 tablet; Refill: 1    3. Chronic nausea  Assessment & Plan:  Requested refill of Zofran while she was here.    Orders:  -     ondansetron (ZOFRAN) 4 MG tablet; Take 1 tablet by mouth Every 8 (Eight) Hours As Needed for Nausea.  Dispense: 90 tablet; Refill: 0     I also had Dr. Chamberlain examined patient.  He is concerned about the painful swallowing even with liquids.  He recommend check thyroid function, get an urgent thyroid ultrasound, refer to ENT and prescribe some tramadol as needed for pain. Controlled substance prescribed today. Informed consent and treatment agreement signed. Educational materials provided and discussed on appropriate use, disposal and storage of medications. Her JOYA was appropriate. Written information regarding appropriate use, storage and disposal of medication was given and discussed. Point of care UDS was not performed today.     She is not able to take NSAIDs because of her kidney function.  Differential diagnosis would be acute thyroiditis versus some kind of lesion in her throat causing the pain.    Medications:     Current Outpatient Medications:   •  acetaminophen (TYLENOL) 650 MG 8 hr tablet, Take 650 mg by  mouth Every 8 (Eight) Hours As Needed for Mild Pain ., Disp: , Rfl:   •  apixaban (ELIQUIS) 2.5 MG tablet tablet, Take 1 tablet by mouth Every 12 (Twelve) Hours. DO NOT RESUME TAKING UNTIL 7/24, Disp: 180 tablet, Rfl: 1  •  aspirin 81 MG chewable tablet, Chew 81 mg Daily., Disp: , Rfl:   •  atorvastatin (LIPITOR) 10 MG tablet, Take 10 mg by mouth Every Night., Disp: , Rfl:   •  cholecalciferol (VITAMIN D3) 25 MCG (1000 UT) tablet, Take 1,000 Units by mouth Daily., Disp: , Rfl:   •  dilTIAZem CD (Cardizem CD) 300 MG 24 hr capsule, Take 1 capsule by mouth Daily., Disp: 90 capsule, Rfl: 3  •  furosemide (LASIX) 20 MG tablet, TAKE 1 TABLET BY MOUTH DAILY, Disp: 90 tablet, Rfl: 0  •  latanoprost (XALATAN) 0.005 % ophthalmic solution, INSTILL 1 DROP IN BOTH EYES EVERY NIGHT AT BEDTIME, Disp: , Rfl:   •  metoprolol tartrate (LOPRESSOR) 100 MG tablet, Take 1 tablet by mouth 2 (Two) Times a Day. For afib, Disp: 180 tablet, Rfl: 3  •  ondansetron (ZOFRAN) 4 MG tablet, Take 1 tablet by mouth Every 8 (Eight) Hours As Needed for Nausea., Disp: 90 tablet, Rfl: 0  •  Polyethylene Glycol 3350 (MIRALAX PO), Take  by mouth., Disp: , Rfl:   •  simethicone (MYLICON) 125 MG chewable tablet, Chew 125 mg Every 6 (Six) Hours As Needed for Flatulence. PRN, Disp: , Rfl:   •  vitamin B-12 (CYANOCOBALAMIN) 1000 MCG tablet, Take 5,000 mcg by mouth Daily., Disp: , Rfl:   •  traMADol (Ultram) 50 MG tablet, 1/2 - 1 po tid prn pain, Disp: 30 tablet, Rfl: 1        Follow Up:   No follow-ups on file.    Vanessa Vaughan PA-C   [facility]

## 2023-01-12 LAB
ALBUMIN SERPL-MCNC: 4.1 G/DL (ref 3.7–4.7)
ALBUMIN/GLOB SERPL: 1.6 {RATIO} (ref 1.2–2.2)
ALP SERPL-CCNC: 157 IU/L (ref 44–121)
ALT SERPL-CCNC: 35 IU/L (ref 0–32)
AST SERPL-CCNC: 20 IU/L (ref 0–40)
BASOPHILS # BLD AUTO: 0.1 X10E3/UL (ref 0–0.2)
BASOPHILS NFR BLD AUTO: 1 %
BILIRUB SERPL-MCNC: 1 MG/DL (ref 0–1.2)
BUN SERPL-MCNC: 28 MG/DL (ref 8–27)
BUN/CREAT SERPL: 22 (ref 12–28)
CALCIUM SERPL-MCNC: 9.5 MG/DL (ref 8.7–10.3)
CHLORIDE SERPL-SCNC: 98 MMOL/L (ref 96–106)
CO2 SERPL-SCNC: 29 MMOL/L (ref 20–29)
CREAT SERPL-MCNC: 1.25 MG/DL (ref 0.57–1)
EGFRCR SERPLBLD CKD-EPI 2021: 44 ML/MIN/1.73
EOSINOPHIL # BLD AUTO: 0 X10E3/UL (ref 0–0.4)
EOSINOPHIL NFR BLD AUTO: 0 %
ERYTHROCYTE [DISTWIDTH] IN BLOOD BY AUTOMATED COUNT: 12.8 % (ref 11.7–15.4)
GLOBULIN SER CALC-MCNC: 2.6 G/DL (ref 1.5–4.5)
GLUCOSE SERPL-MCNC: 189 MG/DL (ref 70–99)
HCT VFR BLD AUTO: 40.4 % (ref 34–46.6)
HGB BLD-MCNC: 13.5 G/DL (ref 11.1–15.9)
IMM GRANULOCYTES # BLD AUTO: 0 X10E3/UL (ref 0–0.1)
IMM GRANULOCYTES NFR BLD AUTO: 0 %
LYMPHOCYTES # BLD AUTO: 1 X10E3/UL (ref 0.7–3.1)
LYMPHOCYTES NFR BLD AUTO: 11 %
MCH RBC QN AUTO: 32.1 PG (ref 26.6–33)
MCHC RBC AUTO-ENTMCNC: 33.4 G/DL (ref 31.5–35.7)
MCV RBC AUTO: 96 FL (ref 79–97)
MONOCYTES # BLD AUTO: 0.7 X10E3/UL (ref 0.1–0.9)
MONOCYTES NFR BLD AUTO: 8 %
NEUTROPHILS # BLD AUTO: 7.5 X10E3/UL (ref 1.4–7)
NEUTROPHILS NFR BLD AUTO: 80 %
PLATELET # BLD AUTO: 155 X10E3/UL (ref 150–450)
POTASSIUM SERPL-SCNC: 4.3 MMOL/L (ref 3.5–5.2)
PROT SERPL-MCNC: 6.7 G/DL (ref 6–8.5)
RBC # BLD AUTO: 4.21 X10E6/UL (ref 3.77–5.28)
SODIUM SERPL-SCNC: 140 MMOL/L (ref 134–144)
T4 FREE SERPL-MCNC: 1.35 NG/DL (ref 0.82–1.77)
TSH SERPL DL<=0.005 MIU/L-ACNC: 1.35 UIU/ML (ref 0.45–4.5)
WBC # BLD AUTO: 9.4 X10E3/UL (ref 3.4–10.8)

## 2023-01-13 ENCOUNTER — TELEPHONE (OUTPATIENT)
Dept: FAMILY MEDICINE CLINIC | Facility: CLINIC | Age: 78
End: 2023-01-13
Payer: MEDICARE

## 2023-01-17 DIAGNOSIS — M54.2 ANTERIOR NECK PAIN: Primary | ICD-10-CM

## 2023-01-17 DIAGNOSIS — R13.10 SWALLOWING PAINFUL: ICD-10-CM

## 2023-01-17 NOTE — TELEPHONE ENCOUNTER
Caller: Carina Masetrs    Relationship to patient: Emergency Contact    Best call back number: [phone]    Patient is needing: CARINA STATED THAT PATIENT IS STILL HAVING DIFFICULTY SWALLOWING AND THE PAIN MEDICATIONS IS NOT WORKING FOR HER NECK PAIN AND WOULD LIKE TO KNOW WHAT PROVIDER WOULD SUGGEST TO DO     CARINA ALSO WOULD LIKE TO KNOW ABOUT THE ULTRASOUND TO SEE IF THIS IS SCHEDULED FOR PATIENT YET    PLEASE ADVISE      
I spoke with patient daughter I let her know dr padron was seeing her mom Friday at 8am and we are going to do ct instead of an ultra sound and cherry will call her when she gets it scheduled   
General

## 2023-01-18 ENCOUNTER — TELEPHONE (OUTPATIENT)
Dept: FAMILY MEDICINE CLINIC | Facility: CLINIC | Age: 78
End: 2023-01-18
Payer: MEDICARE

## 2023-02-26 PROCEDURE — 93294 REM INTERROG EVL PM/LDLS PM: CPT | Performed by: INTERNAL MEDICINE

## 2023-02-26 PROCEDURE — 93296 REM INTERROG EVL PM/IDS: CPT | Performed by: INTERNAL MEDICINE

## 2023-06-29 PROBLEM — R07.9 CHEST PAIN: Status: RESOLVED | Noted: 2022-07-25 | Resolved: 2023-06-29

## 2023-07-17 PROBLEM — I27.20 PULMONARY HYPERTENSION: Status: ACTIVE | Noted: 2023-07-17

## 2023-07-17 PROBLEM — N30.01 ACUTE CYSTITIS WITH HEMATURIA: Status: RESOLVED | Noted: 2022-08-09 | Resolved: 2023-07-17

## 2023-07-17 PROBLEM — Z79.01 CHRONIC ANTICOAGULATION: Status: ACTIVE | Noted: 2023-07-17

## 2023-07-17 PROBLEM — I48.11 LONGSTANDING PERSISTENT ATRIAL FIBRILLATION: Status: ACTIVE | Noted: 2021-06-17

## 2023-07-17 PROBLEM — I50.42 CHRONIC COMBINED SYSTOLIC AND DIASTOLIC CONGESTIVE HEART FAILURE: Status: ACTIVE | Noted: 2022-05-20

## 2023-07-17 PROBLEM — R73.9 HYPERGLYCEMIA: Status: RESOLVED | Noted: 2022-07-25 | Resolved: 2023-07-17

## 2023-07-24 ENCOUNTER — TELEPHONE (OUTPATIENT)
Dept: CARDIOLOGY | Facility: CLINIC | Age: 78
End: 2023-07-24
Payer: MEDICARE

## 2023-07-24 NOTE — TELEPHONE ENCOUNTER
Spoke with daughter, Mila, and she advised that Ms. Mckeon is about the same on her leg swelling even with the increased dose of Lasix.  She advised she was unsure about the SOA but would ask her next time she sees her.      I went over the echo finding and and advised the following:  Aortic valve- TAVR looked good  Mitral valve is the same and still has moderate leakage  The pressure in her heart is elevated and she would benefit from the farxiga medication, however she refused when offered at last visit.  If she changes her mind we would always be happy to call it in.  Daughter will discuss with her mother.   Dr. Cuba wanted me to discuss a test called PYP, nuclear test, that would check for a condition called, Amyloidosis.  I explained this condition and this test could rule it out.  If she would like to move forth with scheduling we would place an order and make sure insurance approves prior to scheduling.  Daughter advised that her mother does not have carpal tunnel issues or any back issues.  I advised I would pass this information along to Dr. Cuba.

## 2023-08-09 ENCOUNTER — APPOINTMENT (OUTPATIENT)
Dept: CT IMAGING | Facility: HOSPITAL | Age: 78
DRG: 177 | End: 2023-08-09
Payer: MEDICARE

## 2023-08-09 ENCOUNTER — APPOINTMENT (OUTPATIENT)
Dept: GENERAL RADIOLOGY | Facility: HOSPITAL | Age: 78
DRG: 177 | End: 2023-08-09
Payer: MEDICARE

## 2023-08-09 ENCOUNTER — HOSPITAL ENCOUNTER (INPATIENT)
Facility: HOSPITAL | Age: 78
LOS: 10 days | Discharge: NURSING, REHAB OR LONG-TERM CARE | DRG: 177 | End: 2023-08-21
Attending: EMERGENCY MEDICINE | Admitting: FAMILY MEDICINE
Payer: MEDICARE

## 2023-08-09 DIAGNOSIS — G31.84 MCI (MILD COGNITIVE IMPAIRMENT): ICD-10-CM

## 2023-08-09 DIAGNOSIS — R29.6 FREQUENT FALLS: ICD-10-CM

## 2023-08-09 DIAGNOSIS — I50.9 ACUTE ON CHRONIC CONGESTIVE HEART FAILURE, UNSPECIFIED HEART FAILURE TYPE: ICD-10-CM

## 2023-08-09 DIAGNOSIS — U07.1 COVID-19: Primary | ICD-10-CM

## 2023-08-09 DIAGNOSIS — R09.02 HYPOXIA: ICD-10-CM

## 2023-08-09 DIAGNOSIS — R53.1 GENERALIZED WEAKNESS: ICD-10-CM

## 2023-08-09 PROBLEM — E88.09 HYPOALBUMINEMIA: Status: ACTIVE | Noted: 2023-08-09

## 2023-08-09 PROBLEM — R63.0 ANOREXIA: Status: ACTIVE | Noted: 2023-08-09

## 2023-08-09 PROBLEM — I50.21 ACUTE HFREF (HEART FAILURE WITH REDUCED EJECTION FRACTION): Status: ACTIVE | Noted: 2023-08-09

## 2023-08-09 PROBLEM — E87.6 HYPOKALEMIA: Status: ACTIVE | Noted: 2023-08-09

## 2023-08-09 PROBLEM — J90 PLEURAL EFFUSION: Status: ACTIVE | Noted: 2023-08-09

## 2023-08-09 LAB
ALBUMIN SERPL-MCNC: 3.1 G/DL (ref 3.5–5.2)
ALBUMIN/GLOB SERPL: 0.9 G/DL
ALP SERPL-CCNC: 170 U/L (ref 39–117)
ALT SERPL W P-5'-P-CCNC: 27 U/L (ref 1–33)
ANION GAP SERPL CALCULATED.3IONS-SCNC: 14 MMOL/L (ref 5–15)
AST SERPL-CCNC: 39 U/L (ref 1–32)
B PARAPERT DNA SPEC QL NAA+PROBE: NOT DETECTED
B PERT DNA SPEC QL NAA+PROBE: NOT DETECTED
BACTERIA UR QL AUTO: ABNORMAL /HPF
BASOPHILS # BLD AUTO: 0.02 10*3/MM3 (ref 0–0.2)
BASOPHILS NFR BLD AUTO: 0.4 % (ref 0–1.5)
BILIRUB SERPL-MCNC: 1.1 MG/DL (ref 0–1.2)
BILIRUB UR QL STRIP: NEGATIVE
BUN SERPL-MCNC: 31 MG/DL (ref 8–23)
BUN/CREAT SERPL: 20.8 (ref 7–25)
C PNEUM DNA NPH QL NAA+NON-PROBE: NOT DETECTED
CALCIUM SPEC-SCNC: 9.2 MG/DL (ref 8.6–10.5)
CHLORIDE SERPL-SCNC: 92 MMOL/L (ref 98–107)
CLARITY UR: CLEAR
CO2 SERPL-SCNC: 30 MMOL/L (ref 22–29)
COLOR UR: YELLOW
CREAT SERPL-MCNC: 1.49 MG/DL (ref 0.57–1)
CRP SERPL-MCNC: <0.3 MG/DL (ref 0–0.5)
D-LACTATE SERPL-SCNC: 1.1 MMOL/L (ref 0.5–2)
DEPRECATED RDW RBC AUTO: 46.5 FL (ref 37–54)
EGFRCR SERPLBLD CKD-EPI 2021: 35.6 ML/MIN/1.73
EOSINOPHIL # BLD AUTO: 0 10*3/MM3 (ref 0–0.4)
EOSINOPHIL NFR BLD AUTO: 0 % (ref 0.3–6.2)
ERYTHROCYTE [DISTWIDTH] IN BLOOD BY AUTOMATED COUNT: 13.2 % (ref 12.3–15.4)
FLUAV SUBTYP SPEC NAA+PROBE: NOT DETECTED
FLUBV RNA ISLT QL NAA+PROBE: NOT DETECTED
GEN 5 2HR TROPONIN T REFLEX: 29 NG/L
GLOBULIN UR ELPH-MCNC: 3.5 GM/DL
GLUCOSE SERPL-MCNC: 110 MG/DL (ref 65–99)
GLUCOSE UR STRIP-MCNC: NEGATIVE MG/DL
HADV DNA SPEC NAA+PROBE: NOT DETECTED
HCOV 229E RNA SPEC QL NAA+PROBE: NOT DETECTED
HCOV HKU1 RNA SPEC QL NAA+PROBE: NOT DETECTED
HCOV NL63 RNA SPEC QL NAA+PROBE: NOT DETECTED
HCOV OC43 RNA SPEC QL NAA+PROBE: NOT DETECTED
HCT VFR BLD AUTO: 46.1 % (ref 34–46.6)
HGB BLD-MCNC: 14.9 G/DL (ref 12–15.9)
HGB UR QL STRIP.AUTO: ABNORMAL
HMPV RNA NPH QL NAA+NON-PROBE: NOT DETECTED
HPIV1 RNA ISLT QL NAA+PROBE: NOT DETECTED
HPIV2 RNA SPEC QL NAA+PROBE: NOT DETECTED
HPIV3 RNA NPH QL NAA+PROBE: NOT DETECTED
HPIV4 P GENE NPH QL NAA+PROBE: NOT DETECTED
HYALINE CASTS UR QL AUTO: ABNORMAL /LPF
IMM GRANULOCYTES # BLD AUTO: 0.04 10*3/MM3 (ref 0–0.05)
IMM GRANULOCYTES NFR BLD AUTO: 0.8 % (ref 0–0.5)
KETONES UR QL STRIP: NEGATIVE
LDH SERPL-CCNC: 295 U/L (ref 135–214)
LEUKOCYTE ESTERASE UR QL STRIP.AUTO: NEGATIVE
LIPASE SERPL-CCNC: 15 U/L (ref 13–60)
LYMPHOCYTES # BLD AUTO: 0.68 10*3/MM3 (ref 0.7–3.1)
LYMPHOCYTES NFR BLD AUTO: 13 % (ref 19.6–45.3)
M PNEUMO IGG SER IA-ACNC: NOT DETECTED
MAGNESIUM SERPL-MCNC: 1.6 MG/DL (ref 1.6–2.4)
MCH RBC QN AUTO: 31 PG (ref 26.6–33)
MCHC RBC AUTO-ENTMCNC: 32.3 G/DL (ref 31.5–35.7)
MCV RBC AUTO: 96 FL (ref 79–97)
MONOCYTES # BLD AUTO: 0.43 10*3/MM3 (ref 0.1–0.9)
MONOCYTES NFR BLD AUTO: 8.2 % (ref 5–12)
NEUTROPHILS NFR BLD AUTO: 4.06 10*3/MM3 (ref 1.7–7)
NEUTROPHILS NFR BLD AUTO: 77.6 % (ref 42.7–76)
NITRITE UR QL STRIP: NEGATIVE
NRBC BLD AUTO-RTO: 0 /100 WBC (ref 0–0.2)
NT-PROBNP SERPL-MCNC: ABNORMAL PG/ML (ref 0–1800)
PH UR STRIP.AUTO: 6.5 [PH] (ref 5–8)
PLATELET # BLD AUTO: 144 10*3/MM3 (ref 140–450)
PMV BLD AUTO: 10.8 FL (ref 6–12)
POTASSIUM SERPL-SCNC: 3.4 MMOL/L (ref 3.5–5.2)
PROCALCITONIN SERPL-MCNC: 0.13 NG/ML (ref 0–0.25)
PROT SERPL-MCNC: 6.6 G/DL (ref 6–8.5)
PROT UR QL STRIP: ABNORMAL
RBC # BLD AUTO: 4.8 10*6/MM3 (ref 3.77–5.28)
RBC # UR STRIP: ABNORMAL /HPF
REF LAB TEST METHOD: ABNORMAL
RHINOVIRUS RNA SPEC NAA+PROBE: NOT DETECTED
RSV RNA NPH QL NAA+NON-PROBE: NOT DETECTED
SARS-COV-2 RNA NPH QL NAA+NON-PROBE: DETECTED
SODIUM SERPL-SCNC: 136 MMOL/L (ref 136–145)
SP GR UR STRIP: 1.01 (ref 1–1.03)
SQUAMOUS #/AREA URNS HPF: ABNORMAL /HPF
T4 FREE SERPL-MCNC: 1.89 NG/DL (ref 0.93–1.7)
TROPONIN T DELTA: -6 NG/L
TROPONIN T SERPL HS-MCNC: 35 NG/L
TSH SERPL DL<=0.05 MIU/L-ACNC: 6.11 UIU/ML (ref 0.27–4.2)
UROBILINOGEN UR QL STRIP: ABNORMAL
WBC # UR STRIP: ABNORMAL /HPF
WBC NRBC COR # BLD: 5.23 10*3/MM3 (ref 3.4–10.8)

## 2023-08-09 PROCEDURE — 71045 X-RAY EXAM CHEST 1 VIEW: CPT

## 2023-08-09 PROCEDURE — 84439 ASSAY OF FREE THYROXINE: CPT | Performed by: INTERNAL MEDICINE

## 2023-08-09 PROCEDURE — 73610 X-RAY EXAM OF ANKLE: CPT

## 2023-08-09 PROCEDURE — 86140 C-REACTIVE PROTEIN: CPT | Performed by: INTERNAL MEDICINE

## 2023-08-09 PROCEDURE — 0202U NFCT DS 22 TRGT SARS-COV-2: CPT | Performed by: EMERGENCY MEDICINE

## 2023-08-09 PROCEDURE — 84145 PROCALCITONIN (PCT): CPT | Performed by: INTERNAL MEDICINE

## 2023-08-09 PROCEDURE — 83605 ASSAY OF LACTIC ACID: CPT | Performed by: EMERGENCY MEDICINE

## 2023-08-09 PROCEDURE — 99223 1ST HOSP IP/OBS HIGH 75: CPT | Performed by: INTERNAL MEDICINE

## 2023-08-09 PROCEDURE — 83735 ASSAY OF MAGNESIUM: CPT | Performed by: INTERNAL MEDICINE

## 2023-08-09 PROCEDURE — 25010000002 DEXAMETHASONE PER 1 MG: Performed by: INTERNAL MEDICINE

## 2023-08-09 PROCEDURE — 82728 ASSAY OF FERRITIN: CPT | Performed by: INTERNAL MEDICINE

## 2023-08-09 PROCEDURE — 36415 COLL VENOUS BLD VENIPUNCTURE: CPT

## 2023-08-09 PROCEDURE — 73030 X-RAY EXAM OF SHOULDER: CPT

## 2023-08-09 PROCEDURE — 84484 ASSAY OF TROPONIN QUANT: CPT | Performed by: EMERGENCY MEDICINE

## 2023-08-09 PROCEDURE — 84443 ASSAY THYROID STIM HORMONE: CPT | Performed by: INTERNAL MEDICINE

## 2023-08-09 PROCEDURE — 80053 COMPREHEN METABOLIC PANEL: CPT | Performed by: EMERGENCY MEDICINE

## 2023-08-09 PROCEDURE — 73070 X-RAY EXAM OF ELBOW: CPT

## 2023-08-09 PROCEDURE — P9612 CATHETERIZE FOR URINE SPEC: HCPCS

## 2023-08-09 PROCEDURE — 83690 ASSAY OF LIPASE: CPT | Performed by: EMERGENCY MEDICINE

## 2023-08-09 PROCEDURE — 85025 COMPLETE CBC W/AUTO DIFF WBC: CPT | Performed by: EMERGENCY MEDICINE

## 2023-08-09 PROCEDURE — 81001 URINALYSIS AUTO W/SCOPE: CPT | Performed by: EMERGENCY MEDICINE

## 2023-08-09 PROCEDURE — 83880 ASSAY OF NATRIURETIC PEPTIDE: CPT | Performed by: EMERGENCY MEDICINE

## 2023-08-09 PROCEDURE — 70450 CT HEAD/BRAIN W/O DYE: CPT

## 2023-08-09 PROCEDURE — G0378 HOSPITAL OBSERVATION PER HR: HCPCS

## 2023-08-09 PROCEDURE — 85379 FIBRIN DEGRADATION QUANT: CPT | Performed by: INTERNAL MEDICINE

## 2023-08-09 PROCEDURE — 25010000002 FUROSEMIDE PER 20 MG: Performed by: INTERNAL MEDICINE

## 2023-08-09 PROCEDURE — 93005 ELECTROCARDIOGRAM TRACING: CPT | Performed by: EMERGENCY MEDICINE

## 2023-08-09 PROCEDURE — 73630 X-RAY EXAM OF FOOT: CPT

## 2023-08-09 PROCEDURE — 83615 LACTATE (LD) (LDH) ENZYME: CPT | Performed by: INTERNAL MEDICINE

## 2023-08-09 PROCEDURE — 99285 EMERGENCY DEPT VISIT HI MDM: CPT

## 2023-08-09 RX ORDER — MELATONIN
1000 DAILY
Status: DISCONTINUED | OUTPATIENT
Start: 2023-08-10 | End: 2023-08-21 | Stop reason: HOSPADM

## 2023-08-09 RX ORDER — ACETAMINOPHEN 325 MG/1
650 TABLET ORAL EVERY 6 HOURS PRN
Status: DISCONTINUED | OUTPATIENT
Start: 2023-08-09 | End: 2023-08-21 | Stop reason: HOSPADM

## 2023-08-09 RX ORDER — SIMETHICONE 125 MG
125 TABLET,CHEWABLE ORAL EVERY 6 HOURS PRN
Status: DISCONTINUED | OUTPATIENT
Start: 2023-08-09 | End: 2023-08-21 | Stop reason: HOSPADM

## 2023-08-09 RX ORDER — LANOLIN ALCOHOL/MO/W.PET/CERES
1000 CREAM (GRAM) TOPICAL DAILY
Status: DISCONTINUED | OUTPATIENT
Start: 2023-08-10 | End: 2023-08-21 | Stop reason: HOSPADM

## 2023-08-09 RX ORDER — DEXAMETHASONE SODIUM PHOSPHATE 4 MG/ML
6 INJECTION, SOLUTION INTRA-ARTICULAR; INTRALESIONAL; INTRAMUSCULAR; INTRAVENOUS; SOFT TISSUE DAILY
Status: DISCONTINUED | OUTPATIENT
Start: 2023-08-11 | End: 2023-08-11

## 2023-08-09 RX ORDER — BISACODYL 10 MG
10 SUPPOSITORY, RECTAL RECTAL DAILY PRN
Status: DISCONTINUED | OUTPATIENT
Start: 2023-08-09 | End: 2023-08-21 | Stop reason: HOSPADM

## 2023-08-09 RX ORDER — FUROSEMIDE 10 MG/ML
40 INJECTION INTRAMUSCULAR; INTRAVENOUS ONCE
Status: COMPLETED | OUTPATIENT
Start: 2023-08-09 | End: 2023-08-09

## 2023-08-09 RX ORDER — SODIUM CHLORIDE 0.9 % (FLUSH) 0.9 %
10 SYRINGE (ML) INJECTION AS NEEDED
Status: DISCONTINUED | OUTPATIENT
Start: 2023-08-09 | End: 2023-08-21 | Stop reason: HOSPADM

## 2023-08-09 RX ORDER — ALBUTEROL SULFATE 90 UG/1
2 AEROSOL, METERED RESPIRATORY (INHALATION)
Status: DISCONTINUED | OUTPATIENT
Start: 2023-08-10 | End: 2023-08-13

## 2023-08-09 RX ORDER — POLYETHYLENE GLYCOL 3350 17 G/17G
17 POWDER, FOR SOLUTION ORAL DAILY PRN
Status: DISCONTINUED | OUTPATIENT
Start: 2023-08-09 | End: 2023-08-21 | Stop reason: HOSPADM

## 2023-08-09 RX ORDER — DEXAMETHASONE SODIUM PHOSPHATE 4 MG/ML
4 INJECTION, SOLUTION INTRA-ARTICULAR; INTRALESIONAL; INTRAMUSCULAR; INTRAVENOUS; SOFT TISSUE DAILY
Status: DISCONTINUED | OUTPATIENT
Start: 2023-08-09 | End: 2023-08-09

## 2023-08-09 RX ORDER — BISACODYL 5 MG/1
5 TABLET, DELAYED RELEASE ORAL DAILY PRN
Status: DISCONTINUED | OUTPATIENT
Start: 2023-08-09 | End: 2023-08-21 | Stop reason: HOSPADM

## 2023-08-09 RX ORDER — LATANOPROST 50 UG/ML
1 SOLUTION/ DROPS OPHTHALMIC NIGHTLY
Status: DISCONTINUED | OUTPATIENT
Start: 2023-08-09 | End: 2023-08-21 | Stop reason: HOSPADM

## 2023-08-09 RX ORDER — ATORVASTATIN CALCIUM 10 MG/1
10 TABLET, FILM COATED ORAL NIGHTLY
Status: DISCONTINUED | OUTPATIENT
Start: 2023-08-09 | End: 2023-08-21 | Stop reason: HOSPADM

## 2023-08-09 RX ORDER — AMIODARONE HYDROCHLORIDE 200 MG/1
200 TABLET ORAL EVERY 12 HOURS SCHEDULED
Status: DISCONTINUED | OUTPATIENT
Start: 2023-08-09 | End: 2023-08-21 | Stop reason: HOSPADM

## 2023-08-09 RX ORDER — SODIUM CHLORIDE 9 MG/ML
40 INJECTION, SOLUTION INTRAVENOUS AS NEEDED
Status: DISCONTINUED | OUTPATIENT
Start: 2023-08-09 | End: 2023-08-21 | Stop reason: HOSPADM

## 2023-08-09 RX ORDER — ONDANSETRON 2 MG/ML
4 INJECTION INTRAMUSCULAR; INTRAVENOUS EVERY 6 HOURS PRN
Status: DISCONTINUED | OUTPATIENT
Start: 2023-08-09 | End: 2023-08-21 | Stop reason: HOSPADM

## 2023-08-09 RX ORDER — ASPIRIN 81 MG/1
81 TABLET, CHEWABLE ORAL DAILY
Status: DISCONTINUED | OUTPATIENT
Start: 2023-08-10 | End: 2023-08-21 | Stop reason: HOSPADM

## 2023-08-09 RX ORDER — ONDANSETRON 4 MG/1
4 TABLET, FILM COATED ORAL EVERY 6 HOURS PRN
Status: DISCONTINUED | OUTPATIENT
Start: 2023-08-09 | End: 2023-08-21 | Stop reason: HOSPADM

## 2023-08-09 RX ORDER — METOPROLOL TARTRATE 100 MG/1
100 TABLET ORAL 2 TIMES DAILY
Status: COMPLETED | OUTPATIENT
Start: 2023-08-09 | End: 2023-08-11

## 2023-08-09 RX ORDER — AMOXICILLIN 250 MG
2 CAPSULE ORAL 2 TIMES DAILY
Status: DISCONTINUED | OUTPATIENT
Start: 2023-08-09 | End: 2023-08-21 | Stop reason: HOSPADM

## 2023-08-09 RX ORDER — SODIUM CHLORIDE 0.9 % (FLUSH) 0.9 %
10 SYRINGE (ML) INJECTION EVERY 12 HOURS SCHEDULED
Status: DISCONTINUED | OUTPATIENT
Start: 2023-08-09 | End: 2023-08-21 | Stop reason: HOSPADM

## 2023-08-09 RX ADMIN — AMIODARONE HYDROCHLORIDE 200 MG: 200 TABLET ORAL at 23:01

## 2023-08-09 RX ADMIN — APIXABAN 2.5 MG: 2.5 TABLET, FILM COATED ORAL at 23:01

## 2023-08-09 RX ADMIN — DEXAMETHASONE SODIUM PHOSPHATE 4 MG: 4 INJECTION, SOLUTION INTRAMUSCULAR; INTRAVENOUS at 22:50

## 2023-08-09 RX ADMIN — SENNOSIDES AND DOCUSATE SODIUM 2 TABLET: 50; 8.6 TABLET ORAL at 23:01

## 2023-08-09 RX ADMIN — METOPROLOL TARTRATE 100 MG: 100 TABLET, FILM COATED ORAL at 23:01

## 2023-08-09 RX ADMIN — ATORVASTATIN CALCIUM 10 MG: 10 TABLET, FILM COATED ORAL at 23:01

## 2023-08-09 RX ADMIN — Medication 10 ML: at 23:02

## 2023-08-09 RX ADMIN — FUROSEMIDE 40 MG: 10 INJECTION, SOLUTION INTRAMUSCULAR; INTRAVENOUS at 22:50

## 2023-08-09 RX ADMIN — SODIUM CHLORIDE 1000 ML: 9 INJECTION, SOLUTION INTRAVENOUS at 18:13

## 2023-08-09 NOTE — ED PROVIDER NOTES
Subjective   History of Present Illness  77-year-old female who is deaf who was brought in by daughter due to declining overall functional status.  The patient reportedly has had worsened memory and worsening confusion for roughly 1 month.  The patient has now had 2 falls within the last week and a half.  For 2 to 3 weeks now the daughter has noticed that the patient's appetite has been decreased, oral fluid intake has been decreased, and the patient has been losing weight.  She does not have a preceding diagnosis of cancer.  There have been no recent medication adjustments.  No complaint of fever or other infectious symptoms.  The patient denies chest pain, cough, shortness of breath.  No abdominal pain.  No diarrhea or blood in the stool.  No dysuria, frequency, or urgency.  She is awake and alert currently and communicates via sign language with her daughter who acts as an .  She appears to interact and answer all questions appropriately and quickly without any delay.  No other acute complaints.    Review of Systems   Constitutional:  Positive for activity change, appetite change and fatigue. Negative for chills and fever.   HENT:  Negative for congestion, ear pain, postnasal drip, sinus pressure and sore throat.    Eyes:  Negative for pain, redness and visual disturbance.   Respiratory:  Negative for cough, chest tightness and shortness of breath.    Cardiovascular:  Negative for chest pain, palpitations and leg swelling.   Gastrointestinal:  Negative for abdominal pain, anal bleeding, blood in stool, diarrhea, nausea and vomiting.   Endocrine: Negative for polydipsia and polyuria.   Genitourinary:  Negative for difficulty urinating, dysuria, frequency and urgency.   Musculoskeletal:  Negative for arthralgias, back pain and neck pain.   Skin:  Positive for color change and wound. Negative for pallor and rash.   Allergic/Immunologic: Negative for environmental allergies and immunocompromised state.    Neurological:  Negative for dizziness, weakness and headaches.   Hematological:  Negative for adenopathy.   Psychiatric/Behavioral:  Positive for decreased concentration. Negative for confusion, self-injury and suicidal ideas. The patient is not nervous/anxious.    All other systems reviewed and are negative.    Past Medical History:   Diagnosis Date    Anxiety     Aortic valve stenosis     Atrial fibrillation     Borderline diabetes     ???    Carotid artery stenosis     CKD (chronic kidney disease)     Deaf     GERD (gastroesophageal reflux disease)     Heart murmur     Hyperlipidemia     Hypertension     Irregular heartbeat     PONV (postoperative nausea and vomiting)     Stroke     TIA (transient ischemic attack)        No Known Allergies    Past Surgical History:   Procedure Laterality Date    AORTIC VALVE REPAIR/REPLACEMENT N/A 7/20/2022    Procedure: TRANSCATHETER AORTIC VALVE REPLACEMENT with angioplasty and stent to the right common and external iliac artery;  Surgeon: Bola Whitaker MD;  Location: [facility];  Service: Cardiothoracic;  Laterality: N/A;  FL-15 MIN 12 SEC  DOSE- 92.41  CONTRAST- 120 ML ISOVUE    AORTIC VALVE REPAIR/REPLACEMENT N/A 7/20/2022    Procedure: Transcatheter Aortic Valve Replacement;  Surgeon: Yashira Chow MD;  Location: [facility];  Service: Cardiovascular;  Laterality: N/A;    CARDIAC CATHETERIZATION      05/20/2022 per dr. chow    CARDIAC CATHETERIZATION Left 5/20/2022    Procedure: Left Heart Cath;  Surgeon: Yashira Chow MD;  Location: [facility] CATH INVASIVE LOCATION;  Service: Cardiology;  Laterality: Left;    CARDIAC ELECTROPHYSIOLOGY PROCEDURE N/A 04/13/2022    Procedure: DDD PPM Implant (BSC), DNS Eliquis;  Surgeon: Troy Hussein DO;  Location: [facility] EP INVASIVE LOCATION;  Service: Cardiology;  Laterality: N/A;    CAROTID ENDARTERECTOMY Bilateral     CATARACT EXTRACTION      CHOLECYSTECTOMY      COLONOSCOPY      FEMORAL  ARTERY CUTDOWN Right 7/20/2022    Procedure: FEMORAL ARTERY CUTDOWN, ANGIOGRAM;  Surgeon: Bola Whitaker MD;  Location: [facility];  Service: Vascular;  Laterality: Right;  fl-1 m 12 sec  dose- 47.29 mgy  contrast- 50 ml isovue    KNEE SURGERY Right     PACEMAKER IMPLANTATION      JF      05/20/2022 per dr. chow    TRANSESOPHAGEAL ECHOCARDIOGRAM (JF) N/A 7/20/2022    Procedure: TRANSESOPHAGEAL ECHOCARDIOGRAM PER ANESTHESIA;  Surgeon: Bola Whitaker MD;  Location: [facility];  Service: Cardiothoracic;  Laterality: N/A;       Family History   Problem Relation Age of Onset    Other Mother         enlarged heart    Stroke Father        Social History     Socioeconomic History    Marital status:     Number of children: 2    Highest education level: High school graduate   Tobacco Use    Smoking status: Never    Smokeless tobacco: Never   Vaping Use    Vaping Use: Never used   Substance and Sexual Activity    Alcohol use: Never    Drug use: Never    Sexual activity: Defer           Objective   Physical Exam  Vitals and nursing note reviewed.   Constitutional:       General: She is not in acute distress.     Appearance: Normal appearance. She is well-developed. She is not toxic-appearing or diaphoretic.   HENT:      Head: Normocephalic and atraumatic.      Right Ear: External ear normal.      Left Ear: External ear normal.      Nose: Nose normal.   Eyes:      General: Lids are normal.      Pupils: Pupils are equal, round, and reactive to light.   Neck:      Trachea: No tracheal deviation.   Cardiovascular:      Rate and Rhythm: Normal rate and regular rhythm.      Pulses: No decreased pulses.      Heart sounds: Normal heart sounds. No murmur heard.    No friction rub. No gallop.   Pulmonary:      Effort: Pulmonary effort is normal. No respiratory distress.      Breath sounds: Normal breath sounds. No decreased breath sounds, wheezing, rhonchi or rales.   Abdominal:      General:  Bowel sounds are normal.      Palpations: Abdomen is soft.      Tenderness: There is no abdominal tenderness. There is no guarding or rebound.   Musculoskeletal:         General: No deformity. Normal range of motion.      Cervical back: Normal range of motion and neck supple.      Right lower leg: 3+ Pitting Edema present.      Left lower leg: 3+ Pitting Edema present.   Lymphadenopathy:      Cervical: No cervical adenopathy.   Skin:     General: Skin is warm and dry.      Findings: No rash.          Neurological:      Mental Status: She is alert and oriented to person, place, and time.      Cranial Nerves: No cranial nerve deficit.      Sensory: No sensory deficit.   Psychiatric:         Speech: Speech normal.         Behavior: Behavior normal.         Thought Content: Thought content normal.         Judgment: Judgment normal.       Procedures           ED Course                                           Medical Decision Making  Differential diagnosis includes viral infection, urinary tract affection, pneumonia, renal insufficiency, dehydration, electrolyte abnormality, other unspecified etiology.    The patient has a creatinine of 1.49, which is not dramatically different from the patient's previous baseline.    Labs also show elevated BNP to 58,000, but she has a previous significantly elevated BNP greater than 40,000.    Troponin is elevated at 35.    Urine does not show any signs of infection.    CBC shows normal white count, normal H&H.    Viral respiratory panel is positive for COVID-19.    The patient's oxygen saturations were observed down to 89% on room air.    Chest x-ray shows no acute abnormality.    X-rays of the left upper extremity right lower extremity showed no acute fracture or dislocation.    CT scan of the head without contrast shows no acute abnormalities.    I discussed the patient with the hospitalist, Dr. Elkins, who will consult on the patient to determine status of admission.    Problems  Addressed:  Acute on chronic congestive heart failure, unspecified heart failure type: complicated acute illness or injury  COVID-19: complicated acute illness or injury  Frequent falls: complicated acute illness or injury  Generalized weakness: complicated acute illness or injury  Hypoxia: complicated acute illness or injury    Amount and/or Complexity of Data Reviewed  Independent Historian: friend     Details: Daughter provides additional history.  External Data Reviewed: labs, radiology, ECG and notes.  Labs: ordered. Decision-making details documented in ED Course.  Radiology: ordered and independent interpretation performed. Decision-making details documented in ED Course.  ECG/medicine tests: ordered and independent interpretation performed. Decision-making details documented in ED Course.     Details: EKG independently to myself shows A-fib versus a flutter.  The patient has a known history of A-fib and takes Eliquis.    Risk  Prescription drug management.  Decision regarding hospitalization.        Final diagnoses:   COVID-19   Hypoxia   Frequent falls   Generalized weakness   Acute on chronic congestive heart failure, unspecified heart failure type       ED Disposition  ED Disposition       ED Disposition   Decision to Admit    Condition   --    Comment   Level of Care: Telemetry [5]   Diagnosis: Anorexia [783.0.ICD-9-CM]   Admitting Physician: TERRENCE LANDON [[id number]]   Attending Physician: TERRENCE LANDON [[id number]]   Bed Request Comments: tele                 No follow-up provider specified.       Medication List      No changes were made to your prescriptions during this visit.            Jose Valencia MD  08/09/23 [id number]

## 2023-08-09 NOTE — Clinical Note
Level of Care: Telemetry [5]   Diagnosis: Anorexia [783.0.ICD-9-CM]   Admitting Physician: TERRENCE LANDON [[id number]]   Attending Physician: TERRENCE LANDON [[id number]]   Bed Request Comments: tele

## 2023-08-10 ENCOUNTER — APPOINTMENT (OUTPATIENT)
Dept: CARDIOLOGY | Facility: HOSPITAL | Age: 78
DRG: 177 | End: 2023-08-10
Payer: MEDICARE

## 2023-08-10 ENCOUNTER — APPOINTMENT (OUTPATIENT)
Dept: CT IMAGING | Facility: HOSPITAL | Age: 78
DRG: 177 | End: 2023-08-10
Payer: MEDICARE

## 2023-08-10 LAB
ALBUMIN SERPL-MCNC: 2.9 G/DL (ref 3.5–5.2)
ALBUMIN/GLOB SERPL: 1.3 G/DL
ALP SERPL-CCNC: 144 U/L (ref 39–117)
ALT SERPL W P-5'-P-CCNC: 25 U/L (ref 1–33)
ANION GAP SERPL CALCULATED.3IONS-SCNC: 15 MMOL/L (ref 5–15)
AST SERPL-CCNC: 34 U/L (ref 1–32)
BASOPHILS # BLD AUTO: 0.02 10*3/MM3 (ref 0–0.2)
BASOPHILS NFR BLD AUTO: 0.4 % (ref 0–1.5)
BILIRUB SERPL-MCNC: 0.9 MG/DL (ref 0–1.2)
BUN SERPL-MCNC: 28 MG/DL (ref 8–23)
BUN/CREAT SERPL: 19.3 (ref 7–25)
CALCIUM SPEC-SCNC: 8.3 MG/DL (ref 8.6–10.5)
CHLORIDE SERPL-SCNC: 92 MMOL/L (ref 98–107)
CHOLEST SERPL-MCNC: 93 MG/DL (ref 0–200)
CO2 SERPL-SCNC: 30 MMOL/L (ref 22–29)
CREAT SERPL-MCNC: 1.45 MG/DL (ref 0.57–1)
D DIMER PPP FEU-MCNC: 0.88 MCGFEU/ML (ref 0–0.79)
DEPRECATED RDW RBC AUTO: 44.8 FL (ref 37–54)
EGFRCR SERPLBLD CKD-EPI 2021: 36.8 ML/MIN/1.73
EOSINOPHIL # BLD AUTO: 0 10*3/MM3 (ref 0–0.4)
EOSINOPHIL NFR BLD AUTO: 0 % (ref 0.3–6.2)
ERYTHROCYTE [DISTWIDTH] IN BLOOD BY AUTOMATED COUNT: 12.9 % (ref 12.3–15.4)
FERRITIN SERPL-MCNC: 496.3 NG/ML (ref 13–150)
GLOBULIN UR ELPH-MCNC: 2.2 GM/DL
GLUCOSE SERPL-MCNC: 104 MG/DL (ref 65–99)
HBA1C MFR BLD: 6.2 % (ref 4.8–5.6)
HCT VFR BLD AUTO: 42.3 % (ref 34–46.6)
HDLC SERPL-MCNC: 37 MG/DL (ref 40–60)
HGB BLD-MCNC: 14 G/DL (ref 12–15.9)
IMM GRANULOCYTES # BLD AUTO: 0.06 10*3/MM3 (ref 0–0.05)
IMM GRANULOCYTES NFR BLD AUTO: 1.1 % (ref 0–0.5)
LDLC SERPL CALC-MCNC: 37 MG/DL (ref 0–100)
LDLC/HDLC SERPL: 0.98 {RATIO}
LYMPHOCYTES # BLD AUTO: 0.47 10*3/MM3 (ref 0.7–3.1)
LYMPHOCYTES NFR BLD AUTO: 8.9 % (ref 19.6–45.3)
MAGNESIUM SERPL-MCNC: 1.5 MG/DL (ref 1.6–2.4)
MCH RBC QN AUTO: 31.4 PG (ref 26.6–33)
MCHC RBC AUTO-ENTMCNC: 33.1 G/DL (ref 31.5–35.7)
MCV RBC AUTO: 94.8 FL (ref 79–97)
MONOCYTES # BLD AUTO: 0.12 10*3/MM3 (ref 0.1–0.9)
MONOCYTES NFR BLD AUTO: 2.3 % (ref 5–12)
NEUTROPHILS NFR BLD AUTO: 4.64 10*3/MM3 (ref 1.7–7)
NEUTROPHILS NFR BLD AUTO: 87.3 % (ref 42.7–76)
NRBC BLD AUTO-RTO: 0 /100 WBC (ref 0–0.2)
PLATELET # BLD AUTO: 124 10*3/MM3 (ref 140–450)
PMV BLD AUTO: 11.5 FL (ref 6–12)
POTASSIUM SERPL-SCNC: 3.1 MMOL/L (ref 3.5–5.2)
PROT SERPL-MCNC: 5.1 G/DL (ref 6–8.5)
QT INTERVAL: 386 MS
QTC INTERVAL: 453 MS
RBC # BLD AUTO: 4.46 10*6/MM3 (ref 3.77–5.28)
SODIUM SERPL-SCNC: 137 MMOL/L (ref 136–145)
TRIGL SERPL-MCNC: 99 MG/DL (ref 0–150)
VLDLC SERPL-MCNC: 19 MG/DL (ref 5–40)
WBC NRBC COR # BLD: 5.31 10*3/MM3 (ref 3.4–10.8)

## 2023-08-10 PROCEDURE — 94761 N-INVAS EAR/PLS OXIMETRY MLT: CPT

## 2023-08-10 PROCEDURE — 99222 1ST HOSP IP/OBS MODERATE 55: CPT | Performed by: INTERNAL MEDICINE

## 2023-08-10 PROCEDURE — 97166 OT EVAL MOD COMPLEX 45 MIN: CPT

## 2023-08-10 PROCEDURE — G0378 HOSPITAL OBSERVATION PER HR: HCPCS

## 2023-08-10 PROCEDURE — 94799 UNLISTED PULMONARY SVC/PX: CPT

## 2023-08-10 PROCEDURE — 93325 DOPPLER ECHO COLOR FLOW MAPG: CPT | Performed by: INTERNAL MEDICINE

## 2023-08-10 PROCEDURE — 97110 THERAPEUTIC EXERCISES: CPT

## 2023-08-10 PROCEDURE — 93308 TTE F-UP OR LMTD: CPT

## 2023-08-10 PROCEDURE — 71250 CT THORAX DX C-: CPT

## 2023-08-10 PROCEDURE — 92610 EVALUATE SWALLOWING FUNCTION: CPT

## 2023-08-10 PROCEDURE — 85025 COMPLETE CBC W/AUTO DIFF WBC: CPT | Performed by: INTERNAL MEDICINE

## 2023-08-10 PROCEDURE — 93308 TTE F-UP OR LMTD: CPT | Performed by: INTERNAL MEDICINE

## 2023-08-10 PROCEDURE — 83735 ASSAY OF MAGNESIUM: CPT | Performed by: INTERNAL MEDICINE

## 2023-08-10 PROCEDURE — 80061 LIPID PANEL: CPT | Performed by: INTERNAL MEDICINE

## 2023-08-10 PROCEDURE — 93325 DOPPLER ECHO COLOR FLOW MAPG: CPT

## 2023-08-10 PROCEDURE — 25010000002 ONDANSETRON PER 1 MG: Performed by: INTERNAL MEDICINE

## 2023-08-10 PROCEDURE — 94640 AIRWAY INHALATION TREATMENT: CPT

## 2023-08-10 PROCEDURE — 80053 COMPREHEN METABOLIC PANEL: CPT | Performed by: INTERNAL MEDICINE

## 2023-08-10 PROCEDURE — 94664 DEMO&/EVAL PT USE INHALER: CPT

## 2023-08-10 PROCEDURE — 83036 HEMOGLOBIN GLYCOSYLATED A1C: CPT | Performed by: INTERNAL MEDICINE

## 2023-08-10 PROCEDURE — 99232 SBSQ HOSP IP/OBS MODERATE 35: CPT | Performed by: FAMILY MEDICINE

## 2023-08-10 PROCEDURE — 97162 PT EVAL MOD COMPLEX 30 MIN: CPT | Performed by: PHYSICAL THERAPIST

## 2023-08-10 PROCEDURE — 25010000002 MAGNESIUM SULFATE IN D5W 1G/100ML (PREMIX) 1-5 GM/100ML-% SOLUTION: Performed by: FAMILY MEDICINE

## 2023-08-10 RX ORDER — MAGNESIUM SULFATE 1 G/100ML
1 INJECTION INTRAVENOUS
Status: COMPLETED | OUTPATIENT
Start: 2023-08-10 | End: 2023-08-10

## 2023-08-10 RX ORDER — POTASSIUM CHLORIDE 750 MG/1
40 CAPSULE, EXTENDED RELEASE ORAL ONCE
Status: COMPLETED | OUTPATIENT
Start: 2023-08-10 | End: 2023-08-10

## 2023-08-10 RX ORDER — LABETALOL HYDROCHLORIDE 5 MG/ML
10 INJECTION, SOLUTION INTRAVENOUS ONCE
Status: COMPLETED | OUTPATIENT
Start: 2023-08-10 | End: 2023-08-10

## 2023-08-10 RX ADMIN — SENNOSIDES AND DOCUSATE SODIUM 2 TABLET: 50; 8.6 TABLET ORAL at 21:48

## 2023-08-10 RX ADMIN — Medication 10 ML: at 21:48

## 2023-08-10 RX ADMIN — ATORVASTATIN CALCIUM 10 MG: 10 TABLET, FILM COATED ORAL at 21:48

## 2023-08-10 RX ADMIN — CYANOCOBALAMIN TAB 1000 MCG 1000 MCG: 1000 TAB at 09:13

## 2023-08-10 RX ADMIN — MAGNESIUM SULFATE HEPTAHYDRATE 1 G: 1 INJECTION, SOLUTION INTRAVENOUS at 09:28

## 2023-08-10 RX ADMIN — ASPIRIN 81 MG: 81 TABLET, CHEWABLE ORAL at 09:13

## 2023-08-10 RX ADMIN — Medication 1000 UNITS: at 09:13

## 2023-08-10 RX ADMIN — ONDANSETRON 4 MG: 2 INJECTION INTRAMUSCULAR; INTRAVENOUS at 09:27

## 2023-08-10 RX ADMIN — AMIODARONE HYDROCHLORIDE 200 MG: 200 TABLET ORAL at 21:47

## 2023-08-10 RX ADMIN — ALBUTEROL SULFATE 2 PUFF: 90 AEROSOL, METERED RESPIRATORY (INHALATION) at 14:27

## 2023-08-10 RX ADMIN — MAGNESIUM SULFATE HEPTAHYDRATE 1 G: 1 INJECTION, SOLUTION INTRAVENOUS at 14:04

## 2023-08-10 RX ADMIN — APIXABAN 2.5 MG: 2.5 TABLET, FILM COATED ORAL at 21:49

## 2023-08-10 RX ADMIN — AMIODARONE HYDROCHLORIDE 200 MG: 200 TABLET ORAL at 09:12

## 2023-08-10 RX ADMIN — METOPROLOL TARTRATE 100 MG: 100 TABLET, FILM COATED ORAL at 09:13

## 2023-08-10 RX ADMIN — SENNOSIDES AND DOCUSATE SODIUM 2 TABLET: 50; 8.6 TABLET ORAL at 09:13

## 2023-08-10 RX ADMIN — ALBUTEROL SULFATE 2 PUFF: 90 AEROSOL, METERED RESPIRATORY (INHALATION) at 17:33

## 2023-08-10 RX ADMIN — POTASSIUM CHLORIDE 40 MEQ: 750 CAPSULE, EXTENDED RELEASE ORAL at 12:31

## 2023-08-10 RX ADMIN — METOPROLOL TARTRATE 100 MG: 100 TABLET, FILM COATED ORAL at 21:48

## 2023-08-10 RX ADMIN — ALBUTEROL SULFATE 2 PUFF: 90 AEROSOL, METERED RESPIRATORY (INHALATION) at 06:56

## 2023-08-10 RX ADMIN — Medication 10 ML: at 09:13

## 2023-08-10 RX ADMIN — MAGNESIUM SULFATE HEPTAHYDRATE 1 G: 1 INJECTION, SOLUTION INTRAVENOUS at 12:31

## 2023-08-10 RX ADMIN — Medication 10 MG: at 00:42

## 2023-08-10 RX ADMIN — APIXABAN 2.5 MG: 2.5 TABLET, FILM COATED ORAL at 09:12

## 2023-08-10 RX ADMIN — DILTIAZEM HYDROCHLORIDE 300 MG: 180 CAPSULE, COATED, EXTENDED RELEASE ORAL at 09:12

## 2023-08-10 NOTE — PLAN OF CARE
Goal Outcome Evaluation:  Plan of Care Reviewed With: patient, daughter      SLP evaluation completed. Will sign-off as no overt clinical s/sxs oropharyngeal dysphagia--suspect chronic esophageal dysphagia (stable per pt). Please see note for further details and recommendations.

## 2023-08-10 NOTE — THERAPY EVALUATION
Patient Name: Miryam Mckeon  : 1945    MRN: [id number]                              Today's Date: 8/10/2023       Admit Date: 2023    Visit Dx:     ICD-10-CM ICD-9-CM   1. COVID-19  U07.1 079.89   2. Hypoxia  R09.02 799.02   3. Frequent falls  R29.6 V15.88   4. Generalized weakness  R53.1 780.79   5. Acute on chronic congestive heart failure, unspecified heart failure type  I50.9 428.0     Patient Active Problem List   Diagnosis    Bilateral carotid artery stenosis    Aortic stenosis, severe s/p TAVR (2022)    Longstanding persistent atrial fibrillation    Sick sinus syndrome    Hyperlipidemia LDL goal <70    Hypertension    Chronic combined systolic and diastolic congestive heart failure    Status post CVA    Presence of cardiac pacemaker secondary to sick sinus syndrome    Chronic kidney disease, stage 3b    Parent refuses immunizations    Chronic nausea    Pulmonary hypertension    Chronic anticoagulation    Acute HFrEF (heart failure with reduced ejection fraction)    Weakness    Hypokalemia    Hypoalbuminemia    COVID-19 virus detected    Hypoxia    Pleural effusion     Past Medical History:   Diagnosis Date    Anxiety     Aortic valve stenosis     Atrial fibrillation     Borderline diabetes     ???    Carotid artery stenosis     CKD (chronic kidney disease)     Deaf     GERD (gastroesophageal reflux disease)     Heart murmur     Hyperlipidemia     Hypertension     Irregular heartbeat     PONV (postoperative nausea and vomiting)     Stroke     TIA (transient ischemic attack)      Past Surgical History:   Procedure Laterality Date    AORTIC VALVE REPAIR/REPLACEMENT N/A 2022    Procedure: TRANSCATHETER AORTIC VALVE REPLACEMENT with angioplasty and stent to the right common and external iliac artery;  Surgeon: Bola Whitaker MD;  Location: [facility];  Service: Cardiothoracic;  Laterality: N/A;  FL-15 MIN 12 SEC  DOSE- 92.41  CONTRAST- 120 ML ISOVUE    AORTIC VALVE  REPAIR/REPLACEMENT N/A 7/20/2022    Procedure: Transcatheter Aortic Valve Replacement;  Surgeon: Yashira Chow MD;  Location: [facility];  Service: Cardiovascular;  Laterality: N/A;    CARDIAC CATHETERIZATION      05/20/2022 per dr. chow    CARDIAC CATHETERIZATION Left 5/20/2022    Procedure: Left Heart Cath;  Surgeon: Yashira Chow MD;  Location: [facility] CATH INVASIVE LOCATION;  Service: Cardiology;  Laterality: Left;    CARDIAC ELECTROPHYSIOLOGY PROCEDURE N/A 04/13/2022    Procedure: DDD PPM Implant (BSC), DNS Eliquis;  Surgeon: Troy Hussein DO;  Location: [facility] EP INVASIVE LOCATION;  Service: Cardiology;  Laterality: N/A;    CAROTID ENDARTERECTOMY Bilateral     CATARACT EXTRACTION      CHOLECYSTECTOMY      COLONOSCOPY      FEMORAL ARTERY CUTDOWN Right 7/20/2022    Procedure: FEMORAL ARTERY CUTDOWN, ANGIOGRAM;  Surgeon: Bola Whitaker MD;  Location: [facility];  Service: Vascular;  Laterality: Right;  fl-1 m 12 sec  dose- 47.29 mgy  contrast- 50 ml isovue    KNEE SURGERY Right     PACEMAKER IMPLANTATION      JF      05/20/2022 per dr. chow    TRANSESOPHAGEAL ECHOCARDIOGRAM (JF) N/A 7/20/2022    Procedure: TRANSESOPHAGEAL ECHOCARDIOGRAM PER ANESTHESIA;  Surgeon: Bola Whitaker MD;  Location: [facility];  Service: Cardiothoracic;  Laterality: N/A;      General Information       Row Name 08/10/23 1442          OT Time and Intention    Document Type evaluation  -MARCUS     Mode of Treatment occupational therapy  -MARCUS       Row Name 08/10/23 1442          General Information    Patient Profile Reviewed yes  -MARCUS     Prior Level of Function independent:;ADL's;bed mobility;transfer;all household mobility  Ambulates using RW at baseline; daughter assists with laundry  -MARCUS     Existing Precautions/Restrictions fall;other (see comments)  Patient is deaf; Contact and airborne (COVID); Pt on RA at baseline  -MARCUS     Barriers to Rehab medically complex;hearing  deficit  -MARCUS       Row Name 08/10/23 1442          Occupational Profile    Environmental Supports and Barriers (Occupational Profile) Lives alone; daughter present during eval providing sign language interpretation; IND with ADL's; daughter assists with laundry d/t it being located in basement; has tub shower with shower chair.  -MARCUS       Row Name 08/10/23 1442          Living Environment    People in Home alone  -MARCUS       Row Name 08/10/23 1442          Home Main Entrance    Number of Stairs, Main Entrance two  -MARCUS     Stair Railings, Main Entrance none  -MARCUS       Row Name 08/10/23 1442          Stairs Within Home, Primary    Stairs, Within Home, Primary Pt has no need to access  -MARCUS     Number of Stairs, Within Home, Primary twelve  -MARCUS       Row Name 08/10/23 1442          Cognition    Orientation Status (Cognition) oriented x 4  -MARCUS       Row Name 08/10/23 1442          Safety Issues, Functional Mobility    Safety Issues Affecting Function (Mobility) insight into deficits/self-awareness;judgment;problem-solving;safety precaution awareness;safety precautions follow-through/compliance;sequencing abilities  -MARCUS     Impairments Affecting Function (Mobility) balance;endurance/activity tolerance;postural/trunk control;strength  -MARCUS               User Key  (r) = Recorded By, (t) = Taken By, (c) = Cosigned By      Initials Name Provider Type    Jannie Franco OT Occupational Therapist                     Mobility/ADL's       Row Name 08/10/23 1604          Bed Mobility    Bed Mobility supine-sit  -MARCUS     Supine-Sit [place] (Bed Mobility) minimum assist (75% patient effort);1 person assist;nonverbal cues (demo/gesture)  -MARCUS     Assistive Device (Bed Mobility) bed rails;head of bed elevated  -MARCUS     Comment, (Bed Mobility) Increased time and effort needed  -MARCUS       Row Name 08/10/23 1604          Transfers    Transfers sit-stand transfer  -MARCUS       Row Name 08/10/23 1604          Sit-Stand Transfer    Sit-Stand  Comfort (Transfers) minimum assist (75% patient effort);nonverbal cues (demo/gesture);1 person assist  -MARCUS     Assistive Device (Sit-Stand Transfers) walker, front-wheeled  -MARCUS       Row Name 08/10/23 1604          Functional Mobility    Functional Mobility- Ind. Level minimum assist (75% patient effort);1 person  -MARCUS     Functional Mobility- Device walker, front-wheeled  -MARCUS     Functional Mobility- Safety Issues step length decreased;supplemental O2  -MARCUS     Functional Mobility- Comment Pt ambulated to bathroom with Renee/FWW  -MARCUS       Row Name 08/10/23 1604          Activities of Daily Living    BADL Assessment/Intervention lower body dressing;grooming;feeding  -       Row Name 08/10/23 1604          Lower Body Dressing Assessment/Training    Comfort Level (Lower Body Dressing) don;socks;dependent (less than 25% patient effort)  -MARCUS     Position (Lower Body Dressing) sitting up in bed  -MARCUS     Comment, (Lower Body Dressing) Limited by weakness  -       Row Name 08/10/23 1604          Grooming Assessment/Training    Comfort Level (Grooming) wash face, hands;set up  -MARCUS     Position (Grooming) supported sitting  -MARCUS     Comment, (Grooming) declined oral care  -       Row Name 08/10/23 1604          Self-Feeding Assessment/Training    Comfort Level (Feeding) prepare tray/open items;scoop food and bring to mouth;liquids to mouth;minimum assist (75% patient effort)  -MARCUS     Position (Self-Feeding) sitting up in bed  -MARCUS     Comment, (Feeding) assist to load fork and spoon  -MARCUS               User Key  (r) = Recorded By, (t) = Taken By, (c) = Cosigned By      Initials Name Provider Type    Jannie Franco OT Occupational Therapist                   Obj/Interventions       Row Name 08/10/23 1607          Sensory Assessment (Somatosensory)    Sensory Assessment (Somatosensory) UE sensation intact  -       Row Name 08/10/23 1607          Vision Assessment/Intervention    Visual  Impairment/Limitations WFL  -MARCUS       Row Name 08/10/23 1607          Range of Motion Comprehensive    General Range of Motion bilateral upper extremity ROM WFL  -MARCUS       Row Name 08/10/23 1607          Strength Comprehensive (MMT)    Comment, General Manual Muscle Testing (MMT) Assessment BUE's grossly 3+/5  -MARCUS       Row Name 08/10/23 1607          Shoulder (Therapeutic Exercise)    Shoulder (Therapeutic Exercise) AROM (active range of motion)  -MARCUS     Shoulder AROM (Therapeutic Exercise) bilateral;flexion;extension;scapular protraction;scapular retraction;sitting;3 sets  -MARCUS       Row Name 08/10/23 1607          Motor Skills    Therapeutic Exercise shoulder  -MARCUS       Row Name 08/10/23 1607          Balance    Balance Assessment sitting static balance;sitting dynamic balance;standing static balance;standing dynamic balance  -MARCUS     Static Sitting Balance standby assist  -MARCUS     Dynamic Sitting Balance contact guard  -MARCUS     Position, Sitting Balance unsupported;sitting edge of bed  -MARCUS     Static Standing Balance minimal assist;1-person assist  -MARCUS     Dynamic Standing Balance minimal assist;1-person assist  -MARCUS     Position/Device Used, Standing Balance supported;walker, front-wheeled  -MARCUS               User Key  (r) = Recorded By, (t) = Taken By, (c) = Cosigned By      Initials Name Provider Type    MARCUS Jannie Blakely OT Occupational Therapist                   Goals/Plan       Row Name 08/10/23 1612          Transfer Goal 1 (OT)    Activity/Assistive Device (Transfer Goal 1, OT) sit-to-stand/stand-to-sit;toilet;walker, rolling  -MARCUS     [place] Level/Cues Needed (Transfer Goal 1, OT) contact guard required  -MARCUS     Time Frame (Transfer Goal 1, OT) long term goal (LTG);by discharge  -MARCUS     Progress/Outcome (Transfer Goal 1, OT) new goal  -MARCUS       Row Name 08/10/23 1612          Toileting Goal 1 (OT)    Activity/Device (Toileting Goal 1, OT) adjust/manage clothing;perform perineal hygiene;commode;grab  bar/safety frame  -MARCUS     [place] Level/Cues Needed (Toileting Goal 1, OT) moderate assist (50-74% patient effort)  -MARCUS     Time Frame (Toileting Goal 1, OT) long term goal (LTG);by discharge  -MARCUS     Progress/Outcome (Toileting Goal 1, OT) new goal  -       Row Name 08/10/23 1612          Self-Feeding Goal 1 (OT)    Activity/Device (Self-Feeding Goal 1, OT) self-feeding skills, all  -MARCUS     [place] Level/Cues Needed (Self-Feeding Goal 1, OT) modified independence  -MARCUS     Time Frame (Self-Feeding Goal 1, OT) long term goal (LTG);by discharge  -MARCUS     Progress/Outcomes (Self-Feeding Goal 1, OT) new goal  -       Row Name 08/10/23 8917          Therapy Assessment/Plan (OT)    Planned Therapy Interventions (OT) activity tolerance training;BADL retraining;functional balance retraining;occupation/activity based interventions;patient/caregiver education/training;ROM/therapeutic exercise;strengthening exercise;transfer/mobility retraining  -               User Key  (r) = Recorded By, (t) = Taken By, (c) = Cosigned By      Initials Name Provider Type    Jannie Franco, OT Occupational Therapist                   Clinical Impression       Row Name 08/10/23 0264          Pain Assessment    Additional Documentation Pain Scale: FACES Pre/Post-Treatment (Group)  -       Row Name 08/10/23 9102          Pain Scale: FACES Pre/Post-Treatment    Pain: FACES Scale, Pretreatment 0-->no hurt  -MARCUS     Posttreatment Pain Rating 0-->no hurt  -       Row Name 08/10/23 8446          Plan of Care Review    Plan of Care Reviewed With patient;daughter  -     Outcome Evaluation OT eval completed. Pt presents with significant weakness, SOA, and balance deficits limiting ADL's. Pt Renee for self-feeding, PICKERING for grooming tasks seated, Dep for donning socks. IP OT services warranted to support return return to PLOF. Recommend SNF at discharge.  -       Row Name 08/10/23 6658          Therapy Assessment/Plan (OT)    Rehab  Potential (OT) good, to achieve stated therapy goals  -MARCUS     Criteria for Skilled Therapeutic Interventions Met (OT) yes;meets criteria;skilled treatment is necessary  -MARCUS     Therapy Frequency (OT) daily  -MARCUS       Row Name 08/10/23 1609          Therapy Plan Review/Discharge Plan (OT)    Anticipated Discharge Disposition (OT) skilled nursing facility  -       Row Name 08/10/23 1609          Vital Signs    Pre SpO2 (%) 94  -MARCUS     O2 Delivery Pre Treatment nasal cannula  -MARCUS     Intra SpO2 (%) 84  -MARCUS     O2 Delivery Intra Treatment room air  -MARCUS     Post SpO2 (%) 93  -MARCUS     O2 Delivery Post Treatment nasal cannula  -MARCUS     Pre Patient Position Supine  -MARCUS     Intra Patient Position Standing  -MARCUS     Post Patient Position Sitting  -MARCUS       Row Name 08/10/23 1609          Positioning and Restraints    Pre-Treatment Position in bed  -MARCUS     Post Treatment Position chair  -MARCUS     In Chair notified nsg;reclined;call light within reach;encouraged to call for assist;exit alarm on;with family/caregiver;waffle cushion;legs elevated  -MARCUS               User Key  (r) = Recorded By, (t) = Taken By, (c) = Cosigned By      Initials Name Provider Type    Jannie Franco, OT Occupational Therapist                   Outcome Measures       Row Name 08/10/23 1614          How much help from another is currently needed...    Putting on and taking off regular lower body clothing? 1  -MARCUS     Bathing (including washing, rinsing, and drying) 2  -MARCUS     Toileting (which includes using toilet bed pan or urinal) 1  -MARCUS     Putting on and taking off regular upper body clothing 3  -MARCUS     Taking care of personal grooming (such as brushing teeth) 3  -MARCUS     Eating meals 3  -MARCUS     AM-PAC 6 Clicks Score (OT) 13  -MARCUS       Row Name 08/10/23 1500 08/10/23 0712       How much help from another person do you currently need...    Turning from your back to your side while in flat bed without using bedrails? 3  -LM 2  -MG    Moving from lying on  back to sitting on the side of a flat bed without bedrails? 2  -LM 2  -MG    Moving to and from a bed to a chair (including a wheelchair)? 3  -LM 2  -MG    Standing up from a chair using your arms (e.g., wheelchair, bedside chair)? 3  -LM 2  -MG    Climbing 3-5 steps with a railing? 2  -LM 2  -MG    To walk in hospital room? 3  -LM 2  -MG    AM-PAC 6 Clicks Score (PT) 16  -LM 12  -MG    Highest level of mobility 5 --> Static standing  -LM 4 --> Transferred to chair/commode  -MG      Row Name 08/10/23 1614 08/10/23 1500       Functional Assessment    Outcome Measure Options AM-PAC 6 Clicks Daily Activity (OT)  -MARCUS AM-PAC 6 Clicks Basic Mobility (PT)  -LM              User Key  (r) = Recorded By, (t) = Taken By, (c) = Cosigned By      Initials Name Provider Type    LM Maryrui Carson, PT Physical Therapist    Christal Woodson, RN Registered Nurse    Jannie Franco, OT Occupational Therapist                    Occupational Therapy Education       Title: PT OT SLP Therapies (In Progress)       Topic: Occupational Therapy (In Progress)       Point: ADL training (Done)       Description:   Instruct learner(s) on proper safety adaptation and remediation techniques during self care or transfers.   Instruct in proper use of assistive devices.                  Learning Progress Summary             Patient Acceptance, E,TB,D, DU,NR by MARCUS at 8/10/2023 1614    Comment: OT POC; UE pulmonary HEP; incentive spirometer use/goal setting; discharge planning   Family Acceptance, E,TB,D, DU,NR by MARCUS at 8/10/2023 1614    Comment: OT POC; UE pulmonary HEP; incentive spirometer use/goal setting; discharge planning                         Point: Home exercise program (Done)       Description:   Instruct learner(s) on appropriate technique for monitoring, assisting and/or progressing therapeutic exercises/activities.                  Learning Progress Summary             Patient Acceptance, E,TB,D, DU,NR by MARCUS at 8/10/2023 1614     Comment: OT POC; UE pulmonary HEP; incentive spirometer use/goal setting; discharge planning   Family Acceptance, E,TB,D, DU,NR by MARCUS at 8/10/2023 1614    Comment: OT POC; UE pulmonary HEP; incentive spirometer use/goal setting; discharge planning                         Point: Precautions (Done)       Description:   Instruct learner(s) on prescribed precautions during self-care and functional transfers.                  Learning Progress Summary             Patient Acceptance, E,TB,D, DU,NR by MARCUS at 8/10/2023 1614    Comment: OT POC; UE pulmonary HEP; incentive spirometer use/goal setting; discharge planning   Family Acceptance, E,TB,D, DU,NR by MARCUS at 8/10/2023 1614    Comment: OT POC; UE pulmonary HEP; incentive spirometer use/goal setting; discharge planning                         Point: Body mechanics (Not Started)       Description:   Instruct learner(s) on proper positioning and spine alignment during self-care, functional mobility activities and/or exercises.                  Learner Progress:  Not documented in this visit.                              User Key       Initials Effective Dates Name Provider Type Discipline     06/16/21 -  Jannie Blakely OT Occupational Therapist OT                  OT Recommendation and Plan  Planned Therapy Interventions (OT): activity tolerance training, BADL retraining, functional balance retraining, occupation/activity based interventions, patient/caregiver education/training, ROM/therapeutic exercise, strengthening exercise, transfer/mobility retraining  Therapy Frequency (OT): daily  Plan of Care Review  Plan of Care Reviewed With: patient, daughter  Outcome Evaluation: OT eval completed. Pt presents with significant weakness, SOA, and balance deficits limiting ADL's. Pt Renee for self-feeding, PICKERING for grooming tasks seated, Dep for donning socks. IP OT services warranted to support return return to PLOF. Recommend SNF at discharge.     Time Calculation:   Evaluation  Complexity (OT)  Review Occupational Profile/Medical/Therapy History Complexity: expanded/moderate complexity  Assessment, Occupational Performance/Identification of Deficit Complexity: 3-5 performance deficits  Clinical Decision Making Complexity (OT): detailed assessment/moderate complexity  Overall Complexity of Evaluation (OT): moderate complexity     Time Calculation- OT       Row Name 08/10/23 1405             Time Calculation- OT    OT Start Time 1405  -MARCUS      OT Received On 08/10/23  -MARCUS      OT Goal Re-Cert Due Date 08/20/23  -MARCUS         Timed Charges    87263 - OT Therapeutic Exercise Minutes 10  -MARCUS         Untimed Charges    OT Eval/Re-eval Minutes 50  -MARCUS         Total Minutes    Timed Charges Total Minutes 10  -MARCUS      Untimed Charges Total Minutes 50  -MARCUS       Total Minutes 60  -MARCUS                User Key  (r) = Recorded By, (t) = Taken By, (c) = Cosigned By      Initials Name Provider Type    Jannie Franco OT Occupational Therapist                  Therapy Charges for Today       Code Description Service Date Service Provider Modifiers Qty    [id number]  OT THER PROC EA 15 MIN 8/10/2023 Jannie Blakely OT GO 1    [id number]  OT EVAL MOD COMPLEXITY 4 8/10/2023 Jannie Blakely OT GO 1                 Jannie Blakely OT  8/10/2023

## 2023-08-10 NOTE — H&P
[facility] Medicine Services  HISTORY AND PHYSICAL    Patient Name: Miryam Mckeon  : 1945  MRN: [id number]  Primary Care Physician: Rigoberto Chamberlain MD  Date of admission: 2023      Subjective   Subjective     Chief Complaint:  Weakness    HPI:  Miryam Mckeon is a 77 y.o. female with a PMH significant for atrial fibrillation on Eliquis, aortic valve stenosis s/p TAVR, CKD stage III, deaf, HLD, HTN, history of stroke who comes to the ED due to weakness.  Daughter at bedside assist with HPI/signing.  Patient has had progressive weakness, nausea, poor appetite ongoing for many months.  Over the past 2 to 3 weeks symptoms have become more severe.  Patient lives alone, she has had several falls at home.  She reports occasional orthopnea, increased bilateral lower extremity edema and dyspnea on exertion.  She denies fever, chills, malaise, headache, vomiting, diarrhea, new change of taste or smell, chest pain or sick contacts.      Review of Systems   Constitutional:  Positive for activity change and appetite change. Negative for fever.   HENT: Negative.     Eyes: Negative.    Respiratory: Negative.  Positive for shortness of breath.    Cardiovascular:  Positive for leg swelling. Negative for chest pain.   Gastrointestinal: Negative.  Positive for nausea.   Endocrine: Negative.    Genitourinary: Negative.    Musculoskeletal: Negative.  Positive for gait problem.   Skin: Negative.    Allergic/Immunologic: Negative.    Neurological: Negative.  Positive for weakness.   Hematological: Negative.    Psychiatric/Behavioral: Negative.          Personal History     Past Medical History:   Diagnosis Date    Anxiety     Aortic valve stenosis     Atrial fibrillation     Borderline diabetes     ???    Carotid artery stenosis     CKD (chronic kidney disease)     Deaf     GERD (gastroesophageal reflux disease)     Heart murmur     Hyperlipidemia     Hypertension      Irregular heartbeat     PONV (postoperative nausea and vomiting)     Stroke     TIA (transient ischemic attack)          Past Surgical History:   Procedure Laterality Date    AORTIC VALVE REPAIR/REPLACEMENT N/A 7/20/2022    Procedure: TRANSCATHETER AORTIC VALVE REPLACEMENT with angioplasty and stent to the right common and external iliac artery;  Surgeon: Bola Whitaker MD;  Location: [facility];  Service: Cardiothoracic;  Laterality: N/A;  FL-15 MIN 12 SEC  DOSE- 92.41  CONTRAST- 120 ML ISOVUE    AORTIC VALVE REPAIR/REPLACEMENT N/A 7/20/2022    Procedure: Transcatheter Aortic Valve Replacement;  Surgeon: Yashira Chow MD;  Location: [facility];  Service: Cardiovascular;  Laterality: N/A;    CARDIAC CATHETERIZATION      05/20/2022 per dr. chow    CARDIAC CATHETERIZATION Left 5/20/2022    Procedure: Left Heart Cath;  Surgeon: Yashira Chow MD;  Location: [facility] CATH INVASIVE LOCATION;  Service: Cardiology;  Laterality: Left;    CARDIAC ELECTROPHYSIOLOGY PROCEDURE N/A 04/13/2022    Procedure: DDD PPM Implant (BSC), DNS Eliquis;  Surgeon: Troy Hussein DO;  Location: [facility] EP INVASIVE LOCATION;  Service: Cardiology;  Laterality: N/A;    CAROTID ENDARTERECTOMY Bilateral     CATARACT EXTRACTION      CHOLECYSTECTOMY      COLONOSCOPY      FEMORAL ARTERY CUTDOWN Right 7/20/2022    Procedure: FEMORAL ARTERY CUTDOWN, ANGIOGRAM;  Surgeon: Bola Whitaker MD;  Location: [facility];  Service: Vascular;  Laterality: Right;  fl-1 m 12 sec  dose- 47.29 mgy  contrast- 50 ml isovue    KNEE SURGERY Right     PACEMAKER IMPLANTATION      JF      05/20/2022 per dr. chow    TRANSESOPHAGEAL ECHOCARDIOGRAM (JF) N/A 7/20/2022    Procedure: TRANSESOPHAGEAL ECHOCARDIOGRAM PER ANESTHESIA;  Surgeon: Bola Whitaker MD;  Location: [facility];  Service: Cardiothoracic;  Laterality: N/A;       Family History: family history includes Other in her  mother; Stroke in her father.     Social History:  reports that she has never smoked. She has never used smokeless tobacco. She reports that she does not drink alcohol and does not use drugs.  Social History     Social History Narrative    Grew up in [place], [place].  Attended [employer].  Graduated and .  Worked for [employer] office.  Now .  Lives alone in [place].  Daughter checks on her daily and assists with some household chores and medical care.         Medications:  Available home medication information reviewed.  Medications Prior to Admission   Medication Sig Dispense Refill Last Dose    amiodarone (PACERONE) 200 MG tablet Take 1 tablet by mouth 3 (Three) Times a Day. Take 1 tablet 3 times daily for 14 days then 1 tablet twice daily for 14 days then 1 tablet daily 60 tablet 3     apixaban (ELIQUIS) 2.5 MG tablet tablet Take 1 tablet by mouth Every 12 (Twelve) Hours. DO NOT RESUME TAKING UNTIL 7/24 180 tablet 1     aspirin 81 MG chewable tablet Chew 1 tablet Daily.       atorvastatin (LIPITOR) 10 MG tablet Take 1 tablet by mouth Every Night.       cholecalciferol (VITAMIN D3) 25 MCG (1000 UT) tablet Take 1 tablet by mouth Daily.       dilTIAZem CD (Cardizem CD) 300 MG 24 hr capsule Take 1 capsule by mouth Daily. 90 capsule 3     furosemide (LASIX) 40 MG tablet Take 1 tablet by mouth Daily. 90 tablet 3     metoprolol tartrate (LOPRESSOR) 100 MG tablet Take 1 tablet by mouth 2 (Two) Times a Day. 60 tablet 11     ondansetron (ZOFRAN) 4 MG tablet Take 1 tablet by mouth Every 8 (Eight) Hours As Needed for Nausea. 90 tablet 0     vitamin B-12 (CYANOCOBALAMIN) 1000 MCG tablet Take 5 tablets by mouth Daily.       acetaminophen (TYLENOL) 650 MG 8 hr tablet Take 1 tablet by mouth Every 8 (Eight) Hours As Needed for Mild Pain.       latanoprost (XALATAN) 0.005 % ophthalmic solution INSTILL 1 DROP IN BOTH EYES EVERY NIGHT AT BEDTIME       Polyethylene Glycol 3350 (MIRALAX PO)  Take 1 Scoop by mouth As Needed.       simethicone (MYLICON) 125 MG chewable tablet Chew 1 tablet Every 6 (Six) Hours As Needed for Flatulence. PRN          No Known Allergies    Objective   Objective     Vital Signs:   Temp:  [98 øF (36.7 øC)-98.6 øF (37 øC)] 98.6 øF (37 øC)  Heart Rate:  [72-94] 94  Resp:  [16] 16  BP: (155-180)/() 179/110  Flow (L/min):  [2] 2       Physical Exam   Constitutional: Awake, alert  Eyes: PERRLA, sclerae anicteric, no conjunctival injection  HENT: NCAT, mucous membranes moist  Neck: Supple, no thyromegaly, no lymphadenopathy, trachea midline  Respiratory: Bilateral basilar inspiratory crackles, nonlabored respirations   Cardiovascular: RRR, no murmurs, rubs, or gallops, palpable pedal pulses bilaterally  Gastrointestinal: Positive bowel sounds, soft, nontender, nondistended  Musculoskeletal: 2+ bilateral ankle edema, no clubbing or cyanosis to extremities  Psychiatric: Appropriate affect, cooperative  Neurologic: Oriented x 3, strength symmetric in all extremities, Cranial Nerves grossly intact to confrontation, speech clear  Skin: No rashes      Result Review:  I have personally reviewed the results from the time of this admission to 8/9/2023 23:38 EDT and agree with these findings:  [x]  Laboratory list / accordion  [x]  Microbiology  [x]  Radiology  [x]  EKG/Telemetry   []  Cardiology/Vascular   []  Pathology  [x]  Old records      LAB RESULTS:      Lab 08/09/23 2056 08/09/23  1732   WBC  --  5.23   HEMOGLOBIN  --  14.9   HEMATOCRIT  --  46.1   PLATELETS  --  144   NEUTROS ABS  --  4.06   IMMATURE GRANS (ABS)  --  0.04   LYMPHS ABS  --  0.68*   MONOS ABS  --  0.43   EOS ABS  --  0.00   MCV  --  96.0   PROCALCITONIN 0.13  --    LACTATE  --  1.1         Lab 08/09/23 2056 08/09/23  1809   SODIUM  --  136   POTASSIUM  --  3.4*   CHLORIDE  --  92*   CO2  --  30.0*   ANION GAP  --  14.0   BUN  --  31*   CREATININE  --  1.49*   EGFR  --  35.6*   GLUCOSE  --  110*   CALCIUM  --   9.2   MAGNESIUM 1.6  --    TSH 6.110*  --          Lab 08/09/23  1809   TOTAL PROTEIN 6.6   ALBUMIN 3.1*   GLOBULIN 3.5   ALT (SGPT) 27   AST (SGOT) 39*   BILIRUBIN 1.1   ALK PHOS 170*   LIPASE 15         Lab 08/09/23 2056 08/09/23  1809   PROBNP  --  58,397.0*   HSTROP T 29* 35*                 UA          8/16/2022    10:22 8/30/2022    15:12 8/9/2023    18:06   Urinalysis   Squamous Epithelial Cells, UA   0-2    Specific Gravity, UA   1.010    Ketones, UA Trace  Negative  Negative    Blood, UA   Small (1+)    Leukocytes, UA Small (1+)  Negative  Negative    Nitrite, UA   Negative    RBC, UA   7-12    WBC, UA   0-2    Bacteria, UA   None Seen        Microbiology Results (last 10 days)       Procedure Component Value - Date/Time    COVID PRE-OP / PRE-PROCEDURE SCREENING ORDER (NO ISOLATION) - Swab, Nasopharynx [[id number]]  (Abnormal) Collected: 08/09/23 1738    Lab Status: Final result Specimen: Swab from Nasopharynx Updated: 08/09/23 1922    Narrative:      The following orders were created for panel order COVID PRE-OP / PRE-PROCEDURE SCREENING ORDER (NO ISOLATION) - Swab, Nasopharynx.  Procedure                               Abnormality         Status                     ---------                               -----------         ------                     Respiratory Panel PCR w/...[[id number]]  Abnormal            Final result                 Please view results for these tests on the individual orders.    Respiratory Panel PCR w/COVID-19(SARS-CoV-2) [facility]ER/MICHAEL/[facility]/MAD/EVAN In-House, NP Swab in UTM/VTM, 3-4 HR TAT - Swab, Nasopharynx [[id number]]  (Abnormal) Collected: 08/09/23 1738    Lab Status: Final result Specimen: Swab from Nasopharynx Updated: 08/09/23 1922     ADENOVIRUS, PCR Not Detected     Coronavirus 229E Not Detected     Coronavirus HKU1 Not Detected     Coronavirus NL63 Not Detected     Coronavirus OC43 Not Detected     COVID19 Detected     Human Metapneumovirus Not Detected     Human  Rhinovirus/Enterovirus Not Detected     Influenza A PCR Not Detected     Influenza B PCR Not Detected     Parainfluenza Virus 1 Not Detected     Parainfluenza Virus 2 Not Detected     Parainfluenza Virus 3 Not Detected     Parainfluenza Virus 4 Not Detected     RSV, PCR Not Detected     Bordetella pertussis pcr Not Detected     Bordetella parapertussis PCR Not Detected     Chlamydophila pneumoniae PCR Not Detected     Mycoplasma pneumo by PCR Not Detected    Narrative:      In the setting of a positive respiratory panel with a viral infection PLUS a negative procalcitonin without other underlying concern for bacterial infection, consider observing off antibiotics or discontinuation of antibiotics and continue supportive care. If the respiratory panel is positive for atypical bacterial infection (Bordetella pertussis, Chlamydophila pneumoniae, or Mycoplasma pneumoniae), consider antibiotic de-escalation to target atypical bacterial infection.            XR Shoulder 2+ View Left    Result Date: 8/9/2023  XR FOOT 3+ VW RIGHT, XR ANKLE 3+ VW RIGHT, XR SHOULDER 2+ VW LEFT, XR ELBOW 2 VW LEFT Date of Exam: 8/9/2023 5:08 PM EDT Indication: fall/lateral pain Comparison: None available. Findings: The right ankle and foot demonstrate diffuse demineralization, otherwise without evidence of acute fracture or dislocation. Cortical margins are intact and alignment is maintained. Cortical margins of the left shoulder are intact, with some mild arthrosis changes present with acromioclavicular narrowing and sclerosis. Alignment is maintained. Evaluation of the left elbow demonstrates no evidence of fracture, malalignment or significant joint effusion.     Impression: Impression: No acute fracture or dislocation of the imaged right lower extremity and left upper extremity. Electronically Signed: Randell Solomon  8/9/2023 5:59 PM EDT  Workstation ID: [id number]    XR ELBOW 2 VIEW LEFT    Result Date: 8/9/2023  XR FOOT 3+ VW RIGHT, XR  ANKLE 3+ VW RIGHT, XR SHOULDER 2+ VW LEFT, XR ELBOW 2 VW LEFT Date of Exam: 8/9/2023 5:08 PM EDT Indication: fall/lateral pain Comparison: None available. Findings: The right ankle and foot demonstrate diffuse demineralization, otherwise without evidence of acute fracture or dislocation. Cortical margins are intact and alignment is maintained. Cortical margins of the left shoulder are intact, with some mild arthrosis changes present with acromioclavicular narrowing and sclerosis. Alignment is maintained. Evaluation of the left elbow demonstrates no evidence of fracture, malalignment or significant joint effusion.     Impression: Impression: No acute fracture or dislocation of the imaged right lower extremity and left upper extremity. Electronically Signed: Randell Solomon  8/9/2023 5:59 PM EDT  Workstation ID: [id number]    XR Ankle 3+ View Right    Result Date: 8/9/2023  XR FOOT 3+ VW RIGHT, XR ANKLE 3+ VW RIGHT, XR SHOULDER 2+ VW LEFT, XR ELBOW 2 VW LEFT Date of Exam: 8/9/2023 5:08 PM EDT Indication: fall/lateral pain Comparison: None available. Findings: The right ankle and foot demonstrate diffuse demineralization, otherwise without evidence of acute fracture or dislocation. Cortical margins are intact and alignment is maintained. Cortical margins of the left shoulder are intact, with some mild arthrosis changes present with acromioclavicular narrowing and sclerosis. Alignment is maintained. Evaluation of the left elbow demonstrates no evidence of fracture, malalignment or significant joint effusion.     Impression: Impression: No acute fracture or dislocation of the imaged right lower extremity and left upper extremity. Electronically Signed: Randell Solomon  8/9/2023 5:59 PM EDT  Workstation ID: [id number]    XR Foot 3+ View Right    Result Date: 8/9/2023  XR FOOT 3+ VW RIGHT, XR ANKLE 3+ VW RIGHT, XR SHOULDER 2+ VW LEFT, XR ELBOW 2 VW LEFT Date of Exam: 8/9/2023 5:08 PM EDT Indication: fall/lateral pain Comparison:  None available. Findings: The right ankle and foot demonstrate diffuse demineralization, otherwise without evidence of acute fracture or dislocation. Cortical margins are intact and alignment is maintained. Cortical margins of the left shoulder are intact, with some mild arthrosis changes present with acromioclavicular narrowing and sclerosis. Alignment is maintained. Evaluation of the left elbow demonstrates no evidence of fracture, malalignment or significant joint effusion.     Impression: Impression: No acute fracture or dislocation of the imaged right lower extremity and left upper extremity. Electronically Signed: Randell Solomon  8/9/2023 5:59 PM EDT  Workstation ID: [id number]    CT Head Without Contrast    Result Date: 8/9/2023  CT HEAD WO CONTRAST Date of Exam: 8/9/2023 6:38 PM EDT Indication: confusion. Comparison: None available. Technique: Axial CT images were obtained of the head without contrast administration.  Automated exposure control and iterative construction methods were used. FINDINGS: Gray-white differentiation is maintained and there is no evidence of intracranial hemorrhage, mass or mass effect. Mild age-related changes of the brain are present including volume loss and typical periventricular sequela of chronic small vessel ischemia. There is otherwise no evidence of intracranial hemorrhage, mass or mass effect. The ventricles are normal in size and configuration accounting for surrounding volume loss. The orbits are normal and the paranasal sinuses are grossly clear.     Impression: Mild age-related changes of the brain as above, otherwise without evidence of acute intracranial abnormality. Electronically Signed: Randell Solomon  8/9/2023 7:28 PM EDT  Workstation ID: [id number]    XR Chest 1 View    Result Date: 8/9/2023  XR CHEST 1 VW Date of Exam: 8/9/2023 4:55 PM EDT Indication: dyspnea Comparison: 9/22/2022. Findings: Left chest wall ICD projects unchanged. Opacity is seen at the left lung  base with the appearance of likely trace effusion and atelectasis. No focal airspace consolidation is otherwise present. There is no distinct pneumothorax. Unchanged mild cardiac enlargement.     Impression: Impression: Small left pleural effusion and basilar atelectasis. Otherwise stable chest as above. Electronically Signed: Randell Solomon  8/9/2023 5:22 PM EDT  Workstation ID: [id number]     Results for orders placed in visit on 07/19/23    Adult Transthoracic Echo Complete W/ Cont if Necessary Per Protocol    Interpretation Summary    Left ventricular systolic function is low normal. Calculated left ventricular EF = 46% Left ventricular ejection fraction appears to be 46 - 50%.    Left ventricular diastolic function is consistent with (grade III w/high LAP) reversible restrictive pattern. with increase LAP E/E` 21    The left atrial cavity is severely dilated.    The right atrial cavity is dilated.    Aortic valve area is 1 cm2. AVI is present with normal function    Peak velocity of the flow distal to the aortic valve is 187.5 cm/s. Aortic valve maximum pressure gradient is 14 mmHg. Aortic valve mean pressure gradient is 7 mmHg.    Moderate mitral valve regurgitation is present.    The mitral valve area (PHT) is 3 cm2.    Moderate tricuspid valve regurgitation is present.    Estimated right ventricular systolic pressure from tricuspid regurgitation is markedly elevated (>55 mmHg).    The aortic root measures 2 cm.    No pericardial effusion    Pacer is seen in the RA  and RV      Assessment & Plan   Assessment & Plan     Active Hospital Problems    Diagnosis  POA    **Acute HFrEF (heart failure with reduced ejection fraction) [I50.21]  Unknown    Weakness [R53.1]  Unknown    Hypokalemia [E87.6]  Unknown    Hypoalbuminemia [E88.09]  Unknown    COVID-19 virus detected [U07.1]  Unknown    Hypoxia [R09.02]  Unknown    Pleural effusion [J90]  Unknown    Pulmonary hypertension [I27.20]  Yes     Chronic anticoagulation [Z79.01]  Not Applicable    Chronic nausea [R11.0]  Yes    Chronic kidney disease, stage 3b [N18.32]  Yes    Presence of cardiac pacemaker secondary to sick sinus syndrome [Z95.0]  Yes    Chronic combined systolic and diastolic congestive heart failure [I50.42]  Yes     Echocardiogram, 8/18/2022: EF 40-45%.  Grade 3 diastolic dysfunction.  RVSP greater than 55 mmHg      Hypertension [I10]  Yes    Aortic stenosis, severe s/p TAVR (7/20/2022) [I35.0]  Yes     Echo 2-18-22: Severe aortic valve stenosis is present. Aortic valve area is 1 cm2. Peak velocity of the flow distal to the aortic valve is 4.3 cm/s. Aortic valve maximum pressure gradient is 70 mmHg. Aortic valve mean pressure gradient is 48 mmHg.  Transcatheter aortic valve replacement using a 20 mm MARK 3 pericardial prosthesis 7/20/2022      Longstanding persistent atrial fibrillation [I48.11]  Yes    Sick sinus syndrome [I49.5]  Yes     [employer] L3 1 1 dual-chamber permanent pacemaker model, [id number] serial number pulse generator at the left subpectoral site.  Atrial lead [employer] model 7840, 45 cm, serial number [id number].  Right ventricular lead [employer] model 7841, 52 cm, serial number [id number].  4/13/2022      Hyperlipidemia LDL goal <70 [E78.5]  Yes    Bilateral carotid artery stenosis [I65.23]  Yes     Duplex 2-18-22: There is 60 to 80% stenosis LICA. Less than 40% stenosis in the JOHN. Moderate plaques in both primary carotid arteries      Status post CVA [Z86.73]  Not Applicable     Hx CVA in 2002 with deficits of aphasia & right hemiparesis.  Now resolved     Miryam SULMA Mckeon is a 77 y.o. female with a PMH significant for atrial fibrillation on Eliquis, aortic valve stenosis s/p TAVR, CKD stage III, deaf, HLD, HTN, history of stroke who comes to the ED due to weakness.     Acute on chronic systolic and diastolic CHF exacerbation  Hypoxia  Left pleural effusion  -- Give Lasix 40 mg IV now,  defer further diuresis to day team  --stricts is and os, daily weights  -- TTE completed on 7/19/2023 with an EF of 46-50%, grade 3 diastolic dysfunction  -- O2 support as needed  -- Satting 90% on room air, not on home oxygen  - Consult cardiology  -CT chest pending    Weakness  - Likely multifactorial due to COVID and CHF exacerbation  - CT head negative  - PT/OT  - Fall precautions  - Recent falls, imaging negative for acute findings    COVID-19 virus detected  -- Satting 90% on room air.  Current o2 requirement at admission of 2 L  -- Start dexamethasone 6mg q24h  -- Patient refusing remdesivir  --tylenol for fever  --d-dimer, ferritin, ldh, crp pending  --Continue home Eliquis  --Procalcitonin negative  --Albuterol inhaler  -- Denies cough  - Zofran as needed    Atrial fibrillation  Chronic anticoagulation  - Rate controlled  - Continue home Eliquis  - On amiodarone taper, 200 mg twice daily now.  Scheduled to start 200 mg daily on 8/17  - Continue home Cardizem    HTN  HLD  Bilateral carotid stenosis  History of stroke  - Continue home meds      Hypoalbuminemia  - Nutrition consult    Hypokalemia  - Replace per protocol  - Check magnesium    Presence of cardiac pacemaker    CKD stage III  - At baseline  - A.m. labs    History of aortic stenosis s/p TAVR    Home med list reviewed    DVT prophylaxis:  Maryellen    Total time spent: 75 minutes  Time spent includes time reviewing chart, face-to-face time, counseling patient/family/caregiver, ordering medications/tests/procedures, communicating with other health care professionals, documenting clinical information in the electronic health record, and coordination of care.      CODE STATUS:  Full code  Code Status and Medical Interventions:   Ordered at: 08/09/23 1596     Level Of Support Discussed With:    Patient     Code Status (Patient has no pulse and is not breathing):    CPR (Attempt to Resuscitate)     Medical Interventions (Patient has pulse or is  breathing):    Full Support       Expected Discharge   Expected Discharge Date: 8/11/2023; Expected Discharge Time:      Amy Elkins DO  08/09/23

## 2023-08-10 NOTE — PLAN OF CARE
Goal Outcome Evaluation:  Plan of Care Reviewed With: patient, daughter           Outcome Evaluation: PT evaluation completed.  Pt required MinAx1 with all mobility including ambulating 30 feet using rolling walker.  Pt presents below baseline function d/t weakness, decreased activity tolerance, and gait instability.  Recommend SNF for further therapy at d/c.      Anticipated Discharge Disposition (PT): skilled nursing facility

## 2023-08-10 NOTE — PROGRESS NOTES
[facility]  PROGRESS NOTE    Patient Name: Miryam Mckeon  : 1945  MRN: [id number]    Date of Admission: 2023  Primary Care Physician: Rigoberto Chamberlain MD    Subjective   Subjective     CC:  Weakness, COVID-positive    HPI:  Patient is a 77-year-old who was admitted with generalized weakness, heart failure with exacerbation and found to be positive for COVID-19.  Of note the patient is deaf and  assisted with interview.  Patient's daughter is also at bedside.  Discussed COVID-19 diagnosis and she is currently on 2 L nasal cannula with O2 sats 93 to 97%.  On room air the patient dropped to 87%.  Given this I recommended she consider remdesivir and/or treatment with steroids however she does not want any treatment for COVID at this time.  The patient and her daughter are adamant about this.  They state reasons for this is that she does not do well with medications and is very sensitive.  Patient's daughter notes she has had a gradual decline over the last several months.  She is increasingly weak.  She would like her checked out thoroughly given recent heart surgery.  Cardiology was consulted.  Echo is pending.    ROS:  Gen- No fevers, chills  CV- No chest pain, palpitations  Resp-positive dry cough, reports dyspnea that comes and goes but none at present  GI- No N/V/D, abd pain: Positive decreased appetite       Objective   Objective     Vital Signs:   Temp:  [97.4 øF (36.3 øC)-98.8 øF (37.1 øC)] 97.6 øF (36.4 øC)  Heart Rate:  [] 95  Resp:  [16-18] 16  BP: (133-181)/() 133/54  Flow (L/min):  [1-2] 1     Physical Exam:  Constitutional: No acute distress, awake, alert  HENT: NCAT, mucous membranes moist; deaf  Respiratory: Decreased breath sounds bilaterally, respiratory effort normal   Cardiovascular: RRR, positive murmur  Gastrointestinal: Positive bowel sounds, soft, nontender, nondistended  Musculoskeletal: Generally  weak  Psychiatric: Appropriate affect, cooperative  Neurologic: Sitting in bed and communicates via  and her daughter   skin: No rashes      Results Reviewed:  LAB RESULTS:      Lab 08/10/23  0510 08/09/23  2056 08/09/23  2015 08/09/23  1732   WBC 5.31  --   --  5.23   HEMOGLOBIN 14.0  --   --  14.9   HEMATOCRIT 42.3  --   --  46.1   PLATELETS 124*  --   --  144   NEUTROS ABS 4.64  --   --  4.06   IMMATURE GRANS (ABS) 0.06*  --   --  0.04   LYMPHS ABS 0.47*  --   --  0.68*   MONOS ABS 0.12  --   --  0.43   EOS ABS 0.00  --   --  0.00   MCV 94.8  --   --  96.0   CRP  --  <0.30  --   --    PROCALCITONIN  --  0.13  --   --    LACTATE  --   --   --  1.1   LDH  --  295*  --   --    D DIMER QUANT  --   --  0.88*  --          Lab 08/10/23  0510 08/09/23  2056 08/09/23  1809   SODIUM 137  --  136   POTASSIUM 3.1*  --  3.4*   CHLORIDE 92*  --  92*   CO2 30.0*  --  30.0*   ANION GAP 15.0  --  14.0   BUN 28*  --  31*   CREATININE 1.45*  --  1.49*   EGFR 36.8*  --  35.6*   GLUCOSE 104*  --  110*   CALCIUM 8.3*  --  9.2   MAGNESIUM 1.5* 1.6  --    HEMOGLOBIN A1C 6.20*  --   --    TSH  --  6.110*  --          Lab 08/10/23  0510 08/09/23  1809   TOTAL PROTEIN 5.1* 6.6   ALBUMIN 2.9* 3.1*   GLOBULIN 2.2 3.5   ALT (SGPT) 25 27   AST (SGOT) 34* 39*   BILIRUBIN 0.9 1.1   ALK PHOS 144* 170*   LIPASE  --  15         Lab 08/09/23 2056 08/09/23  1809   PROBNP  --  58,397.0*   HSTROP T 29* 35*         Lab 08/10/23  0510   CHOLESTEROL 93   LDL CHOL 37   HDL CHOL 37*   TRIGLYCERIDES 99         Lab 08/09/23 2056   FERRITIN 496.30*         Brief Urine Lab Results  (Last result in the past 365 days)        Color   Clarity   Blood   Leuk Est   Nitrite   Protein   CREAT   Urine HCG        08/09/23 1806 Yellow   Clear   Small (1+)   Negative   Negative   >=300 mg/dL (3+)                   Microbiology Results Abnormal       None            XR Shoulder 2+ View Left    Result Date: 8/9/2023  XR FOOT 3+ VW RIGHT, XR ANKLE 3+ VW RIGHT, XR  SHOULDER 2+ VW LEFT, XR ELBOW 2 VW LEFT Date of Exam: 8/9/2023 5:08 PM EDT Indication: fall/lateral pain Comparison: None available. Findings: The right ankle and foot demonstrate diffuse demineralization, otherwise without evidence of acute fracture or dislocation. Cortical margins are intact and alignment is maintained. Cortical margins of the left shoulder are intact, with some mild arthrosis changes present with acromioclavicular narrowing and sclerosis. Alignment is maintained. Evaluation of the left elbow demonstrates no evidence of fracture, malalignment or significant joint effusion.     Impression: Impression: No acute fracture or dislocation of the imaged right lower extremity and left upper extremity. Electronically Signed: Randell Solomon  8/9/2023 5:59 PM EDT  Workstation ID: [id number]    XR ELBOW 2 VIEW LEFT    Result Date: 8/9/2023  XR FOOT 3+ VW RIGHT, XR ANKLE 3+ VW RIGHT, XR SHOULDER 2+ VW LEFT, XR ELBOW 2 VW LEFT Date of Exam: 8/9/2023 5:08 PM EDT Indication: fall/lateral pain Comparison: None available. Findings: The right ankle and foot demonstrate diffuse demineralization, otherwise without evidence of acute fracture or dislocation. Cortical margins are intact and alignment is maintained. Cortical margins of the left shoulder are intact, with some mild arthrosis changes present with acromioclavicular narrowing and sclerosis. Alignment is maintained. Evaluation of the left elbow demonstrates no evidence of fracture, malalignment or significant joint effusion.     Impression: Impression: No acute fracture or dislocation of the imaged right lower extremity and left upper extremity. Electronically Signed: Randell Solomon  8/9/2023 5:59 PM EDT  Workstation ID: [id number]    XR Ankle 3+ View Right    Result Date: 8/9/2023  XR FOOT 3+ VW RIGHT, XR ANKLE 3+ VW RIGHT, XR SHOULDER 2+ VW LEFT, XR ELBOW 2 VW LEFT Date of Exam: 8/9/2023 5:08 PM EDT Indication: fall/lateral pain Comparison: None available.  Findings: The right ankle and foot demonstrate diffuse demineralization, otherwise without evidence of acute fracture or dislocation. Cortical margins are intact and alignment is maintained. Cortical margins of the left shoulder are intact, with some mild arthrosis changes present with acromioclavicular narrowing and sclerosis. Alignment is maintained. Evaluation of the left elbow demonstrates no evidence of fracture, malalignment or significant joint effusion.     Impression: Impression: No acute fracture or dislocation of the imaged right lower extremity and left upper extremity. Electronically Signed: Randell Solomon  8/9/2023 5:59 PM EDT  Workstation ID: [id number]    XR Foot 3+ View Right    Result Date: 8/9/2023  XR FOOT 3+ VW RIGHT, XR ANKLE 3+ VW RIGHT, XR SHOULDER 2+ VW LEFT, XR ELBOW 2 VW LEFT Date of Exam: 8/9/2023 5:08 PM EDT Indication: fall/lateral pain Comparison: None available. Findings: The right ankle and foot demonstrate diffuse demineralization, otherwise without evidence of acute fracture or dislocation. Cortical margins are intact and alignment is maintained. Cortical margins of the left shoulder are intact, with some mild arthrosis changes present with acromioclavicular narrowing and sclerosis. Alignment is maintained. Evaluation of the left elbow demonstrates no evidence of fracture, malalignment or significant joint effusion.     Impression: Impression: No acute fracture or dislocation of the imaged right lower extremity and left upper extremity. Electronically Signed: Randell Solomon  8/9/2023 5:59 PM EDT  Workstation ID: [id number]    CT Head Without Contrast    Result Date: 8/9/2023  CT HEAD WO CONTRAST Date of Exam: 8/9/2023 6:38 PM EDT Indication: confusion. Comparison: None available. Technique: Axial CT images were obtained of the head without contrast administration.  Automated exposure control and iterative construction methods were used. FINDINGS: Gray-white differentiation is maintained  and there is no evidence of intracranial hemorrhage, mass or mass effect. Mild age-related changes of the brain are present including volume loss and typical periventricular sequela of chronic small vessel ischemia. There is otherwise no evidence of intracranial hemorrhage, mass or mass effect. The ventricles are normal in size and configuration accounting for surrounding volume loss. The orbits are normal and the paranasal sinuses are grossly clear.     Impression: Mild age-related changes of the brain as above, otherwise without evidence of acute intracranial abnormality. Electronically Signed: Randell Solomon  8/9/2023 7:28 PM EDT  Workstation ID: [id number]    CT Chest Without Contrast Diagnostic    Result Date: 8/10/2023  CT CHEST WO CONTRAST DIAGNOSTIC Date of Exam: 8/10/2023 3:06 AM EDT Indication: Respiratory illness, nondiagnostic xray. Comparison: 6/3/2022 and chest radiograph 8/9/2023. Technique: Axial CT images were obtained of the chest without contrast administration.  Reconstructed coronal and sagittal images were also obtained. Automated exposure control and iterative construction methods were used. Findings: There are small bilateral pleural effusions larger on the left. There is dependent bibasilar atelectasis. There is no pneumothorax, pleural effusion or focal airspace consolidation. There is mild peribronchial thickening in the lower lungs. There are calcified granulomas. Thyroid, trachea and esophagus appear within normal limits. The heart is enlarged. There is evidence of prior endovascular aortic valve replacement. There is a left-sided multilead ICD in place. No pericardial effusion. No mediastinal lymphadenopathy. There is aortic and aortic branch vessel atherosclerosis. There is stable mild superior and inferior endplate compression deformity at the T12 level. There are mild thoracic degenerative changes. No acute osseous abnormality or destructive bone lesion. No acute findings in the  superficial soft tissues. Patient is status post cholecystectomy. No acute findings in the upper abdomen.     Impression: Impression: 1. Small bilateral pleural effusions, left greater than right. Mild dependent bibasilar atelectasis. 2. Cardiomegaly. Evidence of prior aortic valve replacement and stable left-sided ICD. Electronically Signed: Steven Pereira  8/10/2023 3:19 AM EDT  Workstation ID: [id number]    XR Chest 1 View    Result Date: 8/9/2023  XR CHEST 1 VW Date of Exam: 8/9/2023 4:55 PM EDT Indication: dyspnea Comparison: 9/22/2022. Findings: Left chest wall ICD projects unchanged. Opacity is seen at the left lung base with the appearance of likely trace effusion and atelectasis. No focal airspace consolidation is otherwise present. There is no distinct pneumothorax. Unchanged mild cardiac enlargement.     Impression: Impression: Small left pleural effusion and basilar atelectasis. Otherwise stable chest as above. Electronically Signed: Randell Solomon  8/9/2023 5:22 PM EDT  Workstation ID: [id number]     Results for orders placed in visit on 07/19/23    Adult Transthoracic Echo Complete W/ Cont if Necessary Per Protocol    Interpretation Summary    Left ventricular systolic function is low normal. Calculated left ventricular EF = 46% Left ventricular ejection fraction appears to be 46 - 50%.    Left ventricular diastolic function is consistent with (grade III w/high LAP) reversible restrictive pattern. with increase LAP E/E` 21    The left atrial cavity is severely dilated.    The right atrial cavity is dilated.    Aortic valve area is 1 cm2. AVI is present with normal function    Peak velocity of the flow distal to the aortic valve is 187.5 cm/s. Aortic valve maximum pressure gradient is 14 mmHg. Aortic valve mean pressure gradient is 7 mmHg.    Moderate mitral valve regurgitation is present.    The mitral valve area (PHT) is 3 cm2.    Moderate tricuspid valve regurgitation is present.    Estimated right  ventricular systolic pressure from tricuspid regurgitation is markedly elevated (>55 mmHg).    The aortic root measures 2 cm.    No pericardial effusion    Pacer is seen in the RA  and RV      Current medications:  Scheduled Meds:albuterol sulfate HFA, 2 puff, Inhalation, 4x Daily - RT  amiodarone, 200 mg, Oral, Q12H  apixaban, 2.5 mg, Oral, Q12H  aspirin, 81 mg, Oral, Daily  atorvastatin, 10 mg, Oral, Nightly  cholecalciferol, 1,000 Units, Oral, Daily  [START ON 8/11/2023] dexAMETHasone, 6 mg, Intravenous, Daily  dilTIAZem CD, 300 mg, Oral, Daily  latanoprost, 1 drop, Both Eyes, Nightly  magnesium sulfate, 1 g, Intravenous, Q1H  metoprolol tartrate, 100 mg, Oral, BID  potassium chloride, 40 mEq, Oral, Once  senna-docusate sodium, 2 tablet, Oral, BID  sodium chloride, 10 mL, Intravenous, Q12H  vitamin B-12, 1,000 mcg, Oral, Daily      Continuous Infusions:   PRN Meds:.  acetaminophen    senna-docusate sodium **AND** polyethylene glycol **AND** bisacodyl **AND** bisacodyl    Calcium Replacement - Follow Nurse / BPA Driven Protocol    Magnesium Low Dose Replacement - Follow Nurse / BPA Driven Protocol    ondansetron **OR** ondansetron    Phosphorus Replacement - Follow Nurse / BPA Driven Protocol    Potassium Replacement - Follow Nurse / BPA Driven Protocol    simethicone    [COMPLETED] Insert Peripheral IV **AND** sodium chloride    sodium chloride    sodium chloride    Assessment & Plan   Assessment & Plan     Active Hospital Problems    Diagnosis  POA    **Acute HFrEF (heart failure with reduced ejection fraction) [I50.21]  Unknown    Weakness [R53.1]  Unknown    Hypokalemia [E87.6]  Unknown    Hypoalbuminemia [E88.09]  Unknown    COVID-19 virus detected [U07.1]  Unknown    Hypoxia [R09.02]  Unknown    Pleural effusion [J90]  Unknown    Pulmonary hypertension [I27.20]  Yes    Chronic anticoagulation [Z79.01]  Not Applicable    Chronic nausea [R11.0]  Yes    Chronic kidney disease, stage 3b [N18.32]  Yes    Presence  of cardiac pacemaker secondary to sick sinus syndrome [Z95.0]  Yes    Chronic combined systolic and diastolic congestive heart failure [I50.42]  Yes    Hypertension [I10]  Yes    Aortic stenosis, severe s/p TAVR (7/20/2022) [I35.0]  Yes    Longstanding persistent atrial fibrillation [I48.11]  Yes    Sick sinus syndrome [I49.5]  Yes    Hyperlipidemia LDL goal <70 [E78.5]  Yes    Bilateral carotid artery stenosis [I65.23]  Yes    Status post CVA [Z86.73]  Not Applicable      Resolved Hospital Problems   No resolved problems to display.        Brief Hospital Course to date:  Miryam Mckeon is a 77 y.o. female with past medical history of A-fib on Eliquis, aortic valve stenosis status post TAVR, CKD stage III, deafness, hypertension, hyperlipidemia and history of CVA who came into the emergency department with complaints of generalized weakness.  She was found to be in congestive heart failure with acute exacerbation as well as COVID-positive.    Acute on chronic systolic and diastolic congestive heart failure exacerbation  -Echo pending  -Cardiology consulted  -Post Lasix 40 mg IV x 1  O2 to maintain sats greater than 90, currently on 2 L nasal cannula with baseline does not require oxygen at home    COVID upper respiratory infection  -Symptoms of dry cough and hypoxia with intermittent dyspnea  -Continue O2 to maintain sats greater than 90, she was 87% on room air during my exam  -Patient is declining treatment for COVID, specifically steroids and remdesivir  -Continue inhalers as needed    Generalized weakness  -Likely multifactorial  -PT and OT to evaluate  -Workup for heart failure continued with echo pending    A-fib  Chronic anticoagulation  -Continue Eliquis  -Continue Cardizem  -Continue amiodarone, scheduled to start 200 mg daily on 817    Hypertension  Hyperlipidemia  Bilateral carotid artery stenosis  History of CVA  -Continue home medications    Poor p.o. intake  Failure to  thrive  Hypoalbuminemia  Malnutrition  -Nutrition consult requested    Presence of pacemaker    CKD, stage III    Aortic stenosis, status post TAVR      Expected Discharge Location and Transportation: Home, private vehicle  Expected Discharge   Expected Discharge Date: 8/11/2023; Expected Discharge Time:      DVT prophylaxis:  Medical and mechanical DVT prophylaxis orders are present.          CODE STATUS:   Code Status and Medical Interventions:   Ordered at: 08/09/23 4886     Level Of Support Discussed With:    Patient     Code Status (Patient has no pulse and is not breathing):    CPR (Attempt to Resuscitate)     Medical Interventions (Patient has pulse or is breathing):    Full Support       Naa North MD  08/10/23

## 2023-08-10 NOTE — PROGRESS NOTES
Discharge Planning Assessment  [facility]     Patient Name: Miryam Mckeon  MRN: [id number]  Today's Date: 8/10/2023    Admit Date: 8/9/2023        Discharge Needs Assessment       Row Name 08/10/23 1703       Living Environment    Current Living Arrangements home    Provides Primary Care For no one    Family Caregiver if Needed child(fuad), adult                   Discharge Plan       Row Name 08/10/23 1703       Plan    Plan Comments Spoke with Mrs. Mckeon's daughter for an initial assessment. The patient is weak and would benefit from inpatient rehab when she becomes medically stable to be able to be referred to inpatient rehab. She has [employer] Medicare and her daughter said that if possible she would like refferals to the [facility].                  Continued Care and Services - Admitted Since 8/9/2023    Coordination has not been started for this encounter.       Expected Discharge Date and Time       Expected Discharge Date Expected Discharge Time    Aug 11, 2023            Demographic Summary       Row Name 08/10/23 1646       General Information    Admission Type inpatient    Arrived From home    Reason for Consult discharge planning    Preferred Language English       Contact Information    Permission Granted to Share Info With     Contact Information Obtained for                    Functional Status       Row Name 08/10/23 1702       Functional Status    Usual Activity Tolerance moderate    Current Activity Tolerance moderate       Functional Status, IADL    Medications assistive person    Meal Preparation assistive person    Housekeeping assistive person    Laundry assistive person    Shopping assistive person                   Psychosocial    No documentation.                  Abuse/Neglect    No documentation.                  Legal    No documentation.                  Substance Abuse    No documentation.                  Patient Forms    No documentation.                      SWETHA García

## 2023-08-10 NOTE — THERAPY DISCHARGE NOTE
Acute Care - Speech Language Pathology   Swallow Initial Evaluation/Discharge  [place]  Clinical Swallow Evaluation     Patient Name: Miryam Mckeon  : 1945  MRN: [id number]  Today's Date: 8/10/2023               Admit Date: 2023    Visit Dx:    ICD-10-CM ICD-9-CM   1. COVID-19  U07.1 079.89   2. Hypoxia  R09.02 799.02   3. Frequent falls  R29.6 V15.88   4. Generalized weakness  R53.1 780.79   5. Acute on chronic congestive heart failure, unspecified heart failure type  I50.9 428.0     Patient Active Problem List   Diagnosis    Bilateral carotid artery stenosis    Aortic stenosis, severe s/p TAVR (2022)    Longstanding persistent atrial fibrillation    Sick sinus syndrome    Hyperlipidemia LDL goal <70    Hypertension    Chronic combined systolic and diastolic congestive heart failure    Status post CVA    Presence of cardiac pacemaker secondary to sick sinus syndrome    Chronic kidney disease, stage 3b    Parent refuses immunizations    Chronic nausea    Pulmonary hypertension    Chronic anticoagulation    Acute HFrEF (heart failure with reduced ejection fraction)    Weakness    Hypokalemia    Hypoalbuminemia    COVID-19 virus detected    Hypoxia    Pleural effusion     Past Medical History:   Diagnosis Date    Anxiety     Aortic valve stenosis     Atrial fibrillation     Borderline diabetes     ???    Carotid artery stenosis     CKD (chronic kidney disease)     Deaf     GERD (gastroesophageal reflux disease)     Heart murmur     Hyperlipidemia     Hypertension     Irregular heartbeat     PONV (postoperative nausea and vomiting)     Stroke     TIA (transient ischemic attack)      Past Surgical History:   Procedure Laterality Date    AORTIC VALVE REPAIR/REPLACEMENT N/A 2022    Procedure: TRANSCATHETER AORTIC VALVE REPLACEMENT with angioplasty and stent to the right common and external iliac artery;  Surgeon: Bola Whitaker MD;  Location: [facility];  Service:  Cardiothoracic;  Laterality: N/A;  FL-15 MIN 12 SEC  DOSE- 92.41  CONTRAST- 120 ML ISOVUE    AORTIC VALVE REPAIR/REPLACEMENT N/A 7/20/2022    Procedure: Transcatheter Aortic Valve Replacement;  Surgeon: Yashira Chow MD;  Location: [facility];  Service: Cardiovascular;  Laterality: N/A;    CARDIAC CATHETERIZATION      05/20/2022 per dr. chow    CARDIAC CATHETERIZATION Left 5/20/2022    Procedure: Left Heart Cath;  Surgeon: Yashira Chow MD;  Location: [facility] CATH INVASIVE LOCATION;  Service: Cardiology;  Laterality: Left;    CARDIAC ELECTROPHYSIOLOGY PROCEDURE N/A 04/13/2022    Procedure: DDD PPM Implant (BSC), DNS Eliquis;  Surgeon: Troy Hussein DO;  Location: [facility] EP INVASIVE LOCATION;  Service: Cardiology;  Laterality: N/A;    CAROTID ENDARTERECTOMY Bilateral     CATARACT EXTRACTION      CHOLECYSTECTOMY      COLONOSCOPY      FEMORAL ARTERY CUTDOWN Right 7/20/2022    Procedure: FEMORAL ARTERY CUTDOWN, ANGIOGRAM;  Surgeon: Bola Whitaker MD;  Location: [facility];  Service: Vascular;  Laterality: Right;  fl-1 m 12 sec  dose- 47.29 mgy  contrast- 50 ml isovue    KNEE SURGERY Right     PACEMAKER IMPLANTATION      JF      05/20/2022 per dr. chow    TRANSESOPHAGEAL ECHOCARDIOGRAM (JF) N/A 7/20/2022    Procedure: TRANSESOPHAGEAL ECHOCARDIOGRAM PER ANESTHESIA;  Surgeon: Bola Whitaker MD;  Location: [facility];  Service: Cardiothoracic;  Laterality: N/A;       SLP Recommendation and Plan  SLP Swallowing Diagnosis: swallow WFL/no suspected pharyngeal impairment, other (see comments) (suspect chronic esophageal dysphagia (hx stricture, s/p dilation)) (08/10/23 1330)  SLP Diet Recommendation: regular textures, thin liquids (extra sauce/gravy for dry meat) (08/10/23 1330)     Monitor for Signs of Aspiration: yes, notify SLP if any concerns (08/10/23 1330)     Swallow Criteria for Skilled Therapeutic Interventions Met: no problems identified which  require skilled intervention (08/10/23 1330)  Anticipated Discharge Disposition (SLP): No further SLP services warranted (08/10/23 1330)                    Anticipated Discharge Disposition (SLP): No further SLP services warranted (08/10/23 1330)  Patient/Family Concerns, Anticipated Discharge Disposition (SLP): Pt's dtr feels she would benefit from IRF to address acute weakness. (08/10/23 1330)                         Plan of Care Reviewed With: patient, daughter (08/10/23 6024)    SWALLOW EVALUATION (last 72 hours)       SLP Adult Swallow Evaluation       Row Name 08/10/23 1330                   Rehab Evaluation    Document Type discharge evaluation/summary  -AC        Subjective Information no complaints  -AC        Patient Observations alert;cooperative  -AC        Patient/Family/Caregiver Comments/Observations Dtr present and provided ASL interpretation.  -AC        Patient Effort good  -AC           General Information    Patient Profile Reviewed yes  -AC        Pertinent History Of Current Problem Adm w/ CHF, weakness, confusion. COVID-19+. Hx TAVR '22, GERD. Reported hx esophageal stricture s/p dilation. Sxs have been stable since & manages by taking small bites & chewing thoroughly.  -AC        Current Method of Nutrition regular textures;thin liquids  -AC        Precautions/Limitations, Vision WFL;for purposes of eval  -AC        Precautions/Limitations, Hearing other (see comments)  Pt Deaf, uses ASL.  -AC        Prior Level of Function-Swallowing no diet consistency restrictions  -AC        Plans/Goals Discussed with patient and family;agreed upon  -AC        Barriers to Rehab none identified  -AC        Patient's Goals for Discharge patient did not state  -AC        Family Goals for Discharge family did not state  -AC           Pain    Additional Documentation Pain Scale: Numbers Pre/Post-Treatment (Group)  -AC           Pain Scale: Numbers Pre/Post-Treatment    Pretreatment Pain Rating 0/10 - no pain   -        Posttreatment Pain Rating 0/10 - no pain  -           Oral Motor Structure and Function    Dentition Assessment natural, present and adequate  -        Secretion Management WNL/WFL  -AC        Mucosal Quality moist, healthy  -        Volitional Cough WFL  -AC           Oral Musculature and Cranial Nerve Assessment    Oral Motor General Assessment WFL  -           General Eating/Swallowing Observations    Respiratory Support Currently in Use nasal cannula  -        Eating/Swallowing Skills self-fed;needed assist;other (see comments)  needed some assist w/ chopping 2' hand weakness  -        Positioning During Eating upright 90 degree;upright in bed  -        Utensils Used spoon;straw  -        Consistencies Trialed thin liquids;regular textures  -           Clinical Swallow Eval    Oral Prep Phase WFL  -AC        Oral Transit WFL  -AC        Oral Residue WFL  -AC        Pharyngeal Phase no overt signs/symptoms of pharyngeal impairment  -        Clinical Swallow Evaluation Summary Pt assessed w/ lunch tray. Oral phase seemingly WFL. No overt clinical s/sxs aspiration, even when pushed w/ several trials. Pt spontaneously took small bites at a time.  -           Swallowing Quality of Life Assessment    Education and counseling provided Aspiration precautions  -           SLP Evaluation Clinical Impression    SLP Swallowing Diagnosis swallow WFL/no suspected pharyngeal impairment;other (see comments)  suspect chronic esophageal dysphagia (hx stricture, s/p dilation)  -        Swallow Criteria for Skilled Therapeutic Interventions Met no problems identified which require skilled intervention  -           Recommendations    SLP Diet Recommendation regular textures;thin liquids  extra sauce/gravy for dry meat  -        Recommended Precautions and Strategies upright posture during/after eating;general aspiration precautions;reflux precautions  -        Oral Care Recommendations Oral  Care BID/PRN;Toothbrush  -        SLP Rec. for Method of Medication Administration meds whole;with thin liquids;with puree;as tolerated  -AC        Monitor for Signs of Aspiration yes;notify SLP if any concerns  -        Anticipated Discharge Disposition (SLP) No further SLP services warranted  -AC        Patient/Family Concerns, Anticipated Discharge Disposition (SLP) Pt's dtr feels she would benefit from IRF to address acute weakness.  -                  User Key  (r) = Recorded By, (t) = Taken By, (c) = Cosigned By      Initials Name Effective Dates     Halie Krishnan MS CCC-SLP 02/03/23 -                     EDUCATION  The patient has been educated in the following areas:   Dysphagia (Swallowing Impairment).                 Time Calculation:    Time Calculation- SLP       Row Name 08/10/23 1430             Time Calculation- SLP    SLP Start Time 1330  -      SLP Received On 08/10/23  -         Untimed Charges    22914-CX Eval Oral Pharyng Swallow Minutes 63  -AC         Total Minutes    Untimed Charges Total Minutes 63  -AC       Total Minutes 63  -                User Key  (r) = Recorded By, (t) = Taken By, (c) = Cosigned By      Initials Name Provider Type     Halie Krishnan MS CCC-SLP Speech and Language Pathologist                    Therapy Charges for Today       Code Description Service Date Service Provider Modifiers Qty    [id number] HC ST EVAL ORAL PHARYNG SWALLOW 4 8/10/2023 Halie Krishnan MS CCC-SLP GN 1                 SLP Discharge Summary  Anticipated Discharge Disposition (SLP): No further SLP services warranted    MS JOEL CliffordSLP  8/10/2023

## 2023-08-10 NOTE — PLAN OF CARE
Goal Outcome Evaluation:  Plan of Care Reviewed With: patient, daughter           Outcome Evaluation: OT eval completed. Pt presents with significant weakness, SOA, and balance deficits limiting ADL's. Pt Renee for self-feeding, PICKERING for grooming tasks seated, Dep for donning socks. IP OT services warranted to support return return to PLOF. Recommend SNF at discharge.      Anticipated Discharge Disposition (OT): skilled nursing facility

## 2023-08-10 NOTE — THERAPY EVALUATION
Patient Name: Miryam Mckeon  : 1945    MRN: [id number]                              Today's Date: 8/10/2023       Admit Date: 2023    Visit Dx:     ICD-10-CM ICD-9-CM   1. COVID-19  U07.1 079.89   2. Hypoxia  R09.02 799.02   3. Frequent falls  R29.6 V15.88   4. Generalized weakness  R53.1 780.79   5. Acute on chronic congestive heart failure, unspecified heart failure type  I50.9 428.0     Patient Active Problem List   Diagnosis    Bilateral carotid artery stenosis    Aortic stenosis, severe s/p TAVR (2022)    Longstanding persistent atrial fibrillation    Sick sinus syndrome    Hyperlipidemia LDL goal <70    Hypertension    Chronic combined systolic and diastolic congestive heart failure    Status post CVA    Presence of cardiac pacemaker secondary to sick sinus syndrome    Chronic kidney disease, stage 3b    Parent refuses immunizations    Chronic nausea    Pulmonary hypertension    Chronic anticoagulation    Acute HFrEF (heart failure with reduced ejection fraction)    Weakness    Hypokalemia    Hypoalbuminemia    COVID-19 virus detected    Hypoxia    Pleural effusion     Past Medical History:   Diagnosis Date    Anxiety     Aortic valve stenosis     Atrial fibrillation     Borderline diabetes     ???    Carotid artery stenosis     CKD (chronic kidney disease)     Deaf     GERD (gastroesophageal reflux disease)     Heart murmur     Hyperlipidemia     Hypertension     Irregular heartbeat     PONV (postoperative nausea and vomiting)     Stroke     TIA (transient ischemic attack)      Past Surgical History:   Procedure Laterality Date    AORTIC VALVE REPAIR/REPLACEMENT N/A 2022    Procedure: TRANSCATHETER AORTIC VALVE REPLACEMENT with angioplasty and stent to the right common and external iliac artery;  Surgeon: Bola Whitaker MD;  Location: [facility];  Service: Cardiothoracic;  Laterality: N/A;  FL-15 MIN 12 SEC  DOSE- 92.41  CONTRAST- 120 ML ISOVUE    AORTIC VALVE  REPAIR/REPLACEMENT N/A 7/20/2022    Procedure: Transcatheter Aortic Valve Replacement;  Surgeon: Yashira Chow MD;  Location: [facility];  Service: Cardiovascular;  Laterality: N/A;    CARDIAC CATHETERIZATION      05/20/2022 per dr. chow    CARDIAC CATHETERIZATION Left 5/20/2022    Procedure: Left Heart Cath;  Surgeon: Yashira Chow MD;  Location: [facility] CATH INVASIVE LOCATION;  Service: Cardiology;  Laterality: Left;    CARDIAC ELECTROPHYSIOLOGY PROCEDURE N/A 04/13/2022    Procedure: DDD PPM Implant (BSC), DNS Eliquis;  Surgeon: Troy Hussein DO;  Location: [facility] EP INVASIVE LOCATION;  Service: Cardiology;  Laterality: N/A;    CAROTID ENDARTERECTOMY Bilateral     CATARACT EXTRACTION      CHOLECYSTECTOMY      COLONOSCOPY      FEMORAL ARTERY CUTDOWN Right 7/20/2022    Procedure: FEMORAL ARTERY CUTDOWN, ANGIOGRAM;  Surgeon: Bola Whitaker MD;  Location: [facility];  Service: Vascular;  Laterality: Right;  fl-1 m 12 sec  dose- 47.29 mgy  contrast- 50 ml isovue    KNEE SURGERY Right     PACEMAKER IMPLANTATION      JF      05/20/2022 per dr. chow    TRANSESOPHAGEAL ECHOCARDIOGRAM (JF) N/A 7/20/2022    Procedure: TRANSESOPHAGEAL ECHOCARDIOGRAM PER ANESTHESIA;  Surgeon: Bola Whitaker MD;  Location: [facility];  Service: Cardiothoracic;  Laterality: N/A;      General Information       Row Name 08/10/23 1452          Physical Therapy Time and Intention    Document Type evaluation  -LM     Mode of Treatment physical therapy  -LM       Row Name 08/10/23 3382          General Information    Patient Profile Reviewed yes  -LM     Prior Level of Function independent:;all household mobility;gait;ADL's  Uses rw with ambulation;  Dtr does pts laundry  -LM     Existing Precautions/Restrictions fall;other (see comments)  Pt is deaf - dtr present to provide sign language during eval  -LM     Barriers to Rehab hearing deficit  -LM       Row Name 08/10/23 1501           Living Environment    People in Home alone  -LM       Row Name 08/10/23 1452          Home Main Entrance    Number of Stairs, Main Entrance two  -LM     Stair Railings, Main Entrance none  -LM       Row Name 08/10/23 1452          Stairs Within Home, Primary    Stairs, Within Home, Primary Has a basement, but pt stays on the main level.  Laundry is in the basement, but pt's daughter completes for her.  -LM       Row Name 08/10/23 1452          Cognition    Orientation Status (Cognition) oriented x 4  -LM       Row Name 08/10/23 1452          Safety Issues, Functional Mobility    Safety Issues Affecting Function (Mobility) insight into deficits/self-awareness;safety precaution awareness;safety precautions follow-through/compliance;sequencing abilities  -LM     Impairments Affecting Function (Mobility) balance;endurance/activity tolerance;strength  -LM               User Key  (r) = Recorded By, (t) = Taken By, (c) = Cosigned By      Initials Name Provider Type    LM Maryuri Carson PT Physical Therapist                   Mobility       Row Name 08/10/23 1455          Bed Mobility    Bed Mobility supine-sit  -LM     Supine-Sit [place] (Bed Mobility) minimum assist (75% patient effort);1 person assist;verbal cues  -LM     Assistive Device (Bed Mobility) bed rails;head of bed elevated  -LM     Comment, (Bed Mobility) Tactile sequencing needed.  -LM       Row Name 08/10/23 1455          Sit-Stand Transfer    Sit-Stand [place] (Transfers) minimum assist (75% patient effort);1 person assist;nonverbal cues (demo/gesture)  -LM     Assistive Device (Sit-Stand Transfers) walker, front-wheeled  -LM     Comment, (Sit-Stand Transfer) Stood x 2 from EOB.  -LM       Row Name 08/10/23 1455          Gait/Stairs (Locomotion)    [place] Level (Gait) minimum assist (75% patient effort);1 person assist;verbal cues  -LM     Assistive Device (Gait) walker, front-wheeled  -LM     Distance in Feet (Gait) 30  -LM      Deviations/Abnormal Patterns (Gait) base of support, narrow;erinn decreased;gait speed decreased;stride length decreased  -LM     Bilateral Gait Deviations heel strike decreased  -LM     Comment, (Gait/Stairs) Pt ambulates at a slower pace with a short, choppy gait.  Mild instability noted throughout, but no overt LOB.  -LM               User Key  (r) = Recorded By, (t) = Taken By, (c) = Cosigned By      Initials Name Provider Type    LM Maryuri Carson, PT Physical Therapist                   Obj/Interventions       Row Name 08/10/23 1456          Range of Motion Comprehensive    General Range of Motion bilateral lower extremity ROM WFL  -LM       Row Name 08/10/23 1456          Strength Comprehensive (MMT)    General Manual Muscle Testing (MMT) Assessment lower extremity strength deficits identified  -LM     Comment, General Manual Muscle Testing (MMT) Assessment BLEs - Hip flex - 4-/5;  Knee flex/ext - 4/5;  Ankle DF - 4/5  -LM       Row Name 08/10/23 1456          Balance    Balance Assessment sitting static balance;standing static balance;standing dynamic balance  -LM     Static Sitting Balance standby assist  -LM     Position, Sitting Balance unsupported  -LM     Static Standing Balance minimal assist;1-person assist  -LM     Dynamic Standing Balance minimal assist;1-person assist  -LM     Position/Device Used, Standing Balance supported;walker, front-wheeled  -LM       Row Name 08/10/23 1456          Sensory Assessment (Somatosensory)    Sensory Assessment (Somatosensory) LE sensation intact  -LM               User Key  (r) = Recorded By, (t) = Taken By, (c) = Cosigned By      Initials Name Provider Type    LM Maryuri Carson, PT Physical Therapist                   Goals/Plan       Row Name 08/10/23 1459          Bed Mobility Goal 1 (PT)    Activity/Assistive Device (Bed Mobility Goal 1, PT) sit to supine/supine to sit  -LM     [place] Level/Cues Needed (Bed Mobility Goal 1, PT) standby assist  -LM      Time Frame (Bed Mobility Goal 1, PT) long term goal (LTG);10 days  -LM       Row Name 08/10/23 1459          Transfer Goal 1 (PT)    Activity/Assistive Device (Transfer Goal 1, PT) bed-to-chair/chair-to-bed  -LM     [place] Level/Cues Needed (Transfer Goal 1, PT) standby assist  -LM     Time Frame (Transfer Goal 1, PT) long term goal (LTG);10 days  -LM       Row Name 08/10/23 1459          Gait Training Goal 1 (PT)    Activity/Assistive Device (Gait Training Goal 1, PT) gait (walking locomotion);assistive device use  -LM     [place] Level (Gait Training Goal 1, PT) standby assist  -LM     Distance (Gait Training Goal 1, PT) 150 feet  -LM     Time Frame (Gait Training Goal 1, PT) long term goal (LTG);10 days  -LM       Row Name 08/10/23 1459          Therapy Assessment/Plan (PT)    Planned Therapy Interventions (PT) balance training;bed mobility training;gait training;home exercise program;motor coordination training;neuromuscular re-education;patient/family education;postural re-education;ROM (range of motion);stair training;strengthening;stretching;transfer training  -               User Key  (r) = Recorded By, (t) = Taken By, (c) = Cosigned By      Initials Name Provider Type    LM Maryuri Carson, PT Physical Therapist                   Clinical Impression       Row Name 08/10/23 1457          Pain    Pretreatment Pain Rating 0/10 - no pain  -LM     Posttreatment Pain Rating 0/10 - no pain  -LM       Row Name 08/10/23 1457          Plan of Care Review    Plan of Care Reviewed With patient;daughter  -LM     Outcome Evaluation PT evaluation completed.  Pt required MinAx1 with all mobility including ambulating 30 feet using rolling walker.  Pt presents below baseline function d/t weakness, decreased activity tolerance, and gait instability.  Recommend SNF for further therapy at d/c.  -       Row Name 08/10/23 1450          Therapy Assessment/Plan (PT)    Rehab Potential (PT) good, to achieve stated  therapy goals  -LM     Criteria for Skilled Interventions Met (PT) yes;meets criteria;skilled treatment is necessary  -LM     Therapy Frequency (PT) daily  -LM       Row Name 08/10/23 1457          Vital Signs    Pre Systolic BP Rehab 133  -LM     Pre Treatment Diastolic BP 54  -LM     Pretreatment Heart Rate (beats/min) 63  -LM     Posttreatment Heart Rate (beats/min) 65  -LM     Pre SpO2 (%) 94  -LM     O2 Delivery Pre Treatment supplemental O2  -LM     Intra SpO2 (%) 88  -LM     O2 Delivery Intra Treatment room air  -LM     Post SpO2 (%) 93  -LM     O2 Delivery Post Treatment supplemental O2  -LM     Pre Patient Position Supine  -LM     Intra Patient Position Sitting  -LM     Post Patient Position Sitting  -LM       Row Name 08/10/23 1457          Positioning and Restraints    Pre-Treatment Position in bed  -LM     Post Treatment Position chair  -LM     In Chair reclined;call light within reach;encouraged to call for assist;exit alarm on;waffle cushion;with family/caregiver;with OT;notified nsg  -LM               User Key  (r) = Recorded By, (t) = Taken By, (c) = Cosigned By      Initials Name Provider Type    LM Maryuri Carson, PT Physical Therapist                   Outcome Measures       Row Name 08/10/23 1500 08/10/23 0712       How much help from another person do you currently need...    Turning from your back to your side while in flat bed without using bedrails? 3  -LM 2  -MG    Moving from lying on back to sitting on the side of a flat bed without bedrails? 2  -LM 2  -MG    Moving to and from a bed to a chair (including a wheelchair)? 3  -LM 2  -MG    Standing up from a chair using your arms (e.g., wheelchair, bedside chair)? 3  -LM 2  -MG    Climbing 3-5 steps with a railing? 2  -LM 2  -MG    To walk in hospital room? 3  -LM 2  -MG    AM-PAC 6 Clicks Score (PT) 16  -LM 12  -MG    Highest level of mobility 5 --> Static standing  -LM 4 --> Transferred to chair/commode  -MG      Row Name 08/10/23 1500           Functional Assessment    Outcome Measure Options AM-PAC 6 Clicks Basic Mobility (PT)  -               User Key  (r) = Recorded By, (t) = Taken By, (c) = Cosigned By      Initials Name Provider Type     Maryuri Carson PT Physical Therapist    Christal Woodson RN Registered Nurse                                 Physical Therapy Education       Title: PT OT SLP Therapies (In Progress)       Topic: Physical Therapy (In Progress)       Point: Mobility training (Done)       Learning Progress Summary             Patient Acceptance, E, VU,NR by  at 8/10/2023 1500                         Point: Home exercise program (Not Started)       Learner Progress:  Not documented in this visit.              Point: Precautions (Done)       Learning Progress Summary             Patient Acceptance, E, VU,NR by  at 8/10/2023 1500                                         User Key       Initials Effective Dates Name Provider Type Discipline     07/11/23 -  Maryuri Carson PT Physical Therapist PT                  PT Recommendation and Plan  Planned Therapy Interventions (PT): balance training, bed mobility training, gait training, home exercise program, motor coordination training, neuromuscular re-education, patient/family education, postural re-education, ROM (range of motion), stair training, strengthening, stretching, transfer training  Plan of Care Reviewed With: patient, daughter  Outcome Evaluation: PT evaluation completed.  Pt required MinAx1 with all mobility including ambulating 30 feet using rolling walker.  Pt presents below baseline function d/t weakness, decreased activity tolerance, and gait instability.  Recommend SNF for further therapy at d/c.     Time Calculation:   PT Evaluation Complexity  History, PT Evaluation Complexity: 3 or more personal factors and/or comorbidities  Examination of Body Systems (PT Eval Complexity): total of 3 or more elements  Clinical Presentation (PT Evaluation Complexity):  evolving  Clinical Decision Making (PT Evaluation Complexity): moderate complexity  Overall Complexity (PT Evaluation Complexity): moderate complexity     PT Charges       Row Name 08/10/23 1501             Time Calculation    Start Time 1358  -LM      PT Received On 08/10/23  -LM      PT Goal Re-Cert Due Date 08/20/23  -LM         Untimed Charges    PT Eval/Re-eval Minutes 50  -LM         Total Minutes    Untimed Charges Total Minutes 50  -LM       Total Minutes 50  -LM                User Key  (r) = Recorded By, (t) = Taken By, (c) = Cosigned By      Initials Name Provider Type    LM Maryuri Carson, PT Physical Therapist                  Therapy Charges for Today       Code Description Service Date Service Provider Modifiers Qty    [id number] HC PT EVAL MOD COMPLEXITY 4 8/10/2023 Maryuri Carson, PT GP 1            PT G-Codes  Outcome Measure Options: AM-PAC 6 Clicks Basic Mobility (PT)  AM-PAC 6 Clicks Score (PT): 16  PT Discharge Summary  Anticipated Discharge Disposition (PT): skilled nursing facility    Maryuri Carson PT  8/10/2023

## 2023-08-10 NOTE — CONSULTS
[facility] Cardiology  Initial Consult Note      Patient Identification:  Miryam Mckeon        77 y.o.        female  1945  [id number]       Date of Consultation:  08/10/23    Reason for Consultation:  heart failure    PCP: Rigoberto Chamberlain MD  Primary cardiologist: Vito Cuba MD  Referring physician: Naa North MD    History of Present Illness:     Miryam Mckeon is a 77 y.o. with a history of persistent atrial fibrillation, sick sinus syndrome with pacemaker, hypertension, aortic stenosis status post TAVR July 2022, diastolic heart failure with mildly reduced ejection fraction, kidney disease, history of stroke/TIA who presented to the emergency department last night with declining functional status.  Interview was conducted with the assistance of an  and the daughter was at bedside.  They report that for a period of at least 2 to 3 weeks the patient has been having increased weakness and fatigue at home.  She has poor appetite and poor oral intake.  Additionally, she has had multiple falls at home without any significant injuries.  The patient denies any chest pain or dyspnea and while she denies any orthopnea she does sleep propped up on a wedge at home chronically.  She has been overall losing weight and denies any short-term weight gain but has had some increased swelling in her legs.    Past History:  Past Medical History:   Diagnosis Date    Anxiety     Aortic valve stenosis     Atrial fibrillation     Borderline diabetes     ???    Carotid artery stenosis     CKD (chronic kidney disease)     Deaf     GERD (gastroesophageal reflux disease)     Heart murmur     Hyperlipidemia     Hypertension     Irregular heartbeat     PONV (postoperative nausea and vomiting)     Stroke     TIA (transient ischemic attack)      Past Surgical History:   Procedure Laterality Date    AORTIC VALVE REPAIR/REPLACEMENT N/A 7/20/2022    Procedure: TRANSCATHETER  AORTIC VALVE REPLACEMENT with angioplasty and stent to the right common and external iliac artery;  Surgeon: Bola Whitaker MD;  Location: [facility];  Service: Cardiothoracic;  Laterality: N/A;  FL-15 MIN 12 SEC  DOSE- 92.41  CONTRAST- 120 ML ISOVUE    AORTIC VALVE REPAIR/REPLACEMENT N/A 7/20/2022    Procedure: Transcatheter Aortic Valve Replacement;  Surgeon: Yashira Chow MD;  Location: [facility];  Service: Cardiovascular;  Laterality: N/A;    CARDIAC CATHETERIZATION      05/20/2022 per dr. chow    CARDIAC CATHETERIZATION Left 5/20/2022    Procedure: Left Heart Cath;  Surgeon: Yashira Chow MD;  Location: [facility] CATH INVASIVE LOCATION;  Service: Cardiology;  Laterality: Left;    CARDIAC ELECTROPHYSIOLOGY PROCEDURE N/A 04/13/2022    Procedure: DDD PPM Implant (BSC), DNS Eliquis;  Surgeon: Troy Hussein DO;  Location: [facility] EP INVASIVE LOCATION;  Service: Cardiology;  Laterality: N/A;    CAROTID ENDARTERECTOMY Bilateral     CATARACT EXTRACTION      CHOLECYSTECTOMY      COLONOSCOPY      FEMORAL ARTERY CUTDOWN Right 7/20/2022    Procedure: FEMORAL ARTERY CUTDOWN, ANGIOGRAM;  Surgeon: Bola Whitaker MD;  Location: [facility];  Service: Vascular;  Laterality: Right;  fl-1 m 12 sec  dose- 47.29 mgy  contrast- 50 ml isovue    KNEE SURGERY Right     PACEMAKER IMPLANTATION      JF      05/20/2022 per dr. chow    TRANSESOPHAGEAL ECHOCARDIOGRAM (JF) N/A 7/20/2022    Procedure: TRANSESOPHAGEAL ECHOCARDIOGRAM PER ANESTHESIA;  Surgeon: Bola Whitaekr MD;  Location: [facility];  Service: Cardiothoracic;  Laterality: N/A;     No Known Allergies  Social History     Socioeconomic History    Marital status:     Number of children: 2    Highest education level: High school graduate   Tobacco Use    Smoking status: Never    Smokeless tobacco: Never   Vaping Use    Vaping Use: Never used   Substance and Sexual Activity    Alcohol use: Never     Drug use: Never    Sexual activity: Defer     Family History   Problem Relation Age of Onset    Other Mother         enlarged heart    Stroke Father      Medications:  Medications Prior to Admission   Medication Sig Dispense Refill Last Dose    amiodarone (PACERONE) 200 MG tablet Take 1 tablet by mouth 3 (Three) Times a Day. Take 1 tablet 3 times daily for 14 days then 1 tablet twice daily for 14 days then 1 tablet daily 60 tablet 3     apixaban (ELIQUIS) 2.5 MG tablet tablet Take 1 tablet by mouth Every 12 (Twelve) Hours. DO NOT RESUME TAKING UNTIL 7/24 180 tablet 1     aspirin 81 MG chewable tablet Chew 1 tablet Daily.       atorvastatin (LIPITOR) 10 MG tablet Take 1 tablet by mouth Every Night.       cholecalciferol (VITAMIN D3) 25 MCG (1000 UT) tablet Take 1 tablet by mouth Daily.       dilTIAZem CD (Cardizem CD) 300 MG 24 hr capsule Take 1 capsule by mouth Daily. 90 capsule 3     furosemide (LASIX) 40 MG tablet Take 1 tablet by mouth Daily. 90 tablet 3     metoprolol tartrate (LOPRESSOR) 100 MG tablet Take 1 tablet by mouth 2 (Two) Times a Day. 60 tablet 11     ondansetron (ZOFRAN) 4 MG tablet Take 1 tablet by mouth Every 8 (Eight) Hours As Needed for Nausea. 90 tablet 0     vitamin B-12 (CYANOCOBALAMIN) 1000 MCG tablet Take 5 tablets by mouth Daily.       acetaminophen (TYLENOL) 650 MG 8 hr tablet Take 1 tablet by mouth Every 8 (Eight) Hours As Needed for Mild Pain.       latanoprost (XALATAN) 0.005 % ophthalmic solution INSTILL 1 DROP IN BOTH EYES EVERY NIGHT AT BEDTIME       Polyethylene Glycol 3350 (MIRALAX PO) Take 1 Scoop by mouth As Needed.       simethicone (MYLICON) 125 MG chewable tablet Chew 1 tablet Every 6 (Six) Hours As Needed for Flatulence. PRN        Current medications:  albuterol sulfate HFA, 2 puff, Inhalation, 4x Daily - RT  amiodarone, 200 mg, Oral, Q12H  apixaban, 2.5 mg, Oral, Q12H  aspirin, 81 mg, Oral, Daily  atorvastatin, 10 mg, Oral, Nightly  cholecalciferol, 1,000 Units, Oral,  Daily  [START ON 8/11/2023] dexAMETHasone, 6 mg, Intravenous, Daily  dilTIAZem CD, 300 mg, Oral, Daily  latanoprost, 1 drop, Both Eyes, Nightly  magnesium oxide, 400 mg, Oral, BID  magnesium sulfate, 1 g, Intravenous, Q1H  metoprolol tartrate, 100 mg, Oral, BID  potassium chloride, 40 mEq, Oral, Once  senna-docusate sodium, 2 tablet, Oral, BID  sodium chloride, 10 mL, Intravenous, Q12H  vitamin B-12, 1,000 mcg, Oral, Daily      Current IV drips:       Review of Systems   Constitutional: Positive for decreased appetite, malaise/fatigue and weight loss.   Cardiovascular:  Positive for leg swelling. Negative for chest pain, orthopnea, palpitations and syncope.   Musculoskeletal:  Positive for falls.     Physical exam:  Temp:  [97.4 øF (36.3 øC)-98.8 øF (37.1 øC)] 97.9 øF (36.6 øC)  Heart Rate:  [] 95  Resp:  [16-18] 18  BP: (139-181)/() 155/86  Flow (L/min):  [2] 2  Body mass index is 21.35 kg/mý.    Gen: well developed, older white female, lying in bed  HEENT: MMM, sclerae anicteric, conjunctivae normal, no JVD  CV: regular rate, irregularly irregular, 2 out of 6 systolic ejection murmur at right upper sternal border, normal S1, S2. 2+ radial and DP pulses  Pulm: RA, normal work of breathing, no wheezes, rales, rhonchi  Abd: soft, nontender, nondistended  Ext: normal bulk for age, normal tone, trace dependent edema, normal skin turgor  Neuro: alert, oriented, face symmetrical, moving all extremities well  Psych: normal mood, appropriate affect    Cardiac Testing:    ECG  (personally reviewed) atrial fibrillation with ventricular pacing, left axis deviation    Telemetry:  (personally reviewed) atrial fibrillation with ventricular pacing    ECHO:   Results for orders placed in visit on 07/19/23    Adult Transthoracic Echo Complete W/ Cont if Necessary Per Protocol    Interpretation Summary    Left ventricular systolic function is low normal. Calculated left ventricular EF = 46% Left ventricular ejection  fraction appears to be 46 - 50%.    Left ventricular diastolic function is consistent with (grade III w/high LAP) reversible restrictive pattern. with increase LAP E/E` 21    The left atrial cavity is severely dilated.    The right atrial cavity is dilated.    Aortic valve area is 1 cm2. AVI is present with normal function    Peak velocity of the flow distal to the aortic valve is 187.5 cm/s. Aortic valve maximum pressure gradient is 14 mmHg. Aortic valve mean pressure gradient is 7 mmHg.    Moderate mitral valve regurgitation is present.    The mitral valve area (PHT) is 3 cm2.    Moderate tricuspid valve regurgitation is present.    Estimated right ventricular systolic pressure from tricuspid regurgitation is markedly elevated (>55 mmHg).    The aortic root measures 2 cm.    No pericardial effusion    Pacer is seen in the RA  and RV    Catheterization:    5/20/22  FINAL IMPRESSION:  Minor coronary artery disease in the RCA with no disease in the LAD or circumflex vessels.  Severe aortic stenosis    Lab Review:    Lab Results   Component Value Date    WBC 5.31 08/10/2023    HGB 14.0 08/10/2023    HCT 42.3 08/10/2023    MCV 94.8 08/10/2023     (L) 08/10/2023     Lab Results   Component Value Date    GLUCOSE 104 (H) 08/10/2023    BUN 28 (H) 08/10/2023    CREATININE 1.45 (H) 08/10/2023    EGFRIFNONA 41 (L) 02/18/2022    EGFRIFAFRI 47 (L) 02/18/2022    BCR 19.3 08/10/2023    K 3.1 (L) 08/10/2023    CO2 30.0 (H) 08/10/2023    CALCIUM 8.3 (L) 08/10/2023    PROTENTOTREF 6.5 07/19/2023    ALBUMIN 2.9 (L) 08/10/2023    LABIL2 1.7 07/19/2023    AST 34 (H) 08/10/2023    ALT 25 08/10/2023     Lab Results   Component Value Date    TROPONINT 29 (H) 08/09/2023    TROPONINT 35 (H) 08/09/2023     Lab Results   Component Value Date    PROBNP 58,397.0 (H) 08/09/2023     Lab Results   Component Value Date    HGBA1C 6.20 (H) 08/10/2023      Lab Results   Component Value Date    CHOL 93 08/10/2023    CHLPL 112 02/18/2022    TRIG  99 08/10/2023    HDL 37 (L) 08/10/2023    LDL 37 08/10/2023     Imaging:  All pertinent imaging studies were personally reviewed.    Assessment & Plan    Acute HFrEF (heart failure with reduced ejection fraction)    Bilateral carotid artery stenosis    Aortic stenosis, severe s/p TAVR (7/20/2022)    Longstanding persistent atrial fibrillation    Sick sinus syndrome    Hyperlipidemia LDL goal <70    Hypertension    Chronic combined systolic and diastolic congestive heart failure    Status post CVA    Presence of cardiac pacemaker secondary to sick sinus syndrome    Chronic kidney disease, stage 3b    Chronic nausea    Pulmonary hypertension    Chronic anticoagulation    Weakness    Hypokalemia    Hypoalbuminemia    COVID-19 virus detected    Hypoxia    Pleural effusion    Acute on chronic diastolic heart failure with mildly reduced ejection fraction  Aortic stenosis status post TAVR   - Appears euvolemic on exam today with no JVD and minimal peripheral edema.   - Will defer any repeat diuretic dosing today   - Awaiting results of limited echocardiogram, will check IVC to estimate RAP to guide further diuretics    Persistent atrial fibrillation  Hx SSS with PM   - Continue anticoagulation with apixaban   - Amiodarone loading, follows with Dr. Hussein    Thank you for allowing us to share in the care of Miryam Sebastian MD  08/10/23   09:58 EDT

## 2023-08-11 PROBLEM — U07.1 COVID-19: Status: ACTIVE | Noted: 2023-08-11

## 2023-08-11 LAB
ALBUMIN SERPL-MCNC: 2.8 G/DL (ref 3.5–5.2)
ALBUMIN/GLOB SERPL: 1.2 G/DL
ALP SERPL-CCNC: 149 U/L (ref 39–117)
ALT SERPL W P-5'-P-CCNC: 36 U/L (ref 1–33)
ANION GAP SERPL CALCULATED.3IONS-SCNC: 7 MMOL/L (ref 5–15)
AST SERPL-CCNC: 50 U/L (ref 1–32)
BH CV ECHO MEAS - EDV(MOD-SP2): 73.3 ML
BH CV ECHO MEAS - EDV(MOD-SP4): 52.7 ML
BH CV ECHO MEAS - EF(MOD-BP): 35.5 %
BH CV ECHO MEAS - EF(MOD-SP2): 37.5 %
BH CV ECHO MEAS - EF(MOD-SP4): 28.5 %
BH CV ECHO MEAS - ESV(MOD-SP2): 45.8 ML
BH CV ECHO MEAS - ESV(MOD-SP4): 37.7 ML
BH CV ECHO MEAS - SV(MOD-SP2): 27.5 ML
BH CV ECHO MEAS - SV(MOD-SP4): 15 ML
BH CV VAS BP LEFT ARM: NORMAL MMHG
BILIRUB SERPL-MCNC: 0.5 MG/DL (ref 0–1.2)
BUN SERPL-MCNC: 42 MG/DL (ref 8–23)
BUN/CREAT SERPL: 27.6 (ref 7–25)
CALCIUM SPEC-SCNC: 8.8 MG/DL (ref 8.6–10.5)
CHLORIDE SERPL-SCNC: 95 MMOL/L (ref 98–107)
CO2 SERPL-SCNC: 35 MMOL/L (ref 22–29)
CREAT SERPL-MCNC: 1.52 MG/DL (ref 0.57–1)
EGFRCR SERPLBLD CKD-EPI 2021: 34.7 ML/MIN/1.73
GLOBULIN UR ELPH-MCNC: 2.4 GM/DL
GLUCOSE SERPL-MCNC: 128 MG/DL (ref 65–99)
MAGNESIUM SERPL-MCNC: 2.3 MG/DL (ref 1.6–2.4)
POTASSIUM SERPL-SCNC: 4.1 MMOL/L (ref 3.5–5.2)
PROT SERPL-MCNC: 5.2 G/DL (ref 6–8.5)
SODIUM SERPL-SCNC: 137 MMOL/L (ref 136–145)

## 2023-08-11 PROCEDURE — 97530 THERAPEUTIC ACTIVITIES: CPT

## 2023-08-11 PROCEDURE — 25010000002 ONDANSETRON PER 1 MG: Performed by: INTERNAL MEDICINE

## 2023-08-11 PROCEDURE — 99232 SBSQ HOSP IP/OBS MODERATE 35: CPT | Performed by: FAMILY MEDICINE

## 2023-08-11 PROCEDURE — 97110 THERAPEUTIC EXERCISES: CPT

## 2023-08-11 PROCEDURE — 94761 N-INVAS EAR/PLS OXIMETRY MLT: CPT

## 2023-08-11 PROCEDURE — 94664 DEMO&/EVAL PT USE INHALER: CPT

## 2023-08-11 PROCEDURE — 83735 ASSAY OF MAGNESIUM: CPT | Performed by: FAMILY MEDICINE

## 2023-08-11 PROCEDURE — 80053 COMPREHEN METABOLIC PANEL: CPT | Performed by: INTERNAL MEDICINE

## 2023-08-11 PROCEDURE — 25010000002 DEXAMETHASONE PER 1 MG: Performed by: INTERNAL MEDICINE

## 2023-08-11 PROCEDURE — 99232 SBSQ HOSP IP/OBS MODERATE 35: CPT | Performed by: INTERNAL MEDICINE

## 2023-08-11 PROCEDURE — 94799 UNLISTED PULMONARY SVC/PX: CPT

## 2023-08-11 RX ORDER — METOPROLOL SUCCINATE 50 MG/1
100 TABLET, EXTENDED RELEASE ORAL
Status: DISCONTINUED | OUTPATIENT
Start: 2023-08-12 | End: 2023-08-20

## 2023-08-11 RX ORDER — VALSARTAN 80 MG/1
40 TABLET ORAL
Status: DISCONTINUED | OUTPATIENT
Start: 2023-08-11 | End: 2023-08-14

## 2023-08-11 RX ORDER — AMIODARONE HYDROCHLORIDE 200 MG/1
200 TABLET ORAL DAILY
COMMUNITY
End: 2023-08-21 | Stop reason: HOSPADM

## 2023-08-11 RX ADMIN — DILTIAZEM HYDROCHLORIDE 300 MG: 180 CAPSULE, COATED, EXTENDED RELEASE ORAL at 09:43

## 2023-08-11 RX ADMIN — SENNOSIDES AND DOCUSATE SODIUM 2 TABLET: 50; 8.6 TABLET ORAL at 12:11

## 2023-08-11 RX ADMIN — AMIODARONE HYDROCHLORIDE 200 MG: 200 TABLET ORAL at 09:43

## 2023-08-11 RX ADMIN — AMIODARONE HYDROCHLORIDE 200 MG: 200 TABLET ORAL at 20:49

## 2023-08-11 RX ADMIN — SENNOSIDES AND DOCUSATE SODIUM 2 TABLET: 50; 8.6 TABLET ORAL at 20:49

## 2023-08-11 RX ADMIN — VALSARTAN 40 MG: 80 TABLET, FILM COATED ORAL at 09:44

## 2023-08-11 RX ADMIN — DEXAMETHASONE SODIUM PHOSPHATE 6 MG: 4 INJECTION INTRA-ARTICULAR; INTRALESIONAL; INTRAMUSCULAR; INTRAVENOUS; SOFT TISSUE at 09:44

## 2023-08-11 RX ADMIN — Medication 10 ML: at 20:50

## 2023-08-11 RX ADMIN — ALBUTEROL SULFATE 2 PUFF: 90 AEROSOL, METERED RESPIRATORY (INHALATION) at 13:17

## 2023-08-11 RX ADMIN — APIXABAN 2.5 MG: 2.5 TABLET, FILM COATED ORAL at 20:49

## 2023-08-11 RX ADMIN — Medication 1000 UNITS: at 09:44

## 2023-08-11 RX ADMIN — BISACODYL 10 MG: 10 SUPPOSITORY RECTAL at 17:36

## 2023-08-11 RX ADMIN — ASPIRIN 81 MG: 81 TABLET, CHEWABLE ORAL at 09:44

## 2023-08-11 RX ADMIN — CYANOCOBALAMIN TAB 1000 MCG 1000 MCG: 1000 TAB at 09:44

## 2023-08-11 RX ADMIN — APIXABAN 2.5 MG: 2.5 TABLET, FILM COATED ORAL at 09:43

## 2023-08-11 RX ADMIN — METOPROLOL TARTRATE 100 MG: 100 TABLET, FILM COATED ORAL at 20:49

## 2023-08-11 RX ADMIN — ONDANSETRON 4 MG: 2 INJECTION INTRAMUSCULAR; INTRAVENOUS at 09:57

## 2023-08-11 RX ADMIN — ATORVASTATIN CALCIUM 10 MG: 10 TABLET, FILM COATED ORAL at 20:49

## 2023-08-11 RX ADMIN — ALBUTEROL SULFATE 2 PUFF: 90 AEROSOL, METERED RESPIRATORY (INHALATION) at 07:29

## 2023-08-11 RX ADMIN — ALBUTEROL SULFATE 2 PUFF: 90 AEROSOL, METERED RESPIRATORY (INHALATION) at 16:23

## 2023-08-11 RX ADMIN — METOPROLOL TARTRATE 100 MG: 100 TABLET, FILM COATED ORAL at 09:44

## 2023-08-11 RX ADMIN — ALBUTEROL SULFATE 2 PUFF: 90 AEROSOL, METERED RESPIRATORY (INHALATION) at 20:56

## 2023-08-11 NOTE — PLAN OF CARE
Problem: Fall Injury Risk  Goal: Absence of Fall and Fall-Related Injury  Outcome: Ongoing, Progressing  Intervention: Identify and Manage Contributors  Recent Flowsheet Documentation  Taken 8/11/2023 0400 by Efren Dennison RN  Medication Review/Management: medications reviewed  Taken 8/11/2023 0000 by Efren Dennison RN  Medication Review/Management: medications reviewed  Taken 8/10/2023 2200 by Efren Dennison, RN  Medication Review/Management: medications reviewed  Taken 8/10/2023 2000 by Efren Dennison RN  Medication Review/Management: medications reviewed  Intervention: Promote Injury-Free Environment  Recent Flowsheet Documentation  Taken 8/11/2023 0605 by Efren Dennison RN  Safety Promotion/Fall Prevention:   activity supervised   assistive device/personal items within reach   clutter free environment maintained   toileting scheduled   safety round/check completed   room organization consistent   nonskid shoes/slippers when out of bed  Taken 8/11/2023 0400 by Efren Dennison RN  Safety Promotion/Fall Prevention:   assistive device/personal items within reach   activity supervised   clutter free environment maintained   toileting scheduled   safety round/check completed   room organization consistent   nonskid shoes/slippers when out of bed  Taken 8/11/2023 0200 by Efren Dennison RN  Safety Promotion/Fall Prevention:   activity supervised   assistive device/personal items within reach   clutter free environment maintained   toileting scheduled   safety round/check completed   room organization consistent   nonskid shoes/slippers when out of bed  Taken 8/11/2023 0000 by Efren Dennison RN  Safety Promotion/Fall Prevention:   activity supervised   assistive device/personal items within reach   clutter free environment maintained   toileting scheduled   safety round/check completed   room organization consistent   nonskid shoes/slippers when out of bed  Taken 8/10/2023 2200 by  Jesi, Efren, RN  Safety Promotion/Fall Prevention:   activity supervised   assistive device/personal items within reach   clutter free environment maintained   toileting scheduled   safety round/check completed   room organization consistent   nonskid shoes/slippers when out of bed  Taken 8/10/2023 2000 by Efren Dennison RN  Safety Promotion/Fall Prevention:   activity supervised   assistive device/personal items within reach   clutter free environment maintained   room organization consistent   safety round/check completed   toileting scheduled   nonskid shoes/slippers when out of bed     Problem: Skin Injury Risk Increased  Goal: Skin Health and Integrity  Outcome: Ongoing, Progressing  Intervention: Optimize Skin Protection  Recent Flowsheet Documentation  Taken 8/11/2023 0605 by Efren Dennison RN  Head of Bed (HOB) Positioning: HOB elevated  Taken 8/11/2023 0400 by Efren Dennison RN  Head of Bed (HOB) Positioning: HOB elevated  Taken 8/11/2023 0200 by Efren Dennison RN  Head of Bed (HOB) Positioning: HOB elevated  Taken 8/11/2023 0000 by Efren Dennison RN  Head of Bed (HOB) Positioning: HOB elevated  Taken 8/10/2023 2200 by Efren Dennison RN  Head of Bed (HOB) Positioning: HOB elevated  Taken 8/10/2023 2000 by Efren Dennison RN  Head of Bed (HOB) Positioning: HOB elevated     Problem: Adult Inpatient Plan of Care  Goal: Plan of Care Review  Outcome: Ongoing, Progressing   Goal Outcome Evaluation:      Pt. resting in bed. Pt. refused AM labs. On 1L NC. Paced on tele. No complaints at this time.

## 2023-08-11 NOTE — PROGRESS NOTES
[facility] Medicine Services  PROGRESS NOTE    Patient Name: Miryam Mckeon  : 1945  MRN: [id number]    Date of Admission: 2023  Primary Care Physician: Rigoberto Chamberlain MD    Subjective   Subjective     CC:  Weakness, COVID-positive    HPI:  8/10 - Patient is a 77-year-old who was admitted with generalized weakness, heart failure with exacerbation and found to be positive for COVID-19.  Of note the patient is deaf and  assisted with interview.  Patient's daughter is also at bedside.  Discussed COVID-19 diagnosis and she is currently on 2 L nasal cannula with O2 sats 93 to 97%.  On room air the patient dropped to 87%.  Given this I recommended she consider remdesivir and/or treatment with steroids however she does not want any treatment for COVID at this time.  The patient and her daughter are adamant about this.  They state reasons for this is that she does not do well with medications and is very sensitive.  Patient's daughter notes she has had a gradual decline over the last several months.  She is increasingly weak.  She would like her checked out thoroughly given recent heart surgery.  Cardiology was consulted.  Echo is pending.     - Pt states she feels her breathing is normal but has not been out of bed yet. Still on 1-2L NC. [place] po but still has decreased appetite. Willing to try chocolate boost. Willing to go to rehab but somewhat reluctant. Daughter is encouraging. Echo report was still pending but cardiology at bedside to discuss with pt and her daughter. She refused labs this am but now agreeable after explaining the reason to recheck the K and Mag.     ROS:  Gen- No fevers, chills  CV- No chest pain, palpitations  Resp-positive dry cough, reports dyspnea that comes and goes but none at present  GI- No N/V/D, abd pain: Positive decreased appetite       Objective   Objective     Vital Signs:   Temp:  [97 øF (36.1 øC)-97.6 øF (36.4 øC)] 97 øF  (36.1 øC)  Heart Rate:  [61-88] 83  Resp:  [16-20] 16  BP: (133-162)/(54-87) 146/80  Flow (L/min):  [1] 1     Physical Exam:  Constitutional: No acute distress, awake, alert  HENT: NCAT, mucous membranes moist; deaf  Respiratory: Decreased breath sounds bilaterally, respiratory effort normal   Cardiovascular: RRR, positive murmur  Gastrointestinal: Positive bowel sounds, soft, nontender, nondistended  Musculoskeletal: Generally weak  Psychiatric: Appropriate affect, cooperative  Neurologic: Sitting in bed and communicates via  and her daughter   skin: No rashes      Results Reviewed:  LAB RESULTS:      Lab 08/10/23  0510 08/09/23  2056 08/09/23  2015 08/09/23  1732   WBC 5.31  --   --  5.23   HEMOGLOBIN 14.0  --   --  14.9   HEMATOCRIT 42.3  --   --  46.1   PLATELETS 124*  --   --  144   NEUTROS ABS 4.64  --   --  4.06   IMMATURE GRANS (ABS) 0.06*  --   --  0.04   LYMPHS ABS 0.47*  --   --  0.68*   MONOS ABS 0.12  --   --  0.43   EOS ABS 0.00  --   --  0.00   MCV 94.8  --   --  96.0   CRP  --  <0.30  --   --    PROCALCITONIN  --  0.13  --   --    LACTATE  --   --   --  1.1   LDH  --  295*  --   --    D DIMER QUANT  --   --  0.88*  --          Lab 08/10/23  0510 08/09/23  2056 08/09/23  1809   SODIUM 137  --  136   POTASSIUM 3.1*  --  3.4*   CHLORIDE 92*  --  92*   CO2 30.0*  --  30.0*   ANION GAP 15.0  --  14.0   BUN 28*  --  31*   CREATININE 1.45*  --  1.49*   EGFR 36.8*  --  35.6*   GLUCOSE 104*  --  110*   CALCIUM 8.3*  --  9.2   MAGNESIUM 1.5* 1.6  --    HEMOGLOBIN A1C 6.20*  --   --    TSH  --  6.110*  --          Lab 08/10/23  0510 08/09/23  1809   TOTAL PROTEIN 5.1* 6.6   ALBUMIN 2.9* 3.1*   GLOBULIN 2.2 3.5   ALT (SGPT) 25 27   AST (SGOT) 34* 39*   BILIRUBIN 0.9 1.1   ALK PHOS 144* 170*   LIPASE  --  15         Lab 08/09/23 2056 08/09/23 1809   PROBNP  --  58,397.0*   HSTROP T 29* 35*         Lab 08/10/23  0510   CHOLESTEROL 93   LDL CHOL 37   HDL CHOL 37*   TRIGLYCERIDES 99         Lab  08/09/23 2056   FERRITIN 496.30*         Brief Urine Lab Results  (Last result in the past 365 days)        Color   Clarity   Blood   Leuk Est   Nitrite   Protein   CREAT   Urine HCG        08/09/23 1806 Yellow   Clear   Small (1+)   Negative   Negative   >=300 mg/dL (3+)                   Microbiology Results Abnormal       None            XR Shoulder 2+ View Left    Result Date: 8/9/2023  XR FOOT 3+ VW RIGHT, XR ANKLE 3+ VW RIGHT, XR SHOULDER 2+ VW LEFT, XR ELBOW 2 VW LEFT Date of Exam: 8/9/2023 5:08 PM EDT Indication: fall/lateral pain Comparison: None available. Findings: The right ankle and foot demonstrate diffuse demineralization, otherwise without evidence of acute fracture or dislocation. Cortical margins are intact and alignment is maintained. Cortical margins of the left shoulder are intact, with some mild arthrosis changes present with acromioclavicular narrowing and sclerosis. Alignment is maintained. Evaluation of the left elbow demonstrates no evidence of fracture, malalignment or significant joint effusion.     Impression: Impression: No acute fracture or dislocation of the imaged right lower extremity and left upper extremity. Electronically Signed: Randell Solomon  8/9/2023 5:59 PM EDT  Workstation ID: [id number]    XR ELBOW 2 VIEW LEFT    Result Date: 8/9/2023  XR FOOT 3+ VW RIGHT, XR ANKLE 3+ VW RIGHT, XR SHOULDER 2+ VW LEFT, XR ELBOW 2 VW LEFT Date of Exam: 8/9/2023 5:08 PM EDT Indication: fall/lateral pain Comparison: None available. Findings: The right ankle and foot demonstrate diffuse demineralization, otherwise without evidence of acute fracture or dislocation. Cortical margins are intact and alignment is maintained. Cortical margins of the left shoulder are intact, with some mild arthrosis changes present with acromioclavicular narrowing and sclerosis. Alignment is maintained. Evaluation of the left elbow demonstrates no evidence of fracture, malalignment or significant joint effusion.      Impression: Impression: No acute fracture or dislocation of the imaged right lower extremity and left upper extremity. Electronically Signed: Randell Solomon  8/9/2023 5:59 PM EDT  Workstation ID: [id number]    XR Ankle 3+ View Right    Result Date: 8/9/2023  XR FOOT 3+ VW RIGHT, XR ANKLE 3+ VW RIGHT, XR SHOULDER 2+ VW LEFT, XR ELBOW 2 VW LEFT Date of Exam: 8/9/2023 5:08 PM EDT Indication: fall/lateral pain Comparison: None available. Findings: The right ankle and foot demonstrate diffuse demineralization, otherwise without evidence of acute fracture or dislocation. Cortical margins are intact and alignment is maintained. Cortical margins of the left shoulder are intact, with some mild arthrosis changes present with acromioclavicular narrowing and sclerosis. Alignment is maintained. Evaluation of the left elbow demonstrates no evidence of fracture, malalignment or significant joint effusion.     Impression: Impression: No acute fracture or dislocation of the imaged right lower extremity and left upper extremity. Electronically Signed: Randell Solomon  8/9/2023 5:59 PM EDT  Workstation ID: [id number]    XR Foot 3+ View Right    Result Date: 8/9/2023  XR FOOT 3+ VW RIGHT, XR ANKLE 3+ VW RIGHT, XR SHOULDER 2+ VW LEFT, XR ELBOW 2 VW LEFT Date of Exam: 8/9/2023 5:08 PM EDT Indication: fall/lateral pain Comparison: None available. Findings: The right ankle and foot demonstrate diffuse demineralization, otherwise without evidence of acute fracture or dislocation. Cortical margins are intact and alignment is maintained. Cortical margins of the left shoulder are intact, with some mild arthrosis changes present with acromioclavicular narrowing and sclerosis. Alignment is maintained. Evaluation of the left elbow demonstrates no evidence of fracture, malalignment or significant joint effusion.     Impression: Impression: No acute fracture or dislocation of the imaged right lower extremity and left upper extremity. Electronically  Signed: Randell Solomon  8/9/2023 5:59 PM EDT  Workstation ID: [id number]    CT Head Without Contrast    Result Date: 8/9/2023  CT HEAD WO CONTRAST Date of Exam: 8/9/2023 6:38 PM EDT Indication: confusion. Comparison: None available. Technique: Axial CT images were obtained of the head without contrast administration.  Automated exposure control and iterative construction methods were used. FINDINGS: Gray-white differentiation is maintained and there is no evidence of intracranial hemorrhage, mass or mass effect. Mild age-related changes of the brain are present including volume loss and typical periventricular sequela of chronic small vessel ischemia. There is otherwise no evidence of intracranial hemorrhage, mass or mass effect. The ventricles are normal in size and configuration accounting for surrounding volume loss. The orbits are normal and the paranasal sinuses are grossly clear.     Impression: Mild age-related changes of the brain as above, otherwise without evidence of acute intracranial abnormality. Electronically Signed: Randell Solomon  8/9/2023 7:28 PM EDT  Workstation ID: [id number]    CT Chest Without Contrast Diagnostic    Result Date: 8/10/2023  CT CHEST WO CONTRAST DIAGNOSTIC Date of Exam: 8/10/2023 3:06 AM EDT Indication: Respiratory illness, nondiagnostic xray. Comparison: 6/3/2022 and chest radiograph 8/9/2023. Technique: Axial CT images were obtained of the chest without contrast administration.  Reconstructed coronal and sagittal images were also obtained. Automated exposure control and iterative construction methods were used. Findings: There are small bilateral pleural effusions larger on the left. There is dependent bibasilar atelectasis. There is no pneumothorax, pleural effusion or focal airspace consolidation. There is mild peribronchial thickening in the lower lungs. There are calcified granulomas. Thyroid, trachea and esophagus appear within normal limits. The heart is enlarged. There is  evidence of prior endovascular aortic valve replacement. There is a left-sided multilead ICD in place. No pericardial effusion. No mediastinal lymphadenopathy. There is aortic and aortic branch vessel atherosclerosis. There is stable mild superior and inferior endplate compression deformity at the T12 level. There are mild thoracic degenerative changes. No acute osseous abnormality or destructive bone lesion. No acute findings in the superficial soft tissues. Patient is status post cholecystectomy. No acute findings in the upper abdomen.     Impression: Impression: 1. Small bilateral pleural effusions, left greater than right. Mild dependent bibasilar atelectasis. 2. Cardiomegaly. Evidence of prior aortic valve replacement and stable left-sided ICD. Electronically Signed: Steven Pereira  8/10/2023 3:19 AM EDT  Workstation ID: [id number]    XR Chest 1 View    Result Date: 8/9/2023  XR CHEST 1 VW Date of Exam: 8/9/2023 4:55 PM EDT Indication: dyspnea Comparison: 9/22/2022. Findings: Left chest wall ICD projects unchanged. Opacity is seen at the left lung base with the appearance of likely trace effusion and atelectasis. No focal airspace consolidation is otherwise present. There is no distinct pneumothorax. Unchanged mild cardiac enlargement.     Impression: Impression: Small left pleural effusion and basilar atelectasis. Otherwise stable chest as above. Electronically Signed: Randell Solomon  8/9/2023 5:22 PM EDT  Workstation ID: [id number]     Results for orders placed in visit on 07/19/23    Adult Transthoracic Echo Complete W/ Cont if Necessary Per Protocol    Interpretation Summary    Left ventricular systolic function is low normal. Calculated left ventricular EF = 46% Left ventricular ejection fraction appears to be 46 - 50%.    Left ventricular diastolic function is consistent with (grade III w/high LAP) reversible restrictive pattern. with increase LAP E/E` 21    The left atrial cavity is severely dilated.    The  right atrial cavity is dilated.    Aortic valve area is 1 cm2. AVI is present with normal function    Peak velocity of the flow distal to the aortic valve is 187.5 cm/s. Aortic valve maximum pressure gradient is 14 mmHg. Aortic valve mean pressure gradient is 7 mmHg.    Moderate mitral valve regurgitation is present.    The mitral valve area (PHT) is 3 cm2.    Moderate tricuspid valve regurgitation is present.    Estimated right ventricular systolic pressure from tricuspid regurgitation is markedly elevated (>55 mmHg).    The aortic root measures 2 cm.    No pericardial effusion    Pacer is seen in the RA  and RV      Current medications:  Scheduled Meds:albuterol sulfate HFA, 2 puff, Inhalation, 4x Daily - RT  amiodarone, 200 mg, Oral, Q12H  apixaban, 2.5 mg, Oral, Q12H  aspirin, 81 mg, Oral, Daily  atorvastatin, 10 mg, Oral, Nightly  cholecalciferol, 1,000 Units, Oral, Daily  [START ON 8/12/2023] dexAMETHasone, 6 mg, Oral, Daily With Breakfast  dilTIAZem CD, 300 mg, Oral, Daily  latanoprost, 1 drop, Both Eyes, Nightly  [START ON 8/12/2023] metoprolol succinate XL, 100 mg, Oral, Q24H  metoprolol tartrate, 100 mg, Oral, BID  pharmacy consult - MTM, , Does not apply, Daily  senna-docusate sodium, 2 tablet, Oral, BID  sodium chloride, 10 mL, Intravenous, Q12H  valsartan, 40 mg, Oral, Q24H  vitamin B-12, 1,000 mcg, Oral, Daily      Continuous Infusions:   PRN Meds:.  acetaminophen    senna-docusate sodium **AND** polyethylene glycol **AND** bisacodyl **AND** bisacodyl    Calcium Replacement - Follow Nurse / BPA Driven Protocol    Magnesium Low Dose Replacement - Follow Nurse / BPA Driven Protocol    ondansetron **OR** ondansetron    Phosphorus Replacement - Follow Nurse / BPA Driven Protocol    Potassium Replacement - Follow Nurse / BPA Driven Protocol    simethicone    [COMPLETED] Insert Peripheral IV **AND** sodium chloride    sodium chloride    sodium chloride    Assessment & Plan   Assessment & Plan     Active  Hospital Problems    Diagnosis  POA    **Acute HFrEF (heart failure with reduced ejection fraction) [I50.21]  Unknown    COVID-19 [U07.1]  Yes    Weakness [R53.1]  Unknown    Hypokalemia [E87.6]  Unknown    Hypoalbuminemia [E88.09]  Unknown    COVID-19 virus detected [U07.1]  Unknown    Hypoxia [R09.02]  Unknown    Pleural effusion [J90]  Unknown    Pulmonary hypertension [I27.20]  Yes    Chronic anticoagulation [Z79.01]  Not Applicable    Chronic nausea [R11.0]  Yes    Chronic kidney disease, stage 3b [N18.32]  Yes    Presence of cardiac pacemaker secondary to sick sinus syndrome [Z95.0]  Yes    Chronic combined systolic and diastolic congestive heart failure [I50.42]  Yes    Hypertension [I10]  Yes    Aortic stenosis, severe s/p TAVR (7/20/2022) [I35.0]  Yes    Longstanding persistent atrial fibrillation [I48.11]  Yes    Sick sinus syndrome [I49.5]  Yes    Hyperlipidemia LDL goal <70 [E78.5]  Yes    Bilateral carotid artery stenosis [I65.23]  Yes    Status post CVA [Z86.73]  Not Applicable      Resolved Hospital Problems   No resolved problems to display.        Brief Hospital Course to date:  Miryam Mckeon is a 77 y.o. female with past medical history of A-fib on Eliquis, aortic valve stenosis status post TAVR, CKD stage III, deafness, hypertension, hyperlipidemia and history of CVA who came into the emergency department with complaints of generalized weakness.  She was found to be in congestive heart failure with acute exacerbation as well as COVID-positive.    Acute on chronic systolic and diastolic congestive heart failure exacerbation  -Echo pending  -Cardiology consulted  -Post Lasix 40 mg IV x 1  O2 to maintain sats greater than 90, currently on 1-2 L nasal cannula with baseline does not require oxygen at home    COVID upper respiratory infection  -Symptoms of dry cough and hypoxia with intermittent dyspnea  -Continue O2 to maintain sats greater than 90, she was 87% on room air during my exam  -Patient  is declining treatment for COVID, specifically remdesivir  --Reluctantly agreeable to continue short course of steroids (5 days total)  -Continue inhalers as needed    Generalized weakness  -Likely multifactorial  -PT and OT recc rehab  -Workup for heart failure continued with echo pending  --cardiology consulted    A-fib  Chronic anticoagulation  -Continue Eliquis  -Continue Cardizem  -Continue amiodarone, scheduled to start 200 mg daily on 8/17    Hypertension  Hyperlipidemia  Bilateral carotid artery stenosis  History of CVA  -Continue home medications    Poor p.o. intake  Failure to thrive  Hypoalbuminemia  Malnutrition  -Nutrition consult requested  --start Boost supplement TID    Presence of pacemaker    CKD, stage III    Aortic stenosis, status post TAVR      Expected Discharge Location and Transportation: Rehab, private vehicle  Expected Discharge   Expected Discharge Date: 8/12/2023; Expected Discharge Time:      DVT prophylaxis:  Medical and mechanical DVT prophylaxis orders are present.     AM-PAC 6 Clicks Score (PT): 16 (08/10/23 1500)    CODE STATUS:   Code Status and Medical Interventions:   Ordered at: 08/09/23 8946     Level Of Support Discussed With:    Patient     Code Status (Patient has no pulse and is not breathing):    CPR (Attempt to Resuscitate)     Medical Interventions (Patient has pulse or is breathing):    Full Support       Naa North MD  08/11/23

## 2023-08-11 NOTE — PLAN OF CARE
Goal Outcome Evaluation:  Plan of Care Reviewed With: patient, daughter        Progress: improving  Outcome Evaluation: Pt continues to present with decreased functional mobility, decreased endurance with activity, and decreased balance. Pt ambulated 45ft with min A and RW for support. Pt required increased cues to improve safety awareness. Continue to progress per pt tolerance.      Anticipated Discharge Disposition (PT): skilled nursing facility

## 2023-08-11 NOTE — THERAPY TREATMENT NOTE
Patient Name: Miryam Mckeon  : 1945    MRN: [id number]                              Today's Date: 2023       Admit Date: 2023    Visit Dx:     ICD-10-CM ICD-9-CM   1. COVID-19  U07.1 079.89   2. Hypoxia  R09.02 799.02   3. Frequent falls  R29.6 V15.88   4. Generalized weakness  R53.1 780.79   5. Acute on chronic congestive heart failure, unspecified heart failure type  I50.9 428.0     Patient Active Problem List   Diagnosis    Bilateral carotid artery stenosis    Aortic stenosis, severe s/p TAVR (2022)    Longstanding persistent atrial fibrillation    Sick sinus syndrome    Hyperlipidemia LDL goal <70    Hypertension    Chronic combined systolic and diastolic congestive heart failure    Status post CVA    Presence of cardiac pacemaker secondary to sick sinus syndrome    Chronic kidney disease, stage 3b    Parent refuses immunizations    Chronic nausea    Pulmonary hypertension    Chronic anticoagulation    Acute HFrEF (heart failure with reduced ejection fraction)    Weakness    Hypokalemia    Hypoalbuminemia    COVID-19 virus detected    Hypoxia    Pleural effusion    COVID-19     Past Medical History:   Diagnosis Date    Anxiety     Aortic valve stenosis     Atrial fibrillation     Borderline diabetes     ???    Carotid artery stenosis     CKD (chronic kidney disease)     Deaf     GERD (gastroesophageal reflux disease)     Heart murmur     Hyperlipidemia     Hypertension     Irregular heartbeat     PONV (postoperative nausea and vomiting)     Stroke     TIA (transient ischemic attack)      Past Surgical History:   Procedure Laterality Date    AORTIC VALVE REPAIR/REPLACEMENT N/A 2022    Procedure: TRANSCATHETER AORTIC VALVE REPLACEMENT with angioplasty and stent to the right common and external iliac artery;  Surgeon: Bola Whitaker MD;  Location: [facility];  Service: Cardiothoracic;  Laterality: N/A;  FL-15 MIN 12 SEC  DOSE- 92.41  CONTRAST- 120 ML ISOVUE    AORTIC  VALVE REPAIR/REPLACEMENT N/A 7/20/2022    Procedure: Transcatheter Aortic Valve Replacement;  Surgeon: Yashira Chow MD;  Location: [facility];  Service: Cardiovascular;  Laterality: N/A;    CARDIAC CATHETERIZATION      05/20/2022 per dr. chow    CARDIAC CATHETERIZATION Left 5/20/2022    Procedure: Left Heart Cath;  Surgeon: Yashira Chow MD;  Location: [facility] CATH INVASIVE LOCATION;  Service: Cardiology;  Laterality: Left;    CARDIAC ELECTROPHYSIOLOGY PROCEDURE N/A 04/13/2022    Procedure: DDD PPM Implant (BSC), DNS Eliquis;  Surgeon: Troy Hussein DO;  Location: [facility] EP INVASIVE LOCATION;  Service: Cardiology;  Laterality: N/A;    CAROTID ENDARTERECTOMY Bilateral     CATARACT EXTRACTION      CHOLECYSTECTOMY      COLONOSCOPY      FEMORAL ARTERY CUTDOWN Right 7/20/2022    Procedure: FEMORAL ARTERY CUTDOWN, ANGIOGRAM;  Surgeon: Bola Whitaker MD;  Location: [facility];  Service: Vascular;  Laterality: Right;  fl-1 m 12 sec  dose- 47.29 mgy  contrast- 50 ml isovue    KNEE SURGERY Right     PACEMAKER IMPLANTATION      FJ      05/20/2022 per dr. chow    TRANSESOPHAGEAL ECHOCARDIOGRAM (JF) N/A 7/20/2022    Procedure: TRANSESOPHAGEAL ECHOCARDIOGRAM PER ANESTHESIA;  Surgeon: Bola Whitaker MD;  Location: [facility];  Service: Cardiothoracic;  Laterality: N/A;      General Information       Row Name 08/11/23 1546          Physical Therapy Time and Intention    Document Type therapy note (daily note)  -AE     Mode of Treatment physical therapy  -AE       Row Name 08/11/23 1546          General Information    Patient Profile Reviewed yes  -AE     Existing Precautions/Restrictions fall;other (see comments)  Pt is deaf, communicates with ASL. Dtr in room to translate but patient requests to not use  cart even if  not available. Dry-erase board in room if needed.  -AE     Barriers to Rehab hearing deficit  -AE       Row Name 08/11/23  1546          Cognition    Orientation Status (Cognition) oriented x 4  -AE       Row Name 08/11/23 1546          Safety Issues, Functional Mobility    Safety Issues Affecting Function (Mobility) awareness of need for assistance;insight into deficits/self-awareness;safety precaution awareness;safety precautions follow-through/compliance;sequencing abilities  -AE     Impairments Affecting Function (Mobility) balance;endurance/activity tolerance;strength  -AE               User Key  (r) = Recorded By, (t) = Taken By, (c) = Cosigned By      Initials Name Provider Type    AE Sharan Ceja, EMERY Physical Therapist                   Mobility       Row Name 08/11/23 1549          Bed Mobility    Comment, (Bed Mobility) Pt received and left [facility].  -AE       Row Name 08/11/23 1549          Transfers    Comment, (Transfers) VCs for hand placement and sequencing. Pt required increased cues to improve positioning within RW and sequencing with backward steps to chair prior to sitting.  -AE       Row Name 08/11/23 1549          Sit-Stand Transfer    Sit-Stand [place] (Transfers) contact guard;1 person assist;verbal cues  -AE       Row Name 08/11/23 1549          Gait/Stairs (Locomotion)    [place] Level (Gait) minimum assist (75% patient effort);1 person assist;verbal cues  -AE     Assistive Device (Gait) walker, front-wheeled  -AE     Distance in Feet (Gait) 45  -AE     Deviations/Abnormal Patterns (Gait) base of support, narrow;erinn decreased;gait speed decreased;stride length decreased  -AE     Bilateral Gait Deviations heel strike decreased  -AE     Comment, (Gait/Stairs) Pt demo step through gait pattern with slowed erinn, decreased step length, and general slow pace. Pt required increased cues to improve positioning within RW while ambulating, good effort with mobility. Further distance limited by fatigue.  -AE               User Key  (r) = Recorded By, (t) = Taken By, (c) = Cosigned By      Initials Name  Provider Type    AE Sharan Ceja PT Physical Therapist                   Obj/Interventions       Row Name 08/11/23 1553          Motor Skills    Therapeutic Exercise knee;ankle;hip  -AE       Row Name 08/11/23 1553          Hip (Therapeutic Exercise)    Hip (Therapeutic Exercise) isometric exercises  -AE     Hip Isometrics (Therapeutic Exercise) bilateral;gluteal sets;10 repetitions  -AE       Row Name 08/11/23 1553          Knee (Therapeutic Exercise)    Knee (Therapeutic Exercise) strengthening exercise  -AE     Knee Strengthening (Therapeutic Exercise) bilateral;SLR (straight leg raise);LAQ (long arc quad);10 repetitions;sitting  -AE       Row Name 08/11/23 1553          Ankle (Therapeutic Exercise)    Ankle (Therapeutic Exercise) AROM (active range of motion)  -AE     Ankle AROM (Therapeutic Exercise) bilateral;dorsiflexion;plantarflexion;10 repetitions;sitting  -AE       Row Name 08/11/23 1553          Balance    Balance Assessment sitting static balance;sitting dynamic balance;sit to stand dynamic balance;standing static balance;standing dynamic balance  -AE     Static Sitting Balance contact guard  -AE     Dynamic Sitting Balance contact guard  -AE     Position, Sitting Balance unsupported;sitting edge of bed  -AE     Sit to Stand Dynamic Balance contact guard;1-person assist;verbal cues  -AE     Static Standing Balance minimal assist;1-person assist;verbal cues  -AE     Dynamic Standing Balance minimal assist;1-person assist;verbal cues  -AE     Position/Device Used, Standing Balance supported;walker, front-wheeled  -AE               User Key  (r) = Recorded By, (t) = Taken By, (c) = Cosigned By      Initials Name Provider Type    AE Sharan eCja PT Physical Therapist                   Goals/Plan    No documentation.                  Clinical Impression       Row Name 08/11/23 1554          Pain    Pretreatment Pain Rating 0/10 - no pain  -AE     Posttreatment Pain Rating 0/10 - no pain  -AE        Row Name 08/11/23 1554          Plan of Care Review    Plan of Care Reviewed With patient;daughter  -AE     Progress improving  -AE     Outcome Evaluation Pt continues to present with decreased functional mobility, decreased endurance with activity, and decreased balance. Pt ambulated 45ft with min A and RW for support. Pt required increased cues to improve safety awareness. Continue to progress per pt tolerance.  -AE       Row Name 08/11/23 1554          Vital Signs    Pre Systolic BP Rehab --  VSS  -AE     O2 Delivery Pre Treatment nasal cannula  -AE     O2 Delivery Intra Treatment room air  -AE     Post SpO2 (%) 91  -AE     O2 Delivery Post Treatment room air  -AE     Pre Patient Position Sitting  -AE     Intra Patient Position Standing  -AE     Post Patient Position Sitting  -AE       Row Name 08/11/23 1554          Positioning and Restraints    Pre-Treatment Position sitting in chair/recliner  -AE     Post Treatment Position chair  -AE     In Chair notified nsg;reclined;call light within reach;encouraged to call for assist;exit alarm on;with family/caregiver;waffle cushion;legs elevated  -AE               User Key  (r) = Recorded By, (t) = Taken By, (c) = Cosigned By      Initials Name Provider Type    AE Sharan Ceja, PT Physical Therapist                   Outcome Measures       Row Name 08/11/23 1558          How much help from another person do you currently need...    Turning from your back to your side while in flat bed without using bedrails? 3  -AE     Moving from lying on back to sitting on the side of a flat bed without bedrails? 3  -AE     Moving to and from a bed to a chair (including a wheelchair)? 3  -AE     Standing up from a chair using your arms (e.g., wheelchair, bedside chair)? 3  -AE     Climbing 3-5 steps with a railing? 2  -AE     To walk in hospital room? 3  -AE     AM-PAC 6 Clicks Score (PT) 17  -AE     Highest level of mobility 5 --> Static standing  -AE       Row Name 08/11/23 1557           Functional Assessment    Outcome Measure Options AM-PAC 6 Clicks Basic Mobility (PT)  -AE               User Key  (r) = Recorded By, (t) = Taken By, (c) = Cosigned By      Initials Name Provider Type    AE Sharan Ceja PT Physical Therapist                                 Physical Therapy Education       Title: PT OT SLP Therapies (In Progress)       Topic: Physical Therapy (In Progress)       Point: Mobility training (Done)       Learning Progress Summary             Patient Acceptance, E, VU by AE at 8/11/2023 1445    Acceptance, E, VU,NR by LM at 8/10/2023 1500                         Point: Home exercise program (Not Started)       Learner Progress:  Not documented in this visit.              Point: Precautions (Done)       Learning Progress Summary             Patient Acceptance, E, VU by AE at 8/11/2023 1445    Acceptance, E, VU,NR by LM at 8/10/2023 1500                                         User Key       Initials Effective Dates Name Provider Type Discipline     07/11/23 -  Maryuri Carson, EMERY Physical Therapist PT    AE 09/21/21 -  Sharan Ceja PT Physical Therapist PT                  PT Recommendation and Plan     Plan of Care Reviewed With: patient, daughter  Progress: improving  Outcome Evaluation: Pt continues to present with decreased functional mobility, decreased endurance with activity, and decreased balance. Pt ambulated 45ft with min A and RW for support. Pt required increased cues to improve safety awareness. Continue to progress per pt tolerance.     Time Calculation:         PT Charges       Row Name 08/11/23 1601             Time Calculation    Start Time 1445  -AE      PT Received On 08/11/23  -AE      PT Goal Re-Cert Due Date 08/20/23  -AE         Timed Charges    35765 - PT Therapeutic Exercise Minutes 12  -AE      80246 - PT Therapeutic Activity Minutes 21  -AE         Total Minutes    Timed Charges Total Minutes 33  -AE       Total Minutes 33  -AE                 User Key  (r) = Recorded By, (t) = Taken By, (c) = Cosigned By      Initials Name Provider Type    AE Sharan Ceja PT Physical Therapist                  Therapy Charges for Today       Code Description Service Date Service Provider Modifiers Qty    72505624251 HC PT THER PROC EA 15 MIN 8/11/2023 Sharan Ceja, PT GP 1    88810813773 HC PT THERAPEUTIC ACT EA 15 MIN 8/11/2023 Sharan Ceja, PT GP 1            PT G-Codes  Outcome Measure Options: AM-PAC 6 Clicks Basic Mobility (PT)  AM-PAC 6 Clicks Score (PT): 17  AM-PAC 6 Clicks Score (OT): 13  PT Discharge Summary  Anticipated Discharge Disposition (PT): skilled nursing facility    Sharan Ceja PT  8/11/2023

## 2023-08-11 NOTE — PROGRESS NOTES
Continued Stay Note  [facility]     Patient Name: Miryam Mckeon  MRN: [id number]  Today's Date: 8/11/2023    Admit Date: 8/9/2023        Discharge Plan       Row Name 08/11/23 1623       Plan    Plan Comments [facility] has said again that they have a 10 day isolation and that the patients insurance is not going to likely be covering acute rehab. Contacted the patients daughter who would like to continue with the referral to [facility]. Her daughter wanted referrals to the [facility] and the [facility] reviewed the referral and called back and said they were not able to accept the patient for rehab. The patients daughter will research some of the facilities over the weekend and some of the other facilities may also require some isolation.                   Discharge Codes    No documentation.                 Expected Discharge Date and Time       Expected Discharge Date Expected Discharge Time    Aug 12, 2023               SWETHA García

## 2023-08-11 NOTE — PROGRESS NOTES
Clinical Nutrition   Nutrition Assessment  Reason for Visit: Difficulty chewing/swallowing, Reduced oral intake    Patient Name: Miryam Mckeon  YOB: 1945  MRN: [id number]  Date of Encounter: 08/11/23 10:59 EDT  Admission date: 8/9/2023    Pt with ongoing poor intakes past 3-4 months since cardiac surgery, significantly worsened over the past month. Unsure if pt meets criteria malnutrition at this time, BS weight shows weight gain, doubtful of this and ordered zeroed/standing weight to clarify.     Does not like boost but agreeable to trailing some other options, added to order.     No significant dysphagia issues, SLP has seen. Needs gravy with meats, added to order.     Nutrition Assessment   Admission Diagnosis:  Anorexia [R63.0]  COVID-19 [U07.1]    Problem List:    Acute HFrEF (heart failure with reduced ejection fraction)    Bilateral carotid artery stenosis    Aortic stenosis, severe s/p TAVR (7/20/2022)    Longstanding persistent atrial fibrillation    Sick sinus syndrome    Hyperlipidemia LDL goal <70    Hypertension    Chronic combined systolic and diastolic congestive heart failure    Status post CVA    Presence of cardiac pacemaker secondary to sick sinus syndrome    Chronic kidney disease, stage 3b    Chronic nausea    Pulmonary hypertension    Chronic anticoagulation    Weakness    Hypokalemia    Hypoalbuminemia    COVID-19 virus detected    Hypoxia    Pleural effusion    COVID-19      PMH:   She  has a past medical history of Anxiety, Aortic valve stenosis, Atrial fibrillation, Borderline diabetes, Carotid artery stenosis, CKD (chronic kidney disease), Deaf, GERD (gastroesophageal reflux disease), Heart murmur, Hyperlipidemia, Hypertension, Irregular heartbeat, PONV (postoperative nausea and vomiting), Stroke, and TIA (transient ischemic attack).    PSH:  She  has a past surgical history that includes Cholecystectomy; Knee surgery (Right); Carotid Endarterectomy  (Bilateral); Cataract extraction; Cardiac electrophysiology procedure (N/A, 04/13/2022); Colonoscopy; Cardiac catheterization; CARLITOS; Pacemaker Implantation; Cardiac catheterization (Left, 5/20/2022); Aortic valve replacement (N/A, 7/20/2022); transesophageal echocardiogram (carlitos) (N/A, 7/20/2022); Aortic valve replacement (N/A, 7/20/2022); and Femoral Artery Cutdown (Right, 7/20/2022).    Applicable Nutrition Concerns:   Skin:  Oral:  GI:    Applicable Interval History:       Reported/Observed/Food/Nutrition Related History:     Difficulty chewing/swallowing and reduced oral intake reported on nsg admission screen. Pt passed bedside swallow eval and has regular texture/thin liquid diet. Of note pt in HF exacerbation and declining respiratory status due to COVID but has refused medical treatment for COVID. SLP spoke with RD concerning this patient yesterday and relayed that pt does not like boost but may be open to magic cup.   Pt in COVID isolation and is deaf, spoke with daughter Mila on phone. Mila tells me pt has had ongoing poor intakes past 3-4 months since cardiac surgery, significantly worsened over the past month. Pt c/o of constant nausea, zofran seems to help some. Denies any other s/s altered GI function or dysphagia. Mila is unsure of weight loss but states she believes pt has been stable around ~100 lb, BS higher than this thus RD ordered zeroed/standing weight to clarify. Pt has tried ONS before and did not like it. Mila tells me pt can be picky and refuse to try things that she thinks she will not like. She really dislikes boost but Mila thinks she may be open to other options discussed. Mila tells me there is no working phone in pt's room. Discussed need for this to take meal orders with RN who will work on getting a working phone. Informed Mila RD would also drop off menu outside room for assistance ordering. Mila denied pt having further dietary needs/preferences, NKFA.     Anthropometrics  "    Height: Height: 152.4 cm (60\")  Last Filed Weight: Weight: 53.6 kg (118 lb 3.2 oz) (08/11/23 0605)  Method: Weight Method: Bed scale  BMI: BMI (Calculated): 23.1  BMI classification: Normal: 18.5-24.9kg/m2  IBW:   100 lb    UBW:  ~108 lb X past year per EMR, did have loss from ~120 in early '22   Weight       Weight (kg) Weight (lbs) Weight Method Visit Report   12/19/2022 48.081 kg  106 lb   --    1/11/2023 48.988 kg  108 lb   --    6/29/2023 48.081 kg  106 lb   --    7/17/2023 45.813 kg  101 lb   --    7/19/2023 45.813 kg  101 lb      8/9/2023 45.36 kg  100 lb  Stated      48.535 kg  107 lb  Bed scale     8/10/2023 49.578 kg  109 lb 4.8 oz  Bed scale     8/11/2023 53.615 kg  118 lb 3.2 oz  Bed scale       Weight change:  no recent verifiable significant changes appears to have had 5 lb loss in 7/23 and has since regained if BS weight accurate, ordered zeroed/standing to clarify     Nutrition Focused Physical Exam     Date:  8/11       Unable to perform due to COVID Isolation      Current Nutrition Prescription   PO: Diet: Cardiac Diets; Healthy Heart (2-3 Na+); Texture: Regular Texture (IDDSI 7); Fluid Consistency: Thin (IDDSI 0)  Oral Nutrition Supplement:   Intake: Insufficient data    Nutrition Diagnosis   Date:  8/11            Updated:    Problem Inadequate energy intake    Etiology Energy needs>energy intake   Signs/Symptoms PO intakes <75% EEN X past 3-4m, <50% EEN X past month   Status: New    Goal:   General: Nutrition to support treatment  PO: Increase intake  EN/PN: N/A    Nutrition Intervention      Follow treatment progress, Care plan reviewed, Advise alternate selection, Advised available snacks, Interview for preferences, Menu provided, Encourage intake, Supplement provided    Trial Breeze at B, Magic cup L/D  Ordered zeroed/standing weight as able to verify no significant weight changes  Dropped off menu and communicated need for working room phone for meal orders to " RN    Monitoring/Evaluation:   Per protocol, I&O, PO intake, Supplement intake, Pertinent labs, Weight, POC/GOC      Annette Bhatt RD, CNSC  Time Spent: 30m

## 2023-08-11 NOTE — PROGRESS NOTES
[facility]  Inpatient Progress Note      Chief Complaint/Reason for consult:    Chief Complaint   Patient presents with    Weakness - Generalized    Fall            Subjective  Breathing comfortably at rest today, daughter visiting       Vital Sign Min/Max for last 24 hours  Temp  Min: 97 øF (36.1 øC)  Max: 97.6 øF (36.4 øC)   BP  Min: 133/54  Max: 162/75   Pulse  Min: 61  Max: 88   Resp  Min: 16  Max: 20   SpO2  Min: 86 %  Max: 97 %   Flow (L/min)  Min: 1  Max: 1      Intake/Output Summary (Last 24 hours) at 8/11/2023 0849  Last data filed at 8/11/2023 0815  Gross per 24 hour   Intake --   Output 1100 ml   Net -1100 ml           Gen: well developed, older frail appearing WF, sitting up in bed, comfortable appearing  HEENT: MMM, sclerae anicteric, conjunctivae normal, no JVD  CV: regular rate, irregularly irregular rhythm, II/VI BRITTANEY at RUSB, normal S1, S2. 2+ radial and DP pulses  Pulm: RA, normal work of breathing, decreased air movement in bases  Abd: soft, nontender, nondistended  Ext: normal bulk for age, normal tone, no dependent edema  Neuro: deaf, awake and alert, oriented, face symmetrical, moving all extremities well  Psych: normal mood, appropriate affect    Tele:  atrial fibrillation, V pacing    Results Review (reviewed the patient's recent labs in the electronic medical record):     EKG:  afib, V paced beats, LAD, lateral ST changes stable    Results for orders placed during the hospital encounter of 08/09/23 8/11/23 - Transthoracic Echo Limited     Left ventricular systolic function is moderately decreased. Calculated left ventricular EF = 35.5% Septal wall motion is abnormal, consistent with right ventricular pacing. Normal left ventricular cavity size noted. Left ventricular wall thickness is consistent with mild concentric hypertrophy. There is left ventricular global hypokinesis noted.    The right atrial cavity is dilated.    The right atrial cavity is dilated. The  diameter of the inferior vena cava is 1.57 cm. Partial IVC inspiratory collapse of less than 50% noted.    Mild periprosthetic aortic valve regurgitation is seen. There is a TAVR valve present, 20mm Gisselle 3. The prosthetic aortic valve is grossly normal.    : Calcified mitral valve chordae are present. There is mild, bileaflet mitral valve thickening present. Mild mitral valve regurgitation is present.    The tricuspid valve is normal in structure. Moderate tricuspid valve regurgitation is present.         Lab Results   Component Value Date    WBC 5.31 08/10/2023    HGB 14.0 08/10/2023    HCT 42.3 08/10/2023    MCV 94.8 08/10/2023     (L) 08/10/2023     Lab Results   Component Value Date    GLUCOSE 104 (H) 08/10/2023    CALCIUM 8.3 (L) 08/10/2023     08/10/2023    K 3.1 (L) 08/10/2023    CO2 30.0 (H) 08/10/2023    CL 92 (L) 08/10/2023    BUN 28 (H) 08/10/2023    CREATININE 1.45 (H) 08/10/2023    EGFRIFAFRI 47 (L) 02/18/2022    EGFRIFNONA 41 (L) 02/18/2022    BCR 19.3 08/10/2023    ANIONGAP 15.0 08/10/2023     Lab Results   Component Value Date    TROPONINT 29 (H) 08/09/2023    TROPONINT 35 (H) 08/09/2023      Lab Results   Component Value Date    PROBNP 58,397.0 (H) 08/09/2023     Lab Results   Component Value Date    HGBA1C 6.20 (H) 08/10/2023      Lab Results   Component Value Date    CHOL 93 08/10/2023    CHLPL 112 02/18/2022    TRIG 99 08/10/2023    HDL 37 (L) 08/10/2023    LDL 37 08/10/2023      Assessment     Acute on chronic systolic and diastolic heart failure due to hypertension and valvular disease  Aortic stenosis status post TAVR   - appears euvolemic on exam, estimated RAP 10   - Plan for additional diuretic dose today after labs if K acceptable   - change metoprolol tartrate to succinate for GDMT   - add low-dose ARB     Longstanding persistent atrial fibrillation  Hx SSS with PM   - Continue anticoagulation with apixaban   - continue BB, CCB   - Amiodarone loading, follows with   Keenan Sebastian MD  8/11/2023  08:49 EDT

## 2023-08-12 LAB
ALBUMIN SERPL-MCNC: 2.5 G/DL (ref 3.5–5.2)
ALBUMIN/GLOB SERPL: 1 G/DL
ALP SERPL-CCNC: 132 U/L (ref 39–117)
ALT SERPL W P-5'-P-CCNC: 34 U/L (ref 1–33)
ANION GAP SERPL CALCULATED.3IONS-SCNC: 7 MMOL/L (ref 5–15)
AST SERPL-CCNC: 41 U/L (ref 1–32)
BILIRUB SERPL-MCNC: 0.4 MG/DL (ref 0–1.2)
BUN SERPL-MCNC: 45 MG/DL (ref 8–23)
BUN/CREAT SERPL: 30.4 (ref 7–25)
CALCIUM SPEC-SCNC: 8.3 MG/DL (ref 8.6–10.5)
CHLORIDE SERPL-SCNC: 93 MMOL/L (ref 98–107)
CO2 SERPL-SCNC: 33 MMOL/L (ref 22–29)
CREAT SERPL-MCNC: 1.48 MG/DL (ref 0.57–1)
EGFRCR SERPLBLD CKD-EPI 2021: 35.9 ML/MIN/1.73
GLOBULIN UR ELPH-MCNC: 2.6 GM/DL
GLUCOSE SERPL-MCNC: 137 MG/DL (ref 65–99)
MAGNESIUM SERPL-MCNC: 2.1 MG/DL (ref 1.6–2.4)
POTASSIUM SERPL-SCNC: 3.9 MMOL/L (ref 3.5–5.2)
PROT SERPL-MCNC: 5.1 G/DL (ref 6–8.5)
SODIUM SERPL-SCNC: 133 MMOL/L (ref 136–145)

## 2023-08-12 PROCEDURE — 94799 UNLISTED PULMONARY SVC/PX: CPT

## 2023-08-12 PROCEDURE — 94664 DEMO&/EVAL PT USE INHALER: CPT

## 2023-08-12 PROCEDURE — 94761 N-INVAS EAR/PLS OXIMETRY MLT: CPT

## 2023-08-12 PROCEDURE — 99232 SBSQ HOSP IP/OBS MODERATE 35: CPT | Performed by: FAMILY MEDICINE

## 2023-08-12 PROCEDURE — 83735 ASSAY OF MAGNESIUM: CPT | Performed by: INTERNAL MEDICINE

## 2023-08-12 PROCEDURE — 63710000001 DEXAMETHASONE PER 0.25 MG: Performed by: FAMILY MEDICINE

## 2023-08-12 PROCEDURE — 80053 COMPREHEN METABOLIC PANEL: CPT | Performed by: INTERNAL MEDICINE

## 2023-08-12 RX ADMIN — SENNOSIDES AND DOCUSATE SODIUM 2 TABLET: 50; 8.6 TABLET ORAL at 09:21

## 2023-08-12 RX ADMIN — APIXABAN 2.5 MG: 2.5 TABLET, FILM COATED ORAL at 09:22

## 2023-08-12 RX ADMIN — CYANOCOBALAMIN TAB 1000 MCG 1000 MCG: 1000 TAB at 09:22

## 2023-08-12 RX ADMIN — AMIODARONE HYDROCHLORIDE 200 MG: 200 TABLET ORAL at 20:03

## 2023-08-12 RX ADMIN — DILTIAZEM HYDROCHLORIDE 300 MG: 180 CAPSULE, COATED, EXTENDED RELEASE ORAL at 09:20

## 2023-08-12 RX ADMIN — AMIODARONE HYDROCHLORIDE 200 MG: 200 TABLET ORAL at 09:22

## 2023-08-12 RX ADMIN — ALBUTEROL SULFATE 2 PUFF: 90 AEROSOL, METERED RESPIRATORY (INHALATION) at 21:21

## 2023-08-12 RX ADMIN — SENNOSIDES AND DOCUSATE SODIUM 2 TABLET: 50; 8.6 TABLET ORAL at 20:03

## 2023-08-12 RX ADMIN — DEXAMETHASONE 6 MG: 4 TABLET ORAL at 09:21

## 2023-08-12 RX ADMIN — APIXABAN 2.5 MG: 2.5 TABLET, FILM COATED ORAL at 20:03

## 2023-08-12 RX ADMIN — ALBUTEROL SULFATE 2 PUFF: 90 AEROSOL, METERED RESPIRATORY (INHALATION) at 12:08

## 2023-08-12 RX ADMIN — ASPIRIN 81 MG: 81 TABLET, CHEWABLE ORAL at 09:22

## 2023-08-12 RX ADMIN — Medication 1000 UNITS: at 09:19

## 2023-08-12 RX ADMIN — METOPROLOL SUCCINATE 100 MG: 50 TABLET, EXTENDED RELEASE ORAL at 09:21

## 2023-08-12 RX ADMIN — ATORVASTATIN CALCIUM 10 MG: 10 TABLET, FILM COATED ORAL at 20:03

## 2023-08-12 RX ADMIN — Medication 10 ML: at 20:03

## 2023-08-12 RX ADMIN — Medication 10 ML: at 09:19

## 2023-08-12 RX ADMIN — VALSARTAN 40 MG: 80 TABLET, FILM COATED ORAL at 09:21

## 2023-08-12 RX ADMIN — ALBUTEROL SULFATE 2 PUFF: 90 AEROSOL, METERED RESPIRATORY (INHALATION) at 08:30

## 2023-08-12 RX ADMIN — ALBUTEROL SULFATE 2 PUFF: 90 AEROSOL, METERED RESPIRATORY (INHALATION) at 16:28

## 2023-08-12 NOTE — PLAN OF CARE
Problem: Fall Injury Risk  Goal: Absence of Fall and Fall-Related Injury  Outcome: Ongoing, Progressing  Intervention: Identify and Manage Contributors  Recent Flowsheet Documentation  Taken 8/11/2023 2200 by Efren Dennison RN  Medication Review/Management: medications reviewed  Taken 8/11/2023 2000 by Efren Dennison RN  Medication Review/Management: medications reviewed  Intervention: Promote Injury-Free Environment  Recent Flowsheet Documentation  Taken 8/12/2023 0200 by Efren Dennison RN  Safety Promotion/Fall Prevention:   activity supervised   assistive device/personal items within reach   clutter free environment maintained   toileting scheduled   safety round/check completed   room organization consistent   nonskid shoes/slippers when out of bed  Taken 8/12/2023 0000 by Efren Dennison RN  Safety Promotion/Fall Prevention:   assistive device/personal items within reach   activity supervised   clutter free environment maintained   toileting scheduled   safety round/check completed   room organization consistent   nonskid shoes/slippers when out of bed  Taken 8/11/2023 2200 by Efren Dennison RN  Safety Promotion/Fall Prevention:   activity supervised   clutter free environment maintained   assistive device/personal items within reach   toileting scheduled   safety round/check completed   room organization consistent   nonskid shoes/slippers when out of bed  Taken 8/11/2023 2000 by Efren Dennison RN  Safety Promotion/Fall Prevention:   activity supervised   assistive device/personal items within reach   clutter free environment maintained   toileting scheduled   safety round/check completed   room organization consistent   nonskid shoes/slippers when out of bed     Problem: Skin Injury Risk Increased  Goal: Skin Health and Integrity  Outcome: Ongoing, Progressing  Intervention: Optimize Skin Protection  Recent Flowsheet Documentation  Taken 8/12/2023 0200 by Efren Dennison RN  Head  of Bed (HOB) Positioning: HOB elevated  Taken 8/12/2023 0000 by Efren Dennison RN  Head of Bed (HOB) Positioning: HOB elevated  Taken 8/11/2023 2200 by Efren Dennison RN  Head of Bed (HOB) Positioning: HOB elevated  Taken 8/11/2023 2000 by Efren Dennison, RN  Pressure Reduction Techniques: frequent weight shift encouraged  Head of Bed (HOB) Positioning: HOB elevated  Pressure Reduction Devices: pressure-redistributing mattress utilized  Skin Protection:   adhesive use limited   tubing/devices free from skin contact     Problem: Adult Inpatient Plan of Care  Goal: Plan of Care Review  Outcome: Ongoing, Progressing   Goal Outcome Evaluation:      Pt. resting in bed. On 1L NC. Paced rhythm on telemetry. No acute events during shift.

## 2023-08-12 NOTE — PROGRESS NOTES
[facility] Cardiology  Inpatient Progress Note      Chief Complaint/Reason for consult:    Chief Complaint   Patient presents with    Weakness - Generalized    Fall            Subjective  Sleeping, no reports of problems overnight.       Vital Sign Min/Max for last 24 hours  Temp  Min: 96.1 øF (35.6 øC)  Max: 98.6 øF (37 øC)   BP  Min: 137/67  Max: 169/93   Pulse  Min: 60  Max: 93   Resp  Min: 16  Max: 18   SpO2  Min: 90 %  Max: 100 %   Flow (L/min)  Min: 1  Max: 2    No intake or output data in the 24 hours ending 08/12/23 1345          Gen: well developed, older frail appearing WF, sitting up in bed, comfortable appearing  HEENT: MMM, sclerae anicteric, conjunctivae normal, no JVD  CV: regular rate, irregularly irregular rhythm, II/VI BRITTANEY at RUSB, normal S1, S2. 2+ radial and DP pulses  Pulm: RA, normal work of breathing, decreased air movement in bases  Abd: soft, nontender, nondistended  Ext: normal bulk for age, normal tone, no dependent edema  Neuro: deaf, awake and alert, oriented, face symmetrical, moving all extremities well  Psych: normal mood, appropriate affect    Tele:  atrial fibrillation, V pacing    Results Review (reviewed the patient's recent labs in the electronic medical record):     EKG:  afib, V paced beats, LAD, lateral ST changes stable    Results for orders placed during the hospital encounter of 08/09/23 8/11/23 - Transthoracic Echo Limited     Left ventricular systolic function is moderately decreased. Calculated left ventricular EF = 35.5% Septal wall motion is abnormal, consistent with right ventricular pacing. Normal left ventricular cavity size noted. Left ventricular wall thickness is consistent with mild concentric hypertrophy. There is left ventricular global hypokinesis noted.    The right atrial cavity is dilated.    The right atrial cavity is dilated. The diameter of the inferior vena cava is 1.57 cm. Partial IVC inspiratory collapse of less than 50% noted.     Mild periprosthetic aortic valve regurgitation is seen. There is a TAVR valve present, 20mm Gisselle 3. The prosthetic aortic valve is grossly normal.    : Calcified mitral valve chordae are present. There is mild, bileaflet mitral valve thickening present. Mild mitral valve regurgitation is present.    The tricuspid valve is normal in structure. Moderate tricuspid valve regurgitation is present.         Lab Results   Component Value Date    WBC 5.31 08/10/2023    HGB 14.0 08/10/2023    HCT 42.3 08/10/2023    MCV 94.8 08/10/2023     (L) 08/10/2023     Lab Results   Component Value Date    GLUCOSE 137 (H) 08/12/2023    CALCIUM 8.3 (L) 08/12/2023     (L) 08/12/2023    K 3.9 08/12/2023    CO2 33.0 (H) 08/12/2023    CL 93 (L) 08/12/2023    BUN 45 (H) 08/12/2023    CREATININE 1.48 (H) 08/12/2023    EGFRIFAFRI 47 (L) 02/18/2022    EGFRIFNONA 41 (L) 02/18/2022    BCR 30.4 (H) 08/12/2023    ANIONGAP 7.0 08/12/2023     Lab Results   Component Value Date    TROPONINT 29 (H) 08/09/2023    TROPONINT 35 (H) 08/09/2023      Lab Results   Component Value Date    PROBNP 58,397.0 (H) 08/09/2023     Lab Results   Component Value Date    HGBA1C 6.20 (H) 08/10/2023      Lab Results   Component Value Date    CHOL 93 08/10/2023    CHLPL 112 02/18/2022    TRIG 99 08/10/2023    HDL 37 (L) 08/10/2023    LDL 37 08/10/2023      Assessment     Acute on chronic systolic and diastolic heart failure  Aortic stenosis status post TAVR   - appears euvolemic on exam, estimated RAP 10   - Plan for additional diuretic dose today after labs if K acceptable   - change metoprolol tartrate to succinate for GDMT   - add low-dose ARB     Longstanding persistent atrial fibrillation  Hx SSS with PM   - Continue anticoagulation with apixaban   - continue BB, CCB   - Amiodarone loading, follows with Dr. Hussein  Continue supportive care.    STEVE Sebastian MD  8/12/2023  13:45 EDT

## 2023-08-12 NOTE — PROGRESS NOTES
[facility] Medicine Services  PROGRESS NOTE    Patient Name: Miryam Mckeon  : 1945  MRN: [id number]    Date of Admission: 2023  Primary Care Physician: Rigoberto Chamberlain MD    Subjective   Subjective     CC:  Weakness, COVID-positive    Hx obtained using .    HPI:  8/10 - Patient is a 77-year-old who was admitted with generalized weakness, heart failure with exacerbation and found to be positive for COVID-19.  Of note the patient is deaf and  assisted with interview.  Patient's daughter is also at bedside.  Discussed COVID-19 diagnosis and she is currently on 2 L nasal cannula with O2 sats 93 to 97%.  On room air the patient dropped to 87%.  Given this I recommended she consider remdesivir and/or treatment with steroids however she does not want any treatment for COVID at this time.  The patient and her daughter are adamant about this.  They state reasons for this is that she does not do well with medications and is very sensitive.  Patient's daughter notes she has had a gradual decline over the last several months.  She is increasingly weak.  She would like her checked out thoroughly given recent heart surgery.  Cardiology was consulted.  Echo is pending.     - Pt states she feels her breathing is normal but has not been out of bed yet. Still on 1-2L NC. Catarino po but still has decreased appetite. Willing to try chocolate boost. Willing to go to rehab but somewhat reluctant. Daughter is encouraging. Echo report was still pending but cardiology at bedside to discuss with pt and her daughter. She refused labs this am but now agreeable after explaining the reason to recheck the K and Mag.      - Pt states feeling a little better. Ambulated with a walker in the room yesterday. Catarino po. Still feels weak. Still remains on 1-2L oxygen. Awaiting insurance /;rehab referrals    ROS:  Gen- No fevers, chills  CV- No chest pain,  palpitations  Resp-positive dry cough, reports dyspnea that comes and goes but none at present  GI- No N/V/D, abd pain: Positive decreased appetite       Objective   Objective     Vital Signs:   Temp:  [96.1 øF (35.6 øC)-98.6 øF (37 øC)] 98.6 øF (37 øC)  Heart Rate:  [60-93] 69  Resp:  [16-18] 18  BP: (127-169)/(67-98) 153/98  Flow (L/min):  [1-2] 1     Physical Exam:  Constitutional: No acute distress, awake, alert  HENT: NCAT, mucous membranes moist; deaf  Respiratory: Decreased breath sounds bilaterally, respiratory effort normal   Cardiovascular: RRR, positive murmur  Gastrointestinal: Positive bowel sounds, soft, nontender, nondistended  Musculoskeletal: Generally weak  Psychiatric: Appropriate affect, cooperative  Neurologic: Sitting in bed and communicates via    skin: No rashes      Results Reviewed:  LAB RESULTS:      Lab 08/10/23  0510 08/09/23  2056 08/09/23  2015 08/09/23  1732   WBC 5.31  --   --  5.23   HEMOGLOBIN 14.0  --   --  14.9   HEMATOCRIT 42.3  --   --  46.1   PLATELETS 124*  --   --  144   NEUTROS ABS 4.64  --   --  4.06   IMMATURE GRANS (ABS) 0.06*  --   --  0.04   LYMPHS ABS 0.47*  --   --  0.68*   MONOS ABS 0.12  --   --  0.43   EOS ABS 0.00  --   --  0.00   MCV 94.8  --   --  96.0   CRP  --  <0.30  --   --    PROCALCITONIN  --  0.13  --   --    LACTATE  --   --   --  1.1   LDH  --  295*  --   --    D DIMER QUANT  --   --  0.88*  --          Lab 08/12/23  0325 08/11/23  1029 08/10/23  0510 08/09/23  2056 08/09/23  1809   SODIUM 133* 137 137  --  136   POTASSIUM 3.9 4.1 3.1*  --  3.4*   CHLORIDE 93* 95* 92*  --  92*   CO2 33.0* 35.0* 30.0*  --  30.0*   ANION GAP 7.0 7.0 15.0  --  14.0   BUN 45* 42* 28*  --  31*   CREATININE 1.48* 1.52* 1.45*  --  1.49*   EGFR 35.9* 34.7* 36.8*  --  35.6*   GLUCOSE 137* 128* 104*  --  110*   CALCIUM 8.3* 8.8 8.3*  --  9.2   MAGNESIUM 2.1 2.3 1.5* 1.6  --    HEMOGLOBIN A1C  --   --  6.20*  --   --    TSH  --   --   --  6.110*  --          Lab  08/12/23  0325 08/11/23  1029 08/10/23  0510 08/09/23  1809   TOTAL PROTEIN 5.1* 5.2* 5.1* 6.6   ALBUMIN 2.5* 2.8* 2.9* 3.1*   GLOBULIN 2.6 2.4 2.2 3.5   ALT (SGPT) 34* 36* 25 27   AST (SGOT) 41* 50* 34* 39*   BILIRUBIN 0.4 0.5 0.9 1.1   ALK PHOS 132* 149* 144* 170*   LIPASE  --   --   --  15         Lab 08/09/23 2056 08/09/23 1809   PROBNP  --  58,397.0*   HSTROP T 29* 35*         Lab 08/10/23  0510   CHOLESTEROL 93   LDL CHOL 37   HDL CHOL 37*   TRIGLYCERIDES 99         Lab 08/09/23 2056   FERRITIN 496.30*         Brief Urine Lab Results  (Last result in the past 365 days)        Color   Clarity   Blood   Leuk Est   Nitrite   Protein   CREAT   Urine HCG        08/09/23 1806 Yellow   Clear   Small (1+)   Negative   Negative   >=300 mg/dL (3+)                   Microbiology Results Abnormal       None            Adult Transthoracic Echo Limited W/ Cont if Necessary Per Protocol    Result Date: 8/11/2023    : Left ventricular systolic function is moderately decreased. Calculated left ventricular EF = 35.5% Left ventricular ejection fraction appears to be 31 - 35%. Septal wall motion is abnormal, consistent with right ventricular pacing. Normal left ventricular cavity size noted. Left ventricular wall thickness is consistent with mild concentric hypertrophy. There is left ventricular global hypokinesis noted.   The right atrial cavity is dilated.   The right atrial cavity is dilated. The diameter of the inferior vena cava is 1.57 cm. Partial IVC inspiratory collapse of less than 50% noted.   Mild periprosthetic aortic valve regurgitation is seen. There is a TAVR valve present, 20mm Gisselle 3. The prosthetic aortic valve is grossly normal.   : Calcified mitral valve chordae are present. There is mild, bileaflet mitral valve thickening present. Mild mitral valve regurgitation is present.   The tricuspid valve is normal in structure. Moderate tricuspid valve regurgitation is present.      Results for orders placed  during the hospital encounter of 08/09/23    Adult Transthoracic Echo Limited W/ Cont if Necessary Per Protocol    Interpretation Summary    : Left ventricular systolic function is moderately decreased. Calculated left ventricular EF = 35.5% Left ventricular ejection fraction appears to be 31 - 35%. Septal wall motion is abnormal, consistent with right ventricular pacing. Normal left ventricular cavity size noted. Left ventricular wall thickness is consistent with mild concentric hypertrophy. There is left ventricular global hypokinesis noted.    The right atrial cavity is dilated.    The right atrial cavity is dilated. The diameter of the inferior vena cava is 1.57 cm. Partial IVC inspiratory collapse of less than 50% noted.    Mild periprosthetic aortic valve regurgitation is seen. There is a TAVR valve present, 20mm Gisselle 3. The prosthetic aortic valve is grossly normal.    : Calcified mitral valve chordae are present. There is mild, bileaflet mitral valve thickening present. Mild mitral valve regurgitation is present.    The tricuspid valve is normal in structure. Moderate tricuspid valve regurgitation is present.      Current medications:  Scheduled Meds:albuterol sulfate HFA, 2 puff, Inhalation, 4x Daily - RT  amiodarone, 200 mg, Oral, Q12H  apixaban, 2.5 mg, Oral, Q12H  aspirin, 81 mg, Oral, Daily  atorvastatin, 10 mg, Oral, Nightly  cholecalciferol, 1,000 Units, Oral, Daily  dexAMETHasone, 6 mg, Oral, Daily With Breakfast  dilTIAZem CD, 300 mg, Oral, Daily  latanoprost, 1 drop, Both Eyes, Nightly  metoprolol succinate XL, 100 mg, Oral, Q24H  pharmacy consult - [place], , Does not apply, Daily  senna-docusate sodium, 2 tablet, Oral, BID  sodium chloride, 10 mL, Intravenous, Q12H  valsartan, 40 mg, Oral, Q24H  vitamin B-12, 1,000 mcg, Oral, Daily      Continuous Infusions:   PRN Meds:.  acetaminophen    senna-docusate sodium **AND** polyethylene glycol **AND** bisacodyl **AND** bisacodyl    Calcium Replacement -  Follow Nurse / BPA Driven Protocol    Magnesium Low Dose Replacement - Follow Nurse / BPA Driven Protocol    ondansetron **OR** ondansetron    Phosphorus Replacement - Follow Nurse / BPA Driven Protocol    Potassium Replacement - Follow Nurse / BPA Driven Protocol    simethicone    [COMPLETED] Insert Peripheral IV **AND** sodium chloride    sodium chloride    sodium chloride    Assessment & Plan   Assessment & Plan     Active Hospital Problems    Diagnosis  POA    **Acute HFrEF (heart failure with reduced ejection fraction) [I50.21]  Unknown    COVID-19 [U07.1]  Yes    Weakness [R53.1]  Unknown    Hypokalemia [E87.6]  Unknown    Hypoalbuminemia [E88.09]  Unknown    COVID-19 virus detected [U07.1]  Unknown    Hypoxia [R09.02]  Unknown    Pleural effusion [J90]  Unknown    Pulmonary hypertension [I27.20]  Yes    Chronic anticoagulation [Z79.01]  Not Applicable    Chronic nausea [R11.0]  Yes    Chronic kidney disease, stage 3b [N18.32]  Yes    Presence of cardiac pacemaker secondary to sick sinus syndrome [Z95.0]  Yes    Chronic combined systolic and diastolic congestive heart failure [I50.42]  Yes    Hypertension [I10]  Yes    Aortic stenosis, severe s/p TAVR (7/20/2022) [I35.0]  Yes    Longstanding persistent atrial fibrillation [I48.11]  Yes    Sick sinus syndrome [I49.5]  Yes    Hyperlipidemia LDL goal <70 [E78.5]  Yes    Bilateral carotid artery stenosis [I65.23]  Yes    Status post CVA [Z86.73]  Not Applicable      Resolved Hospital Problems   No resolved problems to display.        Brief Hospital Course to date:  Miryam Mckeon is a 77 y.o. female with past medical history of A-fib on Eliquis, aortic valve stenosis status post TAVR, CKD stage III, deafness, hypertension, hyperlipidemia and history of CVA who came into the emergency department with complaints of generalized weakness.  She was found to be in congestive heart failure with acute exacerbation as well as COVID-positive.    Acute on chronic  systolic and diastolic congestive heart failure exacerbation  -Echo with reduced EF 35%  -Cardiology consulted and following  -Post Lasix 40 mg IV x 1  --O2 to maintain sats greater than 90, currently on 1-2 L nasal cannula with baseline does not require oxygen at home    COVID upper respiratory infection  -Symptoms of dry cough and hypoxia with intermittent dyspnea  -Continue O2 to maintain sats greater than 90, she was 87% on room air during my exam on 8/10 and 8/11  -Patient is declining treatment for COVID, specifically remdesivir  --Reluctantly agreeable to continue short course of steroids (5 days total)  -Continue inhalers as needed    Generalized weakness  -Likely multifactorial  -PT and OT recc rehab  -CHF likely contributing  --cardiology consulted    A-fib  Chronic anticoagulation  -Continue Eliquis  -Continue Cardizem  -Continue amiodarone, scheduled to start 200 mg daily on 8/17    Hypertension  Hyperlipidemia  Bilateral carotid artery stenosis  History of CVA  -Continue home medications    Poor p.o. intake  Failure to thrive  Hypoalbuminemia  Malnutrition  -Nutrition consult requested  --start Boost/magic cup supplement TID    Presence of pacemaker    CKD, stage III    Aortic stenosis, status post TAVR      Expected Discharge Location and Transportation: Rehab, private vehicle  Expected Discharge   Expected Discharge Date: 8/14/2023; Expected Discharge Time:      DVT prophylaxis:  Medical and mechanical DVT prophylaxis orders are present.     AM-PAC 6 Clicks Score (PT): 17 (08/11/23 0988)    CODE STATUS:   Code Status and Medical Interventions:   Ordered at: 08/09/23 0373     Level Of Support Discussed With:    Patient     Code Status (Patient has no pulse and is not breathing):    CPR (Attempt to Resuscitate)     Medical Interventions (Patient has pulse or is breathing):    Full Support       Naa North MD  08/12/23

## 2023-08-13 PROCEDURE — 25010000002 ONDANSETRON PER 1 MG: Performed by: INTERNAL MEDICINE

## 2023-08-13 PROCEDURE — 63710000001 DEXAMETHASONE PER 0.25 MG: Performed by: FAMILY MEDICINE

## 2023-08-13 PROCEDURE — 99232 SBSQ HOSP IP/OBS MODERATE 35: CPT | Performed by: FAMILY MEDICINE

## 2023-08-13 RX ORDER — ALBUTEROL SULFATE 90 UG/1
2 AEROSOL, METERED RESPIRATORY (INHALATION) EVERY 4 HOURS PRN
Status: DISCONTINUED | OUTPATIENT
Start: 2023-08-13 | End: 2023-08-21 | Stop reason: HOSPADM

## 2023-08-13 RX ADMIN — DEXAMETHASONE 6 MG: 4 TABLET ORAL at 09:31

## 2023-08-13 RX ADMIN — Medication 10 ML: at 09:31

## 2023-08-13 RX ADMIN — SENNOSIDES AND DOCUSATE SODIUM 2 TABLET: 50; 8.6 TABLET ORAL at 21:14

## 2023-08-13 RX ADMIN — ASPIRIN 81 MG: 81 TABLET, CHEWABLE ORAL at 09:30

## 2023-08-13 RX ADMIN — APIXABAN 2.5 MG: 2.5 TABLET, FILM COATED ORAL at 09:31

## 2023-08-13 RX ADMIN — SENNOSIDES AND DOCUSATE SODIUM 2 TABLET: 50; 8.6 TABLET ORAL at 09:31

## 2023-08-13 RX ADMIN — DILTIAZEM HYDROCHLORIDE 300 MG: 180 CAPSULE, COATED, EXTENDED RELEASE ORAL at 09:30

## 2023-08-13 RX ADMIN — AMIODARONE HYDROCHLORIDE 200 MG: 200 TABLET ORAL at 09:31

## 2023-08-13 RX ADMIN — AMIODARONE HYDROCHLORIDE 200 MG: 200 TABLET ORAL at 21:14

## 2023-08-13 RX ADMIN — ATORVASTATIN CALCIUM 10 MG: 10 TABLET, FILM COATED ORAL at 21:14

## 2023-08-13 RX ADMIN — Medication 1000 UNITS: at 09:31

## 2023-08-13 RX ADMIN — CYANOCOBALAMIN TAB 1000 MCG 1000 MCG: 1000 TAB at 09:31

## 2023-08-13 RX ADMIN — APIXABAN 2.5 MG: 2.5 TABLET, FILM COATED ORAL at 21:14

## 2023-08-13 RX ADMIN — VALSARTAN 40 MG: 80 TABLET, FILM COATED ORAL at 09:31

## 2023-08-13 RX ADMIN — METOPROLOL SUCCINATE 100 MG: 50 TABLET, EXTENDED RELEASE ORAL at 09:30

## 2023-08-13 RX ADMIN — ONDANSETRON 4 MG: 2 INJECTION INTRAMUSCULAR; INTRAVENOUS at 09:30

## 2023-08-13 NOTE — PROGRESS NOTES
"[facility] Cardiology  Inpatient Progress Note      Chief Complaint/Reason for consult:  Following for A/C combined CHF, Persistent AFib       Subjective  Patient in isolation room.  Communication had with patient with help of .   She reports feeling better, slept \"ok.\"  Denies any chest pain, denies dyspnea, denies palpitations.       Vital Sign Min/Max for last 24 hours  Temp  Min: 95.6 øF (35.3 øC)  Max: 97.9 øF (36.6 øC)   BP  Min: 146/85  Max: 164/76   Pulse  Min: 61  Max: 78   Resp  Min: 16  Max: 18   SpO2  Min: 90 %  Max: 97 %   Flow (L/min)  Min: 1  Max: 1      Intake/Output Summary (Last 24 hours) at 8/13/2023 0805  Last data filed at 8/13/2023 0255  Gross per 24 hour   Intake --   Output 1250 ml   Net -1250 ml             Gen: well developed, older frail appearing WF, sitting up in bed, comfortable appearing  HEENT: MMM, sclerae anicteric, conjunctivae normal, no JVD  CV: regular rate, irregularly irregular rhythm, II/VI BRITTANEY at RUSB, normal S1, S2. 2+ radial and DP pulses. + ectopy  Pulm: RA, normal work of breathing, decreased air movement in bases  Abd: soft, nontender, nondistended  Ext: normal bulk for age, normal tone, no dependent edema  Neuro: deaf, awake and alert, oriented, face symmetrical, moving all extremities well  Psych: normal mood, appropriate affect    Tele:  atrial fibrillation, V pacing    Results Review (reviewed the patient's recent labs in the electronic medical record):     EKG:  afib, V paced beats, LAD, lateral ST changes stable    Results for orders placed during the hospital encounter of 08/09/23 8/11/23 - Transthoracic Echo Limited     Left ventricular systolic function is moderately decreased. Calculated left ventricular EF = 35.5% Septal wall motion is abnormal, consistent with right ventricular pacing. Normal left ventricular cavity size noted. Left ventricular wall thickness is consistent with mild concentric hypertrophy. There is left " ventricular global hypokinesis noted.    The right atrial cavity is dilated.    The right atrial cavity is dilated. The diameter of the inferior vena cava is 1.57 cm. Partial IVC inspiratory collapse of less than 50% noted.    Mild periprosthetic aortic valve regurgitation is seen. There is a TAVR valve present, 20mm Gisselle 3. The prosthetic aortic valve is grossly normal.    : Calcified mitral valve chordae are present. There is mild, bileaflet mitral valve thickening present. Mild mitral valve regurgitation is present.    The tricuspid valve is normal in structure. Moderate tricuspid valve regurgitation is present.         Lab Results   Component Value Date    WBC 5.31 08/10/2023    HGB 14.0 08/10/2023    HCT 42.3 08/10/2023    MCV 94.8 08/10/2023     (L) 08/10/2023     Lab Results   Component Value Date    GLUCOSE 137 (H) 08/12/2023    CALCIUM 8.3 (L) 08/12/2023     (L) 08/12/2023    K 3.9 08/12/2023    CO2 33.0 (H) 08/12/2023    CL 93 (L) 08/12/2023    BUN 45 (H) 08/12/2023    CREATININE 1.48 (H) 08/12/2023    EGFRIFAFRI 47 (L) 02/18/2022    EGFRIFNONA 41 (L) 02/18/2022    BCR 30.4 (H) 08/12/2023    ANIONGAP 7.0 08/12/2023     Lab Results   Component Value Date    TROPONINT 29 (H) 08/09/2023    TROPONINT 35 (H) 08/09/2023      Lab Results   Component Value Date    PROBNP 58,397.0 (H) 08/09/2023     Lab Results   Component Value Date    HGBA1C 6.20 (H) 08/10/2023      Lab Results   Component Value Date    CHOL 93 08/10/2023    CHLPL 112 02/18/2022    TRIG 99 08/10/2023    HDL 37 (L) 08/10/2023    LDL 37 08/10/2023      Assessment /Plan:     Acute on chronic systolic and diastolic heart failure  -  Currently appears compensated  -  continue current regimen.  Repeat BMP, proBNP in a.m.  2.  Aortic stenosis status post TAVR   - appears euvolemic on exam, estimated RAP 10   - Plan for additional diuretic dose today after labs if K acceptable   - change metoprolol tartrate to succinate for GDMT   -   low-dose ARB added yesterday     3.  Longstanding persistent atrial fibrillation   - SSS with PM, follows with Dr. Hussein   - Continue anticoagulation with apixaban   - continue BB, CCB   - Amiodarone 200mg BID  -  Continue supportive care.    Viri Funes PA-C working with Dr. Thad Arguelles  8/13/2023  08:34 EDT

## 2023-08-13 NOTE — PLAN OF CARE
Problem: Fall Injury Risk  Goal: Absence of Fall and Fall-Related Injury  Outcome: Ongoing, Progressing  Intervention: Identify and Manage Contributors  Recent Flowsheet Documentation  Taken 8/13/2023 0401 by Efren Dennison RN  Medication Review/Management: medications reviewed  Taken 8/13/2023 0200 by Efren Dennison RN  Medication Review/Management: medications reviewed  Taken 8/13/2023 0000 by Efren Dennison RN  Medication Review/Management: medications reviewed  Taken 8/12/2023 2200 by Efren Dennison RN  Medication Review/Management: medications reviewed  Taken 8/12/2023 2000 by Efren Dennison RN  Medication Review/Management: medications reviewed  Intervention: Promote Injury-Free Environment  Recent Flowsheet Documentation  Taken 8/13/2023 0401 by Efren Dennison RN  Safety Promotion/Fall Prevention:   activity supervised   assistive device/personal items within reach   clutter free environment maintained   toileting scheduled   safety round/check completed   room organization consistent   nonskid shoes/slippers when out of bed  Taken 8/13/2023 0200 by Efren Dennison RN  Safety Promotion/Fall Prevention:   activity supervised   assistive device/personal items within reach   clutter free environment maintained   toileting scheduled   safety round/check completed   room organization consistent   nonskid shoes/slippers when out of bed  Taken 8/13/2023 0000 by Efren Dennison RN  Safety Promotion/Fall Prevention:   activity supervised   assistive device/personal items within reach   clutter free environment maintained   toileting scheduled   room organization consistent   nonskid shoes/slippers when out of bed   safety round/check completed  Taken 8/12/2023 2200 by Efren Dennison RN  Safety Promotion/Fall Prevention:   activity supervised   assistive device/personal items within reach   clutter free environment maintained   toileting scheduled   safety round/check completed    room organization consistent   nonskid shoes/slippers when out of bed  Taken 8/12/2023 2000 by Efren Dennison RN  Safety Promotion/Fall Prevention:   activity supervised   clutter free environment maintained   assistive device/personal items within reach   toileting scheduled   safety round/check completed   room organization consistent   nonskid shoes/slippers when out of bed     Problem: Skin Injury Risk Increased  Goal: Skin Health and Integrity  Outcome: Ongoing, Progressing  Intervention: Optimize Skin Protection  Recent Flowsheet Documentation  Taken 8/13/2023 0401 by Efren Dennison RN  Head of Bed (HOB) Positioning: HOB elevated  Taken 8/13/2023 0200 by Efren Dennison RN  Head of Bed (HOB) Positioning: HOB elevated  Taken 8/13/2023 0000 by Efren Dennison RN  Head of Bed (HOB) Positioning: HOB elevated  Taken 8/12/2023 2200 by Efren Dennison RN  Head of Bed (HOB) Positioning: HOB elevated  Taken 8/12/2023 2000 by Efren Dennison RN  Pressure Reduction Techniques: frequent weight shift encouraged  Head of Bed (HOB) Positioning: HOB elevated  Pressure Reduction Devices: pressure-redistributing mattress utilized  Skin Protection:   adhesive use limited   incontinence pads utilized   tubing/devices free from skin contact     Problem: Adult Inpatient Plan of Care  Goal: Plan of Care Review  Outcome: Ongoing, Progressing   Goal Outcome Evaluation:      Pt. resting in bed. Paced rhythm on telemetry. On 1L NC. No acute events during shift.

## 2023-08-13 NOTE — PLAN OF CARE
Goal Outcome Evaluation:  Plan of Care Reviewed With: patient        Progress: improving  Outcome Evaluation: Pt alert, oriented. VSS, RA. Denied pain, discomfort. Encouraged frequent pulmonary toileting. Patient stated she felt eztra tired today. Got up to chair this evening when daughter was at bedside. Encouraging PO fluid/food intack. Skin care completed to documented areas of concern. Plan of care discussed with pt. Verbalized understanding.

## 2023-08-13 NOTE — PROGRESS NOTES
[facility] Medicine Services  PROGRESS NOTE    Patient Name: Miryam Mckeon  : 1945  MRN: [id number]    Date of Admission: 2023  Primary Care Physician: Rigoberto Chamberlain MD    Subjective   Subjective     CC:  Weakness, COVID-positive   present during exam     HPI:  Patient is a 77-year-old female seen and examined by me this a.m., she is continuing treatment for COVID-19 and CHF exacerbation.  She is resting comfortably in bed and denies any new acute complaints or problems at this time. History obtained from . She reports her breathing is doing well, continue to follow with cardiology and working on possible rehab at this time.     ROS:  Gen- No fevers, chills  CV- No chest pain, palpitations  Resp- reports SOA improved, no worsening cough   GI- No N/V/D, abd pain     Objective   Objective     Vital Signs:   Temp:  [95.6 øF (35.3 øC)-97.9 øF (36.6 øC)] 97.9 øF (36.6 øC)  Heart Rate:  [61-78] 78  Resp:  [16-18] 16  BP: (146-164)/() 157/94  Flow (L/min):  [1] 1     Physical Exam:  Constitutional: No acute distress, awake, alert, resting in bed   HENT: NCAT, mucous membranes moist; deaf  Respiratory: Decreased breath sounds bilaterally, respiratory effort normal, no active rhonchi or wheezing   Cardiovascular: RRR, positive murmur  Gastrointestinal: soft, nontender, nondistended  Musculoskeletal: Generally weak, no noted clubbing, edema, or cyanosis  Psychiatric: Appropriate affect, cooperative  Neurologic: Sitting in bed and communicates via    skin: No rashes      Results Reviewed:  LAB RESULTS:      Lab 08/10/23  0510 23  1732   WBC 5.31  --   --  5.23   HEMOGLOBIN 14.0  --   --  14.9   HEMATOCRIT 42.3  --   --  46.1   PLATELETS 124*  --   --  144   NEUTROS ABS 4.64  --   --  4.06   IMMATURE GRANS (ABS) 0.06*  --   --  0.04   LYMPHS ABS 0.47*  --   --  0.68*   MONOS ABS 0.12  --    --  0.43   EOS ABS 0.00  --   --  0.00   MCV 94.8  --   --  96.0   CRP  --  <0.30  --   --    PROCALCITONIN  --  0.13  --   --    LACTATE  --   --   --  1.1   LDH  --  295*  --   --    D DIMER QUANT  --   --  0.88*  --          Lab 08/12/23 0325 08/11/23 1029 08/10/23  0510 08/09/23  2056 08/09/23  1809   SODIUM 133* 137 137  --  136   POTASSIUM 3.9 4.1 3.1*  --  3.4*   CHLORIDE 93* 95* 92*  --  92*   CO2 33.0* 35.0* 30.0*  --  30.0*   ANION GAP 7.0 7.0 15.0  --  14.0   BUN 45* 42* 28*  --  31*   CREATININE 1.48* 1.52* 1.45*  --  1.49*   EGFR 35.9* 34.7* 36.8*  --  35.6*   GLUCOSE 137* 128* 104*  --  110*   CALCIUM 8.3* 8.8 8.3*  --  9.2   MAGNESIUM 2.1 2.3 1.5* 1.6  --    HEMOGLOBIN A1C  --   --  6.20*  --   --    TSH  --   --   --  6.110*  --          Lab 08/12/23  0325 08/11/23  1029 08/10/23  0510 08/09/23  1809   TOTAL PROTEIN 5.1* 5.2* 5.1* 6.6   ALBUMIN 2.5* 2.8* 2.9* 3.1*   GLOBULIN 2.6 2.4 2.2 3.5   ALT (SGPT) 34* 36* 25 27   AST (SGOT) 41* 50* 34* 39*   BILIRUBIN 0.4 0.5 0.9 1.1   ALK PHOS 132* 149* 144* 170*   LIPASE  --   --   --  15         Lab 08/09/23 2056 08/09/23  1809   PROBNP  --  58,397.0*   HSTROP T 29* 35*         Lab 08/10/23  0510   CHOLESTEROL 93   LDL CHOL 37   HDL CHOL 37*   TRIGLYCERIDES 99         Lab 08/09/23  2056   FERRITIN 496.30*         Brief Urine Lab Results  (Last result in the past 365 days)        Color   Clarity   Blood   Leuk Est   Nitrite   Protein   CREAT   Urine HCG        08/09/23 1806 Yellow   Clear   Small (1+)   Negative   Negative   >=300 mg/dL (3+)                   Microbiology Results Abnormal       None            No radiology results from the last 24 hrs    Results for orders placed during the hospital encounter of 08/09/23    Adult Transthoracic Echo Limited W/ Cont if Necessary Per Protocol    Interpretation Summary    : Left ventricular systolic function is moderately decreased. Calculated left ventricular EF = 35.5% Left ventricular ejection fraction  appears to be 31 - 35%. Septal wall motion is abnormal, consistent with right ventricular pacing. Normal left ventricular cavity size noted. Left ventricular wall thickness is consistent with mild concentric hypertrophy. There is left ventricular global hypokinesis noted.    The right atrial cavity is dilated.    The right atrial cavity is dilated. The diameter of the inferior vena cava is 1.57 cm. Partial IVC inspiratory collapse of less than 50% noted.    Mild periprosthetic aortic valve regurgitation is seen. There is a TAVR valve present, 20mm Gisselle 3. The prosthetic aortic valve is grossly normal.    : Calcified mitral valve chordae are present. There is mild, bileaflet mitral valve thickening present. Mild mitral valve regurgitation is present.    The tricuspid valve is normal in structure. Moderate tricuspid valve regurgitation is present.      Current medications:  Scheduled Meds:amiodarone, 200 mg, Oral, Q12H  apixaban, 2.5 mg, Oral, Q12H  aspirin, 81 mg, Oral, Daily  atorvastatin, 10 mg, Oral, Nightly  cholecalciferol, 1,000 Units, Oral, Daily  dexAMETHasone, 6 mg, Oral, Daily With Breakfast  dilTIAZem CD, 300 mg, Oral, Daily  latanoprost, 1 drop, Both Eyes, Nightly  metoprolol succinate XL, 100 mg, Oral, Q24H  pharmacy consult - MTM, , Does not apply, Daily  senna-docusate sodium, 2 tablet, Oral, BID  sodium chloride, 10 mL, Intravenous, Q12H  valsartan, 40 mg, Oral, Q24H  vitamin B-12, 1,000 mcg, Oral, Daily      Continuous Infusions:   PRN Meds:.  acetaminophen    albuterol sulfate HFA    senna-docusate sodium **AND** polyethylene glycol **AND** bisacodyl **AND** bisacodyl    Calcium Replacement - Follow Nurse / BPA Driven Protocol    Magnesium Low Dose Replacement - Follow Nurse / BPA Driven Protocol    ondansetron **OR** ondansetron    Phosphorus Replacement - Follow Nurse / BPA Driven Protocol    Potassium Replacement - Follow Nurse / BPA Driven Protocol    simethicone    [COMPLETED] Insert  Peripheral IV **AND** sodium chloride    sodium chloride    sodium chloride    Assessment & Plan   Assessment & Plan     Active Hospital Problems    Diagnosis  POA    **Acute HFrEF (heart failure with reduced ejection fraction) [I50.21]  Unknown    COVID-19 [U07.1]  Yes    Weakness [R53.1]  Unknown    Hypokalemia [E87.6]  Unknown    Hypoalbuminemia [E88.09]  Unknown    COVID-19 virus detected [U07.1]  Unknown    Hypoxia [R09.02]  Unknown    Pleural effusion [J90]  Unknown    Pulmonary hypertension [I27.20]  Yes    Chronic anticoagulation [Z79.01]  Not Applicable    Chronic nausea [R11.0]  Yes    Chronic kidney disease, stage 3b [N18.32]  Yes    Presence of cardiac pacemaker secondary to sick sinus syndrome [Z95.0]  Yes    Chronic combined systolic and diastolic congestive heart failure [I50.42]  Yes    Hypertension [I10]  Yes    Aortic stenosis, severe s/p TAVR (7/20/2022) [I35.0]  Yes    Longstanding persistent atrial fibrillation [I48.11]  Yes    Sick sinus syndrome [I49.5]  Yes    Hyperlipidemia LDL goal <70 [E78.5]  Yes    Bilateral carotid artery stenosis [I65.23]  Yes    Status post CVA [Z86.73]  Not Applicable      Resolved Hospital Problems   No resolved problems to display.        Brief Hospital Course to date:  Miryam Mckeon is a 77 y.o. female with past medical history of A-fib on Eliquis, aortic valve stenosis status post TAVR, CKD stage III, deafness, hypertension, hyperlipidemia and history of CVA who came into the emergency department with complaints of generalized weakness.  She was found to be in congestive heart failure with acute exacerbation as well as COVID-positive.    Acute on chronic systolic and diastolic congestive heart failure exacerbation  -Echo with reduced EF 35%  --O2 to maintain sats greater than 90, currently on 1-2 L nasal cannula with baseline does not require oxygen at home  --Reviewed cardiology recommendations, continue to follow.   --Patient appears to be euvolemic at  this time.     COVID upper respiratory infection  -Symptoms of dry cough and hypoxia with intermittent dyspnea  -Continue O2 to maintain sats greater than 90, she was 87% on room air during my exam on 8/10 and 8/11  -Patient is declining treatment for COVID, specifically remdesivir  --Reluctantly agreeable to continue short course of steroids (5 days total)  -Continue inhalers as needed    Generalized weakness  -Likely multifactorial  -PT and OT recc rehab  -CHF likely contributing  --cardiology consulted  -- Patient likely to need acute rehab, follow up with CM and continued PT/OT at this time.     A-fib  Chronic anticoagulation  -Continue Eliquis  -Continue Cardizem  -Continue amiodarone, scheduled to start 200 mg daily on 8/17    Hypertension  Hyperlipidemia  Bilateral carotid artery stenosis  History of CVA  -Continue home medications    Poor p.o. intake  Failure to thrive  Hypoalbuminemia  Malnutrition  -Nutrition consult requested  --start Boost/magic cup supplement TID    Presence of pacemaker    CKD, stage III    Aortic stenosis, status post TAVR      Expected Discharge Location and Transportation: Rehab, private vehicle  Expected Discharge   Expected Discharge Date: 8/14/2023; Expected Discharge Time:      DVT prophylaxis:  Medical and mechanical DVT prophylaxis orders are present.     AM-PAC 6 Clicks Score (PT): 17 (08/12/23 0800)    CODE STATUS:   Code Status and Medical Interventions:   Ordered at: 08/09/23 9637     Level Of Support Discussed With:    Patient     Code Status (Patient has no pulse and is not breathing):    CPR (Attempt to Resuscitate)     Medical Interventions (Patient has pulse or is breathing):    Full Support       AUBRIE Maxwell, DO  08/13/23

## 2023-08-14 ENCOUNTER — APPOINTMENT (OUTPATIENT)
Dept: GENERAL RADIOLOGY | Facility: HOSPITAL | Age: 78
DRG: 177 | End: 2023-08-14
Payer: MEDICARE

## 2023-08-14 LAB
ALBUMIN SERPL-MCNC: 2.9 G/DL (ref 3.5–5.2)
ALBUMIN/GLOB SERPL: 1.3 G/DL
ALP SERPL-CCNC: 136 U/L (ref 39–117)
ALT SERPL W P-5'-P-CCNC: 42 U/L (ref 1–33)
ANION GAP SERPL CALCULATED.3IONS-SCNC: 11 MMOL/L (ref 5–15)
AST SERPL-CCNC: 39 U/L (ref 1–32)
BASOPHILS # BLD AUTO: 0.03 10*3/MM3 (ref 0–0.2)
BASOPHILS NFR BLD AUTO: 0.4 % (ref 0–1.5)
BILIRUB SERPL-MCNC: 0.5 MG/DL (ref 0–1.2)
BUN SERPL-MCNC: 45 MG/DL (ref 8–23)
BUN/CREAT SERPL: 35.7 (ref 7–25)
CALCIUM SPEC-SCNC: 8.5 MG/DL (ref 8.6–10.5)
CHLORIDE SERPL-SCNC: 95 MMOL/L (ref 98–107)
CO2 SERPL-SCNC: 27 MMOL/L (ref 22–29)
CREAT SERPL-MCNC: 1.26 MG/DL (ref 0.57–1)
DEPRECATED RDW RBC AUTO: 45.4 FL (ref 37–54)
EGFRCR SERPLBLD CKD-EPI 2021: 43.5 ML/MIN/1.73
EOSINOPHIL # BLD AUTO: 0 10*3/MM3 (ref 0–0.4)
EOSINOPHIL NFR BLD AUTO: 0 % (ref 0.3–6.2)
ERYTHROCYTE [DISTWIDTH] IN BLOOD BY AUTOMATED COUNT: 13 % (ref 12.3–15.4)
GLOBULIN UR ELPH-MCNC: 2.3 GM/DL
GLUCOSE SERPL-MCNC: 133 MG/DL (ref 65–99)
HCT VFR BLD AUTO: 45.2 % (ref 34–46.6)
HGB BLD-MCNC: 14.9 G/DL (ref 12–15.9)
IMM GRANULOCYTES # BLD AUTO: 0.08 10*3/MM3 (ref 0–0.05)
IMM GRANULOCYTES NFR BLD AUTO: 1.1 % (ref 0–0.5)
LYMPHOCYTES # BLD AUTO: 0.79 10*3/MM3 (ref 0.7–3.1)
LYMPHOCYTES NFR BLD AUTO: 10.5 % (ref 19.6–45.3)
MAGNESIUM SERPL-MCNC: 1.9 MG/DL (ref 1.6–2.4)
MCH RBC QN AUTO: 31.4 PG (ref 26.6–33)
MCHC RBC AUTO-ENTMCNC: 33 G/DL (ref 31.5–35.7)
MCV RBC AUTO: 95.2 FL (ref 79–97)
MONOCYTES # BLD AUTO: 0.35 10*3/MM3 (ref 0.1–0.9)
MONOCYTES NFR BLD AUTO: 4.6 % (ref 5–12)
NEUTROPHILS NFR BLD AUTO: 6.28 10*3/MM3 (ref 1.7–7)
NEUTROPHILS NFR BLD AUTO: 83.4 % (ref 42.7–76)
NRBC BLD AUTO-RTO: 0 /100 WBC (ref 0–0.2)
NT-PROBNP SERPL-MCNC: ABNORMAL PG/ML (ref 0–1800)
PLATELET # BLD AUTO: 139 10*3/MM3 (ref 140–450)
PMV BLD AUTO: 10.3 FL (ref 6–12)
POTASSIUM SERPL-SCNC: 4.5 MMOL/L (ref 3.5–5.2)
PROT SERPL-MCNC: 5.2 G/DL (ref 6–8.5)
RBC # BLD AUTO: 4.75 10*6/MM3 (ref 3.77–5.28)
SODIUM SERPL-SCNC: 133 MMOL/L (ref 136–145)
WBC NRBC COR # BLD: 7.53 10*3/MM3 (ref 3.4–10.8)

## 2023-08-14 PROCEDURE — 85025 COMPLETE CBC W/AUTO DIFF WBC: CPT | Performed by: FAMILY MEDICINE

## 2023-08-14 PROCEDURE — 83880 ASSAY OF NATRIURETIC PEPTIDE: CPT | Performed by: PHYSICIAN ASSISTANT

## 2023-08-14 PROCEDURE — 83735 ASSAY OF MAGNESIUM: CPT | Performed by: FAMILY MEDICINE

## 2023-08-14 PROCEDURE — 97110 THERAPEUTIC EXERCISES: CPT

## 2023-08-14 PROCEDURE — 99231 SBSQ HOSP IP/OBS SF/LOW 25: CPT | Performed by: INTERNAL MEDICINE

## 2023-08-14 PROCEDURE — 63710000001 DEXAMETHASONE PER 0.25 MG: Performed by: FAMILY MEDICINE

## 2023-08-14 PROCEDURE — 71045 X-RAY EXAM CHEST 1 VIEW: CPT

## 2023-08-14 PROCEDURE — 80053 COMPREHEN METABOLIC PANEL: CPT | Performed by: FAMILY MEDICINE

## 2023-08-14 PROCEDURE — 97116 GAIT TRAINING THERAPY: CPT

## 2023-08-14 PROCEDURE — 99232 SBSQ HOSP IP/OBS MODERATE 35: CPT | Performed by: FAMILY MEDICINE

## 2023-08-14 RX ORDER — VALSARTAN 80 MG/1
40 TABLET ORAL EVERY 12 HOURS SCHEDULED
Status: DISCONTINUED | OUTPATIENT
Start: 2023-08-14 | End: 2023-08-15

## 2023-08-14 RX ADMIN — DEXAMETHASONE 6 MG: 4 TABLET ORAL at 09:16

## 2023-08-14 RX ADMIN — CYANOCOBALAMIN TAB 1000 MCG 1000 MCG: 1000 TAB at 09:16

## 2023-08-14 RX ADMIN — SENNOSIDES AND DOCUSATE SODIUM 2 TABLET: 50; 8.6 TABLET ORAL at 20:25

## 2023-08-14 RX ADMIN — SENNOSIDES AND DOCUSATE SODIUM 2 TABLET: 50; 8.6 TABLET ORAL at 09:16

## 2023-08-14 RX ADMIN — VALSARTAN 40 MG: 80 TABLET, FILM COATED ORAL at 20:25

## 2023-08-14 RX ADMIN — Medication 10 ML: at 09:17

## 2023-08-14 RX ADMIN — ASPIRIN 81 MG: 81 TABLET, CHEWABLE ORAL at 09:16

## 2023-08-14 RX ADMIN — APIXABAN 2.5 MG: 2.5 TABLET, FILM COATED ORAL at 20:25

## 2023-08-14 RX ADMIN — METOPROLOL SUCCINATE 100 MG: 50 TABLET, EXTENDED RELEASE ORAL at 09:17

## 2023-08-14 RX ADMIN — Medication 1000 UNITS: at 09:17

## 2023-08-14 RX ADMIN — ATORVASTATIN CALCIUM 10 MG: 10 TABLET, FILM COATED ORAL at 20:25

## 2023-08-14 RX ADMIN — VALSARTAN 40 MG: 80 TABLET, FILM COATED ORAL at 09:16

## 2023-08-14 RX ADMIN — AMIODARONE HYDROCHLORIDE 200 MG: 200 TABLET ORAL at 20:25

## 2023-08-14 RX ADMIN — APIXABAN 2.5 MG: 2.5 TABLET, FILM COATED ORAL at 09:17

## 2023-08-14 RX ADMIN — AMIODARONE HYDROCHLORIDE 200 MG: 200 TABLET ORAL at 09:17

## 2023-08-14 RX ADMIN — DILTIAZEM HYDROCHLORIDE 300 MG: 180 CAPSULE, COATED, EXTENDED RELEASE ORAL at 09:16

## 2023-08-14 RX ADMIN — Medication 10 ML: at 20:24

## 2023-08-14 NOTE — PROGRESS NOTES
"[facility] Cardiology Daily Note       LOS: 3 days   Patient Care Team:  Rigoberto Chamberlain MD as PCP - General (Family Medicine)  Vito Cuba MD as Consulting Physician (Cardiology)  Yashira Pyle MD as Consulting Physician (Cardiology)    Chief Complaint: Acute on chronic systolic heart failure/TAVR/PAF    Subjective     Subjective: In isolation for COVID.  95% on 1 L nasal cannula.  Heart rates have been near 70.  Blood pressures have been slightly elevated.    Review of Systems:   As above.    Medications:  amiodarone, 200 mg, Oral, Q12H  apixaban, 2.5 mg, Oral, Q12H  aspirin, 81 mg, Oral, Daily  atorvastatin, 10 mg, Oral, Nightly  cholecalciferol, 1,000 Units, Oral, Daily  dilTIAZem CD, 300 mg, Oral, Daily  latanoprost, 1 drop, Both Eyes, Nightly  metoprolol succinate XL, 100 mg, Oral, Q24H  pharmacy consult - [place], , Does not apply, Daily  senna-docusate sodium, 2 tablet, Oral, BID  sodium chloride, 10 mL, Intravenous, Q12H  valsartan, 40 mg, Oral, Q24H  vitamin B-12, 1,000 mcg, Oral, Daily        Objective     Vital Sign Min/Max for last 24 hours  Temp  Min: 95.6 øF (35.3 øC)  Max: 98.6 øF (37 øC)   BP  Min: 142/76  Max: 169/96   Pulse  Min: 66  Max: 79   Resp  Min: 16  Max: 18   SpO2  Min: 91 %  Max: 96 %   Flow (L/min)  Min: 1  Max: 1   Weight  Min: 51.8 kg (114 lb 4.8 oz)  Max: 53.8 kg (118 lb 11.2 oz)      Intake/Output Summary (Last 24 hours) at 8/14/2023 1403  Last data filed at 8/14/2023 0536  Gross per 24 hour   Intake --   Output 500 ml   Net -500 ml        Flowsheet Rows      Flowsheet Row First Filed Value   Admission Height 152.4 cm (60\") Documented at 08/09/2023 1638   Admission Weight 45.4 kg (100 lb) Documented at 08/09/2023 1638            Physical Exam:    No physical exam was performed.     Results Review:    I reviewed the patient's new clinical results.  EKG:  Tele: A-fib    Labs:    Results from last 7 days   Lab Units 08/14/23  0821 08/12/23  0325 " 08/11/23  1029   SODIUM mmol/L 133* 133* 137   POTASSIUM mmol/L 4.5 3.9 4.1   CHLORIDE mmol/L 95* 93* 95*   CO2 mmol/L 27.0 33.0* 35.0*   BUN mg/dL 45* 45* 42*   CREATININE mg/dL 1.26* 1.48* 1.52*   CALCIUM mg/dL 8.5* 8.3* 8.8   BILIRUBIN mg/dL 0.5 0.4 0.5   ALK PHOS U/L 136* 132* 149*   ALT (SGPT) U/L 42* 34* 36*   AST (SGOT) U/L 39* 41* 50*   GLUCOSE mg/dL 133* 137* 128*     Results from last 7 days   Lab Units 08/14/23  0821 08/10/23  0510 08/09/23  1732   WBC 10*3/mm3 7.53 5.31 5.23   HEMOGLOBIN g/dL 14.9 14.0 14.9   HEMATOCRIT % 45.2 42.3 46.1   PLATELETS 10*3/mm3 139* 124* 144     Lab Results   Component Value Date    TROPONINT 29 (H) 08/09/2023    TROPONINT 35 (H) 08/09/2023     Lab Results   Component Value Date    CHOL 93 08/10/2023    CHOL 96 05/20/2022     Lab Results   Component Value Date    TRIG 99 08/10/2023    TRIG 48 05/20/2022    TRIG 48 02/18/2022     Lab Results   Component Value Date    HDL 37 (L) 08/10/2023    HDL 55 05/20/2022    HDL 55 02/18/2022     No components found for: LDLCALC  Lab Results   Component Value Date    INR 1.30 (H) 07/19/2022    INR 1.20 (H) 05/20/2022    INR 1.26 (H) 05/17/2022    PROTIME 16.1 (H) 07/19/2022    PROTIME 15.1 (H) 05/20/2022    PROTIME 15.8 (H) 05/17/2022         Ejection Fraction: LVEF 30 to 35% by echo.  20 mm MARK 3 pericardial prosthesis.    Assessment   Assessment:    COVID +  Acute on chronic systolic and diastolic heart failure  Echo EF was 60 to 65% by echo 2/18/2022 (personally reviewed echo)  Status post dual-chamber pacemaker 4/13/2022 Dr. Hussein  Echo EF 40 to 45% 8/8/2022 with anteroseptal hypokinesis.  Minor coronary artery disease by cath 5/20/2022  Aortic stenosis status post 20 mm MARK 3 TAVR 7/20/2022 EF 60% at implant  Persistent atrial fibrillation  Continue amiodarone  Continue Eliquis  Sick sinus syndrome status post permanent pacemaker  CKD    Fall in ejection fraction is unexplained unless possibly by persistent RV pacing or A-fib  with RVR    Plan:    May consider repeat cardiac catheterization after she is no longer COVID-positive.  We will need pacemaker interrogation once she is no longer COVID-positive.  Increase valsartan to 40 mg twice daily    Yashira Pyle MD  08/14/23  14:03 EDT

## 2023-08-14 NOTE — PROGRESS NOTES
[facility] Medicine Services  PROGRESS NOTE    Patient Name: Miryam Mckeon  : 1945  MRN: [id number]    Date of Admission: 2023  Primary Care Physician: Rigoberto Chamberlain MD    Subjective   Subjective     CC:  Weakness, COVID-positive   present during exam     HPI:  No cough, denies fever or chills.    ROS:  Gen: no fever  Pulm: no cough  GI: no nausea    Objective   Objective     Vital Signs:   Temp:  [95.6 øF (35.3 øC)-98.6 øF (37 øC)] 95.6 øF (35.3 øC)  Heart Rate:  [66-79] 70  Resp:  [16-18] 18  BP: (142-169)/(76-96) 169/96  Flow (L/min):  [1] 1     Physical Exam:  Constitutional - no acute distress, thin female, in bed  HEENT-NCAT, mucous membranes moist  CV-RRR  Resp-slightly diminished left base, otherwise clear  Abd-soft, nontender, nondistended, normoactive bowel sounds  Ext-No lower extremity cyanosis, clubbing or edema bilaterally  Neuro-alert, moves all extremities   Psych-normal affect   Skin- No rash on exposed UE or LE bilaterally        Results Reviewed:  LAB RESULTS:      Lab 23  0821 08/10/23  0510 23  1732   WBC 7.53 5.31  --   --  5.23   HEMOGLOBIN 14.9 14.0  --   --  14.9   HEMATOCRIT 45.2 42.3  --   --  46.1   PLATELETS 139* 124*  --   --  144   NEUTROS ABS 6.28 4.64  --   --  4.06   IMMATURE GRANS (ABS) 0.08* 0.06*  --   --  0.04   LYMPHS ABS 0.79 0.47*  --   --  0.68*   MONOS ABS 0.35 0.12  --   --  0.43   EOS ABS 0.00 0.00  --   --  0.00   MCV 95.2 94.8  --   --  96.0   CRP  --   --  <0.30  --   --    PROCALCITONIN  --   --  0.13  --   --    LACTATE  --   --   --   --  1.1   LDH  --   --  295*  --   --    D DIMER QUANT  --   --   --  0.88*  --          Lab 23  0821 23  0325 23  1029 08/10/23  0510 23  1809   SODIUM 133* 133* 137 137  --  136   POTASSIUM 4.5 3.9 4.1 3.1*  --  3.4*   CHLORIDE 95* 93* 95* 92*  --  92*   CO2 27.0 33.0* 35.0* 30.0*   --  30.0*   ANION GAP 11.0 7.0 7.0 15.0  --  14.0   BUN 45* 45* 42* 28*  --  31*   CREATININE 1.26* 1.48* 1.52* 1.45*  --  1.49*   EGFR 43.5* 35.9* 34.7* 36.8*  --  35.6*   GLUCOSE 133* 137* 128* 104*  --  110*   CALCIUM 8.5* 8.3* 8.8 8.3*  --  9.2   MAGNESIUM 1.9 2.1 2.3 1.5* 1.6  --    HEMOGLOBIN A1C  --   --   --  6.20*  --   --    TSH  --   --   --   --  6.110*  --          Lab 08/14/23  0821 08/12/23  0325 08/11/23  1029 08/10/23  0510 08/09/23  1809   TOTAL PROTEIN 5.2* 5.1* 5.2* 5.1* 6.6   ALBUMIN 2.9* 2.5* 2.8* 2.9* 3.1*   GLOBULIN 2.3 2.6 2.4 2.2 3.5   ALT (SGPT) 42* 34* 36* 25 27   AST (SGOT) 39* 41* 50* 34* 39*   BILIRUBIN 0.5 0.4 0.5 0.9 1.1   ALK PHOS 136* 132* 149* 144* 170*   LIPASE  --   --   --   --  15         Lab 08/14/23  0821 08/09/23 2056 08/09/23  1809   PROBNP 41,672.0*  --  58,397.0*   HSTROP T  --  29* 35*         Lab 08/10/23  0510   CHOLESTEROL 93   LDL CHOL 37   HDL CHOL 37*   TRIGLYCERIDES 99         Lab 08/09/23 2056   FERRITIN 496.30*         Brief Urine Lab Results  (Last result in the past 365 days)        Color   Clarity   Blood   Leuk Est   Nitrite   Protein   CREAT   Urine HCG        08/09/23 1806 Yellow   Clear   Small (1+)   Negative   Negative   >=300 mg/dL (3+)                   Microbiology Results Abnormal       None            XR Chest 1 View    Result Date: 8/14/2023  XR CHEST 1 VW Date of Exam: 8/14/2023 3:10 AM EDT Indication: Follow up COVID 19/CHF exac Comparison: 8/9/2023. Findings: The left lower lobe remains partially opacified by atelectasis and small effusion. There is stable moderate cardiomegaly. Left chest wall ICD again noted. There may be a minimal right effusion.     Impression: Impression: No appreciable change in left lower lobe atelectasis with small effusion. Electronically Signed: Angelica Glover MD  8/14/2023 8:02 AM EDT  Workstation ID: [id number]     Results for orders placed during the hospital encounter of 08/09/23    Adult Transthoracic Echo  Limited W/ Cont if Necessary Per Protocol    Interpretation Summary    : Left ventricular systolic function is moderately decreased. Calculated left ventricular EF = 35.5% Left ventricular ejection fraction appears to be 31 - 35%. Septal wall motion is abnormal, consistent with right ventricular pacing. Normal left ventricular cavity size noted. Left ventricular wall thickness is consistent with mild concentric hypertrophy. There is left ventricular global hypokinesis noted.    The right atrial cavity is dilated.    The right atrial cavity is dilated. The diameter of the inferior vena cava is 1.57 cm. Partial IVC inspiratory collapse of less than 50% noted.    Mild periprosthetic aortic valve regurgitation is seen. There is a TAVR valve present, 20mm Gisselle 3. The prosthetic aortic valve is grossly normal.    : Calcified mitral valve chordae are present. There is mild, bileaflet mitral valve thickening present. Mild mitral valve regurgitation is present.    The tricuspid valve is normal in structure. Moderate tricuspid valve regurgitation is present.      Current medications:  Scheduled Meds:amiodarone, 200 mg, Oral, Q12H  apixaban, 2.5 mg, Oral, Q12H  aspirin, 81 mg, Oral, Daily  atorvastatin, 10 mg, Oral, Nightly  cholecalciferol, 1,000 Units, Oral, Daily  dilTIAZem CD, 300 mg, Oral, Daily  latanoprost, 1 drop, Both Eyes, Nightly  metoprolol succinate XL, 100 mg, Oral, Q24H  pharmacy consult - MTM, , Does not apply, Daily  senna-docusate sodium, 2 tablet, Oral, BID  sodium chloride, 10 mL, Intravenous, Q12H  valsartan, 40 mg, Oral, Q24H  vitamin B-12, 1,000 mcg, Oral, Daily      Continuous Infusions:   PRN Meds:.  acetaminophen    albuterol sulfate HFA    senna-docusate sodium **AND** polyethylene glycol **AND** bisacodyl **AND** bisacodyl    Calcium Replacement - Follow Nurse / BPA Driven Protocol    Magnesium Low Dose Replacement - Follow Nurse / BPA Driven Protocol    ondansetron **OR** ondansetron     Phosphorus Replacement - Follow Nurse / BPA Driven Protocol    Potassium Replacement - Follow Nurse / BPA Driven Protocol    simethicone    [COMPLETED] Insert Peripheral IV **AND** sodium chloride    sodium chloride    sodium chloride    Assessment & Plan   Assessment & Plan     Active Hospital Problems    Diagnosis  POA    **Acute HFrEF (heart failure with reduced ejection fraction) [I50.21]  Unknown    COVID-19 [U07.1]  Yes    Weakness [R53.1]  Unknown    Hypokalemia [E87.6]  Unknown    Hypoalbuminemia [E88.09]  Unknown    COVID-19 virus detected [U07.1]  Unknown    Hypoxia [R09.02]  Unknown    Pleural effusion [J90]  Unknown    Pulmonary hypertension [I27.20]  Yes    Chronic anticoagulation [Z79.01]  Not Applicable    Chronic nausea [R11.0]  Yes    Chronic kidney disease, stage 3b [N18.32]  Yes    Presence of cardiac pacemaker secondary to sick sinus syndrome [Z95.0]  Yes    Chronic combined systolic and diastolic congestive heart failure [I50.42]  Yes    Hypertension [I10]  Yes    Aortic stenosis, severe s/p TAVR (7/20/2022) [I35.0]  Yes    Longstanding persistent atrial fibrillation [I48.11]  Yes    Sick sinus syndrome [I49.5]  Yes    Hyperlipidemia LDL goal <70 [E78.5]  Yes    Bilateral carotid artery stenosis [I65.23]  Yes    Status post CVA [Z86.73]  Not Applicable      Resolved Hospital Problems   No resolved problems to display.        Brief Hospital Course to date:  Miryam Mckeon is a 77 y.o. female with past medical history of A-fib on Eliquis, aortic valve stenosis status post TAVR, CKD stage III, deafness, hypertension, hyperlipidemia and history of CVA who came into the emergency department with complaints of generalized weakness.  She was found to be in congestive heart failure with acute exacerbation as well as COVID-positive.    Acute on chronic systolic and diastolic congestive heart failure exacerbation  -Echo with reduced EF 35%  --Cardiology follows    COVID upper respiratory  infection  -Symptoms of dry cough and hypoxia with intermittent dyspnea  -Patient declined remdesivir, discussed with her today  --Reluctantly agreeable to continue short course of steroids (5 days total)  -Continue inhalers as needed    Generalized weakness  -Likely multifactorial  -PT and OT recc rehab    A-fib  Chronic anticoagulation  -Continue Eliquis  -Continue Cardizem/metoprolol  -Continue amiodarone    Hypertension  Hyperlipidemia  Bilateral carotid artery stenosis  History of CVA  -Continue home medications    Poor p.o. intake  Failure to thrive  Hypoalbuminemia  Malnutrition  -Nutrition consult requested  --start Boost/magic cup supplement TID    Presence of pacemaker    CKD, stage III    Aortic stenosis, status post TAVR      Expected Discharge Location and Transportation: Rehab, private vehicle  Expected Discharge   Expected Discharge Date: 8/14/2023; Expected Discharge Time:      DVT prophylaxis:  Medical and mechanical DVT prophylaxis orders are present.     AM-PAC 6 Clicks Score (PT): 17 (08/14/23 5883)    CODE STATUS:   Code Status and Medical Interventions:   Ordered at: 08/09/23 2916     Level Of Support Discussed With:    Patient     Code Status (Patient has no pulse and is not breathing):    CPR (Attempt to Resuscitate)     Medical Interventions (Patient has pulse or is breathing):    Full Support       Germain Macias MD  08/14/23

## 2023-08-14 NOTE — THERAPY TREATMENT NOTE
Patient Name: Miryam Mckeon  : 1945    MRN: [id number]                              Today's Date: 2023       Admit Date: 2023    Visit Dx:     ICD-10-CM ICD-9-CM   1. COVID-19  U07.1 079.89   2. Hypoxia  R09.02 799.02   3. Frequent falls  R29.6 V15.88   4. Generalized weakness  R53.1 780.79   5. Acute on chronic congestive heart failure, unspecified heart failure type  I50.9 428.0     Patient Active Problem List   Diagnosis    Bilateral carotid artery stenosis    Aortic stenosis, severe s/p TAVR (2022)    Longstanding persistent atrial fibrillation    Sick sinus syndrome    Hyperlipidemia LDL goal <70    Hypertension    Chronic combined systolic and diastolic congestive heart failure    Status post CVA    Presence of cardiac pacemaker secondary to sick sinus syndrome    Chronic kidney disease, stage 3b    Parent refuses immunizations    Chronic nausea    Pulmonary hypertension    Chronic anticoagulation    Acute HFrEF (heart failure with reduced ejection fraction)    Weakness    Hypokalemia    Hypoalbuminemia    COVID-19 virus detected    Hypoxia    Pleural effusion    COVID-19     Past Medical History:   Diagnosis Date    Anxiety     Aortic valve stenosis     Atrial fibrillation     Borderline diabetes     ???    Carotid artery stenosis     CKD (chronic kidney disease)     Deaf     GERD (gastroesophageal reflux disease)     Heart murmur     Hyperlipidemia     Hypertension     Irregular heartbeat     PONV (postoperative nausea and vomiting)     Stroke     TIA (transient ischemic attack)      Past Surgical History:   Procedure Laterality Date    AORTIC VALVE REPAIR/REPLACEMENT N/A 2022    Procedure: TRANSCATHETER AORTIC VALVE REPLACEMENT with angioplasty and stent to the right common and external iliac artery;  Surgeon: Bola Whitaker MD;  Location: [facility];  Service: Cardiothoracic;  Laterality: N/A;  FL-15 MIN 12 SEC  DOSE- 92.41  CONTRAST- 120 ML ISOVUE    AORTIC  VALVE REPAIR/REPLACEMENT N/A 7/20/2022    Procedure: Transcatheter Aortic Valve Replacement;  Surgeon: Yashira Chow MD;  Location: [facility];  Service: Cardiovascular;  Laterality: N/A;    CARDIAC CATHETERIZATION      05/20/2022 per dr. chow    CARDIAC CATHETERIZATION Left 5/20/2022    Procedure: Left Heart Cath;  Surgeon: Yashira Chow MD;  Location: [facility] CATH INVASIVE LOCATION;  Service: Cardiology;  Laterality: Left;    CARDIAC ELECTROPHYSIOLOGY PROCEDURE N/A 04/13/2022    Procedure: DDD PPM Implant (BSC), DNS Eliquis;  Surgeon: Troy Hussein DO;  Location: [facility] EP INVASIVE LOCATION;  Service: Cardiology;  Laterality: N/A;    CAROTID ENDARTERECTOMY Bilateral     CATARACT EXTRACTION      CHOLECYSTECTOMY      COLONOSCOPY      FEMORAL ARTERY CUTDOWN Right 7/20/2022    Procedure: FEMORAL ARTERY CUTDOWN, ANGIOGRAM;  Surgeon: Bola Whitaker MD;  Location: [facility];  Service: Vascular;  Laterality: Right;  fl-1 m 12 sec  dose- 47.29 mgy  contrast- 50 ml isovue    KNEE SURGERY Right     PACEMAKER IMPLANTATION      JF      05/20/2022 per dr. chow    TRANSESOPHAGEAL ECHOCARDIOGRAM (JF) N/A 7/20/2022    Procedure: TRANSESOPHAGEAL ECHOCARDIOGRAM PER ANESTHESIA;  Surgeon: Bola Whitaker MD;  Location: [facility];  Service: Cardiothoracic;  Laterality: N/A;      General Information       Row Name 08/14/23 0825          Physical Therapy Time and Intention    Document Type therapy note (daily note)  -AS     Mode of Treatment physical therapy  -AS       Row Name 08/14/23 0825          General Information    Patient Profile Reviewed yes  -AS     Existing Precautions/Restrictions fall;other (see comments)  Patient is deaf, communicates with ASL and has white board in room, does not use  cart.  -AS     Barriers to Rehab hearing deficit  -AS       Row Name 08/14/23 0825          Cognition    Orientation Status (Cognition) oriented x 3  -AS        Row Name 08/14/23 0825          Safety Issues, Functional Mobility    Safety Issues Affecting Function (Mobility) awareness of need for assistance;insight into deficits/self-awareness;safety precaution awareness;safety precautions follow-through/compliance;sequencing abilities;positioning of assistive device  -AS     Impairments Affecting Function (Mobility) balance;endurance/activity tolerance;strength  -AS     Comment, Safety Issues/Impairments (Mobility) alert, following commands,  present  -AS               User Key  (r) = Recorded By, (t) = Taken By, (c) = Cosigned By      Initials Name Provider Type    AS Ольга Victor PTA Physical Therapist Assistant                   Mobility       Row Name 08/14/23 0828          Bed Mobility    Supine-Sit [place] (Bed Mobility) verbal cues;contact guard;1 person assist  -AS     Assistive Device (Bed Mobility) head of bed elevated;bed rails  -AS     Comment, (Bed Mobility) cues for sequencing  -AS       Row Name 08/14/23 0828          Transfers    Comment, (Transfers) verbal cues to push up from bed and not to pull up on walker and to reach back when sitting  -AS       [facility] Name 08/14/23 0828          Bed-Chair Transfer    Bed-Chair [place] (Transfers) verbal cues;contact guard;1 person assist  -AS     Assistive Device (Bed-Chair Transfers) walker, front-wheeled  -AS       [facility] Name 08/14/23 0828          Sit-Stand Transfer    Sit-Stand [place] (Transfers) verbal cues;1 person assist;contact guard  -AS     Assistive Device (Sit-Stand Transfers) walker, front-wheeled  -AS     Comment, (Sit-Stand Transfer) cues for hand placement  -AS       Row Name 08/14/23 0828          Gait/Stairs (Locomotion)    [place] Level (Gait) verbal cues;minimum assist (75% patient effort);1 person assist  -AS     Assistive Device (Gait) walker, front-wheeled  -AS     Distance in Feet (Gait) 24 + 24  -AS     Deviations/Abnormal Patterns (Gait) base of  support, narrow;erinn decreased;gait speed decreased;stride length decreased  -AS     Bilateral Gait Deviations heel strike decreased  -AS     Comment, (Gait/Stairs) patient ambulated 24' + 24' with Min assist x1 and rolling walker for support, cues to stay close to walker and to improve posture. 1 episode of LOB with directional change needing assist to correct. Distance limited by weakness and fatigue.  -AS               User Key  (r) = Recorded By, (t) = Taken By, (c) = Cosigned By      Initials Name Provider Type    AS Ольга Victor PTA Physical Therapist Assistant                   Obj/Interventions       Row Name 08/14/23 0832          Motor Skills    Therapeutic Exercise knee;ankle  -AS       Row Name 08/14/23 0832          Knee (Therapeutic Exercise)    Knee (Therapeutic Exercise) strengthening exercise  -AS     Knee Strengthening (Therapeutic Exercise) bilateral;marching while seated;LAQ (long arc quad);sitting;10 repetitions  -AS       Row Name 08/14/23 0832          Ankle (Therapeutic Exercise)    Ankle (Therapeutic Exercise) AROM (active range of motion)  -AS     Ankle AROM (Therapeutic Exercise) dorsiflexion;bilateral;plantarflexion;sitting;10 repetitions  -AS       Row Name 08/14/23 0832          Balance    Dynamic Standing Balance verbal cues;minimal assist;1-person assist  -AS     Position/Device Used, Standing Balance supported;walker, front-wheeled  -AS     Comment, Balance 1 episode of LOB needing assist to correct  -AS               User Key  (r) = Recorded By, (t) = Taken By, (c) = Cosigned By      Initials Name Provider Type    AS Ольга Victor PTA Physical Therapist Assistant                   Goals/Plan    No documentation.                  Clinical Impression       Row Name 08/14/23 0833          Pain    Pretreatment Pain Rating 2/10  -AS     Posttreatment Pain Rating 2/10  -AS     Pain Location - abdomen  -AS     Pain Intervention(s) Repositioned;Ambulation/increased  activity  -AS       [facility] Name 08/14/23 0833          Plan of Care Review    Plan of Care Reviewed With patient  -AS     Progress improving  -AS     Outcome Evaluation patient ambulated 24' + 24' with Min assist x1 and rolling walker for support, cues to stay close to walker and to improve posture. 1 episode of LOB with directional change needing assist to correct. Distance limited by weakness and fatigue. B LE exercises completed. Recommend SNF at D/C for best functional outcome.  -AS       [facility] Name 08/14/23 0833          Vital Signs    Post SpO2 (%) 93  -AS     O2 Delivery Post Treatment supplemental O2  -AS     Post Patient Position Sitting  -AS       Row Name 08/14/23 0833          Positioning and Restraints    Pre-Treatment Position in bed  -AS     Post Treatment Position chair  -AS     In Chair reclined;call light within reach;encouraged to call for assist;exit alarm on;waffle cushion;legs elevated  -AS               User Key  (r) = Recorded By, (t) = Taken By, (c) = Cosigned By      Initials Name Provider Type    AS Ольга Victor, MARIE Physical Therapist Assistant                   Outcome Measures       Row Name 08/14/23 0834          How much help from another person do you currently need...    Turning from your back to your side while in flat bed without using bedrails? 3  -AS     Moving from lying on back to sitting on the side of a flat bed without bedrails? 3  -AS     Moving to and from a bed to a chair (including a wheelchair)? 3  -AS     Standing up from a chair using your arms (e.g., wheelchair, bedside chair)? 3  -AS     Climbing 3-5 steps with a railing? 2  -AS     To walk in hospital room? 3  -AS     AM-PAC 6 Clicks Score (PT) 17  -AS     Highest level of mobility 5 --> Static standing  -AS       [facility] Name 08/14/23 0834          Functional Assessment    Outcome Measure Options AM-PAC 6 Clicks Basic Mobility (PT)  -AS               User Key  (r) = Recorded By, (t) = Taken By, (c) = Cosigned By       Initials Name Provider Type    AS Ольга Victor PTA Physical Therapist Assistant                                 Physical Therapy Education       Title: PT OT SLP Therapies (In Progress)       Topic: Physical Therapy (In Progress)       Point: Mobility training (In Progress)       Learning Progress Summary             Patient Acceptance, E, NR by AS at 8/14/2023 0834    Acceptance, E, VU by AE at 8/11/2023 1445    Acceptance, E, VU,NR by LM at 8/10/2023 1500                         Point: Home exercise program (In Progress)       Learning Progress Summary             Patient Acceptance, E, NR by AS at 8/14/2023 0834                         Point: Precautions (In Progress)       Learning Progress Summary             Patient Acceptance, E, NR by AS at 8/14/2023 0834    Acceptance, E, VU by AE at 8/11/2023 1445    Acceptance, E, VU,NR by LM at 8/10/2023 1500                                         User Key       Initials Effective Dates Name Provider Type Discipline    AS 04/28/23 -  Ольга Victor PTA Physical Therapist Assistant PT    LM 07/11/23 -  Maryuri Carson, EMERY Physical Therapist PT    AE 09/21/21 -  Sharan Ceja, PT Physical Therapist PT                  PT Recommendation and Plan     Plan of Care Reviewed With: patient  Progress: improving  Outcome Evaluation: patient ambulated 24' + 24' with Min assist x1 and rolling walker for support, cues to stay close to walker and to improve posture. 1 episode of LOB with directional change needing assist to correct. Distance limited by weakness and fatigue. B LE exercises completed. Recommend SNF at D/C for best functional outcome.     Time Calculation:         PT Charges       Row Name 08/14/23 0835             Time Calculation    Start Time 0807  -AS      PT Received On 08/14/23  -AS      PT Goal Re-Cert Due Date 08/20/23  -AS         Timed Charges    39270 - PT Therapeutic Exercise Minutes 10  -AS      96992 - Gait Training Minutes  13  -AS          Total Minutes    Timed Charges Total Minutes 23  -AS       Total Minutes 23  -AS                User Key  (r) = Recorded By, (t) = Taken By, (c) = Cosigned By      Initials Name Provider Type    AS Ольга Victor PTA Physical Therapist Assistant                  Therapy Charges for Today       Code Description Service Date Service Provider Modifiers Qty    77440360657 HC PT THER PROC EA 15 MIN 8/14/2023 Ольга Victor, MARIE GP 1    [id number] HC GAIT TRAINING EA 15 MIN 8/14/2023 Ольга Victor PTA GP 1            PT G-Codes  Outcome Measure Options: AM-PAC 6 Clicks Basic Mobility (PT)  AM-PAC 6 Clicks Score (PT): 17  AM-PAC 6 Clicks Score (OT): 13       Ольга Victor PTA  8/14/2023

## 2023-08-14 NOTE — PLAN OF CARE
Goal Outcome Evaluation:  Plan of Care Reviewed With: patient        Progress: improving  Outcome Evaluation: patient ambulated 24' + 24' with Min assist x1 and rolling walker for support, cues to stay close to walker and to improve posture. 1 episode of LOB with directional change needing assist to correct. Distance limited by weakness and fatigue. B LE exercises completed. Recommend SNF at D/C for best functional outcome.

## 2023-08-14 NOTE — PLAN OF CARE
Goal Outcome Evaluation:               Pt VSS, Ax4, A-fib on monitor. Plans for rehab once out of isolation. Will continue with POC

## 2023-08-15 LAB
ALBUMIN SERPL-MCNC: 2.7 G/DL (ref 3.5–5.2)
ALBUMIN/GLOB SERPL: 1.3 G/DL
ALP SERPL-CCNC: 135 U/L (ref 39–117)
ALT SERPL W P-5'-P-CCNC: 41 U/L (ref 1–33)
ANION GAP SERPL CALCULATED.3IONS-SCNC: 10 MMOL/L (ref 5–15)
AST SERPL-CCNC: 31 U/L (ref 1–32)
BASOPHILS # BLD AUTO: 0.02 10*3/MM3 (ref 0–0.2)
BASOPHILS NFR BLD AUTO: 0.3 % (ref 0–1.5)
BILIRUB SERPL-MCNC: 0.4 MG/DL (ref 0–1.2)
BUN SERPL-MCNC: 51 MG/DL (ref 8–23)
BUN/CREAT SERPL: 43.2 (ref 7–25)
CALCIUM SPEC-SCNC: 8.3 MG/DL (ref 8.6–10.5)
CHLORIDE SERPL-SCNC: 96 MMOL/L (ref 98–107)
CO2 SERPL-SCNC: 28 MMOL/L (ref 22–29)
CREAT SERPL-MCNC: 1.18 MG/DL (ref 0.57–1)
DEPRECATED RDW RBC AUTO: 45.2 FL (ref 37–54)
EGFRCR SERPLBLD CKD-EPI 2021: 47.1 ML/MIN/1.73
EOSINOPHIL # BLD AUTO: 0 10*3/MM3 (ref 0–0.4)
EOSINOPHIL NFR BLD AUTO: 0 % (ref 0.3–6.2)
ERYTHROCYTE [DISTWIDTH] IN BLOOD BY AUTOMATED COUNT: 13 % (ref 12.3–15.4)
GLOBULIN UR ELPH-MCNC: 2.1 GM/DL
GLUCOSE BLDC GLUCOMTR-MCNC: 126 MG/DL (ref 70–130)
GLUCOSE SERPL-MCNC: 152 MG/DL (ref 65–99)
HCT VFR BLD AUTO: 44.5 % (ref 34–46.6)
HGB BLD-MCNC: 14.9 G/DL (ref 12–15.9)
IMM GRANULOCYTES # BLD AUTO: 0.13 10*3/MM3 (ref 0–0.05)
IMM GRANULOCYTES NFR BLD AUTO: 1.7 % (ref 0–0.5)
LYMPHOCYTES # BLD AUTO: 0.48 10*3/MM3 (ref 0.7–3.1)
LYMPHOCYTES NFR BLD AUTO: 6.3 % (ref 19.6–45.3)
MAGNESIUM SERPL-MCNC: 1.8 MG/DL (ref 1.6–2.4)
MCH RBC QN AUTO: 31.6 PG (ref 26.6–33)
MCHC RBC AUTO-ENTMCNC: 33.5 G/DL (ref 31.5–35.7)
MCV RBC AUTO: 94.5 FL (ref 79–97)
MONOCYTES # BLD AUTO: 0.34 10*3/MM3 (ref 0.1–0.9)
MONOCYTES NFR BLD AUTO: 4.5 % (ref 5–12)
NEUTROPHILS NFR BLD AUTO: 6.65 10*3/MM3 (ref 1.7–7)
NEUTROPHILS NFR BLD AUTO: 87.2 % (ref 42.7–76)
NRBC BLD AUTO-RTO: 0 /100 WBC (ref 0–0.2)
PLATELET # BLD AUTO: 137 10*3/MM3 (ref 140–450)
PMV BLD AUTO: 10.8 FL (ref 6–12)
POTASSIUM SERPL-SCNC: 4.2 MMOL/L (ref 3.5–5.2)
PROT SERPL-MCNC: 4.8 G/DL (ref 6–8.5)
RBC # BLD AUTO: 4.71 10*6/MM3 (ref 3.77–5.28)
SODIUM SERPL-SCNC: 134 MMOL/L (ref 136–145)
WBC NRBC COR # BLD: 7.62 10*3/MM3 (ref 3.4–10.8)

## 2023-08-15 PROCEDURE — 99231 SBSQ HOSP IP/OBS SF/LOW 25: CPT | Performed by: INTERNAL MEDICINE

## 2023-08-15 PROCEDURE — 83735 ASSAY OF MAGNESIUM: CPT | Performed by: FAMILY MEDICINE

## 2023-08-15 PROCEDURE — 97110 THERAPEUTIC EXERCISES: CPT

## 2023-08-15 PROCEDURE — 97116 GAIT TRAINING THERAPY: CPT

## 2023-08-15 PROCEDURE — 25010000002 HYDRALAZINE PER 20 MG: Performed by: PHYSICIAN ASSISTANT

## 2023-08-15 PROCEDURE — 99232 SBSQ HOSP IP/OBS MODERATE 35: CPT | Performed by: FAMILY MEDICINE

## 2023-08-15 PROCEDURE — 85025 COMPLETE CBC W/AUTO DIFF WBC: CPT | Performed by: FAMILY MEDICINE

## 2023-08-15 PROCEDURE — 82948 REAGENT STRIP/BLOOD GLUCOSE: CPT

## 2023-08-15 PROCEDURE — 80053 COMPREHEN METABOLIC PANEL: CPT | Performed by: FAMILY MEDICINE

## 2023-08-15 RX ORDER — HYDRALAZINE HYDROCHLORIDE 20 MG/ML
10 INJECTION INTRAMUSCULAR; INTRAVENOUS ONCE
Status: COMPLETED | OUTPATIENT
Start: 2023-08-15 | End: 2023-08-15

## 2023-08-15 RX ORDER — VALSARTAN 80 MG/1
40 TABLET ORAL ONCE
Status: DISCONTINUED | OUTPATIENT
Start: 2023-08-15 | End: 2023-08-15

## 2023-08-15 RX ORDER — VALSARTAN 80 MG/1
80 TABLET ORAL EVERY 12 HOURS SCHEDULED
Status: DISCONTINUED | OUTPATIENT
Start: 2023-08-15 | End: 2023-08-15

## 2023-08-15 RX ORDER — VALSARTAN 80 MG/1
80 TABLET ORAL EVERY 12 HOURS SCHEDULED
Status: DISCONTINUED | OUTPATIENT
Start: 2023-08-15 | End: 2023-08-16

## 2023-08-15 RX ADMIN — ATORVASTATIN CALCIUM 10 MG: 10 TABLET, FILM COATED ORAL at 20:28

## 2023-08-15 RX ADMIN — HYDRALAZINE HYDROCHLORIDE 10 MG: 20 INJECTION INTRAMUSCULAR; INTRAVENOUS at 01:13

## 2023-08-15 RX ADMIN — SENNOSIDES AND DOCUSATE SODIUM 2 TABLET: 50; 8.6 TABLET ORAL at 10:45

## 2023-08-15 RX ADMIN — Medication 10 ML: at 20:28

## 2023-08-15 RX ADMIN — VALSARTAN 80 MG: 80 TABLET, FILM COATED ORAL at 10:52

## 2023-08-15 RX ADMIN — AMIODARONE HYDROCHLORIDE 200 MG: 200 TABLET ORAL at 20:28

## 2023-08-15 RX ADMIN — APIXABAN 2.5 MG: 2.5 TABLET, FILM COATED ORAL at 20:28

## 2023-08-15 RX ADMIN — DILTIAZEM HYDROCHLORIDE 300 MG: 180 CAPSULE, COATED, EXTENDED RELEASE ORAL at 10:42

## 2023-08-15 RX ADMIN — AMIODARONE HYDROCHLORIDE 200 MG: 200 TABLET ORAL at 10:43

## 2023-08-15 RX ADMIN — Medication 1000 UNITS: at 10:42

## 2023-08-15 RX ADMIN — CYANOCOBALAMIN TAB 1000 MCG 1000 MCG: 1000 TAB at 10:43

## 2023-08-15 RX ADMIN — METOPROLOL SUCCINATE 100 MG: 50 TABLET, EXTENDED RELEASE ORAL at 10:43

## 2023-08-15 RX ADMIN — VALSARTAN 80 MG: 80 TABLET, FILM COATED ORAL at 20:28

## 2023-08-15 RX ADMIN — METOPROLOL SUCCINATE 100 MG: 50 TABLET, EXTENDED RELEASE ORAL at 10:44

## 2023-08-15 RX ADMIN — ASPIRIN 81 MG: 81 TABLET, CHEWABLE ORAL at 10:43

## 2023-08-15 RX ADMIN — APIXABAN 2.5 MG: 2.5 TABLET, FILM COATED ORAL at 10:42

## 2023-08-15 RX ADMIN — SENNOSIDES AND DOCUSATE SODIUM 2 TABLET: 50; 8.6 TABLET ORAL at 20:28

## 2023-08-15 RX ADMIN — Medication 10 ML: at 10:44

## 2023-08-15 NOTE — PLAN OF CARE
Problem: Fall Injury Risk  Goal: Absence of Fall and Fall-Related Injury  Outcome: Ongoing, Progressing  Intervention: Identify and Manage Contributors  Recent Flowsheet Documentation  Taken 8/15/2023 0454 by Kaitlin Contreras RN  Medication Review/Management: medications reviewed  Taken 8/15/2023 0215 by Kaitlin Contreras RN  Medication Review/Management: medications reviewed  Taken 8/15/2023 0045 by Kaitlin Contreras RN  Medication Review/Management: medications reviewed  Taken 8/14/2023 2230 by Kaitlin Contreras RN  Medication Review/Management: medications reviewed  Taken 8/14/2023 2020 by Kaitlin Contreras RN  Medication Review/Management: medications reviewed  Intervention: Promote Injury-Free Environment  Recent Flowsheet Documentation  Taken 8/15/2023 0454 by Kaitlin Contreras RN  Safety Promotion/Fall Prevention:   activity supervised   assistive device/personal items within reach   clutter free environment maintained   room organization consistent   safety round/check completed  Taken 8/15/2023 0215 by Kaitlin Contreras RN  Safety Promotion/Fall Prevention:   activity supervised   assistive device/personal items within reach   clutter free environment maintained   room organization consistent   safety round/check completed  Taken 8/15/2023 0045 by Kaitlin Contreras RN  Safety Promotion/Fall Prevention:   activity supervised   assistive device/personal items within reach   clutter free environment maintained   room organization consistent   safety round/check completed  Taken 8/14/2023 2230 by Kaitlin Contreras RN  Safety Promotion/Fall Prevention:   activity supervised   assistive device/personal items within reach   clutter free environment maintained   room organization consistent   safety round/check completed  Taken 8/14/2023 2020 by Kaitlin Contreras RN  Safety Promotion/Fall Prevention:   activity supervised   assistive device/personal items within reach   room organization  consistent   safety round/check completed     Problem: Skin Injury Risk Increased  Goal: Skin Health and Integrity  Outcome: Ongoing, Progressing  Intervention: Optimize Skin Protection  Recent Flowsheet Documentation  Taken 8/15/2023 0454 by Kaitlin Contreras RN  Head of Bed (HOB) Positioning: HOB elevated  Taken 8/15/2023 0215 by Kaitlin Contreras RN  Head of Bed (HOB) Positioning: HOB elevated  Taken 8/15/2023 0045 by Kaitlin Contreras RN  Pressure Reduction Techniques: frequent weight shift encouraged  Head of Bed (HOB) Positioning: HOB elevated  Pressure Reduction Devices: pressure-redistributing mattress utilized  Skin Protection:   adhesive use limited   transparent dressing maintained  Taken 8/14/2023 2230 by Kaitlin Contreras RN  Pressure Reduction Techniques: frequent weight shift encouraged  Head of Bed (HOB) Positioning: HOB elevated  Pressure Reduction Devices: pressure-redistributing mattress utilized  Skin Protection:   adhesive use limited   tubing/devices free from skin contact  Taken 8/14/2023 2020 by Kaitlin Contreras RN  Pressure Reduction Techniques: frequent weight shift encouraged  Head of Bed (HOB) Positioning: HOB elevated  Pressure Reduction Devices: pressure-redistributing mattress utilized  Skin Protection:   adhesive use limited   tubing/devices free from skin contact   Goal Outcome Evaluation:

## 2023-08-15 NOTE — PROGRESS NOTES
[facility] Medicine Services  PROGRESS NOTE    Patient Name: Miryam Mckeon  : 1945  MRN: [id number]    Date of Admission: 2023  Primary Care Physician: Rigoberto Chamberlain MD    Subjective   Subjective     CC:  Weakness, COVID-positive    HPI:  No cough, denies fever    ROS:  Gen: no fever  Pulm; denies shortness of breath  GI: no nausea    Objective   Objective     Vital Signs:   Temp:  [95.5 øF (35.3 øC)-96.9 øF (36.1 øC)] 95.7 øF (35.4 øC)  Heart Rate:  [62-77] 62  Resp:  [16] 16  BP: (143-166)/() 146/71  Flow (L/min):  [1] 1     Physical Exam:  Constitutional - no acute distress, thin female, in bed  HEENT-NCAT, mucous membranes moist  CV-RRR  Resp-CTAB  Abd-soft, nontender  Ext-No lower extremity cyanosis, clubbing or edema bilaterally  Neuro-alert, speech clear, moves all extremities   Psych-normal affect   Skin- No rash on exposed UE or LE bilaterally          Results Reviewed:  LAB RESULTS:      Lab 08/15/23  0404 23  0821 08/10/23  0510 23  1732   WBC 7.62 7.53 5.31  --   --  5.23   HEMOGLOBIN 14.9 14.9 14.0  --   --  14.9   HEMATOCRIT 44.5 45.2 42.3  --   --  46.1   PLATELETS 137* 139* 124*  --   --  144   NEUTROS ABS 6.65 6.28 4.64  --   --  4.06   IMMATURE GRANS (ABS) 0.13* 0.08* 0.06*  --   --  0.04   LYMPHS ABS 0.48* 0.79 0.47*  --   --  0.68*   MONOS ABS 0.34 0.35 0.12  --   --  0.43   EOS ABS 0.00 0.00 0.00  --   --  0.00   MCV 94.5 95.2 94.8  --   --  96.0   CRP  --   --   --  <0.30  --   --    PROCALCITONIN  --   --   --  0.13  --   --    LACTATE  --   --   --   --   --  1.1   LDH  --   --   --  295*  --   --    D DIMER QUANT  --   --   --   --  0.88*  --          Lab 08/15/23  0404 23  0821 23  0325 23  1029 08/10/23  0510 23   SODIUM 134* 133* 133* 137 137  --    POTASSIUM 4.2 4.5 3.9 4.1 3.1*  --    CHLORIDE 96* 95* 93* 95* 92*  --    CO2 28.0 27.0 33.0* 35.0* 30.0*  --     ANION GAP 10.0 11.0 7.0 7.0 15.0  --    BUN 51* 45* 45* 42* 28*  --    CREATININE 1.18* 1.26* 1.48* 1.52* 1.45*  --    EGFR 47.1* 43.5* 35.9* 34.7* 36.8*  --    GLUCOSE 152* 133* 137* 128* 104*  --    CALCIUM 8.3* 8.5* 8.3* 8.8 8.3*  --    MAGNESIUM 1.8 1.9 2.1 2.3 1.5* 1.6   HEMOGLOBIN A1C  --   --   --   --  6.20*  --    TSH  --   --   --   --   --  6.110*         Lab 08/15/23  0404 08/14/23  0821 08/12/23  0325 08/11/23  1029 08/10/23  0510 08/09/23  1809   TOTAL PROTEIN 4.8* 5.2* 5.1* 5.2* 5.1* 6.6   ALBUMIN 2.7* 2.9* 2.5* 2.8* 2.9* 3.1*   GLOBULIN 2.1 2.3 2.6 2.4 2.2 3.5   ALT (SGPT) 41* 42* 34* 36* 25 27   AST (SGOT) 31 39* 41* 50* 34* 39*   BILIRUBIN 0.4 0.5 0.4 0.5 0.9 1.1   ALK PHOS 135* 136* 132* 149* 144* 170*   LIPASE  --   --   --   --   --  15         Lab 08/14/23 0821 08/09/23 2056 08/09/23  1809   PROBNP 41,672.0*  --  58,397.0*   HSTROP T  --  29* 35*         Lab 08/10/23  0510   CHOLESTEROL 93   LDL CHOL 37   HDL CHOL 37*   TRIGLYCERIDES 99         Lab 08/09/23 2056   FERRITIN 496.30*         Brief Urine Lab Results  (Last result in the past 365 days)        Color   Clarity   Blood   Leuk Est   Nitrite   Protein   CREAT   Urine HCG        08/09/23 1806 Yellow   Clear   Small (1+)   Negative   Negative   >=300 mg/dL (3+)                   Microbiology Results Abnormal       None            XR Chest 1 View    Result Date: 8/14/2023  XR CHEST 1 VW Date of Exam: 8/14/2023 3:10 AM EDT Indication: Follow up COVID 19/CHF exac Comparison: 8/9/2023. Findings: The left lower lobe remains partially opacified by atelectasis and small effusion. There is stable moderate cardiomegaly. Left chest wall ICD again noted. There may be a minimal right effusion.     Impression: Impression: No appreciable change in left lower lobe atelectasis with small effusion. Electronically Signed: Angelica Glover MD  8/14/2023 8:02 AM EDT  Workstation ID: [id number]     Results for orders placed during the hospital encounter of  08/09/23    Adult Transthoracic Echo Limited W/ Cont if Necessary Per Protocol    Interpretation Summary    : Left ventricular systolic function is moderately decreased. Calculated left ventricular EF = 35.5% Left ventricular ejection fraction appears to be 31 - 35%. Septal wall motion is abnormal, consistent with right ventricular pacing. Normal left ventricular cavity size noted. Left ventricular wall thickness is consistent with mild concentric hypertrophy. There is left ventricular global hypokinesis noted.    The right atrial cavity is dilated.    The right atrial cavity is dilated. The diameter of the inferior vena cava is 1.57 cm. Partial IVC inspiratory collapse of less than 50% noted.    Mild periprosthetic aortic valve regurgitation is seen. There is a TAVR valve present, 20mm Gisselle 3. The prosthetic aortic valve is grossly normal.    : Calcified mitral valve chordae are present. There is mild, bileaflet mitral valve thickening present. Mild mitral valve regurgitation is present.    The tricuspid valve is normal in structure. Moderate tricuspid valve regurgitation is present.      Current medications:  Scheduled Meds:amiodarone, 200 mg, Oral, Q12H  apixaban, 2.5 mg, Oral, Q12H  aspirin, 81 mg, Oral, Daily  atorvastatin, 10 mg, Oral, Nightly  cholecalciferol, 1,000 Units, Oral, Daily  dilTIAZem CD, 300 mg, Oral, Daily  latanoprost, 1 drop, Both Eyes, Nightly  metoprolol succinate XL, 100 mg, Oral, Q24H  pharmacy consult - MTM, , Does not apply, Daily  senna-docusate sodium, 2 tablet, Oral, BID  sodium chloride, 10 mL, Intravenous, Q12H  valsartan, 80 mg, Oral, Q12H  vitamin B-12, 1,000 mcg, Oral, Daily      Continuous Infusions:   PRN Meds:.  acetaminophen    albuterol sulfate HFA    senna-docusate sodium **AND** polyethylene glycol **AND** bisacodyl **AND** bisacodyl    Calcium Replacement - Follow Nurse / BPA Driven Protocol    Magnesium Low Dose Replacement - Follow Nurse / BPA Driven Protocol     ondansetron **OR** ondansetron    Phosphorus Replacement - Follow Nurse / BPA Driven Protocol    Potassium Replacement - Follow Nurse / BPA Driven Protocol    simethicone    [COMPLETED] Insert Peripheral IV **AND** sodium chloride    sodium chloride    sodium chloride    Assessment & Plan   Assessment & Plan     Active Hospital Problems    Diagnosis  POA    **Acute HFrEF (heart failure with reduced ejection fraction) [I50.21]  Unknown    COVID-19 [U07.1]  Yes    Weakness [R53.1]  Unknown    Hypokalemia [E87.6]  Unknown    Hypoalbuminemia [E88.09]  Unknown    COVID-19 virus detected [U07.1]  Unknown    Hypoxia [R09.02]  Unknown    Pleural effusion [J90]  Unknown    Pulmonary hypertension [I27.20]  Yes    Chronic anticoagulation [Z79.01]  Not Applicable    Chronic nausea [R11.0]  Yes    Chronic kidney disease, stage 3b [N18.32]  Yes    Presence of cardiac pacemaker secondary to sick sinus syndrome [Z95.0]  Yes    Chronic combined systolic and diastolic congestive heart failure [I50.42]  Yes    Hypertension [I10]  Yes    Aortic stenosis, severe s/p TAVR (7/20/2022) [I35.0]  Yes    Longstanding persistent atrial fibrillation [I48.11]  Yes    Sick sinus syndrome [I49.5]  Yes    Hyperlipidemia LDL goal <70 [E78.5]  Yes    Bilateral carotid artery stenosis [I65.23]  Yes    Status post CVA [Z86.73]  Not Applicable      Resolved Hospital Problems   No resolved problems to display.        Brief Hospital Course to date:  Miryam Mckeon is a 77 y.o. female with past medical history of A-fib on Eliquis, aortic valve stenosis status post TAVR, CKD stage III, deafness, hypertension, hyperlipidemia and history of CVA who came into the emergency department with complaints of generalized weakness.  She was found to be in congestive heart failure with acute exacerbation as well as COVID-positive.    Acute on chronic systolic and diastolic congestive heart failure exacerbation  -Echo with reduced EF 35%  --Cardiology  follows    COVID upper respiratory infection  -Symptoms of dry cough and hypoxia with intermittent dyspnea  -Patient declined remdesivir, discussed with her today  --Reluctantly agreeable to continue short course of steroids (5 days total)  -Continue inhalers as needed    Generalized weakness  -Likely multifactorial  -PT and OT recc rehab    A-fib  Chronic anticoagulation  -Continue Eliquis  -Continue Cardizem/metoprolol  -Continue amiodarone    Hypertension  Hyperlipidemia  Bilateral carotid artery stenosis  History of CVA  -Continue home medications    Poor p.o. intake  Failure to thrive  Hypoalbuminemia  Malnutrition  -Nutrition consult requested  --start Boost/magic cup supplement TID    Presence of pacemaker    CKD, stage III    Aortic stenosis, status post TAVR      Expected Discharge Location and Transportation: Rehab, private vehicle  Expected Discharge   Expected Discharge Date: 8/14/2023; Expected Discharge Time:      DVT prophylaxis:  Medical and mechanical DVT prophylaxis orders are present.     AM-PAC 6 Clicks Score (PT): 17 (08/15/23 8591)    CODE STATUS:   Code Status and Medical Interventions:   Ordered at: 08/09/23 3582     Level Of Support Discussed With:    Patient     Code Status (Patient has no pulse and is not breathing):    CPR (Attempt to Resuscitate)     Medical Interventions (Patient has pulse or is breathing):    Full Support       Germain Macias MD  08/15/23

## 2023-08-15 NOTE — PROGRESS NOTES
"[facility] Cardiology Daily Note       LOS: 4 days   Patient Care Team:  Rigoberto Chamberlain MD as PCP - General (Family Medicine)  Vito Cuba MD as Consulting Physician (Cardiology)  Yashira Pyle MD as Consulting Physician (Cardiology)    Chief Complaint: Acute on chronic systolic heart failure/TAVR/PAF    Subjective     Subjective: In isolation for COVID.  93% on 1 L nasal cannula.  Heart rates have been near 70.  Blood pressures have been even more elevated.    Review of Systems:   As above.    Medications:  amiodarone, 200 mg, Oral, Q12H  apixaban, 2.5 mg, Oral, Q12H  aspirin, 81 mg, Oral, Daily  atorvastatin, 10 mg, Oral, Nightly  cholecalciferol, 1,000 Units, Oral, Daily  dilTIAZem CD, 300 mg, Oral, Daily  latanoprost, 1 drop, Both Eyes, Nightly  metoprolol succinate XL, 100 mg, Oral, Q24H  pharmacy consult - [place], , Does not apply, Daily  senna-docusate sodium, 2 tablet, Oral, BID  sodium chloride, 10 mL, Intravenous, Q12H  valsartan, 40 mg, Oral, Q12H  vitamin B-12, 1,000 mcg, Oral, Daily        Objective     Vital Sign Min/Max for last 24 hours  Temp  Min: 95.5 øF (35.3 øC)  Max: 96.9 øF (36.1 øC)   BP  Min: 143/66  Max: 169/96   Pulse  Min: 67  Max: 77   Resp  Min: 16  Max: 18   SpO2  Min: 93 %  Max: 96 %   Flow (L/min)  Min: 1  Max: 1   Weight  Min: 53.3 kg (117 lb 8 oz)  Max: 53.3 kg (117 lb 8 oz)      Intake/Output Summary (Last 24 hours) at 8/15/2023 0859  Last data filed at 8/14/2023 1704  Gross per 24 hour   Intake --   Output 1000 ml   Net -1000 ml        Flowsheet Rows      Flowsheet Row First Filed Value   Admission Height 152.4 cm (60\") Documented at 08/09/2023 1638   Admission Weight 45.4 kg (100 lb) Documented at 08/09/2023 1638            Physical Exam:    No physical exam was performed.     Results Review:    I reviewed the patient's new clinical results.  EKG:  Tele: A-fib    Labs:    Results from last 7 days   Lab Units 08/15/23  0404 08/14/23  0821 " 08/12/23  0325   SODIUM mmol/L 134* 133* 133*   POTASSIUM mmol/L 4.2 4.5 3.9   CHLORIDE mmol/L 96* 95* 93*   CO2 mmol/L 28.0 27.0 33.0*   BUN mg/dL 51* 45* 45*   CREATININE mg/dL 1.18* 1.26* 1.48*   CALCIUM mg/dL 8.3* 8.5* 8.3*   BILIRUBIN mg/dL 0.4 0.5 0.4   ALK PHOS U/L 135* 136* 132*   ALT (SGPT) U/L 41* 42* 34*   AST (SGOT) U/L 31 39* 41*   GLUCOSE mg/dL 152* 133* 137*     Results from last 7 days   Lab Units 08/15/23  0404 08/14/23  0821 08/10/23  0510   WBC 10*3/mm3 7.62 7.53 5.31   HEMOGLOBIN g/dL 14.9 14.9 14.0   HEMATOCRIT % 44.5 45.2 42.3   PLATELETS 10*3/mm3 137* 139* 124*     Lab Results   Component Value Date    TROPONINT 29 (H) 08/09/2023    TROPONINT 35 (H) 08/09/2023     Lab Results   Component Value Date    CHOL 93 08/10/2023    CHOL 96 05/20/2022     Lab Results   Component Value Date    TRIG 99 08/10/2023    TRIG 48 05/20/2022    TRIG 48 02/18/2022     Lab Results   Component Value Date    HDL 37 (L) 08/10/2023    HDL 55 05/20/2022    HDL 55 02/18/2022     No components found for: LDLCALC  Lab Results   Component Value Date    INR 1.30 (H) 07/19/2022    INR 1.20 (H) 05/20/2022    INR 1.26 (H) 05/17/2022    PROTIME 16.1 (H) 07/19/2022    PROTIME 15.1 (H) 05/20/2022    PROTIME 15.8 (H) 05/17/2022         Ejection Fraction: LVEF 30 to 35% by echo.  20 mm MARK 3 pericardial prosthesis.    Assessment   Assessment:    COVID +  Acute on chronic systolic and diastolic heart failure  Echo EF was 60 to 65% by echo 2/18/2022 (personally reviewed echo)  Status post dual-chamber pacemaker 4/13/2022 Dr. Hussein  Echo EF 40 to 45% 8/8/2022 with anteroseptal hypokinesis.  Minor coronary artery disease by cath 5/20/2022  Aortic stenosis status post 20 mm MARK 3 TAVR 7/20/2022 EF 60% at implant  Persistent atrial fibrillation  Continue amiodarone  Continue Eliquis  Sick sinus syndrome status post permanent pacemaker  CKD    Fall in ejection fraction is unexplained unless possibly by persistent RV pacing or A-fib  with RVR    Plan:    May consider repeat cardiac catheterization after she is no longer COVID-positive.  We will need pacemaker interrogation once she is no longer COVID-positive.  Increase valsartan to 80 mg twice daily    Yashira Pyle MD  08/15/23  08:59 EDT

## 2023-08-15 NOTE — PLAN OF CARE
Goal Outcome Evaluation:  Plan of Care Reviewed With: patient, daughter        Progress: improving  Outcome Evaluation: Pt continues to present with BLE weakness (R>L) and decreased endurance with activity. Pt ambulated 50ft this session with min ISELA and RW for support. Pt demo good safety awareness and effort with ther ex and gait training. Continue to progress per pt tolerance.      Anticipated Discharge Disposition (PT): skilled nursing facility

## 2023-08-15 NOTE — THERAPY TREATMENT NOTE
Patient Name: Miryam Mckeon  : 1945    MRN: [id number]                              Today's Date: 8/15/2023       Admit Date: 2023    Visit Dx:     ICD-10-CM ICD-9-CM   1. COVID-19  U07.1 079.89   2. Hypoxia  R09.02 799.02   3. Frequent falls  R29.6 V15.88   4. Generalized weakness  R53.1 780.79   5. Acute on chronic congestive heart failure, unspecified heart failure type  I50.9 428.0     Patient Active Problem List   Diagnosis    Bilateral carotid artery stenosis    Aortic stenosis, severe s/p TAVR (2022)    Longstanding persistent atrial fibrillation    Sick sinus syndrome    Hyperlipidemia LDL goal <70    Hypertension    Chronic combined systolic and diastolic congestive heart failure    Status post CVA    Presence of cardiac pacemaker secondary to sick sinus syndrome    Chronic kidney disease, stage 3b    Parent refuses immunizations    Chronic nausea    Pulmonary hypertension    Chronic anticoagulation    Acute HFrEF (heart failure with reduced ejection fraction)    Weakness    Hypokalemia    Hypoalbuminemia    COVID-19 virus detected    Hypoxia    Pleural effusion    COVID-19     Past Medical History:   Diagnosis Date    Anxiety     Aortic valve stenosis     Atrial fibrillation     Borderline diabetes     ???    Carotid artery stenosis     CKD (chronic kidney disease)     Deaf     GERD (gastroesophageal reflux disease)     Heart murmur     Hyperlipidemia     Hypertension     Irregular heartbeat     PONV (postoperative nausea and vomiting)     Stroke     TIA (transient ischemic attack)      Past Surgical History:   Procedure Laterality Date    AORTIC VALVE REPAIR/REPLACEMENT N/A 2022    Procedure: TRANSCATHETER AORTIC VALVE REPLACEMENT with angioplasty and stent to the right common and external iliac artery;  Surgeon: Bola Whitaker MD;  Location: [facility];  Service: Cardiothoracic;  Laterality: N/A;  FL-15 MIN 12 SEC  DOSE- 92.41  CONTRAST- 120 ML ISOVUE    AORTIC  VALVE REPAIR/REPLACEMENT N/A 7/20/2022    Procedure: Transcatheter Aortic Valve Replacement;  Surgeon: Yashira Chow MD;  Location: [facility];  Service: Cardiovascular;  Laterality: N/A;    CARDIAC CATHETERIZATION      05/20/2022 per dr. chow    CARDIAC CATHETERIZATION Left 5/20/2022    Procedure: Left Heart Cath;  Surgeon: Yashira Chow MD;  Location: [facility] CATH INVASIVE LOCATION;  Service: Cardiology;  Laterality: Left;    CARDIAC ELECTROPHYSIOLOGY PROCEDURE N/A 04/13/2022    Procedure: DDD PPM Implant (BSC), DNS Eliquis;  Surgeon: Troy Hussein DO;  Location: [facility] EP INVASIVE LOCATION;  Service: Cardiology;  Laterality: N/A;    CAROTID ENDARTERECTOMY Bilateral     CATARACT EXTRACTION      CHOLECYSTECTOMY      COLONOSCOPY      FEMORAL ARTERY CUTDOWN Right 7/20/2022    Procedure: FEMORAL ARTERY CUTDOWN, ANGIOGRAM;  Surgeon: Bola Whitaker MD;  Location: [facility];  Service: Vascular;  Laterality: Right;  fl-1 m 12 sec  dose- 47.29 mgy  contrast- 50 ml isovue    KNEE SURGERY Right     PACEMAKER IMPLANTATION      JF      05/20/2022 per dr. chow    TRANSESOPHAGEAL ECHOCARDIOGRAM (JF) N/A 7/20/2022    Procedure: TRANSESOPHAGEAL ECHOCARDIOGRAM PER ANESTHESIA;  Surgeon: Bola Whitaker MD;  Location: [facility];  Service: Cardiothoracic;  Laterality: N/A;      General Information       Row Name 08/15/23 1400          Physical Therapy Time and Intention    Document Type therapy note (daily note)  -AE     Mode of Treatment physical therapy  -AE       Row Name 08/15/23 1400          General Information    Patient Profile Reviewed yes  -AE     Existing Precautions/Restrictions fall;other (see comments)  Patient is deaf, communicates with ASL and has white board in room, does not use  cart.  -AE     Barriers to Rehab hearing deficit  -AE       Row Name 08/15/23 1400          Cognition    Orientation Status (Cognition) oriented x 3  -AE        Row Name 08/15/23 1400          Safety Issues, Functional Mobility    Safety Issues Affecting Function (Mobility) awareness of need for assistance;insight into deficits/self-awareness;safety precaution awareness;safety precautions follow-through/compliance;sequencing abilities;positioning of assistive device  -AE     Impairments Affecting Function (Mobility) balance;endurance/activity tolerance;strength  -AE               User Key  (r) = Recorded By, (t) = Taken By, (c) = Cosigned By      Initials Name Provider Type    AE Sharan Ceja, PT Physical Therapist                   Mobility       Row Name 08/15/23 1401          Bed Mobility    Bed Mobility supine-sit  -AE     Supine-Sit [place] (Bed Mobility) minimum assist (75% patient effort);1 person assist;verbal cues  -AE     Assistive Device (Bed Mobility) head of bed elevated;bed rails  -AE     Comment, (Bed Mobility) Cues for sequencing with use of bed rails to assist with supine to sit. Gestures for mobility.  -AE       Row Name 08/15/23 1401          Transfers    Comment, (Transfers) Cues for hand placement and sequencing. Increased cues and gesturing required to improve positioning within RW.  -AE       Row Name 08/15/23 1401          Sit-Stand Transfer    Sit-Stand [place] (Transfers) contact guard;1 person assist;verbal cues  -AE     Assistive Device (Sit-Stand Transfers) walker, front-wheeled  -AE       Row Name 08/15/23 1401          Gait/Stairs (Locomotion)    [place] Level (Gait) verbal cues;minimum assist (75% patient effort);1 person assist  -AE     Assistive Device (Gait) walker, front-wheeled  -AE     Distance in Feet (Gait) 50  -AE     Deviations/Abnormal Patterns (Gait) base of support, narrow;erinn decreased;gait speed decreased;stride length decreased  -AE     Bilateral Gait Deviations heel strike decreased  -AE     Comment, (Gait/Stairs) Pt demo step through gait pattern with slowed erinn and decreased gait speed. Pt  noted to have intentional movements and sequencing of AD while turning. A few cues required to improve positioning within RW but otherwise pt demo good safety awareness. Further distance limited by fatigue/weakness.  -AE               User Key  (r) = Recorded By, (t) = Taken By, (c) = Cosigned By      Initials Name Provider Type    AE Sharan Ceja PT Physical Therapist                   Obj/Interventions       Row Name 08/15/23 1409          Motor Skills    Therapeutic Exercise hip;knee;ankle  -AE       Row Name 08/15/23 1409          Hip (Therapeutic Exercise)    Hip (Therapeutic Exercise) strengthening exercise  -AE     Hip Strengthening (Therapeutic Exercise) bilateral;heel slides;aBduction;aDduction;sitting;10 repetitions  -AE       Row Name 08/15/23 1409          Knee (Therapeutic Exercise)    Knee (Therapeutic Exercise) strengthening exercise  -AE     Knee Strengthening (Therapeutic Exercise) bilateral;SLR (straight leg raise);SAQ (short arc quad);LAQ (long arc quad);sitting;10 repetitions  -AE       Row Name 08/15/23 1409          Ankle (Therapeutic Exercise)    Ankle (Therapeutic Exercise) AROM (active range of motion)  -AE     Ankle AROM (Therapeutic Exercise) bilateral;dorsiflexion;plantarflexion;10 repetitions  -AE       Row Name 08/15/23 1409          Balance    Balance Assessment sitting static balance;sitting dynamic balance;sit to stand dynamic balance;standing static balance;standing dynamic balance  -AE     Static Sitting Balance contact guard  -AE     Dynamic Sitting Balance contact guard  -AE     Position, Sitting Balance unsupported;sitting edge of bed  -AE     Sit to Stand Dynamic Balance contact guard;1-person assist;verbal cues  -AE     Static Standing Balance minimal assist;1-person assist;verbal cues  -AE     Dynamic Standing Balance minimal assist;1-person assist;verbal cues  -AE     Position/Device Used, Standing Balance supported;walker, front-wheeled  -AE               User Key  (r)  = Recorded By, (t) = Taken By, (c) = Cosigned By      Initials Name Provider Type    AE Sharan Ceja, PT Physical Therapist                   Goals/Plan    No documentation.                  Clinical Impression       Row Name 08/15/23 1413          Plan of Care Review    Plan of Care Reviewed With patient;daughter  -AE     Progress improving  -AE     Outcome Evaluation Pt continues to present with BLE weakness (R>L) and decreased endurance with activity. Pt ambulated 50ft this session with min A and RW for support. Pt demo good safety awareness and effort with ther ex and gait training. Continue to progress per pt tolerance.  -AE       Row Name 08/15/23 1413          Vital Signs    Pre Systolic BP Rehab --  VSS  -AE     O2 Delivery Pre Treatment nasal cannula  -AE     O2 Delivery Intra Treatment room air  -AE     O2 Delivery Post Treatment room air  -AE       Row Name 08/15/23 1413          Positioning and Restraints    Pre-Treatment Position in bed  -AE     Post Treatment Position chair  -AE     In Chair notified nsg;call light within reach;encouraged to call for assist;exit alarm on;with family/caregiver;waffle cushion;legs elevated  -AE               User Key  (r) = Recorded By, (t) = Taken By, (c) = Cosigned By      Initials Name Provider Type    AE Sharan Ceja, PT Physical Therapist                   Outcome Measures       Row Name 08/15/23 1415          How much help from another person do you currently need...    Turning from your back to your side while in flat bed without using bedrails? 3  -AE     Moving from lying on back to sitting on the side of a flat bed without bedrails? 3  -AE     Moving to and from a bed to a chair (including a wheelchair)? 3  -AE     Standing up from a chair using your arms (e.g., wheelchair, bedside chair)? 3  -AE     Climbing 3-5 steps with a railing? 2  -AE     To walk in hospital room? 3  -AE     AM-PAC 6 Clicks Score (PT) 17  -AE     Highest level of mobility 5 -->  Static standing  -AE       Row Name 08/15/23 1415          Functional Assessment    Outcome Measure Options AM-PAC 6 Clicks Basic Mobility (PT)  -AE               User Key  (r) = Recorded By, (t) = Taken By, (c) = Cosigned By      Initials Name Provider Type    AE Sharan Ceja, PT Physical Therapist                                 Physical Therapy Education       Title: PT OT SLP Therapies (In Progress)       Topic: Physical Therapy (In Progress)       Point: Mobility training (In Progress)       Learning Progress Summary             Patient Acceptance, E, NR by AE at 8/15/2023 1318    Acceptance, E, NR by AS at 8/14/2023 0834    Acceptance, E, VU by AE at 8/11/2023 1445    Acceptance, E, VU,NR by LM at 8/10/2023 1500                         Point: Home exercise program (In Progress)       Learning Progress Summary             Patient Acceptance, E, NR by AE at 8/15/2023 1318    Acceptance, E, NR by AS at 8/14/2023 0834                         Point: Precautions (In Progress)       Learning Progress Summary             Patient Acceptance, E, NR by AE at 8/15/2023 1318    Acceptance, E, NR by AS at 8/14/2023 0834    Acceptance, E, VU by AE at 8/11/2023 1445    Acceptance, E, VU,NR by LM at 8/10/2023 1500                                         User Key       Initials Effective Dates Name Provider Type Discipline    AS 04/28/23 -  Ольга Victor, PTA Physical Therapist Assistant PT    LM 07/11/23 -  Maryuri Carson, EMERY Physical Therapist PT    AE 09/21/21 -  Sharan Ceja, EMERY Physical Therapist PT                  PT Recommendation and Plan     Plan of Care Reviewed With: patient, daughter  Progress: improving  Outcome Evaluation: Pt continues to present with BLE weakness (R>L) and decreased endurance with activity. Pt ambulated 50ft this session with min A and RW for support. Pt demo good safety awareness and effort with ther ex and gait training. Continue to progress per pt tolerance.     Time Calculation:          PT Charges       Row Name 08/15/23 1416             Time Calculation    Start Time 1318  -AE      PT Received On 08/15/23  -AE      PT Goal Re-Cert Due Date 08/20/23  -AE         Timed Charges    08168 - PT Therapeutic Exercise Minutes 10  -AE      09376 - Gait Training Minutes  13  -AE         Total Minutes    Timed Charges Total Minutes 23  -AE       Total Minutes 23  -AE                User Key  (r) = Recorded By, (t) = Taken By, (c) = Cosigned By      Initials Name Provider Type    AE Sharan Ceja, EMERY Physical Therapist                  Therapy Charges for Today       Code Description Service Date Service Provider Modifiers Qty    57650270777 HC PT THER PROC EA 15 MIN 8/15/2023 Sharan Ceja, PT GP 1    15512908663 HC GAIT TRAINING EA 15 MIN 8/15/2023 Sharan Ceja, PT GP 1            PT G-Codes  Outcome Measure Options: AM-PAC 6 Clicks Basic Mobility (PT)  AM-PAC 6 Clicks Score (PT): 17  AM-PAC 6 Clicks Score (OT): 13  PT Discharge Summary  Anticipated Discharge Disposition (PT): skilled nursing facility    Sharan Ceja PT  8/15/2023

## 2023-08-16 LAB
ALBUMIN SERPL-MCNC: 2.4 G/DL (ref 3.5–5.2)
ALBUMIN/GLOB SERPL: 0.9 G/DL
ALP SERPL-CCNC: 119 U/L (ref 39–117)
ALT SERPL W P-5'-P-CCNC: 39 U/L (ref 1–33)
ANION GAP SERPL CALCULATED.3IONS-SCNC: 8 MMOL/L (ref 5–15)
AST SERPL-CCNC: 29 U/L (ref 1–32)
BASOPHILS # BLD AUTO: 0.02 10*3/MM3 (ref 0–0.2)
BASOPHILS NFR BLD AUTO: 0.2 % (ref 0–1.5)
BILIRUB SERPL-MCNC: 0.4 MG/DL (ref 0–1.2)
BUN SERPL-MCNC: 52 MG/DL (ref 8–23)
BUN/CREAT SERPL: 44.4 (ref 7–25)
CALCIUM SPEC-SCNC: 8.7 MG/DL (ref 8.6–10.5)
CHLORIDE SERPL-SCNC: 99 MMOL/L (ref 98–107)
CO2 SERPL-SCNC: 30 MMOL/L (ref 22–29)
CREAT SERPL-MCNC: 1.17 MG/DL (ref 0.57–1)
DEPRECATED RDW RBC AUTO: 45.1 FL (ref 37–54)
EGFRCR SERPLBLD CKD-EPI 2021: 47.6 ML/MIN/1.73
EOSINOPHIL # BLD AUTO: 0 10*3/MM3 (ref 0–0.4)
EOSINOPHIL NFR BLD AUTO: 0 % (ref 0.3–6.2)
ERYTHROCYTE [DISTWIDTH] IN BLOOD BY AUTOMATED COUNT: 13.2 % (ref 12.3–15.4)
GLOBULIN UR ELPH-MCNC: 2.7 GM/DL
GLUCOSE SERPL-MCNC: 101 MG/DL (ref 65–99)
HCT VFR BLD AUTO: 43.4 % (ref 34–46.6)
HGB BLD-MCNC: 14.8 G/DL (ref 12–15.9)
IMM GRANULOCYTES # BLD AUTO: 0.09 10*3/MM3 (ref 0–0.05)
IMM GRANULOCYTES NFR BLD AUTO: 1.1 % (ref 0–0.5)
LYMPHOCYTES # BLD AUTO: 0.64 10*3/MM3 (ref 0.7–3.1)
LYMPHOCYTES NFR BLD AUTO: 7.8 % (ref 19.6–45.3)
MAGNESIUM SERPL-MCNC: 2 MG/DL (ref 1.6–2.4)
MCH RBC QN AUTO: 31.8 PG (ref 26.6–33)
MCHC RBC AUTO-ENTMCNC: 34.1 G/DL (ref 31.5–35.7)
MCV RBC AUTO: 93.3 FL (ref 79–97)
MONOCYTES # BLD AUTO: 0.54 10*3/MM3 (ref 0.1–0.9)
MONOCYTES NFR BLD AUTO: 6.6 % (ref 5–12)
NEUTROPHILS NFR BLD AUTO: 6.93 10*3/MM3 (ref 1.7–7)
NEUTROPHILS NFR BLD AUTO: 84.3 % (ref 42.7–76)
NRBC BLD AUTO-RTO: 0 /100 WBC (ref 0–0.2)
PLATELET # BLD AUTO: 141 10*3/MM3 (ref 140–450)
PMV BLD AUTO: 10.1 FL (ref 6–12)
POTASSIUM SERPL-SCNC: 3.8 MMOL/L (ref 3.5–5.2)
PROT SERPL-MCNC: 5.1 G/DL (ref 6–8.5)
RBC # BLD AUTO: 4.65 10*6/MM3 (ref 3.77–5.28)
SODIUM SERPL-SCNC: 137 MMOL/L (ref 136–145)
WBC NRBC COR # BLD: 8.22 10*3/MM3 (ref 3.4–10.8)

## 2023-08-16 PROCEDURE — 25010000002 ONDANSETRON PER 1 MG: Performed by: INTERNAL MEDICINE

## 2023-08-16 PROCEDURE — 83735 ASSAY OF MAGNESIUM: CPT | Performed by: FAMILY MEDICINE

## 2023-08-16 PROCEDURE — 80053 COMPREHEN METABOLIC PANEL: CPT | Performed by: FAMILY MEDICINE

## 2023-08-16 PROCEDURE — 85025 COMPLETE CBC W/AUTO DIFF WBC: CPT | Performed by: FAMILY MEDICINE

## 2023-08-16 PROCEDURE — 99232 SBSQ HOSP IP/OBS MODERATE 35: CPT | Performed by: FAMILY MEDICINE

## 2023-08-16 PROCEDURE — 99231 SBSQ HOSP IP/OBS SF/LOW 25: CPT | Performed by: INTERNAL MEDICINE

## 2023-08-16 RX ORDER — VALSARTAN 160 MG/1
160 TABLET ORAL EVERY 12 HOURS SCHEDULED
Status: DISCONTINUED | OUTPATIENT
Start: 2023-08-16 | End: 2023-08-21 | Stop reason: HOSPADM

## 2023-08-16 RX ADMIN — CYANOCOBALAMIN TAB 1000 MCG 1000 MCG: 1000 TAB at 09:11

## 2023-08-16 RX ADMIN — Medication 10 ML: at 20:08

## 2023-08-16 RX ADMIN — VALSARTAN 160 MG: 160 TABLET, FILM COATED ORAL at 20:10

## 2023-08-16 RX ADMIN — METOPROLOL SUCCINATE 100 MG: 50 TABLET, EXTENDED RELEASE ORAL at 09:12

## 2023-08-16 RX ADMIN — APIXABAN 2.5 MG: 2.5 TABLET, FILM COATED ORAL at 09:11

## 2023-08-16 RX ADMIN — AMIODARONE HYDROCHLORIDE 200 MG: 200 TABLET ORAL at 09:11

## 2023-08-16 RX ADMIN — VALSARTAN 160 MG: 160 TABLET, FILM COATED ORAL at 09:09

## 2023-08-16 RX ADMIN — APIXABAN 2.5 MG: 2.5 TABLET, FILM COATED ORAL at 20:09

## 2023-08-16 RX ADMIN — Medication 1000 UNITS: at 09:11

## 2023-08-16 RX ADMIN — ONDANSETRON 4 MG: 2 INJECTION INTRAMUSCULAR; INTRAVENOUS at 15:17

## 2023-08-16 RX ADMIN — SENNOSIDES AND DOCUSATE SODIUM 2 TABLET: 50; 8.6 TABLET ORAL at 09:10

## 2023-08-16 RX ADMIN — ATORVASTATIN CALCIUM 10 MG: 10 TABLET, FILM COATED ORAL at 20:09

## 2023-08-16 RX ADMIN — ASPIRIN 81 MG: 81 TABLET, CHEWABLE ORAL at 09:11

## 2023-08-16 RX ADMIN — Medication 10 ML: at 09:14

## 2023-08-16 RX ADMIN — SENNOSIDES AND DOCUSATE SODIUM 2 TABLET: 50; 8.6 TABLET ORAL at 20:09

## 2023-08-16 RX ADMIN — AMIODARONE HYDROCHLORIDE 200 MG: 200 TABLET ORAL at 20:09

## 2023-08-16 RX ADMIN — DILTIAZEM HYDROCHLORIDE 300 MG: 180 CAPSULE, COATED, EXTENDED RELEASE ORAL at 09:11

## 2023-08-16 NOTE — PLAN OF CARE
Goal Outcome Evaluation:         Pt resting in bed. Pt vomited twice this afternoon. Notified physician. No new orders obtained. See MAR. VSS on room air.

## 2023-08-16 NOTE — PROGRESS NOTES
[facility] Medicine Services  PROGRESS NOTE    Patient Name: Miryam Mckeon  : 1945  MRN: [id number]    Date of Admission: 2023  Primary Care Physician: Rigoberto Chamberlain MD    Subjective   Subjective     CC:  Weakness, COVID-positive    HPI:  Slight cough, non productive    ROS:  Gen: no fever  Pulm: no shortness of breath  GI; nausea reported this afternoon    Objective   Objective     Vital Signs:   Temp:  [95.9 øF (35.5 øC)-97.4 øF (36.3 øC)] 96.3 øF (35.7 øC)  Heart Rate:  [70-89] 86  Resp:  [15-18] 16  BP: (123-174)/() 168/83  Flow (L/min):  [1] 1     Physical Exam:  Constitutional - no acute distress, thin female, in bed  HEENT-NCAT, mucous membranes moist  CV-RRR  Resp-CTAB,  Abd-soft, nontender, nondistended, normoactive bowel sounds  Ext-No lower extremity cyanosis, clubbing or edema bilaterally  Neuro-alert, speech clear, moves all extremities   Psych-normal affect   Skin- No rash on exposed UE or LE bilaterally            Results Reviewed:  LAB RESULTS:      Lab 23  0450 08/15/23  0404 23  0821 08/10/23  0510 23   WBC 8.22 7.62 7.53 5.31  --   --    HEMOGLOBIN 14.8 14.9 14.9 14.0  --   --    HEMATOCRIT 43.4 44.5 45.2 42.3  --   --    PLATELETS 141 137* 139* 124*  --   --    NEUTROS ABS 6.93 6.65 6.28 4.64  --   --    IMMATURE GRANS (ABS) 0.09* 0.13* 0.08* 0.06*  --   --    LYMPHS ABS 0.64* 0.48* 0.79 0.47*  --   --    MONOS ABS 0.54 0.34 0.35 0.12  --   --    EOS ABS 0.00 0.00 0.00 0.00  --   --    MCV 93.3 94.5 95.2 94.8  --   --    CRP  --   --   --   --  <0.30  --    PROCALCITONIN  --   --   --   --  0.13  --    LDH  --   --   --   --  295*  --    D DIMER QUANT  --   --   --   --   --  0.88*         Lab 23  0450 08/15/23  0404 23  0821 23  0325 23  1029 08/10/23  0510 236   SODIUM 137 134* 133* 133* 137 137  --    POTASSIUM 3.8 4.2 4.5 3.9 4.1 3.1*  --    CHLORIDE 99 96* 95* 93*  95* 92*  --    CO2 30.0* 28.0 27.0 33.0* 35.0* 30.0*  --    ANION GAP 8.0 10.0 11.0 7.0 7.0 15.0  --    BUN 52* 51* 45* 45* 42* 28*  --    CREATININE 1.17* 1.18* 1.26* 1.48* 1.52* 1.45*  --    EGFR 47.6* 47.1* 43.5* 35.9* 34.7* 36.8*  --    GLUCOSE 101* 152* 133* 137* 128* 104*  --    CALCIUM 8.7 8.3* 8.5* 8.3* 8.8 8.3*  --    MAGNESIUM 2.0 1.8 1.9 2.1 2.3 1.5* 1.6   HEMOGLOBIN A1C  --   --   --   --   --  6.20*  --    TSH  --   --   --   --   --   --  6.110*         Lab 08/16/23  0450 08/15/23  0404 08/14/23  0821 08/12/23  0325 08/11/23  1029 08/10/23  0510 08/09/23  1809   TOTAL PROTEIN 5.1* 4.8* 5.2* 5.1* 5.2*   < > 6.6   ALBUMIN 2.4* 2.7* 2.9* 2.5* 2.8*   < > 3.1*   GLOBULIN 2.7 2.1 2.3 2.6 2.4   < > 3.5   ALT (SGPT) 39* 41* 42* 34* 36*   < > 27   AST (SGOT) 29 31 39* 41* 50*   < > 39*   BILIRUBIN 0.4 0.4 0.5 0.4 0.5   < > 1.1   ALK PHOS 119* 135* 136* 132* 149*   < > 170*   LIPASE  --   --   --   --   --   --  15    < > = values in this interval not displayed.         Lab 08/14/23  0821 08/09/23  2056 08/09/23  1809   PROBNP 41,672.0*  --  58,397.0*   HSTROP T  --  29* 35*         Lab 08/10/23  0510   CHOLESTEROL 93   LDL CHOL 37   HDL CHOL 37*   TRIGLYCERIDES 99         Lab 08/09/23  2056   FERRITIN 496.30*         Brief Urine Lab Results  (Last result in the past 365 days)        Color   Clarity   Blood   Leuk Est   Nitrite   Protein   CREAT   Urine HCG        08/09/23 1806 Yellow   Clear   Small (1+)   Negative   Negative   >=300 mg/dL (3+)                   Microbiology Results Abnormal       None            No radiology results from the last 24 hrs    Results for orders placed during the hospital encounter of 08/09/23    Adult Transthoracic Echo Limited W/ Cont if Necessary Per Protocol    Interpretation Summary    : Left ventricular systolic function is moderately decreased. Calculated left ventricular EF = 35.5% Left ventricular ejection fraction appears to be 31 - 35%. Septal wall motion is abnormal,  consistent with right ventricular pacing. Normal left ventricular cavity size noted. Left ventricular wall thickness is consistent with mild concentric hypertrophy. There is left ventricular global hypokinesis noted.    The right atrial cavity is dilated.    The right atrial cavity is dilated. The diameter of the inferior vena cava is 1.57 cm. Partial IVC inspiratory collapse of less than 50% noted.    Mild periprosthetic aortic valve regurgitation is seen. There is a TAVR valve present, 20mm Gisselle 3. The prosthetic aortic valve is grossly normal.    : Calcified mitral valve chordae are present. There is mild, bileaflet mitral valve thickening present. Mild mitral valve regurgitation is present.    The tricuspid valve is normal in structure. Moderate tricuspid valve regurgitation is present.      Current medications:  Scheduled Meds:amiodarone, 200 mg, Oral, Q12H  apixaban, 2.5 mg, Oral, Q12H  aspirin, 81 mg, Oral, Daily  atorvastatin, 10 mg, Oral, Nightly  cholecalciferol, 1,000 Units, Oral, Daily  dilTIAZem CD, 300 mg, Oral, Daily  latanoprost, 1 drop, Both Eyes, Nightly  metoprolol succinate XL, 100 mg, Oral, Q24H  pharmacy consult - MTM, , Does not apply, Daily  senna-docusate sodium, 2 tablet, Oral, BID  sodium chloride, 10 mL, Intravenous, Q12H  valsartan, 160 mg, Oral, Q12H  vitamin B-12, 1,000 mcg, Oral, Daily      Continuous Infusions:   PRN Meds:.  acetaminophen    albuterol sulfate HFA    senna-docusate sodium **AND** polyethylene glycol **AND** bisacodyl **AND** bisacodyl    Calcium Replacement - Follow Nurse / BPA Driven Protocol    Magnesium Low Dose Replacement - Follow Nurse / BPA Driven Protocol    ondansetron **OR** ondansetron    Phosphorus Replacement - Follow Nurse / BPA Driven Protocol    Potassium Replacement - Follow Nurse / BPA Driven Protocol    simethicone    [COMPLETED] Insert Peripheral IV **AND** sodium chloride    sodium chloride    sodium chloride    Assessment & Plan   Assessment &  Plan     Active Hospital Problems    Diagnosis  POA    **Acute HFrEF (heart failure with reduced ejection fraction) [I50.21]  Unknown    COVID-19 [U07.1]  Yes    Weakness [R53.1]  Unknown    Hypokalemia [E87.6]  Unknown    Hypoalbuminemia [E88.09]  Unknown    COVID-19 virus detected [U07.1]  Unknown    Hypoxia [R09.02]  Unknown    Pleural effusion [J90]  Unknown    Pulmonary hypertension [I27.20]  Yes    Chronic anticoagulation [Z79.01]  Not Applicable    Chronic nausea [R11.0]  Yes    Chronic kidney disease, stage 3b [N18.32]  Yes    Presence of cardiac pacemaker secondary to sick sinus syndrome [Z95.0]  Yes    Chronic combined systolic and diastolic congestive heart failure [I50.42]  Yes    Hypertension [I10]  Yes    Aortic stenosis, severe s/p TAVR (7/20/2022) [I35.0]  Yes    Longstanding persistent atrial fibrillation [I48.11]  Yes    Sick sinus syndrome [I49.5]  Yes    Hyperlipidemia LDL goal <70 [E78.5]  Yes    Bilateral carotid artery stenosis [I65.23]  Yes    Status post CVA [Z86.73]  Not Applicable      Resolved Hospital Problems   No resolved problems to display.        Brief Hospital Course to date:  Miryam Mckeon is a 77 y.o. female with past medical history of A-fib on Eliquis, aortic valve stenosis status post TAVR, CKD stage III, deafness, hypertension, hyperlipidemia and history of CVA who came into the emergency department with complaints of generalized weakness.  She was found to be in congestive heart failure with acute exacerbation as well as COVID-positive.    Acute on chronic systolic and diastolic congestive heart failure exacerbation  Dual chamber PPM 2022 (SSS)  TAVR 2022  Persistent afib  -Echo with reduced EF 35% - likely due to afib vs persistent RV pacing  --needs PPM interrogation once out of COVID precautions  --consideration of LHC   --eliquis/amio/cardizem/metoprolol  --diuresis prn  --Cardiology follows    COVID upper respiratory infection  -Symptoms of dry cough and hypoxia  with intermittent dyspnea  -Patient declined remdesivir  --Reluctantly agreeable to continue short course of steroids (5 days total)  --cough today, check CXR  -Continue inhalers as needed    Generalized weakness  -Likely multifactorial  -PT and OT recc rehab    Hypertension  - diovan dose increased    Hyperlipidemia  Bilateral carotid artery stenosis  History of CVA  -Continue home medications    Poor p.o. intake  Failure to thrive  Hypoalbuminemia  Malnutrition  -Nutrition consult requested  --start Boost/magic cup supplement TID    CKD, stage III    Hearing loss  -  present today          Expected Discharge Location and Transportation: Rehab, private vehicle  Expected Discharge   Expected Discharge Date: 8/14/2023; Expected Discharge Time:      DVT prophylaxis:  Medical and mechanical DVT prophylaxis orders are present.     AM-PAC 6 Clicks Score (PT): 17 (08/16/23 0800)    CODE STATUS:   Code Status and Medical Interventions:   Ordered at: 08/09/23 1490     Level Of Support Discussed With:    Patient     Code Status (Patient has no pulse and is not breathing):    CPR (Attempt to Resuscitate)     Medical Interventions (Patient has pulse or is breathing):    Full Support       Germain Macias MD  08/16/23

## 2023-08-16 NOTE — PLAN OF CARE
Problem: Fall Injury Risk  Goal: Absence of Fall and Fall-Related Injury  Outcome: Ongoing, Progressing  Intervention: Identify and Manage Contributors  Recent Flowsheet Documentation  Taken 8/16/2023 0200 by Efren Dennison RN  Medication Review/Management: medications reviewed  Taken 8/16/2023 0000 by Efren Dennison RN  Medication Review/Management: medications reviewed  Taken 8/15/2023 2200 by Efren Dennison RN  Medication Review/Management: medications reviewed  Taken 8/15/2023 2000 by Efren Dennison RN  Medication Review/Management: medications reviewed  Intervention: Promote Injury-Free Environment  Recent Flowsheet Documentation  Taken 8/16/2023 0400 by Efren Dennison RN  Safety Promotion/Fall Prevention:   activity supervised   assistive device/personal items within reach   clutter free environment maintained   toileting scheduled   room organization consistent   safety round/check completed   nonskid shoes/slippers when out of bed  Taken 8/16/2023 0200 by Efren Denniosn RN  Safety Promotion/Fall Prevention:   activity supervised   clutter free environment maintained   assistive device/personal items within reach   toileting scheduled   safety round/check completed   room organization consistent   nonskid shoes/slippers when out of bed  Taken 8/16/2023 0000 by Efren Dennison RN  Safety Promotion/Fall Prevention:   activity supervised   assistive device/personal items within reach   clutter free environment maintained   toileting scheduled   safety round/check completed   room organization consistent   nonskid shoes/slippers when out of bed  Taken 8/15/2023 2200 by Efren Dennison RN  Safety Promotion/Fall Prevention:   activity supervised   assistive device/personal items within reach   clutter free environment maintained   safety round/check completed   room organization consistent   toileting scheduled   nonskid shoes/slippers when out of bed  Taken 8/15/2023 2000 by  Efren Dennison, RN  Safety Promotion/Fall Prevention:   assistive device/personal items within reach   activity supervised   clutter free environment maintained   toileting scheduled   safety round/check completed   room organization consistent   nonskid shoes/slippers when out of bed     Problem: Skin Injury Risk Increased  Goal: Skin Health and Integrity  Outcome: Ongoing, Progressing  Intervention: Optimize Skin Protection  Recent Flowsheet Documentation  Taken 8/16/2023 0400 by Efren Dennison RN  Head of Bed (HOB) Positioning: HOB elevated  Taken 8/16/2023 0200 by Efren Dennison RN  Head of Bed (HOB) Positioning: HOB elevated  Taken 8/16/2023 0000 by Efren Dennison RN  Head of Bed (HOB) Positioning: HOB elevated  Taken 8/15/2023 2200 by Efren Dennison RN  Head of Bed (HOB) Positioning: HOB elevated  Taken 8/15/2023 2000 by Efren Dennison RN  Pressure Reduction Techniques:   frequent weight shift encouraged   weight shift assistance provided  Head of Bed (HOB) Positioning: HOB elevated  Pressure Reduction Devices: pressure-redistributing mattress utilized     Problem: Adult Inpatient Plan of Care  Goal: Plan of Care Review  Outcome: Ongoing, Progressing   Goal Outcome Evaluation:      Pt. resting in bed. Paced rhythm on telemetry. On RA. No acute events during shift.

## 2023-08-16 NOTE — PROGRESS NOTES
"[facility] Cardiology Daily Note       LOS: 5 days   Patient Care Team:  Rigoberto Chamberlain MD as PCP - General (Family Medicine)  Vito Cuba MD as Consulting Physician (Cardiology)  Yashira Pyle MD as Consulting Physician (Cardiology)    Chief Complaint: Acute on chronic systolic heart failure/TAVR/PAF    Subjective     Subjective: In isolation for COVID.  93% on room air.  Heart rates have been in the 80s.  Blood pressures have been even more elevated.    Review of Systems:   As above.    Medications:  amiodarone, 200 mg, Oral, Q12H  apixaban, 2.5 mg, Oral, Q12H  aspirin, 81 mg, Oral, Daily  atorvastatin, 10 mg, Oral, Nightly  cholecalciferol, 1,000 Units, Oral, Daily  dilTIAZem CD, 300 mg, Oral, Daily  latanoprost, 1 drop, Both Eyes, Nightly  metoprolol succinate XL, 100 mg, Oral, Q24H  pharmacy consult - [place], , Does not apply, Daily  senna-docusate sodium, 2 tablet, Oral, BID  sodium chloride, 10 mL, Intravenous, Q12H  valsartan, 80 mg, Oral, Q12H  vitamin B-12, 1,000 mcg, Oral, Daily        Objective     Vital Sign Min/Max for last 24 hours  Temp  Min: 95.7 øF (35.4 øC)  Max: 97.1 øF (36.2 øC)   BP  Min: 146/71  Max: 174/101   Pulse  Min: 62  Max: 89   Resp  Min: 15  Max: 16   SpO2  Min: 92 %  Max: 96 %   Flow (L/min)  Min: 1  Max: 1   Weight  Min: 50.8 kg (112 lb)  Max: 50.8 kg (112 lb)      Intake/Output Summary (Last 24 hours) at 8/16/2023 0753  Last data filed at 8/16/2023 0356  Gross per 24 hour   Intake --   Output 900 ml   Net -900 ml        Flowsheet Rows      Flowsheet Row First Filed Value   Admission Height 152.4 cm (60\") Documented at 08/09/2023 1638   Admission Weight 45.4 kg (100 lb) Documented at 08/09/2023 1638            Physical Exam:    No physical exam was performed.     Results Review:    I reviewed the patient's new clinical results.  EKG:  Tele: A-fib    Labs:    Results from last 7 days   Lab Units 08/16/23  0450 08/15/23  0404 08/14/23  0821 "   SODIUM mmol/L 137 134* 133*   POTASSIUM mmol/L 3.8 4.2 4.5   CHLORIDE mmol/L 99 96* 95*   CO2 mmol/L 30.0* 28.0 27.0   BUN mg/dL 52* 51* 45*   CREATININE mg/dL 1.17* 1.18* 1.26*   CALCIUM mg/dL 8.7 8.3* 8.5*   BILIRUBIN mg/dL 0.4 0.4 0.5   ALK PHOS U/L 119* 135* 136*   ALT (SGPT) U/L 39* 41* 42*   AST (SGOT) U/L 29 31 39*   GLUCOSE mg/dL 101* 152* 133*     Results from last 7 days   Lab Units 08/16/23  0450 08/15/23  0404 08/14/23  0821   WBC 10*3/mm3 8.22 7.62 7.53   HEMOGLOBIN g/dL 14.8 14.9 14.9   HEMATOCRIT % 43.4 44.5 45.2   PLATELETS 10*3/mm3 141 137* 139*     Lab Results   Component Value Date    TROPONINT 29 (H) 08/09/2023    TROPONINT 35 (H) 08/09/2023     Lab Results   Component Value Date    CHOL 93 08/10/2023    CHOL 96 05/20/2022     Lab Results   Component Value Date    TRIG 99 08/10/2023    TRIG 48 05/20/2022    TRIG 48 02/18/2022     Lab Results   Component Value Date    HDL 37 (L) 08/10/2023    HDL 55 05/20/2022    HDL 55 02/18/2022     No components found for: LDLCALC  Lab Results   Component Value Date    INR 1.30 (H) 07/19/2022    INR 1.20 (H) 05/20/2022    INR 1.26 (H) 05/17/2022    PROTIME 16.1 (H) 07/19/2022    PROTIME 15.1 (H) 05/20/2022    PROTIME 15.8 (H) 05/17/2022         Ejection Fraction: LVEF 30 to 35% by echo.  20 mm MARK 3 pericardial prosthesis.    Assessment   Assessment:    COVID +  Acute on chronic systolic and diastolic heart failure  Echo EF was 60 to 65% by echo 2/18/2022 (personally reviewed echo)  Status post dual-chamber pacemaker 4/13/2022 Dr. Hussein  Echo EF 40 to 45% 8/8/2022 with anteroseptal hypokinesis.  Minor coronary artery disease by cath 5/20/2022  Aortic stenosis status post 20 mm MARK 3 TAVR 7/20/2022 EF 60% at implant  Persistent atrial fibrillation  Continue amiodarone  Continue Eliquis  Sick sinus syndrome status post permanent pacemaker  CKD    Fall in ejection fraction is unexplained unless possibly by persistent RV pacing or A-fib with  RVR    Plan:    May consider repeat cardiac catheterization after she is no longer COVID-positive.  We will need pacemaker interrogation once she is no longer COVID-positive.  Increase valsartan to 160 mg twice daily    Yashira Pyle MD  08/16/23  07:53 EDT

## 2023-08-17 ENCOUNTER — APPOINTMENT (OUTPATIENT)
Dept: GENERAL RADIOLOGY | Facility: HOSPITAL | Age: 78
DRG: 177 | End: 2023-08-17
Payer: MEDICARE

## 2023-08-17 LAB
ANION GAP SERPL CALCULATED.3IONS-SCNC: 11 MMOL/L (ref 5–15)
BUN SERPL-MCNC: 45 MG/DL (ref 8–23)
BUN/CREAT SERPL: 34.1 (ref 7–25)
CALCIUM SPEC-SCNC: 8.6 MG/DL (ref 8.6–10.5)
CHLORIDE SERPL-SCNC: 97 MMOL/L (ref 98–107)
CO2 SERPL-SCNC: 28 MMOL/L (ref 22–29)
CREAT SERPL-MCNC: 1.32 MG/DL (ref 0.57–1)
DEPRECATED RDW RBC AUTO: 45.2 FL (ref 37–54)
EGFRCR SERPLBLD CKD-EPI 2021: 41.2 ML/MIN/1.73
ERYTHROCYTE [DISTWIDTH] IN BLOOD BY AUTOMATED COUNT: 13.2 % (ref 12.3–15.4)
GLUCOSE SERPL-MCNC: 83 MG/DL (ref 65–99)
HCT VFR BLD AUTO: 44.9 % (ref 34–46.6)
HGB BLD-MCNC: 15 G/DL (ref 12–15.9)
MCH RBC QN AUTO: 31.3 PG (ref 26.6–33)
MCHC RBC AUTO-ENTMCNC: 33.4 G/DL (ref 31.5–35.7)
MCV RBC AUTO: 93.5 FL (ref 79–97)
PLATELET # BLD AUTO: 135 10*3/MM3 (ref 140–450)
PMV BLD AUTO: 9.9 FL (ref 6–12)
POTASSIUM SERPL-SCNC: 4.1 MMOL/L (ref 3.5–5.2)
RBC # BLD AUTO: 4.8 10*6/MM3 (ref 3.77–5.28)
SODIUM SERPL-SCNC: 136 MMOL/L (ref 136–145)
WBC NRBC COR # BLD: 7.29 10*3/MM3 (ref 3.4–10.8)

## 2023-08-17 PROCEDURE — 99232 SBSQ HOSP IP/OBS MODERATE 35: CPT | Performed by: FAMILY MEDICINE

## 2023-08-17 PROCEDURE — 80048 BASIC METABOLIC PNL TOTAL CA: CPT | Performed by: INTERNAL MEDICINE

## 2023-08-17 PROCEDURE — 85027 COMPLETE CBC AUTOMATED: CPT | Performed by: INTERNAL MEDICINE

## 2023-08-17 PROCEDURE — 97110 THERAPEUTIC EXERCISES: CPT

## 2023-08-17 PROCEDURE — 97530 THERAPEUTIC ACTIVITIES: CPT

## 2023-08-17 PROCEDURE — 71045 X-RAY EXAM CHEST 1 VIEW: CPT

## 2023-08-17 RX ADMIN — APIXABAN 2.5 MG: 2.5 TABLET, FILM COATED ORAL at 21:19

## 2023-08-17 RX ADMIN — Medication 10 ML: at 08:58

## 2023-08-17 RX ADMIN — METOPROLOL SUCCINATE 100 MG: 50 TABLET, EXTENDED RELEASE ORAL at 08:57

## 2023-08-17 RX ADMIN — VALSARTAN 160 MG: 160 TABLET, FILM COATED ORAL at 08:58

## 2023-08-17 RX ADMIN — ATORVASTATIN CALCIUM 10 MG: 10 TABLET, FILM COATED ORAL at 21:19

## 2023-08-17 RX ADMIN — SENNOSIDES AND DOCUSATE SODIUM 2 TABLET: 50; 8.6 TABLET ORAL at 08:57

## 2023-08-17 RX ADMIN — APIXABAN 2.5 MG: 2.5 TABLET, FILM COATED ORAL at 08:57

## 2023-08-17 RX ADMIN — AMIODARONE HYDROCHLORIDE 200 MG: 200 TABLET ORAL at 08:57

## 2023-08-17 RX ADMIN — AMIODARONE HYDROCHLORIDE 200 MG: 200 TABLET ORAL at 21:19

## 2023-08-17 RX ADMIN — VALSARTAN 160 MG: 160 TABLET, FILM COATED ORAL at 21:19

## 2023-08-17 RX ADMIN — Medication 1000 UNITS: at 08:56

## 2023-08-17 RX ADMIN — Medication 10 ML: at 21:19

## 2023-08-17 RX ADMIN — DILTIAZEM HYDROCHLORIDE 300 MG: 180 CAPSULE, COATED, EXTENDED RELEASE ORAL at 08:57

## 2023-08-17 RX ADMIN — SENNOSIDES AND DOCUSATE SODIUM 2 TABLET: 50; 8.6 TABLET ORAL at 21:19

## 2023-08-17 RX ADMIN — CYANOCOBALAMIN TAB 1000 MCG 1000 MCG: 1000 TAB at 08:56

## 2023-08-17 RX ADMIN — ASPIRIN 81 MG: 81 TABLET, CHEWABLE ORAL at 08:57

## 2023-08-17 NOTE — PLAN OF CARE
Problem: Fall Injury Risk  Goal: Absence of Fall and Fall-Related Injury  Outcome: Ongoing, Progressing  Intervention: Identify and Manage Contributors  Recent Flowsheet Documentation  Taken 8/16/2023 2015 by Kristen Washburn RN  Medication Review/Management: medications reviewed  Self-Care Promotion:   independence encouraged   BADL personal objects within reach   safe use of adaptive equipment encouraged  Intervention: Promote Injury-Free Environment  Recent Flowsheet Documentation  Taken 8/17/2023 0600 by Kristen Washburn RN  Safety Promotion/Fall Prevention:   activity supervised   assistive device/personal items within reach   clutter free environment maintained   nonskid shoes/slippers when out of bed   room organization consistent   safety round/check completed  Taken 8/17/2023 0400 by Kristen Washburn RN  Safety Promotion/Fall Prevention:   activity supervised   assistive device/personal items within reach   clutter free environment maintained   nonskid shoes/slippers when out of bed   room organization consistent   safety round/check completed  Taken 8/17/2023 0200 by Kristen Washburn RN  Safety Promotion/Fall Prevention:   activity supervised   assistive device/personal items within reach   clutter free environment maintained   nonskid shoes/slippers when out of bed   room organization consistent   safety round/check completed  Taken 8/17/2023 0000 by Kristen Washburn RN  Safety Promotion/Fall Prevention:   activity supervised   assistive device/personal items within reach   clutter free environment maintained   nonskid shoes/slippers when out of bed   room organization consistent   safety round/check completed  Taken 8/16/2023 2200 by Kristen Washburn RN  Safety Promotion/Fall Prevention:   activity supervised   assistive device/personal items within reach   clutter free environment maintained   nonskid shoes/slippers when out of bed   room organization consistent   safety  round/check completed  Taken 8/16/2023 2015 by Kristen Washburn RN  Safety Promotion/Fall Prevention:   activity supervised   assistive device/personal items within reach   clutter free environment maintained   nonskid shoes/slippers when out of bed   room organization consistent   safety round/check completed     Problem: Skin Injury Risk Increased  Goal: Skin Health and Integrity  Outcome: Ongoing, Progressing  Intervention: Promote and Optimize Oral Intake  Recent Flowsheet Documentation  Taken 8/16/2023 2015 by Kristen Washburn RN  Oral Nutrition Promotion: rest periods promoted  Intervention: Optimize Skin Protection  Recent Flowsheet Documentation  Taken 8/17/2023 0600 by Kristen Washburn RN  Pressure Reduction Techniques: frequent weight shift encouraged  Head of Bed (HOB) Positioning: [facility] elevated  Pressure Reduction Devices: pressure-redistributing mattress utilized  Skin Protection:   adhesive use limited   incontinence pads utilized   transparent dressing maintained   tubing/devices free from skin contact  Taken 8/17/2023 0400 by Kristen Washburn RN  Pressure Reduction Techniques: frequent weight shift encouraged  Head of Bed (HOB) Positioning: [facility] elevated  Pressure Reduction Devices: pressure-redistributing mattress utilized  Skin Protection:   adhesive use limited   incontinence pads utilized   transparent dressing maintained   tubing/devices free from skin contact  Taken 8/17/2023 0200 by Kristen Washburn RN  Pressure Reduction Techniques: frequent weight shift encouraged  Head of Bed (HOB) Positioning: [facility] elevated  Pressure Reduction Devices: pressure-redistributing mattress utilized  Skin Protection:   adhesive use limited   incontinence pads utilized   transparent dressing maintained   tubing/devices free from skin contact  Taken 8/17/2023 0000 by Kristne Washburn RN  Pressure Reduction Techniques: frequent weight shift encouraged  Head of Bed (HOB) Positioning: [facility]  elevated  Pressure Reduction Devices: pressure-redistributing mattress utilized  Skin Protection:   adhesive use limited   incontinence pads utilized   transparent dressing maintained   tubing/devices free from skin contact  Taken 8/16/2023 2200 by Kristen Washburn RN  Pressure Reduction Techniques: frequent weight shift encouraged  Head of Bed (HOB) Positioning: [facility] elevated  Pressure Reduction Devices: pressure-redistributing mattress utilized  Skin Protection:   adhesive use limited   incontinence pads utilized   transparent dressing maintained   tubing/devices free from skin contact  Taken 8/16/2023 2015 by Kristen Washburn RN  Pressure Reduction Techniques: frequent weight shift encouraged  Head of Bed (HOB) Positioning: [facility] elevated  Pressure Reduction Devices: pressure-redistributing mattress utilized  Skin Protection:   adhesive use limited   incontinence pads utilized   transparent dressing maintained   tubing/devices free from skin contact     Problem: Adult Inpatient Plan of Care  Goal: Plan of Care Review  Outcome: Ongoing, Progressing  Flowsheets (Taken 8/17/2023 0622)  Progress: improving  Plan of Care Reviewed With: patient  Outcome Evaluation: VSS throughout shift. No complaints of pain. No acute events during shift. Awaiting placements.   Goal Outcome Evaluation:  Plan of Care Reviewed With: patient        Progress: improving  Outcome Evaluation: VSS throughout shift. No complaints of pain. No acute events during shift. Awaiting placements.

## 2023-08-17 NOTE — PROGRESS NOTES
Continued Stay Note  [facility]     Patient Name: Miryam Mckeon  MRN: [id number]  Today's Date: 8/17/2023    Admit Date: 8/9/2023        Discharge Plan       Row Name 08/17/23 1456       Plan    Plan Comments [facility] is following and the patient will need to be medically ready to begin the referral process.                   Discharge Codes    No documentation.                 Expected Discharge Date and Time       Expected Discharge Date Expected Discharge Time    Aug 14, 2023               SWETHA García

## 2023-08-17 NOTE — PLAN OF CARE
Goal Outcome Evaluation:  Plan of Care Reviewed With: patient        Progress: no change  Outcome Evaluation: Pt continues to be limited by decreased endurance with activity and generalized weakness. Pt ambulated 40ft this session with CGA and RW for support. Pt required increased cues for sequencing of AD and BLE. Continue to progress per pt tolerance.      Anticipated Discharge Disposition (PT): skilled nursing facility

## 2023-08-17 NOTE — THERAPY TREATMENT NOTE
Patient Name: Miryam Mckeon  : 1945    MRN: [id number]                              Today's Date: 2023       Admit Date: 2023    Visit Dx:     ICD-10-CM ICD-9-CM   1. COVID-19  U07.1 079.89   2. Hypoxia  R09.02 799.02   3. Frequent falls  R29.6 V15.88   4. Generalized weakness  R53.1 780.79   5. Acute on chronic congestive heart failure, unspecified heart failure type  I50.9 428.0     Patient Active Problem List   Diagnosis    Bilateral carotid artery stenosis    Aortic stenosis, severe s/p TAVR (2022)    Longstanding persistent atrial fibrillation    Sick sinus syndrome    Hyperlipidemia LDL goal <70    Hypertension    Chronic combined systolic and diastolic congestive heart failure    Status post CVA    Presence of cardiac pacemaker secondary to sick sinus syndrome    Chronic kidney disease, stage 3b    Parent refuses immunizations    Chronic nausea    Pulmonary hypertension    Chronic anticoagulation    Acute HFrEF (heart failure with reduced ejection fraction)    Weakness    Hypokalemia    Hypoalbuminemia    COVID-19 virus detected    Hypoxia    Pleural effusion    COVID-19     Past Medical History:   Diagnosis Date    Anxiety     Aortic valve stenosis     Atrial fibrillation     Borderline diabetes     ???    Carotid artery stenosis     CKD (chronic kidney disease)     Deaf     GERD (gastroesophageal reflux disease)     Heart murmur     Hyperlipidemia     Hypertension     Irregular heartbeat     PONV (postoperative nausea and vomiting)     Stroke     TIA (transient ischemic attack)      Past Surgical History:   Procedure Laterality Date    AORTIC VALVE REPAIR/REPLACEMENT N/A 2022    Procedure: TRANSCATHETER AORTIC VALVE REPLACEMENT with angioplasty and stent to the right common and external iliac artery;  Surgeon: Bola Whitaker MD;  Location: [facility];  Service: Cardiothoracic;  Laterality: N/A;  FL-15 MIN 12 SEC  DOSE- 92.41  CONTRAST- 120 ML ISOVUE    AORTIC  VALVE REPAIR/REPLACEMENT N/A 7/20/2022    Procedure: Transcatheter Aortic Valve Replacement;  Surgeon: Yashira Chow MD;  Location: [facility];  Service: Cardiovascular;  Laterality: N/A;    CARDIAC CATHETERIZATION      05/20/2022 per dr. chow    CARDIAC CATHETERIZATION Left 5/20/2022    Procedure: Left Heart Cath;  Surgeon: Yashira Chow MD;  Location: [facility] CATH INVASIVE LOCATION;  Service: Cardiology;  Laterality: Left;    CARDIAC ELECTROPHYSIOLOGY PROCEDURE N/A 04/13/2022    Procedure: DDD PPM Implant (BSC), DNS Eliquis;  Surgeon: Troy Hussein DO;  Location: [facility] EP INVASIVE LOCATION;  Service: Cardiology;  Laterality: N/A;    CAROTID ENDARTERECTOMY Bilateral     CATARACT EXTRACTION      CHOLECYSTECTOMY      COLONOSCOPY      FEMORAL ARTERY CUTDOWN Right 7/20/2022    Procedure: FEMORAL ARTERY CUTDOWN, ANGIOGRAM;  Surgeon: Bola Whitaker MD;  Location: [facility];  Service: Vascular;  Laterality: Right;  fl-1 m 12 sec  dose- 47.29 mgy  contrast- 50 ml isovue    KNEE SURGERY Right     PACEMAKER IMPLANTATION      JF      05/20/2022 per dr. chow    TRANSESOPHAGEAL ECHOCARDIOGRAM (JF) N/A 7/20/2022    Procedure: TRANSESOPHAGEAL ECHOCARDIOGRAM PER ANESTHESIA;  Surgeon: Bola Whitaker MD;  Location: [facility];  Service: Cardiothoracic;  Laterality: N/A;      General Information       Row Name 08/17/23 1052          Physical Therapy Time and Intention    Document Type therapy note (daily note)  -AE     Mode of Treatment physical therapy  -AE       Row Name 08/17/23 1052          General Information    Patient Profile Reviewed yes  -AE     Existing Precautions/Restrictions fall;other (see comments)  Patient is deaf, communicates with ASL and has white board in room, does not use  cart  -AE     Barriers to Rehab hearing deficit  -AE       Row Name 08/17/23 1052          Cognition    Orientation Status (Cognition) oriented x 3  -AE        Row Name 08/17/23 1052          Safety Issues, Functional Mobility    Safety Issues Affecting Function (Mobility) awareness of need for assistance;insight into deficits/self-awareness;safety precaution awareness;safety precautions follow-through/compliance;sequencing abilities  -AE     Impairments Affecting Function (Mobility) balance;endurance/activity tolerance;strength  -AE               User Key  (r) = Recorded By, (t) = Taken By, (c) = Cosigned By      Initials Name Provider Type    AE Sharan Ceja, PT Physical Therapist                   Mobility       Row Name 08/17/23 1058          Bed Mobility    Bed Mobility sit-supine  -AE     Sit-Supine Runnels (Bed Mobility) minimum assist (75% patient effort);1 person assist;verbal cues  -AE     Assistive Device (Bed Mobility) head of bed elevated;bed rails  -AE     Comment, (Bed Mobility) Assist required to advance BLE into bed.  -AE       Row Name 08/17/23 1058          Transfers    Comment, (Transfers) Cues required to improve upright standing posture and positioning with AD.  -AE       Row Name 08/17/23 1058          Sit-Stand Transfer    Sit-Stand Runnels (Transfers) contact guard;1 person assist  -AE     Assistive Device (Sit-Stand Transfers) walker, front-wheeled  -AE       Row Name 08/17/23 1058          Gait/Stairs (Locomotion)    Runnels Level (Gait) verbal cues;contact guard;1 person assist  -AE     Assistive Device (Gait) walker, front-wheeled  -AE     Distance in Feet (Gait) 40  -AE     Deviations/Abnormal Patterns (Gait) base of support, narrow;erinn decreased;gait speed decreased;stride length decreased  -AE     Bilateral Gait Deviations heel strike decreased;forward flexed posture  -AE     Comment, (Gait/Stairs) Pt demo step through gait pattern with slowed erinn, decreased step length, and forward flexed posture. Pt required increased cues to improve upright posture and sequencing of AD. No LOB noted. Further distance  limited by fatigue.  -AE               User Key  (r) = Recorded By, (t) = Taken By, (c) = Cosigned By      Initials Name Provider Type    AE Sharan Ceja PT Physical Therapist                   Obj/Interventions       Row Name 08/17/23 1112          Motor Skills    Therapeutic Exercise knee;hip;ankle  -AE       Row Name 08/17/23 1112          Hip (Therapeutic Exercise)    Hip (Therapeutic Exercise) strengthening exercise  -AE     Hip Strengthening (Therapeutic Exercise) bilateral;heel slides;supine;10 repetitions  -AE       Row Name 08/17/23 1112          Knee (Therapeutic Exercise)    Knee (Therapeutic Exercise) strengthening exercise  -AE     Knee Strengthening (Therapeutic Exercise) bilateral;SLR (straight leg raise);LAQ (long arc quad);sitting;supine;10 repetitions  -AE       Row Name 08/17/23 1112          Ankle (Therapeutic Exercise)    Ankle (Therapeutic Exercise) AROM (active range of motion)  -AE     Ankle AROM (Therapeutic Exercise) bilateral;dorsiflexion;plantarflexion;10 repetitions  -AE       Row Name 08/17/23 1112          Balance    Balance Assessment sitting static balance;sitting dynamic balance;sit to stand dynamic balance;standing static balance;standing dynamic balance  -AE     Static Sitting Balance contact guard  -AE     Dynamic Sitting Balance contact guard  -AE     Position, Sitting Balance unsupported;sitting edge of bed  -AE     Sit to Stand Dynamic Balance contact guard;1-person assist;verbal cues  -AE     Static Standing Balance contact guard;1-person assist  -AE     Dynamic Standing Balance contact guard;1-person assist  -AE     Position/Device Used, Standing Balance supported;walker, front-wheeled  -AE               User Key  (r) = Recorded By, (t) = Taken By, (c) = Cosigned By      Initials Name Provider Type    AE Sharan Ceja PT Physical Therapist                   Goals/Plan    No documentation.                  Clinical Impression       Row Name 08/17/23 1113           Pain Scale: FACES Pre/Post-Treatment    Pain: FACES Scale, Pretreatment 0-->no hurt  -AE     Posttreatment Pain Rating 0-->no hurt  -AE       Row Name 08/17/23 1113          Plan of Care Review    Plan of Care Reviewed With patient  -AE     Progress no change  -AE     Outcome Evaluation Pt continues to be limited by decreased endurance with activity and generalized weakness. Pt ambulated 40ft this session with CGA and RW for support. Pt required increased cues for sequencing of AD and BLE. Continue to progress per pt tolerance.  -AE       Row Name 08/17/23 1113          Vital Signs    Pre Systolic BP Rehab 149  -AE     Pre Treatment Diastolic BP 80  -AE     Posttreatment Heart Rate (beats/min) 81  -AE     O2 Delivery Pre Treatment room air  -AE     O2 Delivery Intra Treatment room air  -AE     Post SpO2 (%) 96  -AE     O2 Delivery Post Treatment room air  -AE     Pre Patient Position Sitting  -AE     Intra Patient Position Standing  -AE     Post Patient Position Supine  -AE       Row Name 08/17/23 1113          Positioning and Restraints    Pre-Treatment Position in bed  -AE     Post Treatment Position bed  -AE     In Bed notified nsg;fowlers;call light within reach;encouraged to call for assist;exit alarm on;side rails up x2;legs elevated  -AE               User Key  (r) = Recorded By, (t) = Taken By, (c) = Cosigned By      Initials Name Provider Type    AE Sharan Ceja, PT Physical Therapist                   Outcome Measures       Row Name 08/17/23 1116 08/17/23 0800       How much help from another person do you currently need...    Turning from your back to your side while in flat bed without using bedrails? 4  -AE 4  -MC    Moving from lying on back to sitting on the side of a flat bed without bedrails? 3  -AE 3  -MC    Moving to and from a bed to a chair (including a wheelchair)? 3  -AE 3  -MC    Standing up from a chair using your arms (e.g., wheelchair, bedside chair)? 3  -AE 3  -MC    Climbing 3-5  steps with a railing? 2  -AE 2  -MC    To walk in hospital room? 3  -AE 3  -MC    AM-PAC 6 Clicks Score (PT) 18  -AE 18  -MC    Highest level of mobility 6 --> Walked 10 steps or more  -AE 6 --> Walked 10 steps or more  -      Row Name 08/17/23 1116          Functional Assessment    Outcome Measure Options AM-PAC 6 Clicks Basic Mobility (PT)  -AE               User Key  (r) = Recorded By, (t) = Taken By, (c) = Cosigned By      Initials Name Provider Type    AE Sharan Ceja, PT Physical Therapist    MC Carrel, Miriam, RN Registered Nurse                                 Physical Therapy Education       Title: PT OT SLP Therapies (In Progress)       Topic: Physical Therapy (In Progress)       Point: Mobility training (In Progress)       Learning Progress Summary             Patient Acceptance, E, NR by AE at 8/17/2023 1021    Acceptance, E, NR by AE at 8/15/2023 1318    Acceptance, E, NR by AS at 8/14/2023 0834    Acceptance, E, VU by AE at 8/11/2023 1445    Acceptance, E, VU,NR by LM at 8/10/2023 1500                         Point: Home exercise program (In Progress)       Learning Progress Summary             Patient Acceptance, E, NR by AE at 8/17/2023 1021    Acceptance, E, NR by AE at 8/15/2023 1318    Acceptance, E, NR by AS at 8/14/2023 0834                         Point: Precautions (In Progress)       Learning Progress Summary             Patient Acceptance, E, NR by AE at 8/17/2023 1021    Acceptance, E, NR by AE at 8/15/2023 1318    Acceptance, E, NR by AS at 8/14/2023 0834    Acceptance, E, VU by AE at 8/11/2023 1445    Acceptance, E, VU,NR by LM at 8/10/2023 1500                                         User Key       Initials Effective Dates Name Provider Type Discipline    AS 04/28/23 -  Ольга Victor, PTA Physical Therapist Assistant PT    LM 07/11/23 -  Maryuri Carson, EMERY Physical Therapist PT    AE 09/21/21 -  Sharan Ceja, PT Physical Therapist PT                  PT Recommendation and  Plan     Plan of Care Reviewed With: patient  Progress: no change  Outcome Evaluation: Pt continues to be limited by decreased endurance with activity and generalized weakness. Pt ambulated 40ft this session with CGA and RW for support. Pt required increased cues for sequencing of AD and BLE. Continue to progress per pt tolerance.     Time Calculation:         PT Charges       Row Name 08/17/23 1117             Time Calculation    Start Time 1021  -AE      PT Received On 08/17/23  -AE      PT Goal Re-Cert Due Date 08/20/23  -AE         Timed Charges    51103 - PT Therapeutic Exercise Minutes 12  -AE      08739 - PT Therapeutic Activity Minutes 11  -AE         Total Minutes    Timed Charges Total Minutes 23  -AE       Total Minutes 23  -AE                User Key  (r) = Recorded By, (t) = Taken By, (c) = Cosigned By      Initials Name Provider Type    AE Sharan Ceja PT Physical Therapist                  Therapy Charges for Today       Code Description Service Date Service Provider Modifiers Qty    02693336116 HC PT THER PROC EA 15 MIN 8/17/2023 Sharan Ceja, PT GP 1    52327011133 HC PT THERAPEUTIC ACT EA 15 MIN 8/17/2023 Sharan Ceja, PT GP 1            PT G-Codes  Outcome Measure Options: AM-PAC 6 Clicks Basic Mobility (PT)  AM-PAC 6 Clicks Score (PT): 18  AM-PAC 6 Clicks Score (OT): 13  PT Discharge Summary  Anticipated Discharge Disposition (PT): skilled nursing facility    Sharan Ceja PT  8/17/2023

## 2023-08-17 NOTE — PROGRESS NOTES
"[facility] Cardiology Daily Note       LOS: 6 days   Patient Care Team:  Rigoberto Chamberlain MD as PCP - General (Family Medicine)  Vito Cuba MD as Consulting Physician (Cardiology)  Yashira Pyle MD as Consulting Physician (Cardiology)    Chief Complaint: Acute on chronic systolic heart failure/TAVR/PAF    Subjective     Subjective: In isolation for COVID.  96% on 1 L nasal cannula heart rates have been in the 80s.  Blood pressures are better    Review of Systems:   As above.    Medications:  amiodarone, 200 mg, Oral, Q12H  apixaban, 2.5 mg, Oral, Q12H  aspirin, 81 mg, Oral, Daily  atorvastatin, 10 mg, Oral, Nightly  cholecalciferol, 1,000 Units, Oral, Daily  dilTIAZem CD, 300 mg, Oral, Daily  latanoprost, 1 drop, Both Eyes, Nightly  metoprolol succinate XL, 100 mg, Oral, Q24H  pharmacy consult - [place], , Does not apply, Daily  senna-docusate sodium, 2 tablet, Oral, BID  sodium chloride, 10 mL, Intravenous, Q12H  valsartan, 160 mg, Oral, Q12H  vitamin B-12, 1,000 mcg, Oral, Daily        Objective     Vital Sign Min/Max for last 24 hours  Temp  Min: 96.3 øF (35.7 øC)  Max: 98.2 øF (36.8 øC)   BP  Min: 123/95  Max: 172/97   Pulse  Min: 71  Max: 89   Resp  Min: 16  Max: 18   SpO2  Min: 92 %  Max: 96 %   Flow (L/min)  Min: 1  Max: 1   Weight  Min: 52.6 kg (116 lb)  Max: 52.6 kg (116 lb)      Intake/Output Summary (Last 24 hours) at 8/17/2023 0657  Last data filed at 8/17/2023 0347  Gross per 24 hour   Intake --   Output 750 ml   Net -750 ml        Flowsheet Rows      Flowsheet Row First Filed Value   Admission Height 152.4 cm (60\") Documented at 08/09/2023 1638   Admission Weight 45.4 kg (100 lb) Documented at 08/09/2023 1638            Physical Exam:    No physical exam was performed.     Results Review:    I reviewed the patient's new clinical results.  EKG:  Tele: A-fib    Labs:    Results from last 7 days   Lab Units 08/16/23  0450 08/15/23  0404 08/14/23  0821   SODIUM mmol/L " 137 134* 133*   POTASSIUM mmol/L 3.8 4.2 4.5   CHLORIDE mmol/L 99 96* 95*   CO2 mmol/L 30.0* 28.0 27.0   BUN mg/dL 52* 51* 45*   CREATININE mg/dL 1.17* 1.18* 1.26*   CALCIUM mg/dL 8.7 8.3* 8.5*   BILIRUBIN mg/dL 0.4 0.4 0.5   ALK PHOS U/L 119* 135* 136*   ALT (SGPT) U/L 39* 41* 42*   AST (SGOT) U/L 29 31 39*   GLUCOSE mg/dL 101* 152* 133*     Results from last 7 days   Lab Units 08/16/23  0450 08/15/23  0404 08/14/23  0821   WBC 10*3/mm3 8.22 7.62 7.53   HEMOGLOBIN g/dL 14.8 14.9 14.9   HEMATOCRIT % 43.4 44.5 45.2   PLATELETS 10*3/mm3 141 137* 139*     Lab Results   Component Value Date    TROPONINT 29 (H) 08/09/2023    TROPONINT 35 (H) 08/09/2023     Lab Results   Component Value Date    CHOL 93 08/10/2023    CHOL 96 05/20/2022     Lab Results   Component Value Date    TRIG 99 08/10/2023    TRIG 48 05/20/2022    TRIG 48 02/18/2022     Lab Results   Component Value Date    HDL 37 (L) 08/10/2023    HDL 55 05/20/2022    HDL 55 02/18/2022     No components found for: LDLCALC  Lab Results   Component Value Date    INR 1.30 (H) 07/19/2022    INR 1.20 (H) 05/20/2022    INR 1.26 (H) 05/17/2022    PROTIME 16.1 (H) 07/19/2022    PROTIME 15.1 (H) 05/20/2022    PROTIME 15.8 (H) 05/17/2022         Ejection Fraction: LVEF 30 to 35% by echo.  20 mm MARK 3 pericardial prosthesis.    Assessment   Assessment:    COVID +  Acute on chronic systolic and diastolic heart failure  Echo EF was 60 to 65% by echo 2/18/2022 (personally reviewed echo)  Status post dual-chamber pacemaker 4/13/2022 Dr. Hussein  Echo EF 40 to 45% 8/8/2022 with anteroseptal hypokinesis.  Minor coronary artery disease by cath 5/20/2022  Aortic stenosis status post 20 mm MARK 3 TAVR 7/20/2022 EF 60% at implant  Persistent atrial fibrillation  Continue amiodarone  Continue Eliquis  Sick sinus syndrome status post permanent pacemaker  CKD    Fall in ejection fraction is unexplained unless possibly by persistent RV pacing or A-fib with RVR    Plan:    Continue  amiodarone and Eliquis for PAF  Continue statin for minor coronary disease  Continue aspirin for TAVR  May consider repeat cardiac catheterization after she is no longer COVID-positive.  We will need pacemaker interrogation once she is no longer COVID-positive.  Continue valsartan 160 mg twice daily.     Yashira Pyle MD  08/17/23  06:57 EDT

## 2023-08-17 NOTE — PROGRESS NOTES
[facility] Medicine Services  PROGRESS NOTE    Patient Name: Miryam Mckeon  : 1945  MRN: [id number]    Date of Admission: 2023  Primary Care Physician: Rigoberto Chamberlain MD    Subjective   Subjective     CC:  Weakness, COVID-positive    HPI:  No cough today, denies fever.  No additional complaints or issues Ms Mckeon would like addressed.    ROS:  Gen: no fever  Pulm: no shortness of breath  GI: no nausea    Objective   Objective     Vital Signs:   Temp:  [96.4 øF (35.8 øC)-98.2 øF (36.8 øC)] 97 øF (36.1 øC)  Heart Rate:  [74-86] 78  Resp:  [16-18] 16  BP: (147-172)/(75-97) 157/75  Flow (L/min):  [1] 1     Physical Exam:    Constitutional - non toxic, in bed, thin female  HEENT-NCAT, mucous membranes moist  CV-irregular  Resp-CTAB  Abd-soft, nontender, nondistended, normoactive bowel sounds  Ext-No lower extremity cyanosis, clubbing or edema bilaterally  Neuro-alert, moves all extremities   Psych-slightly flat affect   Skin- No rash on exposed UE or LE bilaterally        Results Reviewed:  LAB RESULTS:      Lab 23  0812 08/16/23  0450 08/15/23  0404 23  0821   WBC 7.29 8.22 7.62 7.53   HEMOGLOBIN 15.0 14.8 14.9 14.9   HEMATOCRIT 44.9 43.4 44.5 45.2   PLATELETS 135* 141 137* 139*   NEUTROS ABS  --  6.93 6.65 6.28   IMMATURE GRANS (ABS)  --  0.09* 0.13* 0.08*   LYMPHS ABS  --  0.64* 0.48* 0.79   MONOS ABS  --  0.54 0.34 0.35   EOS ABS  --  0.00 0.00 0.00   MCV 93.5 93.3 94.5 95.2         Lab 23  0812 23  0450 08/15/23  0404 23  0821 23  0325 23  1029   SODIUM 136 137 134* 133* 133* 137   POTASSIUM 4.1 3.8 4.2 4.5 3.9 4.1   CHLORIDE 97* 99 96* 95* 93* 95*   CO2 28.0 30.0* 28.0 27.0 33.0* 35.0*   ANION GAP 11.0 8.0 10.0 11.0 7.0 7.0   BUN 45* 52* 51* 45* 45* 42*   CREATININE 1.32* 1.17* 1.18* 1.26* 1.48* 1.52*   EGFR 41.2* 47.6* 47.1* 43.5* 35.9* 34.7*   GLUCOSE 83 101* 152* 133* 137* 128*   CALCIUM 8.6 8.7 8.3* 8.5* 8.3* 8.8    MAGNESIUM  --  2.0 1.8 1.9 2.1 2.3         Lab 08/16/23  0450 08/15/23  0404 08/14/23  0821 08/12/23  0325 08/11/23  1029   TOTAL PROTEIN 5.1* 4.8* 5.2* 5.1* 5.2*   ALBUMIN 2.4* 2.7* 2.9* 2.5* 2.8*   GLOBULIN 2.7 2.1 2.3 2.6 2.4   ALT (SGPT) 39* 41* 42* 34* 36*   AST (SGOT) 29 31 39* 41* 50*   BILIRUBIN 0.4 0.4 0.5 0.4 0.5   ALK PHOS 119* 135* 136* 132* 149*         Lab 08/14/23  0821   PROBNP 41,672.0*                     Brief Urine Lab Results  (Last result in the past 365 days)        Color   Clarity   Blood   Leuk Est   Nitrite   Protein   CREAT   Urine HCG        08/09/23 1806 Yellow   Clear   Small (1+)   Negative   Negative   >=300 mg/dL (3+)                   Microbiology Results Abnormal       None            XR Chest 1 View    Result Date: 8/17/2023  XR CHEST 1 VW Date of Exam: 8/17/2023 2:35 AM EDT Indication: covid, heart failure, cough Comparison: None available. Findings: Left chest wall pacemaker. Aortic valve replacement. Surgical clips within the neck. Cardiac size is similar to prior exam. Left basal opacity with small left pleural effusion. Likely skinfolds projecting over the right lower lung as lung markings are seen beyond the lines. No evidence of pneumothorax. No evidence of acute osseous abnormalities. Visualized upper abdomen is unremarkable.     Impression: Impression: Similar left basilar opacity with associated small left pleural effusion. Electronically Signed: Warren Gonsalez MD  8/17/2023 9:23 AM EDT  Workstation ID: [id number]     Results for orders placed during the hospital encounter of 08/09/23    Adult Transthoracic Echo Limited W/ Cont if Necessary Per Protocol    Interpretation Summary    : Left ventricular systolic function is moderately decreased. Calculated left ventricular EF = 35.5% Left ventricular ejection fraction appears to be 31 - 35%. Septal wall motion is abnormal, consistent with right ventricular pacing. Normal left ventricular cavity size noted. Left  ventricular wall thickness is consistent with mild concentric hypertrophy. There is left ventricular global hypokinesis noted.    The right atrial cavity is dilated.    The right atrial cavity is dilated. The diameter of the inferior vena cava is 1.57 cm. Partial IVC inspiratory collapse of less than 50% noted.    Mild periprosthetic aortic valve regurgitation is seen. There is a TAVR valve present, 20mm Gisselle 3. The prosthetic aortic valve is grossly normal.    : Calcified mitral valve chordae are present. There is mild, bileaflet mitral valve thickening present. Mild mitral valve regurgitation is present.    The tricuspid valve is normal in structure. Moderate tricuspid valve regurgitation is present.      Current medications:  Scheduled Meds:amiodarone, 200 mg, Oral, Q12H  apixaban, 2.5 mg, Oral, Q12H  aspirin, 81 mg, Oral, Daily  atorvastatin, 10 mg, Oral, Nightly  cholecalciferol, 1,000 Units, Oral, Daily  dilTIAZem CD, 300 mg, Oral, Daily  latanoprost, 1 drop, Both Eyes, Nightly  metoprolol succinate XL, 100 mg, Oral, Q24H  pharmacy consult - [place], , Does not apply, Daily  senna-docusate sodium, 2 tablet, Oral, BID  sodium chloride, 10 mL, Intravenous, Q12H  valsartan, 160 mg, Oral, Q12H  vitamin B-12, 1,000 mcg, Oral, Daily      Continuous Infusions:   PRN Meds:.  acetaminophen    albuterol sulfate HFA    senna-docusate sodium **AND** polyethylene glycol **AND** bisacodyl **AND** bisacodyl    Calcium Replacement - Follow Nurse / BPA Driven Protocol    Magnesium Low Dose Replacement - Follow Nurse / BPA Driven Protocol    ondansetron **OR** ondansetron    Phosphorus Replacement - Follow Nurse / BPA Driven Protocol    Potassium Replacement - Follow Nurse / BPA Driven Protocol    simethicone    [COMPLETED] Insert Peripheral IV **AND** sodium chloride    sodium chloride    sodium chloride    Assessment & Plan   Assessment & Plan     Active Hospital Problems    Diagnosis  POA    **Acute HFrEF (heart failure with  reduced ejection fraction) [I50.21]  Unknown    COVID-19 [U07.1]  Yes    Weakness [R53.1]  Unknown    Hypokalemia [E87.6]  Unknown    Hypoalbuminemia [E88.09]  Unknown    COVID-19 virus detected [U07.1]  Unknown    Hypoxia [R09.02]  Unknown    Pleural effusion [J90]  Unknown    Pulmonary hypertension [I27.20]  Yes    Chronic anticoagulation [Z79.01]  Not Applicable    Chronic nausea [R11.0]  Yes    Chronic kidney disease, stage 3b [N18.32]  Yes    Presence of cardiac pacemaker secondary to sick sinus syndrome [Z95.0]  Yes    Chronic combined systolic and diastolic congestive heart failure [I50.42]  Yes    Hypertension [I10]  Yes    Aortic stenosis, severe s/p TAVR (7/20/2022) [I35.0]  Yes    Longstanding persistent atrial fibrillation [I48.11]  Yes    Sick sinus syndrome [I49.5]  Yes    Hyperlipidemia LDL goal <70 [E78.5]  Yes    Bilateral carotid artery stenosis [I65.23]  Yes    Status post CVA [Z86.73]  Not Applicable      Resolved Hospital Problems   No resolved problems to display.        Brief Hospital Course to date:  Miryam Mckeon is a 77 y.o. female with past medical history of A-fib on Eliquis, aortic valve stenosis status post TAVR, CKD stage III, deafness, hypertension, hyperlipidemia and history of CVA who came into the emergency department with complaints of generalized weakness.  She was found to be in congestive heart failure with acute exacerbation as well as COVID-positive.    Acute on chronic systolic and diastolic congestive heart failure exacerbation  Dual chamber PPM 2022 (SSS)  TAVR 2022  Persistent afib  -Echo with reduced EF 35% - likely due to afib vs persistent RV pacing  --needs PPM interrogation once out of COVID precautions   --consideration of LHC   --eliquis/amio/cardizem/metoprolol  --diuresis prn  --Cardiology follows    COVID upper respiratory infection  -Symptoms of dry cough and hypoxia with intermittent dyspnea  -Patient declined remdesivir  --Reluctantly agreeable to  continue short course of steroids (5 days total)  -Continue inhalers as needed  --Patient will be out of isolation on Friday, 8/18/23    Generalized weakness  -Likely multifactorial  -PT and OT recc rehab    Hypertension  - diovan dose increased    Hyperlipidemia  Bilateral carotid artery stenosis  History of CVA  -Continue home medications    Poor p.o. intake  Failure to thrive  Hypoalbuminemia  Malnutrition  -Nutrition consult requested  --start Boost/magic cup supplement TID    CKD, stage III    Hearing loss  - utilized white board    Called and updated daughter      Expected Discharge Location and Transportation: Rehab, private vehicle  Expected Discharge   Expected Discharge Date: 8/14/2023; Expected Discharge Time:      DVT prophylaxis:  Medical and mechanical DVT prophylaxis orders are present.     AM-PAC 6 Clicks Score (PT): 18 (08/17/23 1116)    CODE STATUS:   Code Status and Medical Interventions:   Ordered at: 08/09/23 6415     Level Of Support Discussed With:    Patient     Code Status (Patient has no pulse and is not breathing):    CPR (Attempt to Resuscitate)     Medical Interventions (Patient has pulse or is breathing):    Full Support       Germain Macias MD  08/17/23

## 2023-08-17 NOTE — PLAN OF CARE
Goal Outcome Evaluation:           Problem: Fall Injury Risk  Goal: Absence of Fall and Fall-Related Injury  Outcome: Ongoing, Progressing  Intervention: Identify and Manage Contributors  Recent Flowsheet Documentation  Taken 8/17/2023 1600 by Carrel, Miriam, RN  Medication Review/Management: medications reviewed  Self-Care Promotion: independence encouraged  Taken 8/17/2023 1400 by Carrel, Miriam, RN  Medication Review/Management: medications reviewed  Self-Care Promotion:   independence encouraged   BADL personal objects within reach  Taken 8/17/2023 1200 by Carrel, Miriam, RN  Medication Review/Management: medications reviewed  Taken 8/17/2023 1000 by Carrel, Miriam, RN  Medication Review/Management: medications reviewed  Self-Care Promotion:   independence encouraged   BADL personal objects within reach  Taken 8/17/2023 0800 by Carrel, Miriam, RN  Medication Review/Management: medications reviewed  Intervention: Promote Injury-Free Environment  Recent Flowsheet Documentation  Taken 8/17/2023 1600 by Carrel, Miriam, RN  Safety Promotion/Fall Prevention:   activity supervised   assistive device/personal items within reach   clutter free environment maintained  Taken 8/17/2023 1400 by Carrel, Miriam, RN  Safety Promotion/Fall Prevention:   activity supervised   assistive device/personal items within reach   clutter free environment maintained  Taken 8/17/2023 1200 by Carrel, Miriam, RN  Safety Promotion/Fall Prevention:   assistive device/personal items within reach   clutter free environment maintained   activity supervised  Taken 8/17/2023 1000 by Carrel, Miriam, RN  Safety Promotion/Fall Prevention:   activity supervised   assistive device/personal items within reach   clutter free environment maintained     Problem: Skin Injury Risk Increased  Goal: Skin Health and Integrity  Outcome: Ongoing, Progressing  Intervention: Promote and Optimize Oral Intake  Recent Flowsheet Documentation  Taken 8/17/2023 0800 by  Carrel, Monse, RN  Oral Nutrition Promotion: rest periods promoted  Intervention: Optimize Skin Protection  Recent Flowsheet Documentation  Taken 8/17/2023 1600 by Carrel, Miriam, RN  Pressure Reduction Techniques: frequent weight shift encouraged  Head of Bed (HOB) Positioning: HOB elevated  Pressure Reduction Devices: pressure-redistributing mattress utilized  Skin Protection: adhesive use limited  Taken 8/17/2023 1400 by Carrel, Miriam, RN  Head of Bed (HOB) Positioning: HOB elevated  Taken 8/17/2023 1200 by Carrel, Miriam, RN  Head of Bed (HOB) Positioning: HOB elevated  Taken 8/17/2023 1000 by Carrel, Miriam, RN  Pressure Reduction Techniques: frequent weight shift encouraged  Head of Bed (HOB) Positioning: HOB elevated  Pressure Reduction Devices: pressure-redistributing mattress utilized  Skin Protection: adhesive use limited     Problem: Adult Inpatient Plan of Care  Goal: Plan of Care Review  Outcome: Ongoing, Progressing

## 2023-08-18 PROCEDURE — 99232 SBSQ HOSP IP/OBS MODERATE 35: CPT | Performed by: FAMILY MEDICINE

## 2023-08-18 PROCEDURE — 97116 GAIT TRAINING THERAPY: CPT

## 2023-08-18 PROCEDURE — 97535 SELF CARE MNGMENT TRAINING: CPT

## 2023-08-18 PROCEDURE — 97110 THERAPEUTIC EXERCISES: CPT

## 2023-08-18 RX ORDER — GUAIFENESIN 600 MG/1
1200 TABLET, EXTENDED RELEASE ORAL EVERY 12 HOURS SCHEDULED
Status: DISCONTINUED | OUTPATIENT
Start: 2023-08-18 | End: 2023-08-21 | Stop reason: HOSPADM

## 2023-08-18 RX ORDER — IPRATROPIUM BROMIDE AND ALBUTEROL SULFATE 2.5; .5 MG/3ML; MG/3ML
3 SOLUTION RESPIRATORY (INHALATION) EVERY 6 HOURS PRN
Status: DISCONTINUED | OUTPATIENT
Start: 2023-08-18 | End: 2023-08-21 | Stop reason: HOSPADM

## 2023-08-18 RX ADMIN — Medication 10 ML: at 09:03

## 2023-08-18 RX ADMIN — ASPIRIN 81 MG: 81 TABLET, CHEWABLE ORAL at 09:03

## 2023-08-18 RX ADMIN — ATORVASTATIN CALCIUM 10 MG: 10 TABLET, FILM COATED ORAL at 20:09

## 2023-08-18 RX ADMIN — GUAIFENESIN 1200 MG: 600 TABLET, EXTENDED RELEASE ORAL at 20:09

## 2023-08-18 RX ADMIN — SENNOSIDES AND DOCUSATE SODIUM 2 TABLET: 50; 8.6 TABLET ORAL at 20:08

## 2023-08-18 RX ADMIN — SENNOSIDES AND DOCUSATE SODIUM 2 TABLET: 50; 8.6 TABLET ORAL at 09:00

## 2023-08-18 RX ADMIN — Medication 1000 UNITS: at 09:01

## 2023-08-18 RX ADMIN — ACETAMINOPHEN 650 MG: 325 TABLET ORAL at 14:47

## 2023-08-18 RX ADMIN — VALSARTAN 160 MG: 160 TABLET, FILM COATED ORAL at 20:09

## 2023-08-18 RX ADMIN — ALBUTEROL SULFATE 2 PUFF: 90 AEROSOL, METERED RESPIRATORY (INHALATION) at 09:20

## 2023-08-18 RX ADMIN — METOPROLOL SUCCINATE 100 MG: 50 TABLET, EXTENDED RELEASE ORAL at 09:03

## 2023-08-18 RX ADMIN — Medication 10 ML: at 20:09

## 2023-08-18 RX ADMIN — GUAIFENESIN 1200 MG: 600 TABLET, EXTENDED RELEASE ORAL at 13:39

## 2023-08-18 RX ADMIN — DILTIAZEM HYDROCHLORIDE 300 MG: 180 CAPSULE, COATED, EXTENDED RELEASE ORAL at 09:01

## 2023-08-18 RX ADMIN — CYANOCOBALAMIN TAB 1000 MCG 1000 MCG: 1000 TAB at 09:02

## 2023-08-18 RX ADMIN — APIXABAN 2.5 MG: 2.5 TABLET, FILM COATED ORAL at 20:08

## 2023-08-18 RX ADMIN — VALSARTAN 160 MG: 160 TABLET, FILM COATED ORAL at 09:00

## 2023-08-18 RX ADMIN — APIXABAN 2.5 MG: 2.5 TABLET, FILM COATED ORAL at 09:03

## 2023-08-18 RX ADMIN — AMIODARONE HYDROCHLORIDE 200 MG: 200 TABLET ORAL at 20:08

## 2023-08-18 RX ADMIN — AMIODARONE HYDROCHLORIDE 200 MG: 200 TABLET ORAL at 09:02

## 2023-08-18 NOTE — PROGRESS NOTES
Continued Stay Note  [facility]     Patient Name: iMryam Mckeon  MRN: [id number]  Today's Date: 8/18/2023    Admit Date: 8/9/2023        Discharge Plan       Row Name 08/18/23 1048       Plan    Plan Comments [facility] is following and case management sent a message that OT is needing to see the patient for updated notes for [facility].                   Discharge Codes    No documentation.                 Expected Discharge Date and Time       Expected Discharge Date Expected Discharge Time    Aug 14, 2023               SWETHA García

## 2023-08-18 NOTE — PLAN OF CARE
Problem: Fall Injury Risk  Goal: Absence of Fall and Fall-Related Injury  Outcome: Ongoing, Progressing  Intervention: Identify and Manage Contributors  Recent Flowsheet Documentation  Taken 8/18/2023 1800 by Samina Mccauley RN  Medication Review/Management: medications reviewed  Self-Care Promotion: independence encouraged  Taken 8/18/2023 1600 by Samina Mccauley RN  Medication Review/Management: medications reviewed  Self-Care Promotion: independence encouraged  Taken 8/18/2023 1400 by Samina Mccauley RN  Medication Review/Management: medications reviewed  Taken 8/18/2023 1200 by Samina Mccauley RN  Medication Review/Management: medications reviewed  Self-Care Promotion: independence encouraged  Taken 8/18/2023 1000 by Samina Mccauley RN  Medication Review/Management: medications reviewed  Self-Care Promotion: independence encouraged  Taken 8/18/2023 0800 by Samina Mccauley RN  Medication Review/Management: medications reviewed  Self-Care Promotion: independence encouraged  Intervention: Promote Injury-Free Environment  Recent Flowsheet Documentation  Taken 8/18/2023 1800 by Samina Mccauley RN  Safety Promotion/Fall Prevention:   safety round/check completed   room organization consistent  Taken 8/18/2023 1600 by Samina Mccauley RN  Safety Promotion/Fall Prevention:   activity supervised   assistive device/personal items within reach   clutter free environment maintained   safety round/check completed  Taken 8/18/2023 1400 by Samina Mccauley RN  Safety Promotion/Fall Prevention: safety round/check completed  Taken 8/18/2023 1200 by Samina Mccauley RN  Safety Promotion/Fall Prevention:   safety round/check completed   room organization consistent   nonskid shoes/slippers when out of bed   clutter free environment maintained   assistive device/personal items within reach  Taken 8/18/2023 1000 by Samina Mccauley RN  Safety Promotion/Fall Prevention:   activity supervised   assistive device/personal items within reach    lighting adjusted   safety round/check completed   room organization consistent  Taken 8/18/2023 0800 by Samina Mccauley RN  Safety Promotion/Fall Prevention:   assistive device/personal items within reach   clutter free environment maintained   nonskid shoes/slippers when out of bed   room organization consistent   safety round/check completed     Problem: Skin Injury Risk Increased  Goal: Skin Health and Integrity  Outcome: Ongoing, Progressing  Intervention: Promote and Optimize Oral Intake  Recent Flowsheet Documentation  Taken 8/18/2023 0800 by Samina Mccauley RN  Oral Nutrition Promotion: rest periods promoted  Intervention: Optimize Skin Protection  Recent Flowsheet Documentation  Taken 8/18/2023 1800 by Samina Mccauley RN  Pressure Reduction Techniques:   frequent weight shift encouraged   weight shift assistance provided  Head of Bed (HOB) Positioning: [facility] elevated  Pressure Reduction Devices:   pressure-redistributing mattress utilized   chair cushion utilized  Skin Protection:   adhesive use limited   tubing/devices free from skin contact   transparent dressing maintained  Taken 8/18/2023 1600 by Samina Mccauley RN  Pressure Reduction Techniques: frequent weight shift encouraged  Head of Bed (HOB) Positioning: [facility] elevated  Pressure Reduction Devices: pressure-redistributing mattress utilized  Skin Protection:   adhesive use limited   tubing/devices free from skin contact   transparent dressing maintained   incontinence pads utilized  Taken 8/18/2023 1400 by Samina Mccauley RN  Pressure Reduction Techniques:   frequent weight shift encouraged   heels elevated off bed   weight shift assistance provided  Head of Bed (HOB) Positioning: [facility] elevated  Pressure Reduction Devices: pressure-redistributing mattress utilized  Skin Protection:   tubing/devices free from skin contact   transparent dressing maintained   adhesive use limited  Taken 8/18/2023 1200 by Samina Mccauley RN  Pressure Reduction Techniques:    frequent weight shift encouraged   weight shift assistance provided  Head of Bed (HOB) Positioning: HOB at 60-90 degrees  Pressure Reduction Devices:   chair cushion utilized   feet on footrest/footstool   pressure-redistributing mattress utilized  Skin Protection:   adhesive use limited   incontinence pads utilized   tubing/devices free from skin contact   transparent dressing maintained  Taken 8/18/2023 1000 by Samina Mccauley RN  Pressure Reduction Techniques:   frequent weight shift encouraged   weight shift assistance provided  Head of Bed (HOB) Positioning: HOB at 60-90 degrees  Pressure Reduction Devices:   chair cushion utilized   feet on footrest/footstool  Skin Protection:   adhesive use limited   tubing/devices free from skin contact   transparent dressing maintained   incontinence pads utilized   silicone foam dressing in place  Taken 8/18/2023 0800 by Samina Mccauley RN  Pressure Reduction Techniques:   weight shift assistance provided   pressure points protected   frequent weight shift encouraged  Head of Bed (HOB) Positioning: HOB at 60-90 degrees  Pressure Reduction Devices: pressure-redistributing mattress utilized  Skin Protection:   adhesive use limited   tubing/devices free from skin contact   transparent dressing maintained     Problem: Adult Inpatient Plan of Care  Goal: Plan of Care Review  Outcome: Ongoing, Progressing   Goal Outcome Evaluation:               Patient had new onset productive cough this morning. Temp has been trending low. HR and BP stable. Pt has low appetite. Daughter at bedside. No complaints per patient.    . Samina Mccauley, RN

## 2023-08-18 NOTE — PROGRESS NOTES
[facility] Medicine Services  PROGRESS NOTE    Patient Name: Miryam Mckeon  : 1945  MRN: [id number]    Date of Admission: 2023  Primary Care Physician: Rigoberto Chamberlain MD    Subjective   Subjective     CC:  Follow-up weakness, COVID-19 positive    HPI:  Patient seen resting in bed no apparent distress.   present at bedside for visit.  Patient notes that she is feeling about the same today.  Nursing is concerned about new congested cough.  She endorses shortness of breath on exertion but has been weaned to 1 L nasal cannula.  She denies acute chest pain.    ROS:  Gen- No fevers, chills  CV- No chest pain, palpitations  Resp- No cough, dyspnea  GI- No N/V/D, abd pain      Objective   Objective     Vital Signs:   Temp:  [96.2 øF (35.7 øC)-97.7 øF (36.5 øC)] 96.2 øF (35.7 øC)  Heart Rate:  [71-88] 71  Resp:  [16] 16  BP: (157-175)/() 170/100  Flow (L/min):  [1-2] 1     Physical Exam:  Constitutional: Chronically ill-appearing, awake, alert  HENT: NCAT, mucous membranes moist  Respiratory: Coarse bibasilar rales, respiratory effort normal on 1 L  Cardiovascular: Irregular, no murmurs, cap refill brisk   Gastrointestinal: Positive bowel sounds, soft, nontender, nondistended  Musculoskeletal: No BLE edema   Psychiatric: Flat affect, cooperative  Neurologic: Oriented x 3, moves all extremities, uses sign language for communication  Skin: warm, dry, no visible rash       Results Reviewed:  LAB RESULTS:      Lab 23  0812 23  0450 08/15/23  0404 23  0821   WBC 7.29 8.22 7.62 7.53   HEMOGLOBIN 15.0 14.8 14.9 14.9   HEMATOCRIT 44.9 43.4 44.5 45.2   PLATELETS 135* 141 137* 139*   NEUTROS ABS  --  6.93 6.65 6.28   IMMATURE GRANS (ABS)  --  0.09* 0.13* 0.08*   LYMPHS ABS  --  0.64* 0.48* 0.79   MONOS ABS  --  0.54 0.34 0.35   EOS ABS  --  0.00 0.00 0.00   MCV 93.5 93.3 94.5 95.2         Lab 23  0812 23  0450 08/15/23  0404  08/14/23  0821 08/12/23  0325   SODIUM 136 137 134* 133* 133*   POTASSIUM 4.1 3.8 4.2 4.5 3.9   CHLORIDE 97* 99 96* 95* 93*   CO2 28.0 30.0* 28.0 27.0 33.0*   ANION GAP 11.0 8.0 10.0 11.0 7.0   BUN 45* 52* 51* 45* 45*   CREATININE 1.32* 1.17* 1.18* 1.26* 1.48*   EGFR 41.2* 47.6* 47.1* 43.5* 35.9*   GLUCOSE 83 101* 152* 133* 137*   CALCIUM 8.6 8.7 8.3* 8.5* 8.3*   MAGNESIUM  --  2.0 1.8 1.9 2.1         Lab 08/16/23  0450 08/15/23  0404 08/14/23  0821 08/12/23  0325   TOTAL PROTEIN 5.1* 4.8* 5.2* 5.1*   ALBUMIN 2.4* 2.7* 2.9* 2.5*   GLOBULIN 2.7 2.1 2.3 2.6   ALT (SGPT) 39* 41* 42* 34*   AST (SGOT) 29 31 39* 41*   BILIRUBIN 0.4 0.4 0.5 0.4   ALK PHOS 119* 135* 136* 132*         Lab 08/14/23 0821   PROBNP 41,672.0*                 Brief Urine Lab Results  (Last result in the past 365 days)        Color   Clarity   Blood   Leuk Est   Nitrite   Protein   CREAT   Urine HCG        08/09/23 1806 Yellow   Clear   Small (1+)   Negative   Negative   >=300 mg/dL (3+)                   Microbiology Results Abnormal       None            XR Chest 1 View    Result Date: 8/17/2023  XR CHEST 1 VW Date of Exam: 8/17/2023 2:35 AM EDT Indication: covid, heart failure, cough Comparison: None available. Findings: Left chest wall pacemaker. Aortic valve replacement. Surgical clips within the neck. Cardiac size is similar to prior exam. Left basal opacity with small left pleural effusion. Likely skinfolds projecting over the right lower lung as lung markings are seen beyond the lines. No evidence of pneumothorax. No evidence of acute osseous abnormalities. Visualized upper abdomen is unremarkable.     Impression: Impression: Similar left basilar opacity with associated small left pleural effusion. Electronically Signed: Warren Gonsalez MD  8/17/2023 9:23 AM EDT  Workstation ID: [id number]     Results for orders placed during the hospital encounter of 08/09/23    Adult Transthoracic Echo Limited W/ Cont if Necessary Per  Protocol    Interpretation Summary    : Left ventricular systolic function is moderately decreased. Calculated left ventricular EF = 35.5% Left ventricular ejection fraction appears to be 31 - 35%. Septal wall motion is abnormal, consistent with right ventricular pacing. Normal left ventricular cavity size noted. Left ventricular wall thickness is consistent with mild concentric hypertrophy. There is left ventricular global hypokinesis noted.    The right atrial cavity is dilated.    The right atrial cavity is dilated. The diameter of the inferior vena cava is 1.57 cm. Partial IVC inspiratory collapse of less than 50% noted.    Mild periprosthetic aortic valve regurgitation is seen. There is a TAVR valve present, 20mm Gisselle 3. The prosthetic aortic valve is grossly normal.    : Calcified mitral valve chordae are present. There is mild, bileaflet mitral valve thickening present. Mild mitral valve regurgitation is present.    The tricuspid valve is normal in structure. Moderate tricuspid valve regurgitation is present.      Current medications:  Scheduled Meds:amiodarone, 200 mg, Oral, Q12H  apixaban, 2.5 mg, Oral, Q12H  aspirin, 81 mg, Oral, Daily  atorvastatin, 10 mg, Oral, Nightly  cholecalciferol, 1,000 Units, Oral, Daily  dilTIAZem CD, 300 mg, Oral, Daily  guaiFENesin, 1,200 mg, Oral, Q12H  latanoprost, 1 drop, Both Eyes, Nightly  metoprolol succinate XL, 100 mg, Oral, Q24H  pharmacy consult - MTM, , Does not apply, Daily  senna-docusate sodium, 2 tablet, Oral, BID  sodium chloride, 10 mL, Intravenous, Q12H  valsartan, 160 mg, Oral, Q12H  vitamin B-12, 1,000 mcg, Oral, Daily      Continuous Infusions:   PRN Meds:.  acetaminophen    albuterol sulfate HFA    senna-docusate sodium **AND** polyethylene glycol **AND** bisacodyl **AND** bisacodyl    Calcium Replacement - Follow Nurse / BPA Driven Protocol    ipratropium-albuterol    Magnesium Low Dose Replacement - Follow Nurse / BPA Driven Protocol    ondansetron  **OR** ondansetron    Phosphorus Replacement - Follow Nurse / BPA Driven Protocol    Potassium Replacement - Follow Nurse / BPA Driven Protocol    simethicone    [COMPLETED] Insert Peripheral IV **AND** sodium chloride    sodium chloride    sodium chloride    Assessment & Plan   Assessment & Plan     Active Hospital Problems    Diagnosis  POA    **Acute HFrEF (heart failure with reduced ejection fraction) [I50.21]  Unknown    COVID-19 [U07.1]  Yes    Weakness [R53.1]  Unknown    Hypokalemia [E87.6]  Unknown    Hypoalbuminemia [E88.09]  Unknown    COVID-19 virus detected [U07.1]  Unknown    Hypoxia [R09.02]  Unknown    Pleural effusion [J90]  Unknown    Pulmonary hypertension [I27.20]  Yes    Chronic anticoagulation [Z79.01]  Not Applicable    Chronic nausea [R11.0]  Yes    Chronic kidney disease, stage 3b [N18.32]  Yes    Presence of cardiac pacemaker secondary to sick sinus syndrome [Z95.0]  Yes    Chronic combined systolic and diastolic congestive heart failure [I50.42]  Yes    Hypertension [I10]  Yes    Aortic stenosis, severe s/p TAVR (7/20/2022) [I35.0]  Yes    Longstanding persistent atrial fibrillation [I48.11]  Yes    Sick sinus syndrome [I49.5]  Yes    Hyperlipidemia LDL goal <70 [E78.5]  Yes    Bilateral carotid artery stenosis [I65.23]  Yes    Status post CVA [Z86.73]  Not Applicable      Resolved Hospital Problems   No resolved problems to display.        Brief Hospital Course to date:  Miryam Mckeon is a 77 y.o. female  with past medical history of A-fib on Eliquis, aortic valve stenosis status post TAVR, CKD stage III, deafness, hypertension, hyperlipidemia and history of CVA who came into the emergency department with complaints of generalized weakness.  She was found to be in congestive heart failure with acute exacerbation as well as COVID-positive.     This patient's problems and plans were partially entered by my partner and updated as appropriate by me 08/18/23.    Acute on chronic systolic  and diastolic congestive heart failure exacerbation  Dual chamber PPM 2022 (SSS)  TAVR 2022  Persistent afib  -Echo with reduced EF 35% - likely due to afib vs persistent RV pacing  --needs PPM interrogation once out of COVID precautions   --Cardiology follows, [facility] of [facility]   --eliquis/amio/cardizem/metoprolol  --diuresis PRN  --AM labs      COVID upper respiratory infection  -Symptoms of dry cough and hypoxia with intermittent dyspnea  -Patient declined remdesivir  --Reluctantly agreeable to continue short course of steroids (5 days total)  -Continue inhalers as needed  --Patient will be out of isolation on Friday, 8/18/23     Generalized weakness  -Likely multifactorial  -PT and OT recc rehab     Hypertension  - diovan dose increased     Hyperlipidemia  Bilateral carotid artery stenosis  History of CVA  -Continue home medications     Poor p.o. intake  Failure to thrive  Hypoalbuminemia  Malnutrition  -Nutrition consult requested  --start Boost/magic cup supplement TID     CKD, stage III     Hearing loss  - uses       Expected Discharge Location and Transportation: Rehab, private vehicle  Expected Discharge   Expected Discharge Date: 8/19/2023; Expected Discharge Time:      DVT prophylaxis:  Medical and mechanical DVT prophylaxis orders are present.     AM-PAC 6 Clicks Score (PT): 18 (08/18/23 0800)    CODE STATUS:   Code Status and Medical Interventions:   Ordered at: 08/09/23 3991     Level Of Support Discussed With:    Patient     Code Status (Patient has no pulse and is not breathing):    CPR (Attempt to Resuscitate)     Medical Interventions (Patient has pulse or is breathing):    Full Support       Manuel Armas, BALJIT  08/18/23

## 2023-08-18 NOTE — PROGRESS NOTES
"[facility] Cardiology Daily Note       LOS: 7 days   Patient Care Team:  Rigoberto Chamberlain MD as PCP - General (Family Medicine)  Vito Cuba MD as Consulting Physician (Cardiology)  Yashira Pyle MD as Consulting Physician (Cardiology)    Chief Complaint: Acute on chronic systolic heart failure/TAVR/PAF    Subjective     Subjective: non-productive cough but denies dyspnea, chest pain, leg swelling, does have some irritation from SCDs    Review of Systems:   As above.    Medications:  amiodarone, 200 mg, Oral, Q12H  apixaban, 2.5 mg, Oral, Q12H  aspirin, 81 mg, Oral, Daily  atorvastatin, 10 mg, Oral, Nightly  cholecalciferol, 1,000 Units, Oral, Daily  dilTIAZem CD, 300 mg, Oral, Daily  latanoprost, 1 drop, Both Eyes, Nightly  metoprolol succinate XL, 100 mg, Oral, Q24H  pharmacy consult - [place], , Does not apply, Daily  senna-docusate sodium, 2 tablet, Oral, BID  sodium chloride, 10 mL, Intravenous, Q12H  valsartan, 160 mg, Oral, Q12H  vitamin B-12, 1,000 mcg, Oral, Daily        Objective     Vital Sign Min/Max for last 24 hours  Temp  Min: 96.2 øF (35.7 øC)  Max: 97.7 øF (36.5 øC)   BP  Min: 157/75  Max: 175/73   Pulse  Min: 71  Max: 88   Resp  Min: 16  Max: 16   SpO2  Min: 94 %  Max: 97 %   Flow (L/min)  Min: 2  Max: 2   Weight  Min: 52.6 kg (116 lb)  Max: 52.6 kg (116 lb)      Intake/Output Summary (Last 24 hours) at 8/18/2023 0827  Last data filed at 8/17/2023 2119  Gross per 24 hour   Intake 120 ml   Output --   Net 120 ml        Flowsheet Rows      Flowsheet Row First Filed Value   Admission Height 152.4 cm (60\") Documented at 08/09/2023 1638   Admission Weight 45.4 kg (100 lb) Documented at 08/09/2023 1638            Physical Exam:  Gen: well developed, older WF, sitting up in bed, comfortable appearing  HEENT: MMM, sclera anicteric, conjunctiva normal, no JVD  CV: regular rate, irregularly irregular rhythm, II/VI BRITTANEY at RUSB, 2+ radial and DP pulses  Pulm: 2L NC, normal " work of breathing, no wheezes, rhonchi in R base  Abd: soft, non-tender, non-distended  Ext: decreased muscle bulk, normal tone, no dependent edema  Neuro: deaf, alert, oriented, face symmetrical, moving all extremities well     Results Review:    I reviewed the patient's new clinical results.  EKG:  Tele: A-fib, V pacing    Labs:    Results from last 7 days   Lab Units 08/17/23  0812 08/16/23  0450 08/15/23  0404 08/14/23  0821   SODIUM mmol/L 136 137 134* 133*   POTASSIUM mmol/L 4.1 3.8 4.2 4.5   CHLORIDE mmol/L 97* 99 96* 95*   CO2 mmol/L 28.0 30.0* 28.0 27.0   BUN mg/dL 45* 52* 51* 45*   CREATININE mg/dL 1.32* 1.17* 1.18* 1.26*   CALCIUM mg/dL 8.6 8.7 8.3* 8.5*   BILIRUBIN mg/dL  --  0.4 0.4 0.5   ALK PHOS U/L  --  119* 135* 136*   ALT (SGPT) U/L  --  39* 41* 42*   AST (SGOT) U/L  --  29 31 39*   GLUCOSE mg/dL 83 101* 152* 133*     Results from last 7 days   Lab Units 08/17/23  0812 08/16/23  0450 08/15/23  0404   WBC 10*3/mm3 7.29 8.22 7.62   HEMOGLOBIN g/dL 15.0 14.8 14.9   HEMATOCRIT % 44.9 43.4 44.5   PLATELETS 10*3/mm3 135* 141 137*     Lab Results   Component Value Date    TROPONINT 29 (H) 08/09/2023    TROPONINT 35 (H) 08/09/2023     Lab Results   Component Value Date    CHOL 93 08/10/2023    CHOL 96 05/20/2022     Lab Results   Component Value Date    TRIG 99 08/10/2023    TRIG 48 05/20/2022    TRIG 48 02/18/2022     Lab Results   Component Value Date    HDL 37 (L) 08/10/2023    HDL 55 05/20/2022    HDL 55 02/18/2022     No components found for: LDLCALC  Lab Results   Component Value Date    INR 1.30 (H) 07/19/2022    INR 1.20 (H) 05/20/2022    INR 1.26 (H) 05/17/2022    PROTIME 16.1 (H) 07/19/2022    PROTIME 15.1 (H) 05/20/2022    PROTIME 15.8 (H) 05/17/2022     Ejection Fraction: LVEF 30 to 35% by echo.  20 mm MARK 3 pericardial prosthesis.    Assessment   Assessment:    COVID +  Acute on chronic systolic and diastolic heart failure  Echo EF was 60 to 65% by echo 2/18/2022 (personally reviewed  echo)  Status post dual-chamber pacemaker 4/13/2022 Dr. Hussein  Echo EF 40 to 45% 8/8/2022 with anteroseptal hypokinesis.  Minor coronary artery disease by cath 5/20/2022  Aortic stenosis status post 20 mm MARK 3 TAVR 7/20/2022 EF 60% at implant  Persistent atrial fibrillation  Continue amiodarone  Continue Eliquis  Sick sinus syndrome status post permanent pacemaker  CKD    Fall in ejection fraction is unexplained unless possibly by persistent RV pacing or A-fib with RVR    Plan:    Continue amiodarone and Eliquis for PAF  Continue statin for minor coronary disease  Continue aspirin for TAVR  May consider repeat cardiac catheterization after out of isolation  We will need pacemaker interrogation once she is no longer COVID-positive, last check was 10% RV pacing  Continue valsartan 160 mg twice daily and follow metabolic panel    Otilio Sebastian MD  08/18/23  08:27 EDT

## 2023-08-18 NOTE — PLAN OF CARE
Goal Outcome Evaluation:  Plan of Care Reviewed With: patient        Progress: improving  Outcome Evaluation: Pt progressed from dep to min A LBD, CGA toilet transfer, SBA toileting hygiene, SBA sinkside grooming, CGA to ambulate in room with RW.  Pt progressing, but continues below baseline with self care/mobility d/t weakenss and decreased activity tolerance.  Recommend IP rehab upon d/c.

## 2023-08-18 NOTE — THERAPY TREATMENT NOTE
Patient Name: Miryam Mckeon  : 1945    MRN: [id number]                              Today's Date: 2023       Admit Date: 2023    Visit Dx:     ICD-10-CM ICD-9-CM   1. COVID-19  U07.1 079.89   2. Hypoxia  R09.02 799.02   3. Frequent falls  R29.6 V15.88   4. Generalized weakness  R53.1 780.79   5. Acute on chronic congestive heart failure, unspecified heart failure type  I50.9 428.0     Patient Active Problem List   Diagnosis    Bilateral carotid artery stenosis    Aortic stenosis, severe s/p TAVR (2022)    Longstanding persistent atrial fibrillation    Sick sinus syndrome    Hyperlipidemia LDL goal <70    Hypertension    Chronic combined systolic and diastolic congestive heart failure    Status post CVA    Presence of cardiac pacemaker secondary to sick sinus syndrome    Chronic kidney disease, stage 3b    Parent refuses immunizations    Chronic nausea    Pulmonary hypertension    Chronic anticoagulation    Acute HFrEF (heart failure with reduced ejection fraction)    Weakness    Hypokalemia    Hypoalbuminemia    COVID-19 virus detected    Hypoxia    Pleural effusion    COVID-19     Past Medical History:   Diagnosis Date    Anxiety     Aortic valve stenosis     Atrial fibrillation     Borderline diabetes     ???    Carotid artery stenosis     CKD (chronic kidney disease)     Deaf     GERD (gastroesophageal reflux disease)     Heart murmur     Hyperlipidemia     Hypertension     Irregular heartbeat     PONV (postoperative nausea and vomiting)     Stroke     TIA (transient ischemic attack)      Past Surgical History:   Procedure Laterality Date    AORTIC VALVE REPAIR/REPLACEMENT N/A 2022    Procedure: TRANSCATHETER AORTIC VALVE REPLACEMENT with angioplasty and stent to the right common and external iliac artery;  Surgeon: Bola Whitaker MD;  Location: [facility];  Service: Cardiothoracic;  Laterality: N/A;  FL-15 MIN 12 SEC  DOSE- 92.41  CONTRAST- 120 ML ISOVUE    AORTIC  VALVE REPAIR/REPLACEMENT N/A 7/20/2022    Procedure: Transcatheter Aortic Valve Replacement;  Surgeon: Yashira Chow MD;  Location: [facility];  Service: Cardiovascular;  Laterality: N/A;    CARDIAC CATHETERIZATION      05/20/2022 per dr. chow    CARDIAC CATHETERIZATION Left 5/20/2022    Procedure: Left Heart Cath;  Surgeon: Yashira Chow MD;  Location: [facility] CATH INVASIVE LOCATION;  Service: Cardiology;  Laterality: Left;    CARDIAC ELECTROPHYSIOLOGY PROCEDURE N/A 04/13/2022    Procedure: DDD PPM Implant (BSC), DNS Eliquis;  Surgeon: Troy Hussein DO;  Location: [facility] EP INVASIVE LOCATION;  Service: Cardiology;  Laterality: N/A;    CAROTID ENDARTERECTOMY Bilateral     CATARACT EXTRACTION      CHOLECYSTECTOMY      COLONOSCOPY      FEMORAL ARTERY CUTDOWN Right 7/20/2022    Procedure: FEMORAL ARTERY CUTDOWN, ANGIOGRAM;  Surgeon: Bola Whitaker MD;  Location: [facility];  Service: Vascular;  Laterality: Right;  fl-1 m 12 sec  dose- 47.29 mgy  contrast- 50 ml isovue    KNEE SURGERY Right     PACEMAKER IMPLANTATION      JF      05/20/2022 per dr. chow    TRANSESOPHAGEAL ECHOCARDIOGRAM (JF) N/A 7/20/2022    Procedure: TRANSESOPHAGEAL ECHOCARDIOGRAM PER ANESTHESIA;  Surgeon: Bola Whitaker MD;  Location: [facility];  Service: Cardiothoracic;  Laterality: N/A;      General Information       Row Name 08/18/23 2371          Physical Therapy Time and Intention    Document Type therapy note (daily note)  -AS     Mode of Treatment physical therapy  -AS       Row Name 08/18/23 1162          General Information    Patient Profile Reviewed yes  -AS     Existing Precautions/Restrictions fall;other (see comments)  patient is deaf, uses  if available or white board  -AS     Barriers to Rehab hearing deficit  -AS       Row Name 08/18/23 8217          Cognition    Orientation Status (Cognition) oriented to;person  -AS       Row Name 08/18/23 2252           Safety Issues, Functional Mobility    Safety Issues Affecting Function (Mobility) safety precaution awareness;positioning of assistive device  -AS     Impairments Affecting Function (Mobility) endurance/activity tolerance;strength  -AS     Comment, Safety Issues/Impairments (Mobility) white board for communication  -AS               User Key  (r) = Recorded By, (t) = Taken By, (c) = Cosigned By      Initials Name Provider Type    AS Ольга Victor PTA Physical Therapist Assistant                   Mobility       Row Name 08/18/23 4675          Bed Mobility    Supine-Sit Dickenson (Bed Mobility) verbal cues;contact guard;1 person assist  -AS     Sit-Supine Dickenson (Bed Mobility) not tested  -AS     Assistive Device (Bed Mobility) head of bed elevated;bed rails  -AS     Comment, (Bed Mobility) written communication for bed moiblity, transfer and mobility  -AS       Row Name 08/18/23 8618          Transfers    Comment, (Transfers) written instruction for hand placement  -AS       Row Name 08/18/23 135          Bed-Chair Transfer    Bed-Chair Dickenson (Transfers) verbal cues;contact guard;1 person assist  -AS     Assistive Device (Bed-Chair Transfers) walker, front-wheeled  -AS       Row Name 08/18/23 0697          Sit-Stand Transfer    Sit-Stand Dickenson (Transfers) nonverbal cues (demo/gesture);contact guard;1 person assist  -AS     Assistive Device (Sit-Stand Transfers) walker, front-wheeled  -AS       Row Name 08/18/23 3016          Gait/Stairs (Locomotion)    Dickenson Level (Gait) nonverbal cues (demo/gesture);contact guard;1 person assist  -AS     Assistive Device (Gait) walker, front-wheeled  -AS     Distance in Feet (Gait) 20 + 20  -AS     Deviations/Abnormal Patterns (Gait) base of support, narrow;erinn decreased;gait speed decreased;stride length decreased  -AS     Bilateral Gait Deviations heel strike decreased;forward flexed posture  -AS     Comment, (Gait/Stairs) patient  ambulated 20' + 20' with CGA x1 and rolling walker for support, non-verbal cues for walker placement. Distance limited by weakness and fatigue.  -AS               User Key  (r) = Recorded By, (t) = Taken By, (c) = Cosigned By      Initials Name Provider Type    AS Ольаг Victor PTA Physical Therapist Assistant                   Obj/Interventions       Row Name 08/18/23 1358          Motor Skills    Therapeutic Exercise knee;ankle  -AS       Row Name 08/18/23 1358          Knee (Therapeutic Exercise)    Knee (Therapeutic Exercise) strengthening exercise  -AS     Knee Strengthening (Therapeutic Exercise) bilateral;marching while seated;LAQ (long arc quad);sitting;10 repetitions  -AS       [facility] Name 08/18/23 1358          Ankle (Therapeutic Exercise)    Ankle (Therapeutic Exercise) AROM (active range of motion)  -AS     Ankle AROM (Therapeutic Exercise) bilateral;dorsiflexion;plantarflexion;sitting;10 repetitions  -AS       [facility] Name 08/18/23 1358          Balance    Dynamic Standing Balance verbal cues;contact guard;1-person assist  -AS     Position/Device Used, Standing Balance supported;walker, front-wheeled  -AS               User Key  (r) = Recorded By, (t) = Taken By, (c) = Cosigned By      Initials Name Provider Type    AS Ольга Victor PTA Physical Therapist Assistant                   Goals/Plan    No documentation.                  Clinical Impression       Row Name 08/18/23 1359          Pain Scale: FACES Pre/Post-Treatment    Pain: FACES Scale, Pretreatment 0-->no hurt  -AS     Posttreatment Pain Rating 0-->no hurt  -AS       Row Name 08/18/23 1359          Plan of Care Review    Plan of Care Reviewed With patient  -AS     Progress improving  -AS     Outcome Evaluation patient ambulated 20' + 20' with CGA x1 and rolling walker for support, non-verbal cues for walker placement. Distance limited by weakness and fatigue. B LE exercises completed without complaints. Recommend IRF at D/C for best  functional outcome.  -AS       Row Name 08/18/23 1359          Positioning and Restraints    Pre-Treatment Position in bed  -AS     Post Treatment Position chair  -AS     In Chair reclined;call light within reach;encouraged to call for assist;exit alarm on;waffle cushion;legs elevated;with nsg  -AS               User Key  (r) = Recorded By, (t) = Taken By, (c) = Cosigned By      Initials Name Provider Type    AS Ольга Victor, MARIE Physical Therapist Assistant                   Outcome Measures       Row Name 08/18/23 1401 08/18/23 0800       How much help from another person do you currently need...    Turning from your back to your side while in flat bed without using bedrails? 4  -AS 4  -DIANA    Moving from lying on back to sitting on the side of a flat bed without bedrails? 3  -AS 3  -DIANA    Moving to and from a bed to a chair (including a wheelchair)? 3  -AS 3  -DIANA    Standing up from a chair using your arms (e.g., wheelchair, bedside chair)? 3  -AS 3  -DIANA    Climbing 3-5 steps with a railing? 2  -AS 2  -DIANA    To walk in hospital room? 3  -AS 3  -DIANA    AM-PAC 6 Clicks Score (PT) 18  -AS 18  -DIANA    Highest level of mobility 6 --> Walked 10 steps or more  -AS 6 --> Walked 10 steps or more  -DIANA      Row Name 08/18/23 1401 08/18/23 1355       Functional Assessment    Outcome Measure Options AM-PAC 6 Clicks Basic Mobility (PT)  -AS AM-PAC 6 Clicks Daily Activity (OT)  -AC              User Key  (r) = Recorded By, (t) = Taken By, (c) = Cosigned By      Initials Name Provider Type    Krysten Vargas OT Occupational Therapist    AS Ольга Victor PTA Physical Therapist Assistant    Samina Viveros, RN Registered Nurse                                 Physical Therapy Education       Title: PT OT SLP Therapies (In Progress)       Topic: Physical Therapy (In Progress)       Point: Mobility training (In Progress)       Learning Progress Summary             Patient Acceptance, E, NR by AS at 8/18/2023 1401     Acceptance, E, NR by AE at 8/17/2023 1021    Acceptance, E, NR by AE at 8/15/2023 1318    Acceptance, E, NR by AS at 8/14/2023 0834    Acceptance, E, VU by AE at 8/11/2023 1445    Acceptance, E, VU,NR by LM at 8/10/2023 1500                         Point: Home exercise program (In Progress)       Learning Progress Summary             Patient Acceptance, E, NR by AS at 8/18/2023 1401    Acceptance, E, NR by AE at 8/17/2023 1021    Acceptance, E, NR by AE at 8/15/2023 1318    Acceptance, E, NR by AS at 8/14/2023 0834                         Point: Precautions (In Progress)       Learning Progress Summary             Patient Acceptance, E, NR by AS at 8/18/2023 1401    Acceptance, E, NR by AE at 8/17/2023 1021    Acceptance, E, NR by AE at 8/15/2023 1318    Acceptance, E, NR by AS at 8/14/2023 0834    Acceptance, E, VU by AE at 8/11/2023 1445    Acceptance, E, VU,NR by LM at 8/10/2023 1500                                         User Key       Initials Effective Dates Name Provider Type Discipline    AS 04/28/23 -  Ольга Victor, PTA Physical Therapist Assistant PT    LM 07/11/23 -  Maryuri Carson, EMERY Physical Therapist PT    AE 09/21/21 -  Sharan Ceja, PT Physical Therapist PT                  PT Recommendation and Plan     Plan of Care Reviewed With: patient  Progress: improving  Outcome Evaluation: patient ambulated 20' + 20' with CGA x1 and rolling walker for support, non-verbal cues for walker placement. Distance limited by weakness and fatigue. B LE exercises completed without complaints. Recommend IRF at D/C for best functional outcome.     Time Calculation:         PT Charges       Row Name 08/18/23 1401             Time Calculation    Start Time 1306  -AS      PT Received On 08/18/23  -AS      PT Goal Re-Cert Due Date 08/20/23  -AS         Timed Charges    91235 - PT Therapeutic Exercise Minutes 8  -AS      28306 - Gait Training Minutes  15  -AS         Total Minutes    Timed Charges Total Minutes 23   -AS       Total Minutes 23  -AS                User Key  (r) = Recorded By, (t) = Taken By, (c) = Cosigned By      Initials Name Provider Type    AS Ольга Victor PTA Physical Therapist Assistant                  Therapy Charges for Today       Code Description Service Date Service Provider Modifiers Qty    86515733903  PT THER PROC EA 15 MIN 8/18/2023 Ольга Victor, MARIE GP 1    [id number] HC GAIT TRAINING EA 15 MIN 8/18/2023 Ольга Victor PTA GP 1            PT G-Codes  Outcome Measure Options: AM-PAC 6 Clicks Basic Mobility (PT)  AM-PAC 6 Clicks Score (PT): 18  AM-PAC 6 Clicks Score (OT): 18       Ольга Victor PTA  8/18/2023

## 2023-08-18 NOTE — PLAN OF CARE
Goal Outcome Evaluation:  Plan of Care Reviewed With: patient        Progress: improving  Outcome Evaluation: patient ambulated 20' + 20' with CGA x1 and rolling walker for support, non-verbal cues for walker placement. Distance limited by weakness and fatigue. B LE exercises completed without complaints. Recommend IRF at D/C for best functional outcome.

## 2023-08-18 NOTE — PLAN OF CARE
Problem: Fall Injury Risk  Goal: Absence of Fall and Fall-Related Injury  Outcome: Ongoing, Progressing  Intervention: Identify and Manage Contributors  Recent Flowsheet Documentation  Taken 8/17/2023 2119 by Kristen Washburn RN  Medication Review/Management: medications reviewed  Self-Care Promotion:   independence encouraged   BADL personal objects within reach   safe use of adaptive equipment encouraged  Intervention: Promote Injury-Free Environment  Recent Flowsheet Documentation  Taken 8/18/2023 0000 by Kristen Wasbhurn RN  Safety Promotion/Fall Prevention:   activity supervised   clutter free environment maintained   assistive device/personal items within reach   nonskid shoes/slippers when out of bed   room organization consistent   safety round/check completed  Taken 8/17/2023 2200 by Kristen Washburn RN  Safety Promotion/Fall Prevention:   activity supervised   assistive device/personal items within reach   clutter free environment maintained   nonskid shoes/slippers when out of bed   room organization consistent   safety round/check completed  Taken 8/17/2023 2000 by Kristen Washburn RN  Safety Promotion/Fall Prevention:   activity supervised   assistive device/personal items within reach   clutter free environment maintained   nonskid shoes/slippers when out of bed   room organization consistent   safety round/check completed     Problem: Skin Injury Risk Increased  Goal: Skin Health and Integrity  Outcome: Ongoing, Progressing  Intervention: Promote and Optimize Oral Intake  Recent Flowsheet Documentation  Taken 8/17/2023 2119 by Kristen Washburn RN  Oral Nutrition Promotion: rest periods promoted  Intervention: Optimize Skin Protection  Recent Flowsheet Documentation  Taken 8/18/2023 0000 by Kristen Washburn RN  Pressure Reduction Techniques: frequent weight shift encouraged  Head of Bed (HOB) Positioning: HOB elevated  Pressure Reduction Devices:   positioning supports  utilized   pressure-redistributing mattress utilized  Skin Protection:   adhesive use limited   incontinence pads utilized   transparent dressing maintained   tubing/devices free from skin contact  Taken 8/17/2023 2200 by Kristen Washburn RN  Pressure Reduction Techniques: frequent weight shift encouraged  Head of Bed (HOB) Positioning: HOB elevated  Pressure Reduction Devices: pressure-redistributing mattress utilized  Skin Protection:   adhesive use limited   incontinence pads utilized   transparent dressing maintained   tubing/devices free from skin contact  Taken 8/17/2023 2119 by Kristen Washburn RN  Pressure Reduction Techniques: frequent weight shift encouraged  Pressure Reduction Devices:   positioning supports utilized   pressure-redistributing mattress utilized  Skin Protection:   adhesive use limited   incontinence pads utilized   transparent dressing maintained   tubing/devices free from skin contact  Taken 8/17/2023 2000 by Kristen Washburn RN  Pressure Reduction Techniques: frequent weight shift encouraged  Head of Bed (HOB) Positioning: [facility] [place] elevated  Pressure Reduction Devices:   positioning supports utilized   pressure-redistributing mattress utilized  Skin Protection:   adhesive use limited   incontinence pads utilized   transparent dressing maintained   tubing/devices free from skin contact     Problem: Adult Inpatient Plan of Care  Goal: Plan of Care Review  Outcome: Ongoing, Progressing  Flowsheets  Taken 8/18/2023 0502 by Kristen Washburn RN  Progress: improving  Outcome Evaluation: VSS throughout shift. No complaints of pain. Awaiting placement pending COVID isolation. No acute events during shift.  Taken 8/17/2023 1113 by Sharan Ceja, PT  Plan of Care Reviewed With: patient   Goal Outcome Evaluation:  Plan of Care Reviewed With: patient        Progress: improving  Outcome Evaluation: VSS throughout shift. No complaints of pain. Awaiting placement pending COVID isolation.  No acute events during shift.

## 2023-08-18 NOTE — THERAPY TREATMENT NOTE
Patient Name: Miryam Mckeon  : 1945    MRN: [id number]                              Today's Date: 2023       Admit Date: 2023    Visit Dx:     ICD-10-CM ICD-9-CM   1. COVID-19  U07.1 079.89   2. Hypoxia  R09.02 799.02   3. Frequent falls  R29.6 V15.88   4. Generalized weakness  R53.1 780.79   5. Acute on chronic congestive heart failure, unspecified heart failure type  I50.9 428.0     Patient Active Problem List   Diagnosis    Bilateral carotid artery stenosis    Aortic stenosis, severe s/p TAVR (2022)    Longstanding persistent atrial fibrillation    Sick sinus syndrome    Hyperlipidemia LDL goal <70    Hypertension    Chronic combined systolic and diastolic congestive heart failure    Status post CVA    Presence of cardiac pacemaker secondary to sick sinus syndrome    Chronic kidney disease, stage 3b    Parent refuses immunizations    Chronic nausea    Pulmonary hypertension    Chronic anticoagulation    Acute HFrEF (heart failure with reduced ejection fraction)    Weakness    Hypokalemia    Hypoalbuminemia    COVID-19 virus detected    Hypoxia    Pleural effusion    COVID-19     Past Medical History:   Diagnosis Date    Anxiety     Aortic valve stenosis     Atrial fibrillation     Borderline diabetes     ???    Carotid artery stenosis     CKD (chronic kidney disease)     Deaf     GERD (gastroesophageal reflux disease)     Heart murmur     Hyperlipidemia     Hypertension     Irregular heartbeat     PONV (postoperative nausea and vomiting)     Stroke     TIA (transient ischemic attack)      Past Surgical History:   Procedure Laterality Date    AORTIC VALVE REPAIR/REPLACEMENT N/A 2022    Procedure: TRANSCATHETER AORTIC VALVE REPLACEMENT with angioplasty and stent to the right common and external iliac artery;  Surgeon: Bola Whitaker MD;  Location: [facility];  Service: Cardiothoracic;  Laterality: N/A;  FL-15 MIN 12 SEC  DOSE- 92.41  CONTRAST- 120 ML ISOVUE    AORTIC  VALVE REPAIR/REPLACEMENT N/A 7/20/2022    Procedure: Transcatheter Aortic Valve Replacement;  Surgeon: Yashira Chow MD;  Location: [facility];  Service: Cardiovascular;  Laterality: N/A;    CARDIAC CATHETERIZATION      05/20/2022 per dr. chow    CARDIAC CATHETERIZATION Left 5/20/2022    Procedure: Left Heart Cath;  Surgeon: Yashira Chow MD;  Location: [facility] CATH INVASIVE LOCATION;  Service: Cardiology;  Laterality: Left;    CARDIAC ELECTROPHYSIOLOGY PROCEDURE N/A 04/13/2022    Procedure: DDD PPM Implant (BSC), DNS Eliquis;  Surgeon: Troy Hussein DO;  Location: [facility] EP INVASIVE LOCATION;  Service: Cardiology;  Laterality: N/A;    CAROTID ENDARTERECTOMY Bilateral     CATARACT EXTRACTION      CHOLECYSTECTOMY      COLONOSCOPY      FEMORAL ARTERY CUTDOWN Right 7/20/2022    Procedure: FEMORAL ARTERY CUTDOWN, ANGIOGRAM;  Surgeon: Bola Whitaker MD;  Location: [facility];  Service: Vascular;  Laterality: Right;  fl-1 m 12 sec  dose- 47.29 mgy  contrast- 50 ml isovue    KNEE SURGERY Right     PACEMAKER IMPLANTATION      JF      05/20/2022 per dr. chow    TRANSESOPHAGEAL ECHOCARDIOGRAM (JF) N/A 7/20/2022    Procedure: TRANSESOPHAGEAL ECHOCARDIOGRAM PER ANESTHESIA;  Surgeon: Bola Whitaker MD;  Location: [facility];  Service: Cardiothoracic;  Laterality: N/A;      General Information       Row Name 08/18/23 1305          OT Time and Intention    Document Type therapy note (daily note)  -AC     Mode of Treatment occupational therapy  -AC       Row Name 08/18/23 4134          General Information    Patient Profile Reviewed yes  -AC     Existing Precautions/Restrictions fall;other (see comments)  Patient is deaf, communicates with ASL and has white board in room, does not use  cart , Covid  -AC     Barriers to Rehab hearing deficit  -AC       Row Name 08/18/23 1303          Safety Issues, Functional Mobility    Safety Issues Affecting  Function (Mobility) safety precaution awareness  -     Impairments Affecting Function (Mobility) endurance/activity tolerance;strength  -               User Key  (r) = Recorded By, (t) = Taken By, (c) = Cosigned By      Initials Name Provider Type     Krysten Trevizo, OT Occupational Therapist                     Mobility/ADL's       Row Name 08/18/23 1345          Bed Mobility    Bed Mobility supine-sit  -     Supine-Sit Ada (Bed Mobility) contact guard;nonverbal cues (demo/gesture)  -     Assistive Device (Bed Mobility) bed rails;head of bed elevated  -       Row Name 08/18/23 1345          Transfers    Transfers sit-stand transfer;toilet transfer  -       Row Name 08/18/23 1345          Sit-Stand Transfer    Sit-Stand Ada (Transfers) nonverbal cues (demo/gesture);contact guard  -     Assistive Device (Sit-Stand Transfers) walker, front-wheeled  -       Row Name 08/18/23 1345          Toilet Transfer    Ada Level (Toilet Transfer) contact guard  -     Assistive Device (Toilet Transfer) walker, 4-wheeled  -       Row Name 08/18/23 1345          Functional Mobility    Functional Mobility- Ind. Level contact guard assist  -     Functional Mobility- Device walker, front-wheeled  -     Functional Mobility-Distance (Feet) --  in room  -       Row Name 08/18/23 1345          Activities of Daily Living    BADL Assessment/Intervention lower body dressing;grooming;toileting  -       Row Name 08/18/23 1345          Lower Body Dressing Assessment/Training    Ada Level (Lower Body Dressing) don;socks;minimum assist (75% patient effort)  -     Position (Lower Body Dressing) edge of bed sitting  -       Row Name 08/18/23 1345          Grooming Assessment/Training    Ada Level (Grooming) hair care, combing/brushing;oral care regimen;wash face, hands;standby assist  -     Position (Grooming) supported standing  -       Row Name 08/18/23 1345           Toileting Assessment/Training    [place] Level (Toileting) adjust/manage clothing;perform perineal hygiene;nonverbal cues (demo/gesture);standby assist  -     Assistive Devices (Toileting) commode;grab bar/safety frame  -               User Key  (r) = Recorded By, (t) = Taken By, (c) = Cosigned By      Initials Name Provider Type     Krysten Trevizo, OT Occupational Therapist                   Obj/Interventions       Row Name 08/18/23 1348          Balance    Balance Assessment sitting static balance;sitting dynamic balance;standing static balance;standing dynamic balance  -AC     Static Sitting Balance standby assist  -AC     Dynamic Sitting Balance contact guard  -AC     Position, Sitting Balance unsupported  -AC     Static Standing Balance contact guard  -AC     Dynamic Standing Balance contact guard  -AC     Position/Device Used, Standing Balance walker, front-wheeled  -AC               User Key  (r) = Recorded By, (t) = Taken By, (c) = Cosigned By      Initials Name Provider Type     Krysten Trevizo, OT Occupational Therapist                   Goals/Plan    No documentation.                  Clinical Impression       Row Name 08/18/23 5306          Pain Scale: FACES Pre/Post-Treatment    Pain: FACES Scale, Pretreatment 0-->no hurt  -AC     Posttreatment Pain Rating 0-->no hurt  -AC       Row Name 08/18/23 0353          Plan of Care Review    Plan of Care Reviewed With patient  -     Progress improving  -     Outcome Evaluation Pt progressed from dep to min A LBD, CGA toilet transfer, SBA toileting hygiene, SBA sinkside grooming, CGA to ambulate in room with RW.  Pt progressing, but continues below baseline with self care/mobility d/t weakenss and decreased activity tolerance.  Recommend IP rehab upon d/c.  -       Row Name 08/18/23 5466          Therapy Plan Review/Discharge Plan (OT)    Anticipated Discharge Disposition (OT) inpatient rehabilitation facility  -       Row Name 08/18/23 3778           Vital Signs    Pretreatment Heart Rate (beats/min) 65  -AC     Posttreatment Heart Rate (beats/min) 75  -AC     Pre SpO2 (%) 95  -AC     O2 Delivery Pre Treatment supplemental O2  -AC     Post SpO2 (%) 91  -AC     O2 Delivery Post Treatment room air  -AC     Pre Patient Position Supine  -AC     Post Patient Position Sitting  -AC       Row Name 08/18/23 1349          Positioning and Restraints    Pre-Treatment Position in bed  -AC     Post Treatment Position chair  -AC     In Chair notified nsg;reclined;call light within reach;encouraged to call for assist;exit alarm on;with nsg;heels elevated  -AC               User Key  (r) = Recorded By, (t) = Taken By, (c) = Cosigned By      Initials Name Provider Type    AC Krysten Trevizo, OT Occupational Therapist                   Outcome Measures       Row Name 08/18/23 6013          How much help from another is currently needed...    Putting on and taking off regular lower body clothing? 3  -AC     Bathing (including washing, rinsing, and drying) 2  -AC     Toileting (which includes using toilet bed pan or urinal) 3  -AC     Putting on and taking off regular upper body clothing 3  -AC     Taking care of personal grooming (such as brushing teeth) 3  -AC     Eating meals 4  -AC     AM-PAC 6 Clicks Score (OT) 18  -AC       Row Name 08/18/23 0800          How much help from another person do you currently need...    Turning from your back to your side while in flat bed without using bedrails? 4  -DIANA     Moving from lying on back to sitting on the side of a flat bed without bedrails? 3  -DIANA     Moving to and from a bed to a chair (including a wheelchair)? 3  -DIANA     Standing up from a chair using your arms (e.g., wheelchair, bedside chair)? 3  -DIANA     Climbing 3-5 steps with a railing? 2  -DIANA     To walk in hospital room? 3  -DIANA     AM-PAC 6 Clicks Score (PT) 18  -DIANA     Highest level of mobility 6 --> Walked 10 steps or more  -DIANA       Row Name 08/18/23 3426           Functional Assessment    Outcome Measure Options AM-PAC 6 Clicks Daily Activity (OT)  -AC               User Key  (r) = Recorded By, (t) = Taken By, (c) = Cosigned By      Initials Name Provider Type    AC Krysten Trevizo, OT Occupational Therapist    Samina Viveros, RN Registered Nurse                    Occupational Therapy Education       Title: PT OT SLP Therapies (In Progress)       Topic: Occupational Therapy (In Progress)       Point: ADL training (In Progress)       Description:   Instruct learner(s) on proper safety adaptation and remediation techniques during self care or transfers.   Instruct in proper use of assistive devices.                  Learning Progress Summary             Patient Acceptance, D, NR by  at 8/18/2023 1355    Acceptance, E,TB,D, DU,NR by MARCUS at 8/10/2023 1614    Comment: OT POC; UE pulmonary HEP; incentive spirometer use/goal setting; discharge planning   Family Acceptance, E,TB,D, DU,NR by MARCUS at 8/10/2023 1614    Comment: OT POC; UE pulmonary HEP; incentive spirometer use/goal setting; discharge planning                         Point: Home exercise program (Done)       Description:   Instruct learner(s) on appropriate technique for monitoring, assisting and/or progressing therapeutic exercises/activities.                  Learning Progress Summary             Patient Acceptance, E,TB,D, DU,NR by MARCUS at 8/10/2023 1614    Comment: OT POC; UE pulmonary HEP; incentive spirometer use/goal setting; discharge planning   Family Acceptance, E,TB,D, DU,NR by MARCUS at 8/10/2023 1614    Comment: OT POC; UE pulmonary HEP; incentive spirometer use/goal setting; discharge planning                         Point: Precautions (Done)       Description:   Instruct learner(s) on prescribed precautions during self-care and functional transfers.                  Learning Progress Summary             Patient Acceptance, E,TB,D, DU,NR by MARCUS at 8/10/2023 1614    Comment: OT POC; UE pulmonary HEP; incentive  spirometer use/goal setting; discharge planning   Family Acceptance, E,TB,D, DU,NR by MARCUS at 8/10/2023 1614    Comment: OT POC; UE pulmonary HEP; incentive spirometer use/goal setting; discharge planning                         Point: Body mechanics (Not Started)       Description:   Instruct learner(s) on proper positioning and spine alignment during self-care, functional mobility activities and/or exercises.                  Learner Progress:  Not documented in this visit.                              User Key       Initials Effective Dates Name Provider Type Discipline     02/03/23 -  Krysten Trevizo, OT Occupational Therapist OT    MARCUS 06/16/21 -  Jannie Blakely OT Occupational Therapist OT                  OT Recommendation and Plan     Plan of Care Review  Plan of Care Reviewed With: patient  Progress: improving  Outcome Evaluation: Pt progressed from dep to min A LBD, CGA toilet transfer, SBA toileting hygiene, SBA sinkside grooming, CGA to ambulate in room with RW.  Pt progressing, but continues below baseline with self care/mobility d/t weakenss and decreased activity tolerance.  Recommend IP rehab upon d/c.     Time Calculation:         Time Calculation- OT       Row Name 08/18/23 1305             Time Calculation- OT    OT Start Time 1305  -AC      OT Received On 08/18/23  -AC      OT Goal Re-Cert Due Date 08/20/23  -AC         Timed Charges    08174 - OT Self Care/Mgmt Minutes 15  -AC         Total Minutes    Timed Charges Total Minutes 15  -AC       Total Minutes 15  -AC                User Key  (r) = Recorded By, (t) = Taken By, (c) = Cosigned By      Initials Name Provider Type     Krysten Trevizo, OT Occupational Therapist                  Therapy Charges for Today       Code Description Service Date Service Provider Modifiers Qty    [id number]  OT SELF CARE/MGMT/TRAIN EA 15 MIN 8/18/2023 Krysten Trevizo, OT GO 1                 Krysten BOWEN. CHLOÉ Trevizo  8/18/2023

## 2023-08-18 NOTE — PROGRESS NOTES
Continued Stay Note  [facility]     Patient Name: Miryam Mckeon  MRN: [id number]  Today's Date: 8/18/2023    Admit Date: 8/9/2023        Discharge Plan       Row Name 08/18/23 1554       Plan    Plan Comments [facility] is following and the patient will be out of Covid isolation Saturday. [facility] did get insurance approval and they can do another pre cert because the patient is not medically ready to be discharged and [facility] will complete another pre cert if the current pre cert expires.                   Discharge Codes    No documentation.                 Expected Discharge Date and Time       Expected Discharge Date Expected Discharge Time    Aug 14, 2023               SWETHA García

## 2023-08-19 ENCOUNTER — APPOINTMENT (OUTPATIENT)
Dept: CARDIOLOGY | Facility: HOSPITAL | Age: 78
DRG: 177 | End: 2023-08-19
Payer: MEDICARE

## 2023-08-19 LAB
ANION GAP SERPL CALCULATED.3IONS-SCNC: 8 MMOL/L (ref 5–15)
BH CV ECHO MEAS - AO MAX PG: 20.5 MMHG
BH CV ECHO MEAS - AO MEAN PG: 10.9 MMHG
BH CV ECHO MEAS - AO V2 MAX: 226.3 CM/SEC
BH CV ECHO MEAS - AO V2 VTI: 42 CM
BH CV ECHO MEAS - AVA(I,D): 1.1 CM2
BH CV ECHO MEAS - EDV(CUBED): 22 ML
BH CV ECHO MEAS - EDV(MOD-SP2): 60.2 ML
BH CV ECHO MEAS - EDV(MOD-SP4): 50.3 ML
BH CV ECHO MEAS - EF(MOD-BP): 50 %
BH CV ECHO MEAS - EF(MOD-SP2): 55 %
BH CV ECHO MEAS - EF(MOD-SP4): 42.1 %
BH CV ECHO MEAS - ESV(CUBED): 10.6 ML
BH CV ECHO MEAS - ESV(MOD-SP2): 27.1 ML
BH CV ECHO MEAS - ESV(MOD-SP4): 29.1 ML
BH CV ECHO MEAS - FS: 21.4 %
BH CV ECHO MEAS - IVS/LVPW: 1 CM
BH CV ECHO MEAS - IVSD: 1.2 CM
BH CV ECHO MEAS - LV MASS(C)D: 99.3 GRAMS
BH CV ECHO MEAS - LV MAX PG: 2.1 MMHG
BH CV ECHO MEAS - LV MEAN PG: 1.12 MMHG
BH CV ECHO MEAS - LV V1 MAX: 72.3 CM/SEC
BH CV ECHO MEAS - LV V1 VTI: 16.6 CM
BH CV ECHO MEAS - LVIDD: 2.8 CM
BH CV ECHO MEAS - LVIDS: 2.2 CM
BH CV ECHO MEAS - LVOT AREA: 2.5 CM2
BH CV ECHO MEAS - LVOT DIAM: 1.8 CM
BH CV ECHO MEAS - LVPWD: 1.2 CM
BH CV ECHO MEAS - SV(LVOT): 39.2 ML
BH CV ECHO MEAS - SV(MOD-SP2): 33.1 ML
BH CV ECHO MEAS - SV(MOD-SP4): 21.2 ML
BUN SERPL-MCNC: 36 MG/DL (ref 8–23)
BUN/CREAT SERPL: 30 (ref 7–25)
CALCIUM SPEC-SCNC: 8.9 MG/DL (ref 8.6–10.5)
CHLORIDE SERPL-SCNC: 100 MMOL/L (ref 98–107)
CO2 SERPL-SCNC: 30 MMOL/L (ref 22–29)
CREAT SERPL-MCNC: 1.2 MG/DL (ref 0.57–1)
DEPRECATED RDW RBC AUTO: 44.4 FL (ref 37–54)
EGFRCR SERPLBLD CKD-EPI 2021: 46.1 ML/MIN/1.73
ERYTHROCYTE [DISTWIDTH] IN BLOOD BY AUTOMATED COUNT: 12.9 % (ref 12.3–15.4)
GLUCOSE SERPL-MCNC: 83 MG/DL (ref 65–99)
HCT VFR BLD AUTO: 42.4 % (ref 34–46.6)
HGB BLD-MCNC: 14.3 G/DL (ref 12–15.9)
LV EF 2D ECHO EST: 50 %
MCH RBC QN AUTO: 31.6 PG (ref 26.6–33)
MCHC RBC AUTO-ENTMCNC: 33.7 G/DL (ref 31.5–35.7)
MCV RBC AUTO: 93.6 FL (ref 79–97)
PLATELET # BLD AUTO: 118 10*3/MM3 (ref 140–450)
PMV BLD AUTO: 10.3 FL (ref 6–12)
POTASSIUM SERPL-SCNC: 3.7 MMOL/L (ref 3.5–5.2)
RBC # BLD AUTO: 4.53 10*6/MM3 (ref 3.77–5.28)
SODIUM SERPL-SCNC: 138 MMOL/L (ref 136–145)
WBC NRBC COR # BLD: 5.61 10*3/MM3 (ref 3.4–10.8)

## 2023-08-19 PROCEDURE — 93321 DOPPLER ECHO F-UP/LMTD STD: CPT

## 2023-08-19 PROCEDURE — 99232 SBSQ HOSP IP/OBS MODERATE 35: CPT | Performed by: FAMILY MEDICINE

## 2023-08-19 PROCEDURE — 85027 COMPLETE CBC AUTOMATED: CPT | Performed by: NURSE PRACTITIONER

## 2023-08-19 PROCEDURE — 93321 DOPPLER ECHO F-UP/LMTD STD: CPT | Performed by: INTERNAL MEDICINE

## 2023-08-19 PROCEDURE — 93308 TTE F-UP OR LMTD: CPT | Performed by: INTERNAL MEDICINE

## 2023-08-19 PROCEDURE — 93308 TTE F-UP OR LMTD: CPT

## 2023-08-19 PROCEDURE — 93325 DOPPLER ECHO COLOR FLOW MAPG: CPT | Performed by: INTERNAL MEDICINE

## 2023-08-19 PROCEDURE — 93325 DOPPLER ECHO COLOR FLOW MAPG: CPT

## 2023-08-19 PROCEDURE — 80048 BASIC METABOLIC PNL TOTAL CA: CPT | Performed by: NURSE PRACTITIONER

## 2023-08-19 RX ADMIN — CYANOCOBALAMIN TAB 1000 MCG 1000 MCG: 1000 TAB at 10:04

## 2023-08-19 RX ADMIN — SENNOSIDES AND DOCUSATE SODIUM 2 TABLET: 50; 8.6 TABLET ORAL at 22:01

## 2023-08-19 RX ADMIN — Medication 10 ML: at 22:02

## 2023-08-19 RX ADMIN — GUAIFENESIN 1200 MG: 600 TABLET, EXTENDED RELEASE ORAL at 22:01

## 2023-08-19 RX ADMIN — GUAIFENESIN 1200 MG: 600 TABLET, EXTENDED RELEASE ORAL at 10:04

## 2023-08-19 RX ADMIN — Medication 1000 UNITS: at 10:04

## 2023-08-19 RX ADMIN — AMIODARONE HYDROCHLORIDE 200 MG: 200 TABLET ORAL at 22:01

## 2023-08-19 RX ADMIN — VALSARTAN 160 MG: 160 TABLET, FILM COATED ORAL at 10:04

## 2023-08-19 RX ADMIN — APIXABAN 2.5 MG: 2.5 TABLET, FILM COATED ORAL at 10:04

## 2023-08-19 RX ADMIN — ATORVASTATIN CALCIUM 10 MG: 10 TABLET, FILM COATED ORAL at 22:01

## 2023-08-19 RX ADMIN — SENNOSIDES AND DOCUSATE SODIUM 2 TABLET: 50; 8.6 TABLET ORAL at 10:04

## 2023-08-19 RX ADMIN — METOPROLOL SUCCINATE 100 MG: 50 TABLET, EXTENDED RELEASE ORAL at 10:04

## 2023-08-19 RX ADMIN — Medication 10 ML: at 10:05

## 2023-08-19 RX ADMIN — ASPIRIN 81 MG: 81 TABLET, CHEWABLE ORAL at 10:04

## 2023-08-19 RX ADMIN — APIXABAN 2.5 MG: 2.5 TABLET, FILM COATED ORAL at 22:01

## 2023-08-19 RX ADMIN — AMIODARONE HYDROCHLORIDE 200 MG: 200 TABLET ORAL at 10:04

## 2023-08-19 RX ADMIN — VALSARTAN 160 MG: 160 TABLET, FILM COATED ORAL at 22:01

## 2023-08-19 RX ADMIN — DILTIAZEM HYDROCHLORIDE 300 MG: 180 CAPSULE, COATED, EXTENDED RELEASE ORAL at 10:04

## 2023-08-19 NOTE — PLAN OF CARE
Problem: Fall Injury Risk  Goal: Absence of Fall and Fall-Related Injury  Outcome: Ongoing, Progressing  Intervention: Identify and Manage Contributors  Recent Flowsheet Documentation  Taken 8/18/2023 2000 by Kristen Washburn RN  Medication Review/Management: medications reviewed  Self-Care Promotion:   independence encouraged   BADL personal objects within reach   safe use of adaptive equipment encouraged  Intervention: Promote Injury-Free Environment  Recent Flowsheet Documentation  Taken 8/19/2023 0000 by Kristen Washburn RN  Safety Promotion/Fall Prevention:   activity supervised   assistive device/personal items within reach   clutter free environment maintained   nonskid shoes/slippers when out of bed   safety round/check completed   room organization consistent  Taken 8/18/2023 2219 by Kristen Washburn RN  Safety Promotion/Fall Prevention:   activity supervised   assistive device/personal items within reach   clutter free environment maintained   nonskid shoes/slippers when out of bed   room organization consistent   safety round/check completed  Taken 8/18/2023 2000 by Kristen Washburn RN  Safety Promotion/Fall Prevention:   activity supervised   assistive device/personal items within reach   clutter free environment maintained   nonskid shoes/slippers when out of bed   room organization consistent   safety round/check completed     Problem: Skin Injury Risk Increased  Goal: Skin Health and Integrity  Outcome: Ongoing, Progressing  Intervention: Promote and Optimize Oral Intake  Recent Flowsheet Documentation  Taken 8/18/2023 2000 by Kristen Washburn RN  Oral Nutrition Promotion: rest periods promoted  Intervention: Optimize Skin Protection  Recent Flowsheet Documentation  Taken 8/19/2023 0000 by Kristen Washburn RN  Pressure Reduction Techniques: frequent weight shift encouraged  Head of Bed (HOB) Positioning: HOB elevated  Pressure Reduction Devices:   positioning supports  utilized   pressure-redistributing mattress utilized  Skin Protection:   adhesive use limited   incontinence pads utilized   transparent dressing maintained   tubing/devices free from skin contact  Taken 8/18/2023 2219 by Kristen Washburn RN  Pressure Reduction Techniques: frequent weight shift encouraged  Head of Bed (HOB) Positioning: \[facility]\"" elevated  Pressure Reduction Devices:   pressure-redistributing mattress utilized   positioning supports utilized  Skin Protection:   adhesive use limited   incontinence pads utilized   transparent dressing maintained   tubing/devices free from skin contact  Taken 8/18/2023 2000 by Kristen Washburn RN  Pressure Reduction Techniques: frequent weight shift encouraged  Head of Bed (HOB) Positioning: \[facility]\"" elevated  Pressure Reduction Devices:   pressure-redistributing mattress utilized   positioning supports utilized  Skin Protection:   adhesive use limited   incontinence pads utilized   transparent dressing maintained   tubing/devices free from skin contact     Problem: Adult Inpatient Plan of Care  Goal: Plan of Care Review  Outcome: Ongoing, Progressing  Flowsheets  Taken 8/19/2023 0637 by Kristen Washburn RN  Outcome Evaluation: VSS throughout shift. No complaints of pain. Pt to come out of precautions on 8/19. No acute events.  Taken 8/18/2023 1359 by Ольга Victor PTA  Progress: improving  Plan of Care Reviewed With: patient   Goal Outcome Evaluation:  Plan of Care Reviewed With: patient        Progress: improving  Outcome Evaluation: VSS throughout shift. No complaints of pain. Pt to come out of precautions on 8/19. No acute events.

## 2023-08-19 NOTE — PROGRESS NOTES
Psychiatric Medicine Services  PROGRESS NOTE    Patient Name: Miryam Mckeon  : 1945  MRN: [id number]    Date of Admission: 2023  Primary Care Physician: Rigoberto Chamberlain MD    Subjective   Subjective     CC:  Follow up weakness, COVID-19    HPI:  Patient seen resting in bed in no apparent distress. No acute events overnight per nursing.  at bedside for visit. Patient notes that she is currently feeling OK. Currently alert and oriented x3. Updated patient's daughter via telephone. She is concerned about memory issues and confusion that have been slowly progressing over the past year. We discussed plan for referral to neurology at discharge.     ROS:  Gen- No fevers, chills  CV- No chest pain, palpitations  Resp- No cough, dyspnea  GI- No N/V/D, abd pain    Objective   Objective     Vital Signs:   Temp:  [95.4 øF (35.2 øC)-97.7 øF (36.5 øC)] 97.1 øF (36.2 øC)  Heart Rate:  [75-79] 77  Resp:  [16] 16  BP: (143-176)/(56-86) 164/86  Flow (L/min):  [0-1] 1     Physical Exam:  Constitutional: No acute distress, awake, alert, chronically ill appearing   HENT: NCAT, mucous membranes moist  Respiratory: course, diminished, respiratory effort normal on 1L NC   Cardiovascular: irregular, no murmurs, cap refill brisk   Gastrointestinal: Positive bowel sounds, soft, nontender, nondistended  Musculoskeletal: No BLE edema   Psychiatric: flat affect, cooperative  Neurologic: Oriented x 3, moves all extremities, sign language  Skin: warm, dry, no visible rash     Results Reviewed:  LAB RESULTS:      Lab 23  0344 23  0812 23  0450 08/15/23  0404 23  0821   WBC 5.61 7.29 8.22 7.62 7.53   HEMOGLOBIN 14.3 15.0 14.8 14.9 14.9   HEMATOCRIT 42.4 44.9 43.4 44.5 45.2   PLATELETS 118* 135* 141 137* 139*   NEUTROS ABS  --   --  6.93 6.65 6.28   IMMATURE GRANS (ABS)  --   --  0.09* 0.13* 0.08*   LYMPHS ABS  --   --  0.64* 0.48* 0.79   MONOS ABS  --    --  0.54 0.34 0.35   EOS ABS  --   --  0.00 0.00 0.00   MCV 93.6 93.5 93.3 94.5 95.2         Lab 08/19/23  0344 08/17/23  0812 08/16/23  0450 08/15/23  0404 08/14/23  0821   SODIUM 138 136 137 134* 133*   POTASSIUM 3.7 4.1 3.8 4.2 4.5   CHLORIDE 100 97* 99 96* 95*   CO2 30.0* 28.0 30.0* 28.0 27.0   ANION GAP 8.0 11.0 8.0 10.0 11.0   BUN 36* 45* 52* 51* 45*   CREATININE 1.20* 1.32* 1.17* 1.18* 1.26*   EGFR 46.1* 41.2* 47.6* 47.1* 43.5*   GLUCOSE 83 83 101* 152* 133*   CALCIUM 8.9 8.6 8.7 8.3* 8.5*   MAGNESIUM  --   --  2.0 1.8 1.9         Lab 08/16/23  0450 08/15/23  0404 08/14/23  0821   TOTAL PROTEIN 5.1* 4.8* 5.2*   ALBUMIN 2.4* 2.7* 2.9*   GLOBULIN 2.7 2.1 2.3   ALT (SGPT) 39* 41* 42*   AST (SGOT) 29 31 39*   BILIRUBIN 0.4 0.4 0.5   ALK PHOS 119* 135* 136*         Lab 08/14/23  0821   PROBNP 41,672.0*                 Brief Urine Lab Results  (Last result in the past 365 days)        Color   Clarity   Blood   Leuk Est   Nitrite   Protein   CREAT   Urine HCG        08/09/23 1806 Yellow   Clear   Small (1+)   Negative   Negative   >=300 mg/dL (3+)                   Microbiology Results Abnormal       None            No radiology results from the last 24 hrs    Results for orders placed during the hospital encounter of 08/09/23    Adult Transthoracic Echo Limited W/ Cont if Necessary Per Protocol    Interpretation Summary    : Left ventricular systolic function is moderately decreased. Calculated left ventricular EF = 35.5% Left ventricular ejection fraction appears to be 31 - 35%. Septal wall motion is abnormal, consistent with right ventricular pacing. Normal left ventricular cavity size noted. Left ventricular wall thickness is consistent with mild concentric hypertrophy. There is left ventricular global hypokinesis noted.    The right atrial cavity is dilated.    The right atrial cavity is dilated. The diameter of the inferior vena cava is 1.57 cm. Partial IVC inspiratory collapse of less than 50% noted.    Mild  periprosthetic aortic valve regurgitation is seen. There is a TAVR valve present, 20mm Gisselle 3. The prosthetic aortic valve is grossly normal.    : Calcified mitral valve chordae are present. There is mild, bileaflet mitral valve thickening present. Mild mitral valve regurgitation is present.    The tricuspid valve is normal in structure. Moderate tricuspid valve regurgitation is present.      Current medications:  Scheduled Meds:amiodarone, 200 mg, Oral, Q12H  apixaban, 2.5 mg, Oral, Q12H  aspirin, 81 mg, Oral, Daily  atorvastatin, 10 mg, Oral, Nightly  cholecalciferol, 1,000 Units, Oral, Daily  dilTIAZem CD, 300 mg, Oral, Daily  guaiFENesin, 1,200 mg, Oral, Q12H  latanoprost, 1 drop, Both Eyes, Nightly  metoprolol succinate XL, 100 mg, Oral, Q24H  pharmacy consult - MTM, , Does not apply, Daily  senna-docusate sodium, 2 tablet, Oral, BID  sodium chloride, 10 mL, Intravenous, Q12H  valsartan, 160 mg, Oral, Q12H  vitamin B-12, 1,000 mcg, Oral, Daily      Continuous Infusions:   PRN Meds:.  acetaminophen    albuterol sulfate HFA    senna-docusate sodium **AND** polyethylene glycol **AND** bisacodyl **AND** bisacodyl    Calcium Replacement - Follow Nurse / BPA Driven Protocol    ipratropium-albuterol    Magnesium Low Dose Replacement - Follow Nurse / BPA Driven Protocol    ondansetron **OR** ondansetron    Phosphorus Replacement - Follow Nurse / BPA Driven Protocol    Potassium Replacement - Follow Nurse / BPA Driven Protocol    simethicone    [COMPLETED] Insert Peripheral IV **AND** sodium chloride    sodium chloride    sodium chloride    Assessment & Plan   Assessment & Plan     Active Hospital Problems    Diagnosis  POA    **Acute HFrEF (heart failure with reduced ejection fraction) [I50.21]  Unknown    COVID-19 [U07.1]  Yes    Weakness [R53.1]  Unknown    Hypokalemia [E87.6]  Unknown    Hypoalbuminemia [E88.09]  Unknown    COVID-19 virus detected [U07.1]  Unknown    Hypoxia [R09.02]  Unknown    Pleural effusion  [J90]  Unknown    Pulmonary hypertension [I27.20]  Yes    Chronic anticoagulation [Z79.01]  Not Applicable    Chronic nausea [R11.0]  Yes    Chronic kidney disease, stage 3b [N18.32]  Yes    Presence of cardiac pacemaker secondary to sick sinus syndrome [Z95.0]  Yes    Chronic combined systolic and diastolic congestive heart failure [I50.42]  Yes    Hypertension [I10]  Yes    Aortic stenosis, severe s/p TAVR (7/20/2022) [I35.0]  Yes    Longstanding persistent atrial fibrillation [I48.11]  Yes    Sick sinus syndrome [I49.5]  Yes    Hyperlipidemia LDL goal <70 [E78.5]  Yes    Bilateral carotid artery stenosis [I65.23]  Yes    Status post CVA [Z86.73]  Not Applicable      Resolved Hospital Problems   No resolved problems to display.        Brief Hospital Course to date:  Miryam Mckeon is a 77 y.o. female  with past medical history of A-fib on Eliquis, aortic valve stenosis status post TAVR, CKD stage III, deafness, hypertension, hyperlipidemia and history of CVA who came into the emergency department with complaints of generalized weakness.  She was found to be COVID-19 positive. Cardiology has followed due to acute heart failure exacerbation.     This patient's problems and plans were partially entered by my partner and updated as appropriate by me 08/19/23.    Acute on chronic systolic and diastolic congestive heart failure exacerbation  Dual chamber PPM 2022 ([facility])  TAVR 2022  Persistent afib  -Echo with reduced EF 35% - likely due to afib vs persistent RV pacing  --needs PPM interrogation once out of COVID precautions   --Cardiology following, consideration of [facility]   --eliquis/amio/cardizem/metoprolol  --diuresis PRN  --AM labs      COVID upper respiratory infection  -Symptoms of dry cough and hypoxia with intermittent dyspnea  -Patient declined remdesivir  --Reluctantly agreeable to continue short course of steroids (5 days total)  -Continue inhalers as needed  --Completed isolation on 8/18     Generalized  weakness  -Likely multifactorial  -PT and OT recc rehab     Hypertension  - diovan dose increased     Hyperlipidemia  Bilateral carotid artery stenosis  History of CVA  -Continue home medications     Poor p.o. intake  Failure to thrive  Hypoalbuminemia  Malnutrition  -Nutrition consult requested  --start Boost/magic cup supplement TID     CKD, stage III     Hearing loss  - uses     [facility]  Memory loss  -Pt's daughter reports progressive memory issues over the past year.  -Plan for outpatient neurology referral at discharge      Expected Discharge Location and Transportation: Rehab, private vehicle  Expected Discharge   Expected Discharge Date: 8/20/2023; Expected Discharge Time:       DVT prophylaxis:  Medical and mechanical DVT prophylaxis orders are present.     AM-PAC 6 Clicks Score (PT): 18 (08/18/23 2000)    CODE STATUS:   Code Status and Medical Interventions:   Ordered at: 08/09/23 2555     Level Of Support Discussed With:    Patient     Code Status (Patient has no pulse and is not breathing):    CPR (Attempt to Resuscitate)     Medical Interventions (Patient has pulse or is breathing):    Full Support       BALJIT Preston  08/19/23

## 2023-08-19 NOTE — PROGRESS NOTES
"[facility] Cardiology Daily Note       LOS: 8 days   Patient Care Team:  Rigoberto Chamberlain MD as PCP - General (Family Medicine)  Vito Cuba MD as Consulting Physician (Cardiology)  Yashira Pyle MD as Consulting Physician (Cardiology)    Chief Complaint: Acute on chronic systolic heart failure/TAVR/PAF    Subjective     Subjective: No complaints this morning.  An  was present when I spoke with the patient so that I could inform her about her falling ejection fraction.  92% on room air.  Blood pressures have been elevated.  Heart rates in the 70s.    Review of Systems:   As above.    Medications:  amiodarone, 200 mg, Oral, Q12H  apixaban, 2.5 mg, Oral, Q12H  aspirin, 81 mg, Oral, Daily  atorvastatin, 10 mg, Oral, Nightly  cholecalciferol, 1,000 Units, Oral, Daily  dilTIAZem CD, 300 mg, Oral, Daily  guaiFENesin, 1,200 mg, Oral, Q12H  latanoprost, 1 drop, Both Eyes, Nightly  metoprolol succinate XL, 100 mg, Oral, Q24H  pharmacy consult - MTM, , Does not apply, Daily  senna-docusate sodium, 2 tablet, Oral, BID  sodium chloride, 10 mL, Intravenous, Q12H  valsartan, 160 mg, Oral, Q12H  vitamin B-12, 1,000 mcg, Oral, Daily        Objective     Vital Sign Min/Max for last 24 hours  Temp  Min: 95.4 øF (35.2 øC)  Max: 97.7 øF (36.5 øC)   BP  Min: 143/56  Max: 176/76   Pulse  Min: 75  Max: 79   Resp  Min: 16  Max: 16   SpO2  Min: 92 %  Max: 95 %   Flow (L/min)  Min: 0  Max: 1   No data recorded      Intake/Output Summary (Last 24 hours) at 8/19/2023 0949  Last data filed at 8/18/2023 1841  Gross per 24 hour   Intake 150 ml   Output --   Net 150 ml        Flowsheet Rows      Flowsheet Row First Filed Value   Admission Height 152.4 cm (60\") Documented at 08/09/2023 1638   Admission Weight 45.4 kg (100 lb) Documented at 08/09/2023 1638            Physical Exam:    General: Alert and oriented   Cardiovascular: Heart has a nondisplaced focal PMI. Regular rate and rhythm with 1/6 " SE murmur, no gallop or rub.  Lungs: Clear without rales or wheezes. Equal expansion is noted.   Extremities: Show no edema.  Skin: warm and dry.  Neurologic: nonfocal       Results Review:    I reviewed the patient's new clinical results.  EKG:  Tele: A-fib    Labs:    Results from last 7 days   Lab Units 08/19/23  0344 08/17/23  0812 08/16/23  0450 08/15/23  0404 08/14/23  0821   SODIUM mmol/L 138 136 137 134* 133*   POTASSIUM mmol/L 3.7 4.1 3.8 4.2 4.5   CHLORIDE mmol/L 100 97* 99 96* 95*   CO2 mmol/L 30.0* 28.0 30.0* 28.0 27.0   BUN mg/dL 36* 45* 52* 51* 45*   CREATININE mg/dL 1.20* 1.32* 1.17* 1.18* 1.26*   CALCIUM mg/dL 8.9 8.6 8.7 8.3* 8.5*   BILIRUBIN mg/dL  --   --  0.4 0.4 0.5   ALK PHOS U/L  --   --  119* 135* 136*   ALT (SGPT) U/L  --   --  39* 41* 42*   AST (SGOT) U/L  --   --  29 31 39*   GLUCOSE mg/dL 83 83 101* 152* 133*     Results from last 7 days   Lab Units 08/19/23  0344 08/17/23  0812 08/16/23  0450   WBC 10*3/mm3 5.61 7.29 8.22   HEMOGLOBIN g/dL 14.3 15.0 14.8   HEMATOCRIT % 42.4 44.9 43.4   PLATELETS 10*3/mm3 118* 135* 141     Lab Results   Component Value Date    TROPONINT 29 (H) 08/09/2023    TROPONINT 35 (H) 08/09/2023     Lab Results   Component Value Date    CHOL 93 08/10/2023    CHOL 96 05/20/2022     Lab Results   Component Value Date    TRIG 99 08/10/2023    TRIG 48 05/20/2022    TRIG 48 02/18/2022     Lab Results   Component Value Date    HDL 37 (L) 08/10/2023    HDL 55 05/20/2022    HDL 55 02/18/2022     No components found for: LDLCALC  Lab Results   Component Value Date    INR 1.30 (H) 07/19/2022    INR 1.20 (H) 05/20/2022    INR 1.26 (H) 05/17/2022    PROTIME 16.1 (H) 07/19/2022    PROTIME 15.1 (H) 05/20/2022    PROTIME 15.8 (H) 05/17/2022         Ejection Fraction: LVEF 30 to 35% by echo.  20 mm MARK 3 pericardial prosthesis.    Assessment   Assessment:    COVID +/off precautions as of 10/18/2023  Acute on chronic systolic and diastolic heart failure  Echo EF was 60 to 65% by  echo 2/18/2022 (personally reviewed echo)  Status post dual-chamber pacemaker 4/13/2022 Dr. Hussein  Echo EF 40 to 45% 8/8/2022 with anteroseptal hypokinesis.  Minor coronary artery disease by cath 5/20/2022  Aortic stenosis status post 20 mm MARK 3 TAVR 7/20/2022 EF 60% at implant  Persistent atrial fibrillation  Continue amiodarone  Continue Eliquis  Sick sinus syndrome status post permanent pacemaker  CKD    Fall in ejection fraction is unexplained unless possibly by persistent RV pacing or A-fib with RVR    Plan:    Repeat echocardiogram today limited for LVEF  Continue amiodarone and Eliquis for PAF  Continue statin for minor coronary disease  Continue aspirin for TAVR  May consider repeat cardiac catheterization If her LV function remains reduced on today's echo.    We will need pacemaker interrogation if her LV function remains reduced on today's echo.  Continue valsartan 160 mg twice daily.     Yashira Pyle MD  08/19/23  09:49 EDT

## 2023-08-20 LAB
ANION GAP SERPL CALCULATED.3IONS-SCNC: 9 MMOL/L (ref 5–15)
BUN SERPL-MCNC: 30 MG/DL (ref 8–23)
BUN/CREAT SERPL: 25.2 (ref 7–25)
CALCIUM SPEC-SCNC: 8.4 MG/DL (ref 8.6–10.5)
CHLORIDE SERPL-SCNC: 98 MMOL/L (ref 98–107)
CO2 SERPL-SCNC: 29 MMOL/L (ref 22–29)
CREAT SERPL-MCNC: 1.19 MG/DL (ref 0.57–1)
DEPRECATED RDW RBC AUTO: 43.8 FL (ref 37–54)
EGFRCR SERPLBLD CKD-EPI 2021: 46.6 ML/MIN/1.73
ERYTHROCYTE [DISTWIDTH] IN BLOOD BY AUTOMATED COUNT: 12.8 % (ref 12.3–15.4)
GLUCOSE SERPL-MCNC: 72 MG/DL (ref 65–99)
HCT VFR BLD AUTO: 40.7 % (ref 34–46.6)
HGB BLD-MCNC: 13.6 G/DL (ref 12–15.9)
MCH RBC QN AUTO: 31.1 PG (ref 26.6–33)
MCHC RBC AUTO-ENTMCNC: 33.4 G/DL (ref 31.5–35.7)
MCV RBC AUTO: 93.1 FL (ref 79–97)
PLATELET # BLD AUTO: 135 10*3/MM3 (ref 140–450)
PMV BLD AUTO: 10.6 FL (ref 6–12)
POTASSIUM SERPL-SCNC: 3.5 MMOL/L (ref 3.5–5.2)
RBC # BLD AUTO: 4.37 10*6/MM3 (ref 3.77–5.28)
SODIUM SERPL-SCNC: 136 MMOL/L (ref 136–145)
WBC NRBC COR # BLD: 6.58 10*3/MM3 (ref 3.4–10.8)

## 2023-08-20 PROCEDURE — 80048 BASIC METABOLIC PNL TOTAL CA: CPT | Performed by: NURSE PRACTITIONER

## 2023-08-20 PROCEDURE — 85027 COMPLETE CBC AUTOMATED: CPT | Performed by: NURSE PRACTITIONER

## 2023-08-20 PROCEDURE — 25010000002 ONDANSETRON PER 1 MG: Performed by: INTERNAL MEDICINE

## 2023-08-20 PROCEDURE — 99232 SBSQ HOSP IP/OBS MODERATE 35: CPT | Performed by: FAMILY MEDICINE

## 2023-08-20 RX ORDER — METOPROLOL SUCCINATE 50 MG/1
150 TABLET, EXTENDED RELEASE ORAL
Status: DISCONTINUED | OUTPATIENT
Start: 2023-08-21 | End: 2023-08-21 | Stop reason: HOSPADM

## 2023-08-20 RX ORDER — METOPROLOL SUCCINATE 50 MG/1
50 TABLET, EXTENDED RELEASE ORAL ONCE
Status: COMPLETED | OUTPATIENT
Start: 2023-08-20 | End: 2023-08-20

## 2023-08-20 RX ADMIN — VALSARTAN 160 MG: 160 TABLET, FILM COATED ORAL at 08:43

## 2023-08-20 RX ADMIN — AMIODARONE HYDROCHLORIDE 200 MG: 200 TABLET ORAL at 21:01

## 2023-08-20 RX ADMIN — GUAIFENESIN 1200 MG: 600 TABLET, EXTENDED RELEASE ORAL at 08:44

## 2023-08-20 RX ADMIN — SENNOSIDES AND DOCUSATE SODIUM 2 TABLET: 50; 8.6 TABLET ORAL at 21:01

## 2023-08-20 RX ADMIN — ASPIRIN 81 MG: 81 TABLET, CHEWABLE ORAL at 08:45

## 2023-08-20 RX ADMIN — METOPROLOL SUCCINATE 50 MG: 50 TABLET, EXTENDED RELEASE ORAL at 11:29

## 2023-08-20 RX ADMIN — LATANOPROST 1 DROP: 50 SOLUTION OPHTHALMIC at 21:03

## 2023-08-20 RX ADMIN — ONDANSETRON 4 MG: 2 INJECTION INTRAMUSCULAR; INTRAVENOUS at 21:01

## 2023-08-20 RX ADMIN — DILTIAZEM HYDROCHLORIDE 300 MG: 180 CAPSULE, COATED, EXTENDED RELEASE ORAL at 08:44

## 2023-08-20 RX ADMIN — VALSARTAN 160 MG: 160 TABLET, FILM COATED ORAL at 21:01

## 2023-08-20 RX ADMIN — SENNOSIDES AND DOCUSATE SODIUM 2 TABLET: 50; 8.6 TABLET ORAL at 08:45

## 2023-08-20 RX ADMIN — Medication 1000 UNITS: at 08:43

## 2023-08-20 RX ADMIN — AMIODARONE HYDROCHLORIDE 200 MG: 200 TABLET ORAL at 08:43

## 2023-08-20 RX ADMIN — Medication 10 ML: at 08:43

## 2023-08-20 RX ADMIN — METOPROLOL SUCCINATE 100 MG: 50 TABLET, EXTENDED RELEASE ORAL at 08:45

## 2023-08-20 RX ADMIN — ATORVASTATIN CALCIUM 10 MG: 10 TABLET, FILM COATED ORAL at 21:02

## 2023-08-20 RX ADMIN — CYANOCOBALAMIN TAB 1000 MCG 1000 MCG: 1000 TAB at 08:45

## 2023-08-20 RX ADMIN — Medication 10 ML: at 21:02

## 2023-08-20 RX ADMIN — GUAIFENESIN 1200 MG: 600 TABLET, EXTENDED RELEASE ORAL at 21:01

## 2023-08-20 RX ADMIN — ONDANSETRON 4 MG: 2 INJECTION INTRAMUSCULAR; INTRAVENOUS at 08:55

## 2023-08-20 RX ADMIN — APIXABAN 2.5 MG: 2.5 TABLET, FILM COATED ORAL at 21:02

## 2023-08-20 RX ADMIN — APIXABAN 2.5 MG: 2.5 TABLET, FILM COATED ORAL at 08:44

## 2023-08-20 NOTE — PLAN OF CARE
Problem: Fall Injury Risk  Goal: Absence of Fall and Fall-Related Injury  Outcome: Ongoing, Progressing  Intervention: Identify and Manage Contributors  Recent Flowsheet Documentation  Taken 8/20/2023 1400 by Sherly Wren RN  Self-Care Promotion:   independence encouraged   BADL personal objects within reach   BADL personal routines maintained   safe use of adaptive equipment encouraged  Taken 8/20/2023 1200 by Sherly Wren RN  Self-Care Promotion:   independence encouraged   BADL personal objects within reach   BADL personal routines maintained   safe use of adaptive equipment encouraged  Taken 8/20/2023 1000 by Sherly Wren RN  Self-Care Promotion:   independence encouraged   BADL personal objects within reach   BADL personal routines maintained   safe use of adaptive equipment encouraged  Taken 8/20/2023 0800 by Sherly Wren RN  Medication Review/Management: medications reviewed  Self-Care Promotion:   independence encouraged   BADL personal objects within reach   BADL personal routines maintained   safe use of adaptive equipment encouraged  Intervention: Promote Injury-Free Environment  Recent Flowsheet Documentation  Taken 8/20/2023 1400 by Sherly Wren RN  Safety Promotion/Fall Prevention:   activity supervised   assistive device/personal items within reach   clutter free environment maintained   mobility aid in reach   nonskid shoes/slippers when out of bed   room organization consistent   safety round/check completed  Taken 8/20/2023 1200 by Sherly Wren RN  Safety Promotion/Fall Prevention:   activity supervised   assistive device/personal items within reach   clutter free environment maintained   nonskid shoes/slippers when out of bed   room organization consistent   safety round/check completed  Taken 8/20/2023 1000 by Sherly Wren RN  Safety Promotion/Fall Prevention:   activity supervised   assistive device/personal items within reach   clutter free environment maintained   fall  prevention program maintained   nonskid shoes/slippers when out of bed   room organization consistent   safety round/check completed  Taken 8/20/2023 0800 by Sherly Wren RN  Safety Promotion/Fall Prevention:   activity supervised   assistive device/personal items within reach   clutter free environment maintained   fall prevention program maintained   nonskid shoes/slippers when out of bed   room organization consistent   safety round/check completed   Goal Outcome Evaluation:  Plan of Care Reviewed With: patient, daughter        Progress: improving

## 2023-08-20 NOTE — PROGRESS NOTES
[facility] Medicine Services  PROGRESS NOTE    Patient Name: Miryam Mckeon  : 1945  MRN: [id number]    Date of Admission: 2023  Primary Care Physician: Rigoberto Chamberlain MD    Subjective   Subjective     CC:  Follow-up weakness, COVID-19    HPI:  Patient seen resting in bed no apparent distress.  No acute events overnight per nursing.   present at bedside for visit.  We discussed final cardiology recommendations with plan to discharge to [facility] once PPM interrogation has been done and transportation has been arranged.  She expressed no new concerns at this time.    ROS:  Gen- No fevers, chills  CV- No chest pain, palpitations  Resp- No cough, dyspnea  GI- No N/V/D, abd pain    Objective   Objective     Vital Signs:   Temp:  [96.7 øF (35.9 øC)-98.1 øF (36.7 øC)] 97.5 øF (36.4 øC)  Heart Rate:  [63-90] 90  Resp:  [16-18] 18  BP: (128-172)/(57-91) 128/74     Physical Exam:  Constitutional: No acute distress, awake, alert, chronically ill-appearing  HENT: NCAT, mucous membranes moist  Respiratory: Coarse, diminished in bases respiratory effort normal, currently on room air  Cardiovascular: irregular, no murmurs, cap refill brisk   Gastrointestinal: Positive bowel sounds, soft, nontender, nondistended  Musculoskeletal: No BLE edema   Psychiatric: Appropriate affect, cooperative  Neurologic: Oriented x 3, moves all extremities, sign language  Skin: warm, dry, no visible rash     Results Reviewed:  LAB RESULTS:      Lab 23  0412 23  0344 23  0812 23  0450 08/15/23  0404 23  0821   WBC 6.58 5.61 7.29 8.22 7.62 7.53   HEMOGLOBIN 13.6 14.3 15.0 14.8 14.9 14.9   HEMATOCRIT 40.7 42.4 44.9 43.4 44.5 45.2   PLATELETS 135* 118* 135* 141 137* 139*   NEUTROS ABS  --   --   --  6.93 6.65 6.28   IMMATURE GRANS (ABS)  --   --   --  0.09* 0.13* 0.08*   LYMPHS ABS  --   --   --  0.64* 0.48* 0.79   MONOS ABS  --   --   --  0.54 0.34  0.35   EOS ABS  --   --   --  0.00 0.00 0.00   MCV 93.1 93.6 93.5 93.3 94.5 95.2         Lab 08/20/23  0412 08/19/23  0344 08/17/23  0812 08/16/23  0450 08/15/23  0404 08/14/23  0821   SODIUM 136 138 136 137 134* 133*   POTASSIUM 3.5 3.7 4.1 3.8 4.2 4.5   CHLORIDE 98 100 97* 99 96* 95*   CO2 29.0 30.0* 28.0 30.0* 28.0 27.0   ANION GAP 9.0 8.0 11.0 8.0 10.0 11.0   BUN 30* 36* 45* 52* 51* 45*   CREATININE 1.19* 1.20* 1.32* 1.17* 1.18* 1.26*   EGFR 46.6* 46.1* 41.2* 47.6* 47.1* 43.5*   GLUCOSE 72 83 83 101* 152* 133*   CALCIUM 8.4* 8.9 8.6 8.7 8.3* 8.5*   MAGNESIUM  --   --   --  2.0 1.8 1.9         Lab 08/16/23  0450 08/15/23  0404 08/14/23  0821   TOTAL PROTEIN 5.1* 4.8* 5.2*   ALBUMIN 2.4* 2.7* 2.9*   GLOBULIN 2.7 2.1 2.3   ALT (SGPT) 39* 41* 42*   AST (SGOT) 29 31 39*   BILIRUBIN 0.4 0.4 0.5   ALK PHOS 119* 135* 136*         Lab 08/14/23  0821   PROBNP 41,672.0*                 Brief Urine Lab Results  (Last result in the past 365 days)        Color   Clarity   Blood   Leuk Est   Nitrite   Protein   CREAT   Urine HCG        08/09/23 1806 Yellow   Clear   Small (1+)   Negative   Negative   >=300 mg/dL (3+)                   Microbiology Results Abnormal       None            Adult Transthoracic Echo Limited W/ Cont if Necessary Per Protocol    Result Date: 8/19/2023    Left ventricular systolic function is low normal. Estimated left ventricular EF = 50%   Left ventricular wall thickness is consistent with mild concentric hypertrophy.   There is a 20 mm MARK 3 TAVR valve present.   Aortic valve mean pressure gradient is 11 mmHg.   Calculated aortic valve area 1.12 cmý.   Trivial prosthesis insufficiency, paravalvular versus central.      Results for orders placed during the hospital encounter of 08/09/23    Adult Transthoracic Echo Limited W/ Cont if Necessary Per Protocol    Interpretation Summary    Left ventricular systolic function is low normal. Estimated left ventricular EF = 50%    Left ventricular wall  thickness is consistent with mild concentric hypertrophy.    There is a 20 mm MARK 3 TAVR valve present.    Aortic valve mean pressure gradient is 11 mmHg.    Calculated aortic valve area 1.12 cmý.    Trivial prosthesis insufficiency, paravalvular versus central.      Current medications:  Scheduled Meds:amiodarone, 200 mg, Oral, Q12H  apixaban, 2.5 mg, Oral, Q12H  aspirin, 81 mg, Oral, Daily  atorvastatin, 10 mg, Oral, Nightly  cholecalciferol, 1,000 Units, Oral, Daily  dilTIAZem CD, 300 mg, Oral, Daily  guaiFENesin, 1,200 mg, Oral, Q12H  latanoprost, 1 drop, Both Eyes, Nightly  metoprolol succinate XL, 100 mg, Oral, Q24H  pharmacy consult - MTM, , Does not apply, Daily  senna-docusate sodium, 2 tablet, Oral, BID  sodium chloride, 10 mL, Intravenous, Q12H  valsartan, 160 mg, Oral, Q12H  vitamin B-12, 1,000 mcg, Oral, Daily      Continuous Infusions:   PRN Meds:.  acetaminophen    albuterol sulfate HFA    senna-docusate sodium **AND** polyethylene glycol **AND** bisacodyl **AND** bisacodyl    Calcium Replacement - Follow Nurse / BPA Driven Protocol    ipratropium-albuterol    Magnesium Low Dose Replacement - Follow Nurse / BPA Driven Protocol    ondansetron **OR** ondansetron    Phosphorus Replacement - Follow Nurse / BPA Driven Protocol    Potassium Replacement - Follow Nurse / BPA Driven Protocol    simethicone    [COMPLETED] Insert Peripheral IV **AND** sodium chloride    sodium chloride    sodium chloride    Assessment & Plan   Assessment & Plan     Active Hospital Problems    Diagnosis  POA    **Acute HFrEF (heart failure with reduced ejection fraction) [I50.21]  Unknown    COVID-19 [U07.1]  Yes    Weakness [R53.1]  Unknown    Hypokalemia [E87.6]  Unknown    Hypoalbuminemia [E88.09]  Unknown    COVID-19 virus detected [U07.1]  Unknown    Hypoxia [R09.02]  Unknown    Pleural effusion [J90]  Unknown    Pulmonary hypertension [I27.20]  Yes    Chronic anticoagulation [Z79.01]  Not Applicable    Chronic nausea  [R11.0]  Yes    Chronic kidney disease, stage 3b [N18.32]  Yes    Presence of cardiac pacemaker secondary to sick sinus syndrome [Z95.0]  Yes    Chronic combined systolic and diastolic congestive heart failure [I50.42]  Yes    Hypertension [I10]  Yes    Aortic stenosis, severe s/p TAVR (7/20/2022) [I35.0]  Yes    Longstanding persistent atrial fibrillation [I48.11]  Yes    Sick sinus syndrome [I49.5]  Yes    Hyperlipidemia LDL goal <70 [E78.5]  Yes    Bilateral carotid artery stenosis [I65.23]  Yes    Status post CVA [Z86.73]  Not Applicable      Resolved Hospital Problems   No resolved problems to display.        Brief Hospital Course to date:  Miryam Mckeon is a 77 y.o. female ith past medical history of A-fib on Eliquis, aortic valve stenosis status post TAVR, CKD stage III, deafness, hypertension, hyperlipidemia and history of CVA who came into the emergency department with complaints of generalized weakness.  She was found to be COVID-19 positive. Cardiology has followed due to acute heart failure exacerbation.     This patient's problems and plans were partially entered by my partner and updated as appropriate by me 08/20/23.     Acute on chronic systolic and diastolic congestive heart failure exacerbation  Dual chamber PPM 2022 ([facility])  TAVR 2022  Persistent afib  -Echo with reduced EF 35% - likely due to afib vs persistent RV pacing  --needs PPM interrogation once out of COVID precautions   --Cardiology following, [facility] of [facility]   -Repeat Echo on 8/19 revealed improved EF of 50%  --eliquis/amio/cardizem/metoprolol  --diuresis PRN  --AM labs      COVID upper respiratory infection  -Symptoms of dry cough and hypoxia with intermittent dyspnea  -Patient declined remdesivir  --s/p short course of steroids (5 days total)  -Continue inhalers as needed  --Completed isolation on 8/18     Generalized weakness  -Likely multifactorial  -PT and OT recc rehab     Hypertension  - diovan dose increased      Hyperlipidemia  Bilateral carotid artery stenosis  History of CVA  -Continue home medications     Poor p.o. intake  Failure to thrive  Hypoalbuminemia  Malnutrition  -Nutrition consult requested  --start Boost/magic cup supplement TID     CKD, stage III     Hearing loss  - uses      [facility]  Memory loss  -Pt's daughter reports progressive memory issues over the past year.  -Plan for outpatient neurology referral at discharge      Expected Discharge Location and Transportation: Rehab, private vehicle  Expected Discharge   Expected Discharge Date: 8/21/2023; Expected Discharge Time:       DVT prophylaxis:  Medical and mechanical DVT prophylaxis orders are present.     AM-PAC 6 Clicks Score (PT): 18 (08/19/23 2000)    CODE STATUS:   Code Status and Medical Interventions:   Ordered at: 08/09/23 3374     Level Of Support Discussed With:    Patient     Code Status (Patient has no pulse and is not breathing):    CPR (Attempt to Resuscitate)     Medical Interventions (Patient has pulse or is breathing):    Full Support       Manuel Armas, BALJIT  08/20/23

## 2023-08-20 NOTE — CASE MANAGEMENT/SOCIAL WORK
Continued Stay Note  [facility]     Patient Name: Miryam Mckeon  MRN: [id number]  Today's Date: 8/20/2023    Admit Date: 8/9/2023    Plan: [facility]   Discharge Plan       Row Name 08/20/23 1214       Plan    Plan [facility]    Patient/Family in Agreement with Plan yes    Plan Comments I spoke with Allyn/YEIMI, on the phone. I updated her that pt is having a PPM interogation today, then okay with Cardiology to be discharged. Kunal/BALJIT messaged and stated med ready once cleared by Cardiology. Allyn stated that pt can be discharged to [facility] on 8/21/23 to their Skilled Rehab Unit. Pt is deaf. I spoke with her daughter, on the phone. I updated her on [facility], she states she can transport pt tomorrow. She requests that pt's RN, on Monday, call her when close to being ready so she can come to the hospital then.  will continue to follow.    Final Discharge Disposition Code 03 - skilled nursing facility (SNF)                   Discharge Codes    No documentation.                 Expected Discharge Date and Time       Expected Discharge Date Expected Discharge Time    Aug 14, 2023               Marcelle Ni RN

## 2023-08-20 NOTE — PROGRESS NOTES
"[facility] Emergency Department Cardiology Daily Note       LOS: 9 days   Patient Care Team:  Rigoberto Chamberlain MD as PCP - General (Family Medicine)  Vito Cuba MD as Consulting Physician (Cardiology)  Yashira Pyle MD as Consulting Physician (Cardiology)    Chief Complaint: Acute on chronic systolic heart failure/TAVR/PAF    Subjective     Subjective: Sleeping comfortably when I entered the room.  She awoke easily.  The  was present.  We discussed that her heart function was almost back to normal.  We discussed that I feel this is likely related to her acute COVID illness.  She wants to know if she can go home today.    93% on room air.  Blood pressures are still somewhat elevated.  Heart rates have been in the 80s and 90s.    Review of Systems:   As above.    Medications:  amiodarone, 200 mg, Oral, Q12H  apixaban, 2.5 mg, Oral, Q12H  aspirin, 81 mg, Oral, Daily  atorvastatin, 10 mg, Oral, Nightly  cholecalciferol, 1,000 Units, Oral, Daily  dilTIAZem CD, 300 mg, Oral, Daily  guaiFENesin, 1,200 mg, Oral, Q12H  latanoprost, 1 drop, Both Eyes, Nightly  metoprolol succinate XL, 100 mg, Oral, Q24H  pharmacy consult - MTM, , Does not apply, Daily  senna-docusate sodium, 2 tablet, Oral, BID  sodium chloride, 10 mL, Intravenous, Q12H  valsartan, 160 mg, Oral, Q12H  vitamin B-12, 1,000 mcg, Oral, Daily        Objective     Vital Sign Min/Max for last 24 hours  Temp  Min: 96.7 øF (35.9 øC)  Max: 98.1 øF (36.7 øC)   BP  Min: 128/74  Max: 172/57   Pulse  Min: 63  Max: 90   Resp  Min: 16  Max: 18   SpO2  Min: 92 %  Max: 97 %   No data recorded   Weight  Min: 52.1 kg (114 lb 12.8 oz)  Max: 52.6 kg (115 lb 15.4 oz)      Intake/Output Summary (Last 24 hours) at 8/20/2023 0838  Last data filed at 8/19/2023 2000  Gross per 24 hour   Intake 240 ml   Output --   Net 240 ml        Flowsheet Rows      Flowsheet Row First Filed Value   Admission Height 152.4 cm (60\") Documented at 08/09/2023 1638 "   Admission Weight 45.4 kg (100 lb) Documented at 08/09/2023 1638            Physical Exam:    General: Alert and oriented   Cardiovascular: Heart has a nondisplaced focal PMI. Regular rate and rhythm with 1/6 SE murmur, no gallop or rub.  Lungs: Clear without rales or wheezes. Equal expansion is noted.   Extremities: Show no edema.  Skin: warm and dry.  Neurologic: nonfocal       Results Review:    I reviewed the patient's new clinical results.  EKG:  Tele: A-fib    Labs:    Results from last 7 days   Lab Units 08/20/23  0412 08/19/23  0344 08/17/23  0812 08/16/23  0450 08/15/23  0404 08/14/23  0821   SODIUM mmol/L 136 138 136 137 134* 133*   POTASSIUM mmol/L 3.5 3.7 4.1 3.8 4.2 4.5   CHLORIDE mmol/L 98 100 97* 99 96* 95*   CO2 mmol/L 29.0 30.0* 28.0 30.0* 28.0 27.0   BUN mg/dL 30* 36* 45* 52* 51* 45*   CREATININE mg/dL 1.19* 1.20* 1.32* 1.17* 1.18* 1.26*   CALCIUM mg/dL 8.4* 8.9 8.6 8.7 8.3* 8.5*   BILIRUBIN mg/dL  --   --   --  0.4 0.4 0.5   ALK PHOS U/L  --   --   --  119* 135* 136*   ALT (SGPT) U/L  --   --   --  39* 41* 42*   AST (SGOT) U/L  --   --   --  29 31 39*   GLUCOSE mg/dL 72 83 83 101* 152* 133*     Results from last 7 days   Lab Units 08/20/23  0412 08/19/23  0344 08/17/23  0812   WBC 10*3/mm3 6.58 5.61 7.29   HEMOGLOBIN g/dL 13.6 14.3 15.0   HEMATOCRIT % 40.7 42.4 44.9   PLATELETS 10*3/mm3 135* 118* 135*     Lab Results   Component Value Date    TROPONINT 29 (H) 08/09/2023    TROPONINT 35 (H) 08/09/2023     Lab Results   Component Value Date    CHOL 93 08/10/2023    CHOL 96 05/20/2022     Lab Results   Component Value Date    TRIG 99 08/10/2023    TRIG 48 05/20/2022    TRIG 48 02/18/2022     Lab Results   Component Value Date    HDL 37 (L) 08/10/2023    HDL 55 05/20/2022    HDL 55 02/18/2022     No components found for: LDLCALC  Lab Results   Component Value Date    INR 1.30 (H) 07/19/2022    INR 1.20 (H) 05/20/2022    INR 1.26 (H) 05/17/2022    PROTIME 16.1 (H) 07/19/2022    PROTIME 15.1 (H)  05/20/2022    PROTIME 15.8 (H) 05/17/2022         Ejection Fraction: LVEF 50% by echo.  20 mm MARK 3 pericardial prosthesis.    Assessment   Assessment:    COVID +/off precautions as of 10/18/2023  Acute on chronic systolic and diastolic heart failure  Echo EF was 60 to 65% by echo 2/18/2022 (personally reviewed echo)  Status post dual-chamber pacemaker 4/13/2022 Dr. Hussein  Echo EF 40 to 45% 8/8/2022 with anteroseptal hypokinesis.  Minor coronary artery disease by cath 5/20/202  Echocardiogram 8/19/2023 shows an LVEF estimated at 50%.  Suspect Folan EF was related to acute illness.  Aortic stenosis status post 20 mm MARK 3 TAVR 7/20/2022 EF 60% at implant  Persistent atrial fibrillation  Continue amiodarone  Continue Eliquis  Sick sinus syndrome status post permanent pacemaker  CKD    Plan:    Continue amiodarone and Eliquis for PAF (amiodarone started per Dr. Hussein as she was going in and out of sinus rhythm)  Continue statin for minor coronary disease  Continue aspirin for TAVR  Continue valsartan 160 mg twice daily.  Could change to 320 mg once daily at discharge  Could theoretically be discharged today.  Suggest pacemaker interrogation prior to discharge  Suggest following blood pressures at home.  If her blood pressures remain elevated  We will increase metoprolol succinate to 150 mg daily.  Follow-up with me in 6 weeks.  We may consider cardioversion at that time if she still in A-fib.    Yashira Pyle MD  08/20/23  08:38 EDT

## 2023-08-20 NOTE — PLAN OF CARE
Problem: Fall Injury Risk  Goal: Absence of Fall and Fall-Related Injury  Outcome: Ongoing, Progressing  Intervention: Identify and Manage Contributors  Recent Flowsheet Documentation  Taken 8/19/2023 2000 by Kristen Washburn RN  Medication Review/Management: medications reviewed  Self-Care Promotion:   independence encouraged   BADL personal objects within reach   safe use of adaptive equipment encouraged  Intervention: Promote Injury-Free Environment  Recent Flowsheet Documentation  Taken 8/20/2023 0400 by Kristen Washburn RN  Safety Promotion/Fall Prevention:   activity supervised   assistive device/personal items within reach   clutter free environment maintained   nonskid shoes/slippers when out of bed   room organization consistent   safety round/check completed  Taken 8/20/2023 0200 by Kristen Washburn RN  Safety Promotion/Fall Prevention:   activity supervised   assistive device/personal items within reach   clutter free environment maintained   nonskid shoes/slippers when out of bed   room organization consistent   safety round/check completed  Taken 8/20/2023 0000 by Kristen Washburn RN  Safety Promotion/Fall Prevention:   activity supervised   assistive device/personal items within reach   clutter free environment maintained   nonskid shoes/slippers when out of bed   room organization consistent   safety round/check completed  Taken 8/19/2023 2200 by Kristen Washburn RN  Safety Promotion/Fall Prevention:   activity supervised   assistive device/personal items within reach   clutter free environment maintained   safety round/check completed   room organization consistent  Taken 8/19/2023 2000 by Kristen Washburn RN  Safety Promotion/Fall Prevention:   activity supervised   assistive device/personal items within reach   clutter free environment maintained   nonskid shoes/slippers when out of bed   room organization consistent   safety round/check completed     Problem: Skin Injury  Risk Increased  Goal: Skin Health and Integrity  Outcome: Ongoing, Progressing  Intervention: Promote and Optimize Oral Intake  Recent Flowsheet Documentation  Taken 8/19/2023 2000 by Kristen Washburn RN  Oral Nutrition Promotion: rest periods promoted  Intervention: Optimize Skin Protection  Recent Flowsheet Documentation  Taken 8/20/2023 0400 by Kristen Washburn RN  Pressure Reduction Techniques: frequent weight shift encouraged  Head of Bed (HOB) Positioning: [facility] elevated  Pressure Reduction Devices:   positioning supports utilized   pressure-redistributing mattress utilized  Skin Protection:   adhesive use limited   incontinence pads utilized   transparent dressing maintained   tubing/devices free from skin contact  Taken 8/20/2023 0200 by Kristen Washburn RN  Pressure Reduction Techniques: frequent weight shift encouraged  Head of Bed (HOB) Positioning: [facility] elevated  Pressure Reduction Devices:   positioning supports utilized   pressure-redistributing mattress utilized  Skin Protection:   adhesive use limited   incontinence pads utilized   transparent dressing maintained   tubing/devices free from skin contact  Taken 8/20/2023 0000 by Kristen Washburn RN  Pressure Reduction Techniques: frequent weight shift encouraged  Head of Bed (HOB) Positioning: [facility] elevated  Pressure Reduction Devices:   positioning supports utilized   pressure-redistributing mattress utilized  Skin Protection:   adhesive use limited   incontinence pads utilized   transparent dressing maintained   tubing/devices free from skin contact  Taken 8/19/2023 2200 by Kristen Washburn RN  Pressure Reduction Techniques: frequent weight shift encouraged  Head of Bed (HOB) Positioning: [facility] elevated  Pressure Reduction Devices:   positioning supports utilized   pressure-redistributing mattress utilized  Skin Protection:   adhesive use limited   incontinence pads utilized   transparent dressing maintained   tubing/devices free from  skin contact  Taken 8/19/2023 2000 by Kristen Washburn RN  Pressure Reduction Techniques: frequent weight shift encouraged  Head of Bed (HOB) Positioning: HOB elevated  Pressure Reduction Devices:   positioning supports utilized   pressure-redistributing mattress utilized  Skin Protection:   adhesive use limited   incontinence pads utilized   transparent dressing maintained   tubing/devices free from skin contact     Problem: Adult Inpatient Plan of Care  Goal: Plan of Care Review  Outcome: Ongoing, Progressing  Flowsheets  Taken 8/20/2023 0610 by Kristen Washburn RN  Progress: no change  Outcome Evaluation:   VSS throughout shift. No complaints of pain. Pt with 2 events of emesis during shift   denied any medications for nausea. NPO since midnight. No acute events.  Taken 8/18/2023 1359 by Ольга Victor PTA  Plan of Care Reviewed With: patient   Goal Outcome Evaluation:  Plan of Care Reviewed With: patient        Progress: no change  Outcome Evaluation: VSS throughout shift. No complaints of pain. Pt with 2 events of emesis during shift; denied any medications for nausea. NPO since midnight. No acute events.

## 2023-08-21 VITALS
HEART RATE: 76 BPM | OXYGEN SATURATION: 94 % | BODY MASS INDEX: 22.54 KG/M2 | SYSTOLIC BLOOD PRESSURE: 154 MMHG | DIASTOLIC BLOOD PRESSURE: 59 MMHG | RESPIRATION RATE: 15 BRPM | TEMPERATURE: 98.2 F | HEIGHT: 60 IN | WEIGHT: 114.8 LBS

## 2023-08-21 LAB
ANION GAP SERPL CALCULATED.3IONS-SCNC: 9 MMOL/L (ref 5–15)
BUN SERPL-MCNC: 37 MG/DL (ref 8–23)
BUN/CREAT SERPL: 27 (ref 7–25)
CALCIUM SPEC-SCNC: 8.2 MG/DL (ref 8.6–10.5)
CHLORIDE SERPL-SCNC: 98 MMOL/L (ref 98–107)
CO2 SERPL-SCNC: 26 MMOL/L (ref 22–29)
CREAT SERPL-MCNC: 1.37 MG/DL (ref 0.57–1)
DEPRECATED RDW RBC AUTO: 43.3 FL (ref 37–54)
EGFRCR SERPLBLD CKD-EPI 2021: 39.4 ML/MIN/1.73
ERYTHROCYTE [DISTWIDTH] IN BLOOD BY AUTOMATED COUNT: 12.7 % (ref 12.3–15.4)
GLUCOSE SERPL-MCNC: 66 MG/DL (ref 65–99)
HCT VFR BLD AUTO: 38.6 % (ref 34–46.6)
HGB BLD-MCNC: 12.7 G/DL (ref 12–15.9)
MCH RBC QN AUTO: 30.5 PG (ref 26.6–33)
MCHC RBC AUTO-ENTMCNC: 32.9 G/DL (ref 31.5–35.7)
MCV RBC AUTO: 92.8 FL (ref 79–97)
PLATELET # BLD AUTO: 129 10*3/MM3 (ref 140–450)
PMV BLD AUTO: 10.6 FL (ref 6–12)
POTASSIUM SERPL-SCNC: 3.5 MMOL/L (ref 3.5–5.2)
RBC # BLD AUTO: 4.16 10*6/MM3 (ref 3.77–5.28)
SODIUM SERPL-SCNC: 133 MMOL/L (ref 136–145)
WBC NRBC COR # BLD: 6.91 10*3/MM3 (ref 3.4–10.8)

## 2023-08-21 PROCEDURE — 99239 HOSP IP/OBS DSCHRG MGMT >30: CPT | Performed by: FAMILY MEDICINE

## 2023-08-21 PROCEDURE — 85027 COMPLETE CBC AUTOMATED: CPT | Performed by: NURSE PRACTITIONER

## 2023-08-21 PROCEDURE — 80048 BASIC METABOLIC PNL TOTAL CA: CPT | Performed by: NURSE PRACTITIONER

## 2023-08-21 RX ORDER — AMIODARONE HYDROCHLORIDE 200 MG/1
200 TABLET ORAL EVERY 12 HOURS SCHEDULED
Start: 2023-08-21

## 2023-08-21 RX ORDER — METOPROLOL SUCCINATE 50 MG/1
150 TABLET, EXTENDED RELEASE ORAL
Start: 2023-08-22

## 2023-08-21 RX ORDER — VALSARTAN 160 MG/1
160 TABLET ORAL EVERY 12 HOURS SCHEDULED
Start: 2023-08-21

## 2023-08-21 RX ADMIN — APIXABAN 2.5 MG: 2.5 TABLET, FILM COATED ORAL at 08:49

## 2023-08-21 RX ADMIN — Medication 1000 UNITS: at 08:49

## 2023-08-21 RX ADMIN — VALSARTAN 160 MG: 160 TABLET, FILM COATED ORAL at 08:49

## 2023-08-21 RX ADMIN — CYANOCOBALAMIN TAB 1000 MCG 1000 MCG: 1000 TAB at 08:50

## 2023-08-21 RX ADMIN — Medication 10 ML: at 08:51

## 2023-08-21 RX ADMIN — SENNOSIDES AND DOCUSATE SODIUM 2 TABLET: 50; 8.6 TABLET ORAL at 08:49

## 2023-08-21 RX ADMIN — GUAIFENESIN 1200 MG: 600 TABLET, EXTENDED RELEASE ORAL at 08:50

## 2023-08-21 RX ADMIN — AMIODARONE HYDROCHLORIDE 200 MG: 200 TABLET ORAL at 08:50

## 2023-08-21 RX ADMIN — DILTIAZEM HYDROCHLORIDE 300 MG: 180 CAPSULE, COATED, EXTENDED RELEASE ORAL at 08:50

## 2023-08-21 RX ADMIN — ASPIRIN 81 MG: 81 TABLET, CHEWABLE ORAL at 08:49

## 2023-08-21 RX ADMIN — METOPROLOL SUCCINATE 150 MG: 50 TABLET, EXTENDED RELEASE ORAL at 08:50

## 2023-08-21 NOTE — DISCHARGE SUMMARY
[facility]  DISCHARGE SUMMARY    Patient Name: Miryam Mckeon  : 1945  MRN: [id number]    Date of Admission: 2023  4:42 PM  Date of Discharge:  23  Primary Care Physician: Rigoberto Chamberlain MD    Consults       Date and Time Order Name Status Description    2023 10:05 PM Inpatient Cardiology Consult Completed             Hospital Course       Active Hospital Problems    Diagnosis  POA    **Acute HFrEF (heart failure with reduced ejection fraction) [I50.21]  Unknown    COVID-19 [U07.1]  Yes    Weakness [R53.1]  Unknown    Hypokalemia [E87.6]  Unknown    Hypoalbuminemia [E88.09]  Unknown    COVID-19 virus detected [U07.1]  Unknown    Hypoxia [R09.02]  Unknown    Pleural effusion [J90]  Unknown    Pulmonary hypertension [I27.20]  Yes    Chronic anticoagulation [Z79.01]  Not Applicable    Chronic nausea [R11.0]  Yes    Chronic kidney disease, stage 3b [N18.32]  Yes    Presence of cardiac pacemaker secondary to sick sinus syndrome [Z95.0]  Yes    Chronic combined systolic and diastolic congestive heart failure [I50.42]  Yes    Hypertension [I10]  Yes    Aortic stenosis, severe s/p TAVR (2022) [I35.0]  Yes    Longstanding persistent atrial fibrillation [I48.11]  Yes    Sick sinus syndrome [I49.5]  Yes    Hyperlipidemia LDL goal <70 [E78.5]  Yes    Bilateral carotid artery stenosis [I65.23]  Yes    Status post CVA [Z86.73]  Not Applicable      Resolved Hospital Problems   No resolved problems to display.          Hospital Course:  Miryam Mckeon is a 77 y.o. female    ith past medical history of A-fib on Eliquis, aortic valve stenosis status post TAVR, CKD stage III, deafness, hypertension, hyperlipidemia and history of CVA who came into the emergency department with complaints of generalized weakness.  She was found to be COVID-19 positive. Cardiology has followed due to acute heart failure exacerbation.        Acute on chronic systolic and  diastolic congestive heart failure exacerbation  Dual chamber PPM 2022 (SSS)  TAVR 2022  Persistent afib  -Echo with reduced EF 35% - likely due to afib vs persistent RV pacing  --PPM interrogation this morning   --Cardiology following, consideration of C once out of isolation   -Repeat Echo on 8/19 revealed improved EF of 50%  --eliquis/amio/cardizem/metoprolol  --diuresis PRN  --follow up in 6 weeks; consideration of cardioversion if remains in Afib        COVID upper respiratory infection  -Symptoms of dry cough and hypoxia with intermittent dyspnea  -Patient declined remdesivir  --s/p short course of steroids (5 days total)  -Continue inhalers as needed  --Completed isolation on 8/18     Generalized weakness  -Likely multifactorial  -PT and OT recc rehab     Hypertension  - diovan dose increased     Hyperlipidemia  Bilateral carotid artery stenosis  History of CVA  -Continue home medications     Poor p.o. intake  Failure to thrive  Hypoalbuminemia  Malnutrition  -Nutrition consult requested  --start Boost/magic cup supplement TID     CKD, stage III     Hearing loss  - uses      [facility]  Memory loss  -Pt's daughter reports progressive memory issues over the past year.  -Plan for outpatient neurology referral at discharge    Discharge Follow Up Recommendations for outpatient labs/diagnostics:   PCP 1 week   Heart and Valve clinic 1 week   Dr Pyle 6 weeks  Neurology/ memory clinic referral     Day of Discharge     HPI:   Resting in bed. NAD. No family in room, no interpretor. Used white board for communication without difficulty. Patient denies pain or any new concerns. No respiratory issues. Endorses she is eating minimally. No f/c, n/v/d, soa or cp     Review of Systems  All other systems negative    Vital Signs:   Temp:  [96.8 øF (36 øC)-98.2 øF (36.8 øC)] 98.2 øF (36.8 øC)  Heart Rate:  [64-82] 76  Resp:  [15-18] 15  BP: (133-178)/(55-95) 154/59      Physical  Exam:  Constitutional: No acute distress, awake, alert  HENT: NCAT, mucous membranes moist  Respiratory: Rhonchi with decreased bases, respiratory effort normal on RA  Cardiovascular: RRR, + 1/6 murmur   Gastrointestinal: Positive bowel sounds, soft, nontender, nondistended  Musculoskeletal: No bilateral ankle edema  Psychiatric: Appropriate affect, cooperative  Neurologic: Oriented x 3, GALLOWAY, + sign language   Skin: No rashes, pale     Pertinent  and/or Most Recent Results     LAB RESULTS:      Lab 08/21/23 0413 08/20/23 0412 08/19/23 0344 08/17/23  0812 08/16/23 0450 08/15/23  0404   WBC 6.91 6.58 5.61 7.29 8.22 7.62   HEMOGLOBIN 12.7 13.6 14.3 15.0 14.8 14.9   HEMATOCRIT 38.6 40.7 42.4 44.9 43.4 44.5   PLATELETS 129* 135* 118* 135* 141 137*   NEUTROS ABS  --   --   --   --  6.93 6.65   IMMATURE GRANS (ABS)  --   --   --   --  0.09* 0.13*   LYMPHS ABS  --   --   --   --  0.64* 0.48*   MONOS ABS  --   --   --   --  0.54 0.34   EOS ABS  --   --   --   --  0.00 0.00   MCV 92.8 93.1 93.6 93.5 93.3 94.5         Lab 08/21/23 0413 08/20/23 0412 08/19/23 0344 08/17/23  0812 08/16/23  0450 08/15/23  0404   SODIUM 133* 136 138 136 137 134*   POTASSIUM 3.5 3.5 3.7 4.1 3.8 4.2   CHLORIDE 98 98 100 97* 99 96*   CO2 26.0 29.0 30.0* 28.0 30.0* 28.0   ANION GAP 9.0 9.0 8.0 11.0 8.0 10.0   BUN 37* 30* 36* 45* 52* 51*   CREATININE 1.37* 1.19* 1.20* 1.32* 1.17* 1.18*   EGFR 39.4* 46.6* 46.1* 41.2* 47.6* 47.1*   GLUCOSE 66 72 83 83 101* 152*   CALCIUM 8.2* 8.4* 8.9 8.6 8.7 8.3*   MAGNESIUM  --   --   --   --  2.0 1.8         Lab 08/16/23  0450 08/15/23  0404   TOTAL PROTEIN 5.1* 4.8*   ALBUMIN 2.4* 2.7*   GLOBULIN 2.7 2.1   ALT (SGPT) 39* 41*   AST (SGOT) 29 31   BILIRUBIN 0.4 0.4   ALK PHOS 119* 135*                     Brief Urine Lab Results  (Last result in the past 365 days)        Color   Clarity   Blood   Leuk Est   Nitrite   Protein   CREAT   Urine HCG        08/09/23 1806 Yellow   Clear   Small (1+)   Negative    Negative   >=300 mg/dL (3+)                 Microbiology Results (last 10 days)       ** No results found for the last 240 hours. **            XR Chest 1 View    Result Date: 8/17/2023  XR CHEST 1 VW Date of Exam: 8/17/2023 2:35 AM EDT Indication: covid, heart failure, cough Comparison: None available. Findings: Left chest wall pacemaker. Aortic valve replacement. Surgical clips within the neck. Cardiac size is similar to prior exam. Left basal opacity with small left pleural effusion. Likely skinfolds projecting over the right lower lung as lung markings are seen beyond the lines. No evidence of pneumothorax. No evidence of acute osseous abnormalities. Visualized upper abdomen is unremarkable.     Impression: Similar left basilar opacity with associated small left pleural effusion. Electronically Signed: Warren Gonsalez MD  8/17/2023 9:23 AM EDT  Workstation ID: [id number]    XR Chest 1 View    Result Date: 8/14/2023  XR CHEST 1 VW Date of Exam: 8/14/2023 3:10 AM EDT Indication: Follow up COVID 19/CHF exac Comparison: 8/9/2023. Findings: The left lower lobe remains partially opacified by atelectasis and small effusion. There is stable moderate cardiomegaly. Left chest wall ICD again noted. There may be a minimal right effusion.     Impression: No appreciable change in left lower lobe atelectasis with small effusion. Electronically Signed: Angelica Glover MD  8/14/2023 8:02 AM EDT  Workstation ID: [id number]    Adult Transthoracic Echo Limited W/ Cont if Necessary Per Protocol    Result Date: 8/19/2023    Left ventricular systolic function is low normal. Estimated left ventricular EF = 50%   Left ventricular wall thickness is consistent with mild concentric hypertrophy.   There is a 20 mm MARK 3 TAVR valve present.   Aortic valve mean pressure gradient is 11 mmHg.   Calculated aortic valve area 1.12 cmý.   Trivial prosthesis insufficiency, paravalvular versus central.     Adult Transthoracic Echo Limited W/ Cont if  Necessary Per Protocol    Result Date: 8/11/2023    : Left ventricular systolic function is moderately decreased. Calculated left ventricular EF = 35.5% Left ventricular ejection fraction appears to be 31 - 35%. Septal wall motion is abnormal, consistent with right ventricular pacing. Normal left ventricular cavity size noted. Left ventricular wall thickness is consistent with mild concentric hypertrophy. There is left ventricular global hypokinesis noted.   The right atrial cavity is dilated.   The right atrial cavity is dilated. The diameter of the inferior vena cava is 1.57 cm. Partial IVC inspiratory collapse of less than 50% noted.   Mild periprosthetic aortic valve regurgitation is seen. There is a TAVR valve present, 20mm Gisselle 3. The prosthetic aortic valve is grossly normal.   : Calcified mitral valve chordae are present. There is mild, bileaflet mitral valve thickening present. Mild mitral valve regurgitation is present.   The tricuspid valve is normal in structure. Moderate tricuspid valve regurgitation is present.      Results for orders placed in visit on 02/18/22    Duplex Carotid Ultrasound CAR    Interpretation Summary  ú There is 60 to 80% stenosis LICA  ú Less than 40% stenosis in the JOHN  ú Moderate plaques in both primary carotid arteries  ú Normal antegrade vertebral flow bilateral      Results for orders placed in visit on 02/18/22    Duplex Carotid Ultrasound CAR    Interpretation Summary  ú There is 60 to 80% stenosis LICA  ú Less than 40% stenosis in the JOHN  ú Moderate plaques in both primary carotid arteries  ú Normal antegrade vertebral flow bilateral      Results for orders placed during the hospital encounter of 08/09/23    Adult Transthoracic Echo Limited W/ Cont if Necessary Per Protocol    Interpretation Summary    Left ventricular systolic function is low normal. Estimated left ventricular EF = 50%    Left ventricular wall thickness is consistent with mild concentric  hypertrophy.    There is a 20 mm MARK 3 TAVR valve present.    Aortic valve mean pressure gradient is 11 mmHg.    Calculated aortic valve area 1.12 cmý.    Trivial prosthesis insufficiency, paravalvular versus central.          Discharge Details        Discharge Medications        New Medications        Instructions Start Date   metoprolol succinate XL 50 MG 24 hr tablet  Commonly known as: TOPROL-XL   150 mg, Oral, Every 24 Hours Scheduled   Start Date: August 22, 2023     valsartan 160 MG tablet  Commonly known as: DIOVAN   160 mg, Oral, Every 12 Hours Scheduled             Changes to Medications        Instructions Start Date   amiodarone 200 MG tablet  Commonly known as: PACERONE  What changed:   when to take this  additional instructions  Another medication with the same name was removed. Continue taking this medication, and follow the directions you see here.   200 mg, Oral, Every 12 Hours Scheduled      ondansetron 4 MG tablet  Commonly known as: ZOFRAN  What changed:   how much to take  when to take this   4 mg, Oral, Every 8 Hours PRN             Continue These Medications        Instructions Start Date   acetaminophen 650 MG 8 hr tablet  Commonly known as: TYLENOL   650 mg, Oral, Every 8 Hours PRN      apixaban 2.5 MG tablet tablet  Commonly known as: ELIQUIS   2.5 mg, Oral, Every 12 Hours Scheduled, DO NOT RESUME TAKING UNTIL 7/24      aspirin 81 MG EC tablet   81 mg, Oral, Daily      atorvastatin 10 MG tablet  Commonly known as: LIPITOR   10 mg, Oral, Nightly      cholecalciferol 25 MCG (1000 UT) tablet  Commonly known as: VITAMIN D3   1,000 Units, Oral, Daily      dilTIAZem  MG 24 hr capsule  Commonly known as: Cardizem CD   300 mg, Oral, Daily      latanoprost 0.005 % ophthalmic solution  Commonly known as: XALATAN   INSTILL 1 DROP IN BOTH EYES EVERY NIGHT AT BEDTIME      MIRALAX PO   1 Scoop, Oral, As Needed      simethicone 125 MG chewable tablet  Commonly known as: MYLICON   125 mg, Oral,  Every 6 Hours PRN, PRN       vitamin B-12 1000 MCG tablet  Commonly known as: CYANOCOBALAMIN   1,000 mcg, Oral, Daily             Stop These Medications      furosemide 40 MG tablet  Commonly known as: LASIX     metoprolol tartrate 100 MG tablet  Commonly known as: LOPRESSOR              No Known Allergies      Discharge Disposition:  Rehab Facility or Unit (DC - External)    Diet:  Hospital:  Diet Order   Procedures    Diet: Regular/House Diet; Texture: Regular Texture (IDDSI 7); Fluid Consistency: Thin (IDDSI 0)       CODE STATUS:    Code Status and Medical Interventions:   Ordered at: 08/09/23 0478     Level Of Support Discussed With:    Patient     Code Status (Patient has no pulse and is not breathing):    CPR (Attempt to Resuscitate)     Medical Interventions (Patient has pulse or is breathing):    Full Support       Future Appointments   Date Time Provider Department Center   10/31/2023  3:45 PM Vito Cuba MD [facility]   11/27/2023  2:15 PM Troy Hussein DO [facility]       Additional Instructions for the Follow-ups that You Need to Schedule       Discharge Follow-up with PCP   As directed       Currently Documented PCP:    Rigoberto Chamberlain MD    PCP Phone Number:    [phone]     Follow Up Details: 1 week                      BALJIT Taveras  08/21/23      Time Spent on Discharge:  I spent  45 minutes on this discharge activity which included: face-to-face encounter with the patient, reviewing the data in the system, coordination of the care with the nursing staff as well as consultants, documentation, and entering orders.

## 2023-08-21 NOTE — CASE MANAGEMENT/SOCIAL WORK
Case Management Discharge Note      Final Note: Pt and daughter are agreeable to SRU at [facility]. Pt's daughter will transport her. I spoke with Blu/YEIMI, on the phone. They would like for pt to be discharged from [facility] by 1300 today so they can have a live interpretor available when the pt arrives. I messaged pt's APRN/Susanne, she will have D/C summary ready by then. I spoke with pt's RN, on the phone, she will call daughter and let her know. I updated Blu/YEIMI, by phone. RN call report to [phone]. Fax D/C summary to [phone].         Selected Continued Care - Admitted Since 8/9/2023       Destination Coordination complete.      Service Provider Selected Services Address Phone Fax Patient Preferred    [facility] [address] [place], [place] [id number] [phone] --       Internal Comment last updated by Marcelle Ni RN 8/20/2023 1154    8/20: Pt can got to [facility] on Monday 8/21/23                         Durable Medical Equipment    No services have been selected for the patient.                Dialysis/Infusion    No services have been selected for the patient.                Home Medical Care    No services have been selected for the patient.                Therapy    No services have been selected for the patient.                Community Resources    No services have been selected for the patient.                Community & DME    No services have been selected for the patient.                         Final Discharge Disposition Code: 03 - skilled nursing facility (SNF)

## 2023-08-21 NOTE — PLAN OF CARE
Problem: Fall Injury Risk  Goal: Absence of Fall and Fall-Related Injury  Outcome: Met   Goal Outcome Evaluation:

## 2023-08-27 PROCEDURE — 93294 REM INTERROG EVL PM/LDLS PM: CPT | Performed by: INTERNAL MEDICINE

## 2023-08-27 PROCEDURE — 93296 REM INTERROG EVL PM/IDS: CPT | Performed by: INTERNAL MEDICINE

## 2023-08-28 ENCOUNTER — TELEPHONE (OUTPATIENT)
Dept: CARDIOLOGY | Facility: CLINIC | Age: 78
End: 2023-08-28
Payer: MEDICARE

## 2023-08-28 NOTE — TELEPHONE ENCOUNTER
Spoke with Mila, daughter, advised her I was working on her [facility] paperwork.

## 2023-09-06 ENCOUNTER — TELEPHONE (OUTPATIENT)
Dept: FAMILY MEDICINE CLINIC | Facility: CLINIC | Age: 78
End: 2023-09-06
Payer: MEDICARE

## 2023-09-06 NOTE — TELEPHONE ENCOUNTER
PHONE CALL FROM DAUGHTER.  THEY ARE LOOKING INTO CAREGIVER FOR HOME.  THEY WOULD LIKE MORE INFORMATION.  CURRENTLY AT [facility].      PLEASE CALL [phone]

## 2023-09-06 NOTE — TELEPHONE ENCOUNTER
PATIENT CAREGIVER CALLED TO REQUEST SOONER APPT - A CANCELLATION WAS AVAILABLE AND PATIENT NOW HAS OV SCHEDULED FOR 9/12/23 AT 11:00 AM    SCHEDULE SHOWS ** CONFIRMED BHARATHI/ SHAY @ [employer] [phone]     NEW ARRANGEMENTS WILL NEED TO BE MADE DUE TO THE APPT DATE/TIME CHANGE    THANK YOU

## 2023-09-08 NOTE — TELEPHONE ENCOUNTER
Called and talked to patient's daughter.  Patient is in [facility].  She is not improving.  We had a discussion about memory care units, nursing home, and assisted living.  Patient is going to [facility] memory care unit.  May need nursing home placement.

## 2023-09-12 ENCOUNTER — OFFICE VISIT (OUTPATIENT)
Dept: NEUROLOGY | Facility: CLINIC | Age: 78
End: 2023-09-12
Payer: MEDICARE

## 2023-09-12 VITALS
HEART RATE: 58 BPM | HEIGHT: 60 IN | SYSTOLIC BLOOD PRESSURE: 142 MMHG | DIASTOLIC BLOOD PRESSURE: 68 MMHG | OXYGEN SATURATION: 95 % | BODY MASS INDEX: 22.42 KG/M2

## 2023-09-12 DIAGNOSIS — R63.0 DECREASE IN APPETITE: Primary | ICD-10-CM

## 2023-09-12 DIAGNOSIS — R41.3 MEMORY LOSS: ICD-10-CM

## 2023-09-12 PROCEDURE — 3077F SYST BP >= 140 MM HG: CPT | Performed by: NURSE PRACTITIONER

## 2023-09-12 PROCEDURE — 1160F RVW MEDS BY RX/DR IN RCRD: CPT | Performed by: NURSE PRACTITIONER

## 2023-09-12 PROCEDURE — 3078F DIAST BP <80 MM HG: CPT | Performed by: NURSE PRACTITIONER

## 2023-09-12 PROCEDURE — 1159F MED LIST DOCD IN RCRD: CPT | Performed by: NURSE PRACTITIONER

## 2023-09-12 PROCEDURE — 99214 OFFICE O/P EST MOD 30 MIN: CPT | Performed by: NURSE PRACTITIONER

## 2023-09-12 RX ORDER — DOCUSATE SODIUM 100 MG/1
100 CAPSULE, LIQUID FILLED ORAL AS NEEDED
COMMUNITY

## 2023-09-12 RX ORDER — GUAIFENESIN 600 MG/1
600 TABLET, EXTENDED RELEASE ORAL DAILY
COMMUNITY

## 2023-09-12 RX ORDER — ISOSORBIDE DINITRATE AND HYDRALAZINE HYDROCHLORIDE 37.5; 2 MG/1; MG/1
1.5 TABLET ORAL 3 TIMES DAILY
COMMUNITY

## 2023-09-12 RX ORDER — CLONIDINE HYDROCHLORIDE 0.1 MG/1
0.1 TABLET ORAL DAILY
COMMUNITY

## 2023-09-12 RX ORDER — MIRTAZAPINE 15 MG/1
7.5 TABLET, FILM COATED ORAL NIGHTLY
COMMUNITY

## 2023-09-12 RX ORDER — DRONABINOL 2.5 MG/1
2.5 CAPSULE ORAL
Qty: 60 CAPSULE | Refills: 2 | Status: SHIPPED | OUTPATIENT
Start: 2023-09-12 | End: 2024-09-11

## 2023-09-12 RX ORDER — HYDRALAZINE HYDROCHLORIDE 50 MG/1
50 TABLET, FILM COATED ORAL DAILY
COMMUNITY

## 2023-09-12 RX ORDER — MECLIZINE HCL 12.5 MG/1
12.5 TABLET ORAL AS NEEDED
COMMUNITY

## 2023-09-12 RX ORDER — PROMETHAZINE HYDROCHLORIDE 6.25 MG/5ML
SYRUP ORAL 4 TIMES DAILY PRN
COMMUNITY

## 2023-09-12 NOTE — PROGRESS NOTES
Neuro Office Visit      Encounter Date: 2023   Patient Name: Miryam Mckeon  : 1945   MRN: [id number]   PCP:  Rigoberto Chamberlain MD     Chief Complaint:    Chief Complaint   Patient presents with    Memory Loss       History of Present Illness: Miryam Mckeon is a 77 y.o. female who is here today in Neurology for memory loss.    Patient is deaf and is accompanied by her daughter as well as an .    In July of last year she underwent TAVR which was complicated by hematoma formation in her groin.  Daughter noticed memory issues marked by a decrease in short-term memory.     Forgetting to take medications.    Often forgets what day it is.    Forgets why she goes into her room.    In April and May of this year she began to get weaker and her appetite decreased.    In August of this year she was hospitalized with COVID-19 and although she was not extremely sick with it her daughter did notice worsening memory afterward.    She was discharged from [facility] on 2023 and went to [facility].  Unfortunately her weakness has increased and she has not been working with physical therapy.    She will be transitioning to [place] for ongoing monitoring.      Subjective      Past Medical History:   Past Medical History:   Diagnosis Date    Anxiety     Aortic valve stenosis     Atrial fibrillation     Borderline diabetes     ???    Carotid artery stenosis     CKD (chronic kidney disease)     Deaf     GERD (gastroesophageal reflux disease)     Heart murmur     Hyperlipidemia     Hypertension     Irregular heartbeat     PONV (postoperative nausea and vomiting)     Stroke     TIA (transient ischemic attack)        Past Surgical History:   Past Surgical History:   Procedure Laterality Date    AORTIC VALVE REPAIR/REPLACEMENT N/A 2022    Procedure: TRANSCATHETER AORTIC VALVE REPLACEMENT with angioplasty and stent to the right common and external iliac artery;   Surgeon: Bola Whitaker MD;  Location: [facility];  Service: Cardiothoracic;  Laterality: N/A;  FL-15 MIN 12 SEC  DOSE- 92.41  CONTRAST- 120 ML ISOVUE    AORTIC VALVE REPAIR/REPLACEMENT N/A 07/20/2022    Procedure: Transcatheter Aortic Valve Replacement;  Surgeon: Yashira Chow MD;  Location: [facility];  Service: Cardiovascular;  Laterality: N/A;    CARDIAC CATHETERIZATION      05/20/2022 per dr. chow    CARDIAC CATHETERIZATION Left 05/20/2022    Procedure: Left Heart Cath;  Surgeon: Yashira Chow MD;  Location: [facility] CATH INVASIVE LOCATION;  Service: Cardiology;  Laterality: Left;    CARDIAC ELECTROPHYSIOLOGY PROCEDURE N/A 04/13/2022    Procedure: DDD PPM Implant (BSC), DNS Eliquis;  Surgeon: Troy Hussein DO;  Location: [facility] EP INVASIVE LOCATION;  Service: Cardiology;  Laterality: N/A;    CARDIAC VALVE REPLACEMENT  07/2022    CAROTID ENDARTERECTOMY Bilateral     CATARACT EXTRACTION      CHOLECYSTECTOMY      COLONOSCOPY      FEMORAL ARTERY CUTDOWN Right 07/20/2022    Procedure: FEMORAL ARTERY CUTDOWN, ANGIOGRAM;  Surgeon: Bola Whitaker MD;  Location: [facility];  Service: Vascular;  Laterality: Right;  fl-1 m 12 sec  dose- 47.29 mgy  contrast- 50 ml isovue    KNEE SURGERY Right     PACEMAKER IMPLANTATION      JF      05/20/2022 per dr. chow    TRANSESOPHAGEAL ECHOCARDIOGRAM (JF) N/A 07/20/2022    Procedure: TRANSESOPHAGEAL ECHOCARDIOGRAM PER ANESTHESIA;  Surgeon: Bola Whitaker MD;  Location: [facility];  Service: Cardiothoracic;  Laterality: N/A;       Family History:   Family History   Problem Relation Age of Onset    Other Mother         enlarged heart    Stroke Father        Social History:   Social History     Socioeconomic History    Marital status:     Number of children: 2    Highest education level: High school graduate   Tobacco Use    Smoking status: Never    Smokeless tobacco: Never   Vaping Use    Vaping  Use: Never used   Substance and Sexual Activity    Alcohol use: Never    Drug use: Never    Sexual activity: Defer       Medications:     Current Outpatient Medications:     acetaminophen (TYLENOL) 650 MG 8 hr tablet, Take 1 tablet by mouth Every 8 (Eight) Hours As Needed for Mild Pain., Disp: , Rfl:     aluminum-magnesium hydroxide 200-200 MG/5ML suspension, Take 30 mL by mouth As Needed for Heartburn., Disp: , Rfl:     amiodarone (PACERONE) 200 MG tablet, Take 1 tablet by mouth Every 12 (Twelve) Hours., Disp: , Rfl:     apixaban (ELIQUIS) 2.5 MG tablet tablet, Take 1 tablet by mouth Every 12 (Twelve) Hours. DO NOT RESUME TAKING UNTIL 7/24, Disp: 180 tablet, Rfl: 1    aspirin 81 MG EC tablet, Take 1 tablet by mouth Daily., Disp: , Rfl:     atorvastatin (LIPITOR) 10 MG tablet, Take 1 tablet by mouth Every Night., Disp: , Rfl:     bifidobacterium lactis, INFANT'S MYLICON, probiotic drops liquid, Take 6 drops by mouth Daily., Disp: , Rfl:     cholecalciferol (VITAMIN D3) 25 MCG (1000 UT) tablet, Take 1 tablet by mouth Daily., Disp: , Rfl:     cloNIDine (CATAPRES) 0.1 MG tablet, Take 1 tablet by mouth Daily., Disp: , Rfl:     dilTIAZem CD (Cardizem CD) 300 MG 24 hr capsule, Take 1 capsule by mouth Daily., Disp: 90 capsule, Rfl: 3    docusate sodium (COLACE) 100 MG capsule, Take 1 capsule by mouth As Needed for Constipation., Disp: , Rfl:     guaiFENesin (MUCINEX) 600 MG 12 hr tablet, Take 1 tablet by mouth Daily., Disp: , Rfl:     hydrALAZINE (APRESOLINE) 50 MG tablet, Take 1 tablet by mouth Daily., Disp: , Rfl:     isosorbide-hydrALAZINE (BIDIL) 20-37.5 MG per tablet, Take 1.5 tablets by mouth 3 (Three) Times a Day., Disp: , Rfl:     latanoprost (XALATAN) 0.005 % ophthalmic solution, INSTILL 1 DROP IN BOTH EYES EVERY NIGHT AT BEDTIME, Disp: , Rfl:     meclizine (ANTIVERT) 12.5 MG tablet, Take 1 tablet by mouth As Needed for Dizziness., Disp: , Rfl:     metoprolol succinate XL (TOPROL-XL) 50 MG 24 hr tablet, Take 3  tablets by mouth Daily., Disp: , Rfl:     mirtazapine (REMERON) 15 MG tablet, Take 0.5 tablets by mouth Every Night., Disp: , Rfl:     Polyethylene Glycol 3350 (MIRALAX PO), Take 1 Scoop by mouth As Needed., Disp: , Rfl:     promethazine (PHENERGAN) 6.25 MG/5ML syrup, Take  by mouth 4 (Four) Times a Day As Needed for Nausea or Vomiting., Disp: , Rfl:     simethicone (MYLICON) 125 MG chewable tablet, Chew 1 tablet Every 6 (Six) Hours As Needed for Flatulence. PRN, Disp: , Rfl:     valsartan (DIOVAN) 160 MG tablet, Take 1 tablet by mouth Every 12 (Twelve) Hours., Disp: , Rfl:     vitamin B-12 (CYANOCOBALAMIN) 1000 MCG tablet, Take 1 tablet by mouth Daily., Disp: , Rfl:     dronabinol (Marinol) 2.5 MG capsule, Take 1 capsule by mouth 2 (Two) Times a Day Before Meals., Disp: 60 capsule, Rfl: 2    ondansetron (ZOFRAN) 4 MG tablet, Take 1 tablet by mouth Every 8 (Eight) Hours As Needed for Nausea., Disp: 90 tablet, Rfl: 0    Allergies:   No Known Allergies    PHQ-9 Total Score:     STEADI Fall Risk Assessment was completed, and patient is at HIGH risk for falls. Assessment completed on:9/12/2023    Objective     Physical Exam:   Physical Exam  Vitals reviewed.   Eyes:      Extraocular Movements: EOM normal.      Pupils: Pupils are equal, round, and reactive to light.   Neurological:      Deep Tendon Reflexes:      Reflex Scores:       Tricep reflexes are 2+ on the right side and 2+ on the left side.       Bicep reflexes are 2+ on the right side and 2+ on the left side.       Brachioradialis reflexes are 2+ on the right side and 2+ on the left side.       Patellar reflexes are 2+ on the right side and 2+ on the left side.       Achilles reflexes are 2+ on the right side and 2+ on the left side.      Neurologic Exam     Mental Status   Follows 2 step commands.   Attention: decreased. Concentration: decreased.   Speech: mute   Level of consciousness: alert    Cranial Nerves     CN II   Visual fields full to confrontation.  "    CN III, IV, VI   Pupils are equal, round, and reactive to light.  Extraocular motions are normal.   CN III: no CN III palsy  CN VI: no CN VI palsy    CN V   Facial sensation intact.     CN VII   Facial expression full, symmetric.     CN VIII   CN VIII normal.     CN IX, X   CN IX normal.   CN X normal.     CN XI   CN XI normal.     CN XII   CN XII normal.     Motor Exam     Strength   Right neck flexion: 4/5  Left neck flexion: 4/5  Right neck extension: 4/5  Left neck extension: 4/5  Right deltoid: 4/5  Left deltoid: 4/5  Right biceps: 4/5  Left biceps: 4/5  Right triceps: 4/5  Left triceps: 4/5  Right wrist flexion: 4/5  Left wrist flexion: 4/5  Right wrist extension: 4/5  Left wrist extension: 4/5  Right interossei: 4/5  Left interossei: 4/5  Right abdominals: 4/5  Left abdominals: 4/5  Right iliopsoas: 4/5  Left iliopsoas: 4/5  Right quadriceps: 4/5  Left quadriceps: 4/5  Right hamstrin/5  Left hamstrin/5  Right glutei: 4/5  Left glutei: 4/5  Right anterior tibial: 4/5  Left anterior tibial: 4/5  Right posterior tibial: 4/5  Left posterior tibial: 4/5  Right peroneal: 4/5  Left peroneal: 4/5  Right gastroc: 4/5  Left gastroc: 4/5    Sensory Exam   Light touch normal.     Gait, Coordination, and Reflexes     Gait  Gait: non-neurologic    Tremor   Resting tremor: absent  Intention tremor: absent  Action tremor: absent    Reflexes   Right brachioradialis: 2+  Left brachioradialis: 2+  Right biceps: 2+  Left biceps: 2+  Right triceps: 2+  Left triceps: 2+  Right patellar: 2+  Left patellar: 2+  Right achilles: 2+  Left achilles: 2+  Right : 2+  Left : 2+     Vital Signs:   Vitals:    23 1038   BP: 142/68   Pulse: 58   SpO2: 95%   Height: 152.4 cm (60\")     Body mass index is 22.42 kg/m².     Results:   Imaging:   XR Shoulder 2+ View Left    Result Date: 2023  Impression: No acute fracture or dislocation of the imaged right lower extremity and left upper extremity. Electronically Signed: " Randell Solomon  8/9/2023 5:59 PM EDT  Workstation ID: [id number]    XR ELBOW 2 VIEW LEFT    Result Date: 8/9/2023  Impression: No acute fracture or dislocation of the imaged right lower extremity and left upper extremity. Electronically Signed: Randell Solomon  8/9/2023 5:59 PM EDT  Workstation ID: [id number]    XR Ankle 3+ View Right    Result Date: 8/9/2023  Impression: No acute fracture or dislocation of the imaged right lower extremity and left upper extremity. Electronically Signed: Randell Solomon  8/9/2023 5:59 PM EDT  Workstation ID: [id number]    XR Foot 3+ View Right    Result Date: 8/9/2023  Impression: No acute fracture or dislocation of the imaged right lower extremity and left upper extremity. Electronically Signed: Randell Solomon  8/9/2023 5:59 PM EDT  Workstation ID: [id number]    CT Head Without Contrast    Result Date: 8/9/2023  Mild age-related changes of the brain as above, otherwise without evidence of acute intracranial abnormality. Electronically Signed: Randell Solomon  8/9/2023 7:28 PM EDT  Workstation ID: [id number]    CT Chest Without Contrast Diagnostic    Result Date: 8/10/2023  Impression: 1. Small bilateral pleural effusions, left greater than right. Mild dependent bibasilar atelectasis. 2. Cardiomegaly. Evidence of prior aortic valve replacement and stable left-sided ICD. Electronically Signed: Steven Pereira  8/10/2023 3:19 AM EDT  Workstation ID: [id number]    XR Chest 1 View    Result Date: 8/17/2023  Impression: Similar left basilar opacity with associated small left pleural effusion. Electronically Signed: Warren Gonsalez MD  8/17/2023 9:23 AM EDT  Workstation ID: [id number]    XR Chest 1 View    Result Date: 8/14/2023  Impression: No appreciable change in left lower lobe atelectasis with small effusion. Electronically Signed: Angelica Glover MD  8/14/2023 8:02 AM EDT  Workstation ID: [id number]    XR Chest 1 View    Result Date: 8/9/2023  Impression: Small left pleural effusion and basilar  atelectasis. Otherwise stable chest as above. Electronically Signed: Randell Solomon  8/9/2023 5:22 PM EDT  Workstation ID: [id number]       Labs:    No results found for: CMP, PROTEIN, ANTIMOGAB, [id number], JCVRESULT, QUANTTBGOLD, CBCDIF, IGGALBSER     Assessment / Plan      Assessment/Plan:   Diagnoses and all orders for this visit:    1. Decrease in appetite (Primary)  -     dronabinol (Marinol) 2.5 MG capsule; Take 1 capsule by mouth 2 (Two) Times a Day Before Meals.  Dispense: 60 capsule; Refill: 2    2. Memory loss  Comments:  Consider Aricept           Patient Education:   Miryam has not been eating much at all.  We will try little bit of Marinol to see if that will help improve her appetite and help gain strength.  She is very deconditioned and seems very depressed as well.  Memory seems to be an issue but it may just be related to her declining physical.  Reviewed medications, potential side effects and signs and symptoms to report. Discussed risk versus benefits of treatment plan with patient and/or family-including medications, labs and radiology that may be ordered. Addressed questions and concerns during visit. Patient and/or family verbalized understanding and agree with plan. Instructed to call the office with any questions and report to ER with any life-threatening symptoms.     Follow Up:   Return in about 3 months (around 12/12/2023).    I spent 45 minutes face to face with the patient, family, and  . I personally spent 50 percent of this time counseling and discussing diagnosis, diagnostic testing, treatment options, and management .       During this visit the following were done:  Labs Reviewed [x]    Labs Ordered []    Radiology Reports Reviewed [x]    Radiology Ordered []    PCP Records Reviewed []    Referring Provider Records Reviewed []    ER Records Reviewed []    Hospital Records Reviewed [x]    History Obtained From Family []    Radiology Images Reviewed [x]    Other  Reviewed []    Records Requested []      BALJIT Lou  [facility]  [address]  [place] [address] [phone]

## 2023-09-13 ENCOUNTER — HOSPITAL ENCOUNTER (EMERGENCY)
Facility: HOSPITAL | Age: 78
Discharge: HOME | End: 2023-09-13
Attending: EMERGENCY MEDICINE
Payer: MEDICARE

## 2023-09-13 ENCOUNTER — APPOINTMENT (OUTPATIENT)
Dept: CT IMAGING | Facility: HOSPITAL | Age: 78
End: 2023-09-13
Payer: MEDICARE

## 2023-09-13 VITALS
WEIGHT: 115 LBS | OXYGEN SATURATION: 95 % | HEART RATE: 60 BPM | DIASTOLIC BLOOD PRESSURE: 58 MMHG | HEIGHT: 60 IN | SYSTOLIC BLOOD PRESSURE: 177 MMHG | BODY MASS INDEX: 22.58 KG/M2 | TEMPERATURE: 98.4 F | RESPIRATION RATE: 16 BRPM

## 2023-09-13 DIAGNOSIS — R93.7 ABNORMAL CT SCAN, CERVICAL SPINE: ICD-10-CM
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
## 2023-09-13 DIAGNOSIS — S00.83XA FACIAL HEMATOMA, INITIAL ENCOUNTER: ICD-10-CM

## 2023-09-13 DIAGNOSIS — S09.90XA CLOSED HEAD INJURY, INITIAL ENCOUNTER: Primary | ICD-10-CM

## 2023-09-13 LAB — CK SERPL-CCNC: 60 U/L (ref 20–180)

## 2023-09-13 PROCEDURE — 99284 EMERGENCY DEPT VISIT MOD MDM: CPT

## 2023-09-13 PROCEDURE — 82550 ASSAY OF CK (CPK): CPT | Performed by: EMERGENCY MEDICINE

## 2023-09-13 PROCEDURE — 72125 CT NECK SPINE W/O DYE: CPT

## 2023-09-13 PROCEDURE — 36415 COLL VENOUS BLD VENIPUNCTURE: CPT

## 2023-09-13 PROCEDURE — 70450 CT HEAD/BRAIN W/O DYE: CPT

## 2023-09-13 NOTE — DISCHARGE INSTRUCTIONS
Follow-up with your PCP in the next week.  Call to make an appointment.  Discussed CT results as needed as we discussed.    Return to the emergency department immediately for new or change concerns.    Please review the medications you are supposed to be taking according to prior physician recommendations. I have not changed your home medications during this visit. If your discharge instructions indicate that I have changed your home medications, this is not the case, and you should disregard. If you have any questions about the medication you should be taking at home, please call your physician.

## 2023-09-13 NOTE — ED PROVIDER NOTES
Subjective   History of Present Illness  This patient is a very nice 77-year-old  female who comes in from [facility] via EMS after a fall.  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  Patient has been at [facility] for rehab secondary to frequent falls at home.  She got out of bed today without assistance and fell to the ground.  She tells me she was only on the ground approximately 3 or 4 minutes.  She had a bruise around her right eye she does take Eliquis secondary to atrial fibrillation.  She indicated she had some back pain but is very clear about the fact that this back pain is not due to the fall but due to a decubitus ulcer that is known down near her tailbone.  She denies any neck, extremity, chest, or abdominal pain or injury.  She moves all 4 extremities as if to show me that nothing is hurting.  She is here with her daughter.  Patient is pleasant and at baseline with regard to mental status according to daughter.  She complains only of some headache and some facial pain and is showing evidence of some bruising around the right zygomatic arch while I see her at the bedside.    In summary, we have a 77-year-old female who fell the bed this morning at [facility].    Past medical history  Anxiety, aortic valve stenosis, atrial fibrillation, CKD, death, hyperlipidemia, hypertension    Family history  CVA, dad    Review of Systems   Constitutional: Negative.  Negative for chills, fatigue, fever and unexpected weight change.        Fall   HENT:  Negative for dental problem, ear pain, hearing loss and sinus pressure.    Eyes:  Negative for pain and visual disturbance.   Respiratory:  Negative for chest tightness and shortness of breath.    Cardiovascular:  Negative for chest pain, palpitations and leg swelling.   Gastrointestinal:  Negative for blood in  stool, diarrhea, nausea and vomiting.   Genitourinary:  Negative for difficulty urinating, dysuria, frequency, hematuria and urgency.   Musculoskeletal:  Negative for myalgias, neck pain and neck stiffness.   Neurological:  Positive for headaches. Negative for seizures, syncope, speech difficulty and light-headedness.   Psychiatric/Behavioral:  Negative for confusion.    All other systems reviewed and are negative.    Past Medical History:   Diagnosis Date    Anxiety     Aortic valve stenosis     Atrial fibrillation     Borderline diabetes     ???    Carotid artery stenosis     CKD (chronic kidney disease)     Deaf     GERD (gastroesophageal reflux disease)     Heart murmur     Hyperlipidemia     Hypertension     Irregular heartbeat     PONV (postoperative nausea and vomiting)     Stroke     TIA (transient ischemic attack)        No Known Allergies    Past Surgical History:   Procedure Laterality Date    AORTIC VALVE REPAIR/REPLACEMENT N/A 07/20/2022    Procedure: TRANSCATHETER AORTIC VALVE REPLACEMENT with angioplasty and stent to the right common and external iliac artery;  Surgeon: Bola Whitaker MD;  Location: [facility];  Service: Cardiothoracic;  Laterality: N/A;  FL-15 MIN 12 SEC  DOSE- 92.41  CONTRAST- 120 ML ISOVUE    AORTIC VALVE REPAIR/REPLACEMENT N/A 07/20/2022    Procedure: Transcatheter Aortic Valve Replacement;  Surgeon: Yashira Chow MD;  Location: [facility];  Service: Cardiovascular;  Laterality: N/A;    CARDIAC CATHETERIZATION      05/20/2022 per dr. chow    CARDIAC CATHETERIZATION Left 05/20/2022    Procedure: Left Heart Cath;  Surgeon: Yashira Chow MD;  Location: [facility] CATH INVASIVE LOCATION;  Service: Cardiology;  Laterality: Left;    CARDIAC ELECTROPHYSIOLOGY PROCEDURE N/A 04/13/2022    Procedure: DDD PPM Implant (BSC), DNS Eliquis;  Surgeon: Troy Hussein DO;  Location: [facility] EP INVASIVE LOCATION;  Service: Cardiology;  Laterality: N/A;     CARDIAC VALVE REPLACEMENT  07/2022    CAROTID ENDARTERECTOMY Bilateral     CATARACT EXTRACTION      CHOLECYSTECTOMY      COLONOSCOPY      FEMORAL ARTERY CUTDOWN Right 07/20/2022    Procedure: FEMORAL ARTERY CUTDOWN, ANGIOGRAM;  Surgeon: Bola Whitaker MD;  Location: [facility];  Service: Vascular;  Laterality: Right;  fl-1 m 12 sec  dose- 47.29 mgy  contrast- 50 ml isovue    KNEE SURGERY Right     PACEMAKER IMPLANTATION      JF      05/20/2022 per dr. chow    TRANSESOPHAGEAL ECHOCARDIOGRAM (JF) N/A 07/20/2022    Procedure: TRANSESOPHAGEAL ECHOCARDIOGRAM PER ANESTHESIA;  Surgeon: Bola Whitaker MD;  Location: [facility];  Service: Cardiothoracic;  Laterality: N/A;       Family History   Problem Relation Age of Onset    Other Mother         enlarged heart    Stroke Father        Social History     Socioeconomic History    Marital status:     Number of children: 2    Highest education level: High school graduate   Tobacco Use    Smoking status: Never    Smokeless tobacco: Never   Vaping Use    Vaping Use: Never used   Substance and Sexual Activity    Alcohol use: Never    Drug use: Never    Sexual activity: Defer           Objective   Physical Exam  Vitals and nursing note reviewed.   Constitutional:       General: She is not in acute distress.     Appearance: She is well-developed. She is not toxic-appearing.   HENT:      Head: Normocephalic.      Jaw: No trismus.      Comments: Mild tenderness around the right eye, specifically at the zygomatic arch with some evidence of early bruising.  No bony deformity.  No calvarial depression deformity.     Right Ear: External ear normal.      Left Ear: External ear normal.      Nose: Nose normal.      Comments: No septal hematoma     Mouth/Throat:      Mouth: Mucous membranes are moist. Mucous membranes are not dry. No oral lesions.      Dentition: No dental abscesses.      Pharynx: Oropharynx is clear. No posterior oropharyngeal  erythema or uvula swelling.      Tonsils: No tonsillar exudate or tonsillar abscesses.      Comments: No trismus or malocclusion.  Eyes:      Extraocular Movements: Extraocular movements intact.      Conjunctiva/sclera: Conjunctivae normal.      Right eye: Right conjunctiva is not injected.      Left eye: Left conjunctiva is not injected.      Pupils: Pupils are equal, round, and reactive to light.   Neck:      Vascular: No JVD.      Trachea: No tracheal tenderness.      Comments: No C, T, or L-spine bony midline tender to palpation.  No step-offs or deformities.  Cardiovascular:      Rate and Rhythm: Normal rate and regular rhythm.      Heart sounds: Normal heart sounds.     No friction rub. No gallop.   Pulmonary:      Effort: Pulmonary effort is normal.      Breath sounds: Normal breath sounds. No wheezing or rales.   Chest:      Chest wall: No tenderness.   Abdominal:      General: Bowel sounds are normal. There is no distension.      Palpations: Abdomen is soft. Abdomen is not rigid. There is no mass or pulsatile mass.      Tenderness: There is no abdominal tenderness. There is no guarding or rebound. Negative signs include McBurney's sign.      Comments: No signs of acute abdomen.  No pain at McBurney's point.  No pulsatile abdominal mass.   Musculoskeletal:         General: No tenderness or deformity. Normal range of motion.      Cervical back: Normal range of motion and neck supple. No rigidity. Normal range of motion.   Lymphadenopathy:      Cervical: No cervical adenopathy.   Skin:     General: Skin is warm and dry.      Capillary Refill: Capillary refill takes less than 2 seconds.      Findings: No erythema or rash.      Comments: No diaphoresis, lesions, nevi, petechia, purpura   Neurological:      Mental Status: She is alert and oriented to person, place, and time.      Cranial Nerves: No cranial nerve deficit.      Sensory: No sensory deficit.      Motor: No tremor or abnormal muscle tone.       Comments: 5/5 strength bilaterally with flexion and extension of fingers, wrist, elbows, knees and hips as well as plantar and dorsiflexion of the foot.   Psychiatric:         Attention and Perception: She is attentive.         Speech: Speech normal.         Behavior: Behavior normal.         Thought Content: Thought content normal.         Judgment: Judgment normal.       Procedures           ED Course  ED Course as of 09/13/23 1642   Wed Sep 13, 2023   0621 The patient's daughter was at the bedside and provided all translation via sign language.  I had a good conversation with the daughter and the patient.  Blood pressure initially was high and is starting to come down.  Currently 179/57.  I told the patient and family would take good care of the patient.  A full skeletal/musculoskeletal exam was completed including spine.  Patient only has some very mild tenderness in the area of a known decubitus ulcer.  Patient has good range of motion of all 4 extremities and no C, T, or L-spine bony midline tenderness palpation or step-offs.  CT scan of the head and C-spine has been ordered.  I have also ordered a CK although it does not swelling the patient was on the ground too long I do not anticipate rhabdomyolysis to be likely.  Patient like to go back to [facility] if at all possible and I do think this is very likely.  We have discussed the differential diagnosis and medical decision making in great detail.  Final impression and plan following completion of work-up [DIANA]   0402 I reviewed CT of the head and CT cervical spine images independently and interpreted on my own.  I do not see any evidence of acute bleed on the brain scan nor any obvious malalignment or fracture on the CT of the cervical spine I discussed my findings personally with the patient and family.  Official reads of both have now been completed.  CT scan of the head showed no evidence of acute abnormality.  Atrophy was noted.  Official CT  spine results have been cut/pasted below.    Findings:  There is no acute fracture or dislocation. There is disc base narrowing at C4-C7 consistent with degenerative disc disease. There is multilevel endplate spurring consistent with mild to moderate spondylosis. There are also mild to moderate facet   arthritic changes mainly on the left side. There is no paraspinal hematoma or fluid collection. There are atherosclerotic vascular calcifications bilaterally with surgical clips which may be secondary to a previous carotid endarterectomy. With the   visualized upper chest, there are partially imaged layering pleural effusions. Assessment for a herniated disc is difficult. There are a few disc osteophyte complexes which may cause borderline canal stenosis. There is neural foraminal narrowing mainly   on the left side at C4-C5 and C5-C6.     IMPRESSION:  Impression:     1. No acute fracture or dislocation.  2. Degenerative changes.  3. Partially imaged layering pleural effusions in the upper chest.  4. Questionable borderline canal stenosis. There is neural foraminal narrowing as described.           Electronically Signed: Diego Urena MD    9/13/2023 7:17 AM EDT    Workstation ID: [id number]   [DIANA]   0736 I discussed the cervical spine results with the family and recommended close follow-up with PCP.  I added abnormal CT scan of the cervical spine to impression only so that there would be a note for the family to follow-up these findings do not appear to be related to the fall today.  Patient has no pain.  No neuropathic symptoms.  No numbness or weakness in the upper extremities.  We will have her outpatient team work this up as needed or if symptoms present.  Daughter understands. [DIANA]   0737 Patient is resting comfortably and will be discharged home accordingly. [DIANA]      ED Course User Index  [DIANA] Guzman Pineda MD      Recent Results (from the past 24 hour(s))   CK    Collection Time: 09/13/23  6:30 AM     Specimen: Blood   Result Value Ref Range    Creatine Kinase 60 20 - 180 U/L     Note: In addition to lab results from this visit, the labs listed above may include labs taken at another facility or during a different encounter within the last 24 hours. Please correlate lab times with ED admission and discharge times for further clarification of the services performed during this visit.    CT Head Without Contrast   Final Result   Impression:   Mild atrophy and chronic microvascular ischemia. No acute intracranial process.            Electronically Signed: Hemal Santamaria MD     9/13/2023 7:03 AM EDT     Workstation ID: [id number]      CT Cervical Spine Without Contrast   Final Result   Impression:      1. No acute fracture or dislocation.   2. Degenerative changes.   3. Partially imaged layering pleural effusions in the upper chest.   4. Questionable borderline canal stenosis. There is neural foraminal narrowing as described.            Electronically Signed: Diego Urena MD     9/13/2023 7:17 AM EDT     Workstation ID: [id number]        Vitals:    09/13/23 0538 09/13/23 0600 09/13/23 0630 09/13/23 0700   BP:  179/57 178/59 177/58   BP Location:       Patient Position:       Pulse:  60 60 60   Resp:       Temp: 98.4 °F (36.9 °C)      TempSrc: Oral      SpO2:  94% 94% 95%   Weight:       Height:         Medications - No data to display  ECG/EMG Results (last 24 hours)       ** No results found for the last 24 hours. **          No orders to display                                           Medical Decision Making  Have discussed traumatic subarachnoid hemorrhage, epidural hematoma, subdural hematoma, facial contusion and hematoma.  Fall appears to be mechanical, and that the patient tripped this is not new for her.  Have discussed dysrhythmias, Brugada syndrome and other etiologies that do not seem to be in play here.  Patient is taking Eliquis for atrial fibrillation and CT imaging does make sense.  Patient has a known  decubitus ulcer.  She has no tenderness in the spine but I will add a C-spine given age and risk for potential trauma as well as limited benefit of C-spine x-ray.    Problems Addressed:  Abnormal CT scan, cervical spine: complicated acute illness or injury  Closed head injury, initial encounter: complicated acute illness or injury  Facial hematoma, initial encounter: complicated acute illness or injury  Fall, initial encounter: complicated acute illness or injury    Amount and/or Complexity of Data Reviewed  External Data Reviewed: notes.  Radiology: ordered. Decision-making details documented in ED Course.        Final diagnoses:   Closed head injury, initial encounter   Fall, initial encounter   Facial hematoma, initial encounter   Abnormal CT scan, cervical spine       ED Disposition  ED Disposition       ED Disposition   Discharge    Condition   Stable    Comment   --               Rigoberto Chamberlain MD  [address] [place] [id number]  [phone]    In 1 week           Medication List      No changes were made to your prescriptions during this visit.            Guzman Pineda MD  09/13/23 0251

## 2023-09-22 ENCOUNTER — APPOINTMENT (OUTPATIENT)
Dept: CT IMAGING | Facility: HOSPITAL | Age: 78
End: 2023-09-22
Payer: MEDICARE

## 2023-09-22 ENCOUNTER — HOSPITAL ENCOUNTER (OUTPATIENT)
Facility: HOSPITAL | Age: 78
Setting detail: OBSERVATION
Discharge: NURSING, REHAB OR LONG-TERM CARE | End: 2023-09-29
Attending: EMERGENCY MEDICINE | Admitting: INTERNAL MEDICINE
Payer: MEDICARE

## 2023-09-22 DIAGNOSIS — D62 ACUTE BLOOD LOSS ANEMIA: ICD-10-CM

## 2023-09-22 DIAGNOSIS — I10 ELEVATED BLOOD PRESSURE READING WITH DIAGNOSIS OF HYPERTENSION: ICD-10-CM

## 2023-09-22 DIAGNOSIS — Z79.01 CHRONIC ANTICOAGULATION: ICD-10-CM

## 2023-09-22 DIAGNOSIS — R29.6 FREQUENT FALLS: Primary | ICD-10-CM

## 2023-09-22 DIAGNOSIS — S00.83XA FACIAL CONTUSION, INITIAL ENCOUNTER: ICD-10-CM

## 2023-09-22 DIAGNOSIS — R19.5 HEME POSITIVE STOOL: ICD-10-CM

## 2023-09-22 DIAGNOSIS — D64.9 ACUTE ANEMIA: ICD-10-CM

## 2023-09-22 DIAGNOSIS — R13.10 DYSPHAGIA, UNSPECIFIED TYPE: ICD-10-CM

## 2023-09-22 PROBLEM — R53.1 WEAKNESS: Status: RESOLVED | Noted: 2023-08-09 | Resolved: 2023-09-22

## 2023-09-22 PROBLEM — R09.02 HYPOXIA: Status: RESOLVED | Noted: 2023-08-09 | Resolved: 2023-09-22

## 2023-09-22 PROBLEM — U07.1 COVID-19: Status: RESOLVED | Noted: 2023-08-11 | Resolved: 2023-09-22

## 2023-09-22 PROBLEM — E88.09 HYPOALBUMINEMIA: Status: RESOLVED | Noted: 2023-08-09 | Resolved: 2023-09-22

## 2023-09-22 PROBLEM — E87.6 HYPOKALEMIA: Status: RESOLVED | Noted: 2023-08-09 | Resolved: 2023-09-22

## 2023-09-22 PROBLEM — U07.1 COVID-19 VIRUS DETECTED: Status: RESOLVED | Noted: 2023-08-09 | Resolved: 2023-09-22

## 2023-09-22 PROBLEM — R63.0 ANOREXIA: Status: ACTIVE | Noted: 2023-09-22

## 2023-09-22 LAB
ABO GROUP BLD: NORMAL
ALBUMIN SERPL-MCNC: 3.1 G/DL (ref 3.5–5.2)
ALBUMIN/GLOB SERPL: 1.3 G/DL
ALP SERPL-CCNC: 95 U/L (ref 39–117)
ALT SERPL W P-5'-P-CCNC: 50 U/L (ref 1–33)
AMORPH URATE CRY URNS QL MICRO: ABNORMAL /HPF
ANION GAP SERPL CALCULATED.3IONS-SCNC: 10 MMOL/L (ref 5–15)
AST SERPL-CCNC: 40 U/L (ref 1–32)
BACTERIA UR QL AUTO: ABNORMAL /HPF
BASOPHILS # BLD AUTO: 0.02 10*3/MM3 (ref 0–0.2)
BASOPHILS NFR BLD AUTO: 0.2 % (ref 0–1.5)
BILIRUB SERPL-MCNC: 0.9 MG/DL (ref 0–1.2)
BILIRUB UR QL STRIP: NEGATIVE
BLD GP AB SCN SERPL QL: NEGATIVE
BUN SERPL-MCNC: 37 MG/DL (ref 8–23)
BUN/CREAT SERPL: 25.9 (ref 7–25)
CALCIUM SPEC-SCNC: 8.9 MG/DL (ref 8.6–10.5)
CHLORIDE SERPL-SCNC: 101 MMOL/L (ref 98–107)
CK SERPL-CCNC: 86 U/L (ref 20–180)
CLARITY UR: ABNORMAL
CO2 SERPL-SCNC: 24 MMOL/L (ref 22–29)
COLOR UR: YELLOW
CREAT SERPL-MCNC: 1.43 MG/DL (ref 0.57–1)
CRP SERPL-MCNC: 0.65 MG/DL (ref 0–0.5)
D DIMER PPP FEU-MCNC: 1.45 MCGFEU/ML (ref 0–0.79)
DEPRECATED RDW RBC AUTO: 56.8 FL (ref 37–54)
DEVELOPER EXPIRATION DATE: ABNORMAL
DEVELOPER LOT NUMBER: ABNORMAL
EGFRCR SERPLBLD CKD-EPI 2021: 37.4 ML/MIN/1.73
EOSINOPHIL # BLD AUTO: 0.02 10*3/MM3 (ref 0–0.4)
EOSINOPHIL NFR BLD AUTO: 0.2 % (ref 0.3–6.2)
ERYTHROCYTE [DISTWIDTH] IN BLOOD BY AUTOMATED COUNT: 16.1 % (ref 12.3–15.4)
EXPIRATION DATE: 1025
FECAL OCCULT BLOOD SCREEN, POC: POSITIVE
FERRITIN SERPL-MCNC: 819.8 NG/ML (ref 13–150)
GLOBULIN UR ELPH-MCNC: 2.3 GM/DL
GLUCOSE SERPL-MCNC: 130 MG/DL (ref 65–99)
GLUCOSE UR STRIP-MCNC: NEGATIVE MG/DL
GRAN CASTS URNS QL MICRO: ABNORMAL /LPF
HCT VFR BLD AUTO: 28.8 % (ref 34–46.6)
HCT VFR BLD AUTO: 30 % (ref 34–46.6)
HCT VFR BLD AUTO: 33.3 % (ref 34–46.6)
HGB BLD-MCNC: 10 G/DL (ref 12–15.9)
HGB BLD-MCNC: 11 G/DL (ref 12–15.9)
HGB BLD-MCNC: 9.1 G/DL (ref 12–15.9)
HGB UR QL STRIP.AUTO: NEGATIVE
HYALINE CASTS UR QL AUTO: ABNORMAL /LPF
IMM GRANULOCYTES # BLD AUTO: 0.12 10*3/MM3 (ref 0–0.05)
IMM GRANULOCYTES NFR BLD AUTO: 1.5 % (ref 0–0.5)
IRON 24H UR-MRATE: 46 MCG/DL (ref 37–145)
IRON SATN MFR SERPL: 22 % (ref 20–50)
KETONES UR QL STRIP: NEGATIVE
LDH SERPL-CCNC: 356 U/L (ref 135–214)
LEUKOCYTE ESTERASE UR QL STRIP.AUTO: NEGATIVE
LIPASE SERPL-CCNC: 18 U/L (ref 13–60)
LYMPHOCYTES # BLD AUTO: 0.67 10*3/MM3 (ref 0.7–3.1)
LYMPHOCYTES NFR BLD AUTO: 8.1 % (ref 19.6–45.3)
Lab: ABNORMAL
MCH RBC QN AUTO: 30.7 PG (ref 26.6–33)
MCHC RBC AUTO-ENTMCNC: 31.6 G/DL (ref 31.5–35.7)
MCV RBC AUTO: 97.3 FL (ref 79–97)
MONOCYTES # BLD AUTO: 0.65 10*3/MM3 (ref 0.1–0.9)
MONOCYTES NFR BLD AUTO: 7.9 % (ref 5–12)
NEGATIVE CONTROL: NEGATIVE
NEUTROPHILS NFR BLD AUTO: 6.79 10*3/MM3 (ref 1.7–7)
NEUTROPHILS NFR BLD AUTO: 82.1 % (ref 42.7–76)
NITRITE UR QL STRIP: NEGATIVE
NRBC BLD AUTO-RTO: 0 /100 WBC (ref 0–0.2)
PH UR STRIP.AUTO: 5.5 [PH] (ref 5–8)
PLATELET # BLD AUTO: 146 10*3/MM3 (ref 140–450)
PMV BLD AUTO: 10.1 FL (ref 6–12)
POSITIVE CONTROL: POSITIVE
POTASSIUM SERPL-SCNC: 3.9 MMOL/L (ref 3.5–5.2)
PROCALCITONIN SERPL-MCNC: 0.21 NG/ML (ref 0–0.25)
PROT SERPL-MCNC: 5.4 G/DL (ref 6–8.5)
PROT UR QL STRIP: ABNORMAL
RBC # BLD AUTO: 2.96 10*6/MM3 (ref 3.77–5.28)
RBC # UR STRIP: ABNORMAL /HPF
REF LAB TEST METHOD: ABNORMAL
RH BLD: NEGATIVE
SODIUM SERPL-SCNC: 135 MMOL/L (ref 136–145)
SP GR UR STRIP: 1.02 (ref 1–1.03)
SQUAMOUS #/AREA URNS HPF: ABNORMAL /HPF
T&S EXPIRATION DATE: NORMAL
TIBC SERPL-MCNC: 209 MCG/DL (ref 298–536)
TRANS CELLS #/AREA URNS HPF: ABNORMAL /HPF
TRANSFERRIN SERPL-MCNC: 140 MG/DL (ref 200–360)
TSH SERPL DL<=0.05 MIU/L-ACNC: 17.12 UIU/ML (ref 0.27–4.2)
UROBILINOGEN UR QL STRIP: ABNORMAL
WBC # UR STRIP: ABNORMAL /HPF
WBC NRBC COR # BLD: 8.27 10*3/MM3 (ref 3.4–10.8)

## 2023-09-22 PROCEDURE — G0378 HOSPITAL OBSERVATION PER HR: HCPCS

## 2023-09-22 PROCEDURE — 82270 OCCULT BLOOD FECES: CPT | Performed by: EMERGENCY MEDICINE

## 2023-09-22 PROCEDURE — 25010000002 DEXAMETHASONE PER 1 MG: Performed by: INTERNAL MEDICINE

## 2023-09-22 PROCEDURE — 82728 ASSAY OF FERRITIN: CPT | Performed by: INTERNAL MEDICINE

## 2023-09-22 PROCEDURE — 82550 ASSAY OF CK (CPK): CPT | Performed by: INTERNAL MEDICINE

## 2023-09-22 PROCEDURE — 85025 COMPLETE CBC W/AUTO DIFF WBC: CPT | Performed by: EMERGENCY MEDICINE

## 2023-09-22 PROCEDURE — 83540 ASSAY OF IRON: CPT | Performed by: INTERNAL MEDICINE

## 2023-09-22 PROCEDURE — 96376 TX/PRO/DX INJ SAME DRUG ADON: CPT

## 2023-09-22 PROCEDURE — 81001 URINALYSIS AUTO W/SCOPE: CPT | Performed by: EMERGENCY MEDICINE

## 2023-09-22 PROCEDURE — 36415 COLL VENOUS BLD VENIPUNCTURE: CPT

## 2023-09-22 PROCEDURE — P9612 CATHETERIZE FOR URINE SPEC: HCPCS

## 2023-09-22 PROCEDURE — 25010000002 HYDRALAZINE PER 20 MG: Performed by: INTERNAL MEDICINE

## 2023-09-22 PROCEDURE — 84443 ASSAY THYROID STIM HORMONE: CPT | Performed by: INTERNAL MEDICINE

## 2023-09-22 PROCEDURE — 85018 HEMOGLOBIN: CPT | Performed by: INTERNAL MEDICINE

## 2023-09-22 PROCEDURE — 86900 BLOOD TYPING SEROLOGIC ABO: CPT | Performed by: INTERNAL MEDICINE

## 2023-09-22 PROCEDURE — 96374 THER/PROPH/DIAG INJ IV PUSH: CPT

## 2023-09-22 PROCEDURE — 25010000002 LABETALOL 5 MG/ML SOLUTION: Performed by: INTERNAL MEDICINE

## 2023-09-22 PROCEDURE — 86140 C-REACTIVE PROTEIN: CPT | Performed by: INTERNAL MEDICINE

## 2023-09-22 PROCEDURE — 70450 CT HEAD/BRAIN W/O DYE: CPT

## 2023-09-22 PROCEDURE — 84145 PROCALCITONIN (PCT): CPT | Performed by: INTERNAL MEDICINE

## 2023-09-22 PROCEDURE — 80053 COMPREHEN METABOLIC PANEL: CPT | Performed by: EMERGENCY MEDICINE

## 2023-09-22 PROCEDURE — 86901 BLOOD TYPING SEROLOGIC RH(D): CPT | Performed by: INTERNAL MEDICINE

## 2023-09-22 PROCEDURE — 96375 TX/PRO/DX INJ NEW DRUG ADDON: CPT

## 2023-09-22 PROCEDURE — 84466 ASSAY OF TRANSFERRIN: CPT | Performed by: INTERNAL MEDICINE

## 2023-09-22 PROCEDURE — 85014 HEMATOCRIT: CPT | Performed by: INTERNAL MEDICINE

## 2023-09-22 PROCEDURE — 86923 COMPATIBILITY TEST ELECTRIC: CPT

## 2023-09-22 PROCEDURE — 25010000002 ONDANSETRON PER 1 MG: Performed by: INTERNAL MEDICINE

## 2023-09-22 PROCEDURE — 85379 FIBRIN DEGRADATION QUANT: CPT | Performed by: INTERNAL MEDICINE

## 2023-09-22 PROCEDURE — 83615 LACTATE (LD) (LDH) ENZYME: CPT | Performed by: INTERNAL MEDICINE

## 2023-09-22 PROCEDURE — 86850 RBC ANTIBODY SCREEN: CPT | Performed by: INTERNAL MEDICINE

## 2023-09-22 PROCEDURE — 99284 EMERGENCY DEPT VISIT MOD MDM: CPT

## 2023-09-22 PROCEDURE — 83690 ASSAY OF LIPASE: CPT | Performed by: EMERGENCY MEDICINE

## 2023-09-22 RX ORDER — LABETALOL HYDROCHLORIDE 5 MG/ML
10 INJECTION, SOLUTION INTRAVENOUS EVERY 4 HOURS PRN
Status: DISCONTINUED | OUTPATIENT
Start: 2023-09-22 | End: 2023-09-27

## 2023-09-22 RX ORDER — LANOLIN ALCOHOL/MO/W.PET/CERES
1000 CREAM (GRAM) TOPICAL DAILY
Status: DISCONTINUED | OUTPATIENT
Start: 2023-09-22 | End: 2023-09-29 | Stop reason: HOSPADM

## 2023-09-22 RX ORDER — DOCUSATE SODIUM 100 MG/1
100 CAPSULE, LIQUID FILLED ORAL AS NEEDED
Status: DISCONTINUED | OUTPATIENT
Start: 2023-09-22 | End: 2023-09-29 | Stop reason: HOSPADM

## 2023-09-22 RX ORDER — ONDANSETRON 2 MG/ML
4 INJECTION INTRAMUSCULAR; INTRAVENOUS EVERY 6 HOURS PRN
Status: DISCONTINUED | OUTPATIENT
Start: 2023-09-22 | End: 2023-09-29 | Stop reason: HOSPADM

## 2023-09-22 RX ORDER — PANTOPRAZOLE SODIUM 40 MG/10ML
40 INJECTION, POWDER, LYOPHILIZED, FOR SOLUTION INTRAVENOUS EVERY 12 HOURS SCHEDULED
Status: DISCONTINUED | OUTPATIENT
Start: 2023-09-22 | End: 2023-09-24

## 2023-09-22 RX ORDER — DEXAMETHASONE SODIUM PHOSPHATE 4 MG/ML
4 INJECTION, SOLUTION INTRA-ARTICULAR; INTRALESIONAL; INTRAMUSCULAR; INTRAVENOUS; SOFT TISSUE ONCE
Status: COMPLETED | OUTPATIENT
Start: 2023-09-22 | End: 2023-09-22

## 2023-09-22 RX ORDER — ACETAMINOPHEN 325 MG/1
650 TABLET ORAL EVERY 6 HOURS PRN
Status: DISCONTINUED | OUTPATIENT
Start: 2023-09-22 | End: 2023-09-29 | Stop reason: HOSPADM

## 2023-09-22 RX ORDER — HYDRALAZINE HYDROCHLORIDE 50 MG/1
50 TABLET, FILM COATED ORAL DAILY
Status: DISCONTINUED | OUTPATIENT
Start: 2023-09-22 | End: 2023-09-29 | Stop reason: HOSPADM

## 2023-09-22 RX ORDER — AMIODARONE HYDROCHLORIDE 200 MG/1
200 TABLET ORAL
Status: DISCONTINUED | OUTPATIENT
Start: 2023-09-22 | End: 2023-09-29 | Stop reason: HOSPADM

## 2023-09-22 RX ORDER — MELATONIN
1000 DAILY
Status: DISCONTINUED | OUTPATIENT
Start: 2023-09-22 | End: 2023-09-29 | Stop reason: HOSPADM

## 2023-09-22 RX ORDER — SODIUM CHLORIDE 0.9 % (FLUSH) 0.9 %
10 SYRINGE (ML) INJECTION AS NEEDED
Status: DISCONTINUED | OUTPATIENT
Start: 2023-09-22 | End: 2023-09-29 | Stop reason: HOSPADM

## 2023-09-22 RX ORDER — METOPROLOL SUCCINATE 50 MG/1
100 TABLET, EXTENDED RELEASE ORAL
Status: DISCONTINUED | OUTPATIENT
Start: 2023-09-22 | End: 2023-09-24

## 2023-09-22 RX ORDER — HYDRALAZINE HYDROCHLORIDE 20 MG/ML
10 INJECTION INTRAMUSCULAR; INTRAVENOUS EVERY 6 HOURS PRN
Status: DISCONTINUED | OUTPATIENT
Start: 2023-09-22 | End: 2023-09-29 | Stop reason: HOSPADM

## 2023-09-22 RX ADMIN — PANTOPRAZOLE SODIUM 40 MG: 40 INJECTION, POWDER, LYOPHILIZED, FOR SOLUTION INTRAVENOUS at 13:50

## 2023-09-22 RX ADMIN — PANTOPRAZOLE SODIUM 40 MG: 40 INJECTION, POWDER, LYOPHILIZED, FOR SOLUTION INTRAVENOUS at 22:03

## 2023-09-22 RX ADMIN — Medication 10 MG: at 14:31

## 2023-09-22 RX ADMIN — DEXAMETHASONE SODIUM PHOSPHATE 4 MG: 4 INJECTION INTRA-ARTICULAR; INTRALESIONAL; INTRAMUSCULAR; INTRAVENOUS; SOFT TISSUE at 12:59

## 2023-09-22 RX ADMIN — ONDANSETRON 4 MG: 2 INJECTION INTRAMUSCULAR; INTRAVENOUS at 13:19

## 2023-09-22 RX ADMIN — HYDRALAZINE HYDROCHLORIDE 10 MG: 20 INJECTION INTRAMUSCULAR; INTRAVENOUS at 22:03

## 2023-09-22 NOTE — LETTER
EMS Transport Request  For use at [facility], [place] [place], and [place] only   Patient Name: Miryam Mckeon : 1945   Weight:52.2 kg (115 lb 1.3 oz) [facility] BLS/ALS: BLS/ALS: BLS   Insurance: [employer] MEDICARE REPLACEMENT Auth End Date: N/A   Pre-Cert #: D/C Summary complete:    Destination: Other [address] [place] [address]   Contact Precautions: None   Equipment (O2, Fluids, etc.): None   Arrive By Date/Time:  Stretcher/WC: Wheelchair   CM Requesting: Lara Hargrove Ext: [phone]   Notes/Medical Necessity: bedbound, deaf, back to facility with hospice     ______________________________________________________________________    *Only 2 patient bags OR 1 carry-on size bag are permitted.  Wheelchairs and walkers CANNOT transported with the patient. Acknowledge: Yes

## 2023-09-22 NOTE — Clinical Note
Level of Care: Telemetry [5]   Diagnosis: Anemia associated with acute blood loss [609020]   Admitting Physician: DYLLAN NGUYEN [[id number]]   Attending Physician: DYLLAN NGUYEN [[id number]]

## 2023-09-22 NOTE — PLAN OF CARE
Problem: Adult Inpatient Plan of Care  Goal: Plan of Care Review  Outcome: Ongoing, Progressing  Goal: Patient-Specific Goal (Individualized)  Outcome: Ongoing, Progressing  Goal: Absence of Hospital-Acquired Illness or Injury  Outcome: Ongoing, Progressing  Intervention: Identify and Manage Fall Risk  Recent Flowsheet Documentation  Taken 9/22/2023 1400 by Alicia Garrett RN  Safety Promotion/Fall Prevention: safety round/check completed  Intervention: Prevent Skin Injury  Recent Flowsheet Documentation  Taken 9/22/2023 1400 by Alicia Garrett RN  Body Position: weight shifting  Skin Protection:   incontinence pads utilized   adhesive use limited   protective footwear used   silicone foam dressing in place   skin sealant/moisture barrier applied   skin-to-device areas padded   skin-to-skin areas padded   transparent dressing maintained   tubing/devices free from skin contact  Intervention: Prevent and Manage VTE (Venous Thromboembolism) Risk  Recent Flowsheet Documentation  Taken 9/22/2023 1400 by Alicia Garrett RN  Activity Management: activity encouraged  Range of Motion: active ROM (range of motion) encouraged  Intervention: Prevent Infection  Recent Flowsheet Documentation  Taken 9/22/2023 1400 by Alicia Garrett RN  Infection Prevention: hand hygiene promoted  Goal: Optimal Comfort and Wellbeing  Outcome: Ongoing, Progressing  Goal: Readiness for Transition of Care  Outcome: Ongoing, Progressing  Intervention: Mutually Develop Transition Plan  Recent Flowsheet Documentation  Taken 9/22/2023 1331 by Alicia Garrett RN  Transportation Anticipated: family or friend will provide  Transportation Concerns: none  Patient/Family Anticipated Services at Transition:   Patient/Family Anticipates Transition to: inpatient rehabilitation facility  Taken 9/22/2023 1329 by Alicia Garrett RN  Patient/Family Anticipated Services at Transition: none  Patient/Family Anticipates Transition to: inpatient  rehabilitation facility  Taken 9/22/2023 1326 by Alicia Garrett RN  Equipment Currently Used at Home: walker, rolling     Problem: Fall Injury Risk  Goal: Absence of Fall and Fall-Related Injury  Outcome: Ongoing, Progressing  Intervention: Identify and Manage Contributors  Recent Flowsheet Documentation  Taken 9/22/2023 1400 by Alicia Garrett RN  Medication Review/Management: medications reviewed  Intervention: Promote Injury-Free Environment  Recent Flowsheet Documentation  Taken 9/22/2023 1400 by Alicia Garrett RN  Safety Promotion/Fall Prevention: safety round/check completed     Problem: Skin Injury Risk Increased  Goal: Skin Health and Integrity  Outcome: Ongoing, Progressing  Intervention: Optimize Skin Protection  Recent Flowsheet Documentation  Taken 9/22/2023 1400 by Alicia Garrett RN  Head of Bed (HOB) Positioning: HOB elevated  Skin Protection:   incontinence pads utilized   adhesive use limited   protective footwear used   silicone foam dressing in place   skin sealant/moisture barrier applied   skin-to-device areas padded   skin-to-skin areas padded   transparent dressing maintained   tubing/devices free from skin contact   Goal Outcome Evaluation:

## 2023-09-22 NOTE — LETTER
[facility]  [address] [facility] [id number]  [phone]        September 26, 2023      Patient: Miryam Mckeon  YOB: 1945  Date of Visit: 9/22/2023      Inpatient referral at [facility].    Attending: Briseida SMILEY  Certifying: Dr. Isabel Scales  Referring: Dr. Lakeisha Castro    Thank you,        Jessica Emmanuel, RN

## 2023-09-22 NOTE — ED PROVIDER NOTES
Subjective   History of Present Illness  Patient is a WDWN 77 year old female presenting to the ED today for a fall and head contusion. EMS reports patient fell approximately 1 hour prior to arrival. Patient reports no LOC but states the fall was unwitnessed. Patient reports 4/10 pain around her left eye, where she has a large contusion. Patient does take eliquis. Patient is deaf, her daughter normally translates, EMS reports she is in route.     History provided by:  Patient and EMS personnel  History limited by:  Patient nonverbal   used: Yes      Review of Systems    Past Medical History:   Diagnosis Date    Anxiety     Aortic valve stenosis     Atrial fibrillation     Borderline diabetes     ???    Carotid artery stenosis     CKD (chronic kidney disease)     COVID-19 08/11/2023    Deaf     GERD (gastroesophageal reflux disease)     Heart murmur     Hyperlipidemia     Hypertension     Irregular heartbeat     PONV (postoperative nausea and vomiting)     Stroke     TIA (transient ischemic attack)        No Known Allergies    Past Surgical History:   Procedure Laterality Date    AORTIC VALVE REPAIR/REPLACEMENT N/A 07/20/2022    Procedure: TRANSCATHETER AORTIC VALVE REPLACEMENT with angioplasty and stent to the right common and external iliac artery;  Surgeon: Bola Whitaker MD;  Location:  [facility];  Service: Cardiothoracic;  Laterality: N/A;  FL-15 MIN 12 SEC  DOSE- 92.41  CONTRAST- 120 ML ISOVUE    AORTIC VALVE REPAIR/REPLACEMENT N/A 07/20/2022    Procedure: Transcatheter Aortic Valve Replacement;  Surgeon: Yashira Chow MD;  Location:  [facility];  Service: Cardiovascular;  Laterality: N/A;    CARDIAC CATHETERIZATION      05/20/2022 per dr. chow    CARDIAC CATHETERIZATION Left 05/20/2022    Procedure: Left Heart Cath;  Surgeon: Yashira Chow MD;  Location:  [facility];  Service: Cardiology;  Laterality: Left;    CARDIAC  ELECTROPHYSIOLOGY PROCEDURE N/A 04/13/2022    Procedure: DDD PPM Implant (BSC), DNS Eliquis;  Surgeon: Troy Hussein DO;  Location: [facility] INVASIVE LOCATION;  Service: Cardiology;  Laterality: N/A;    CARDIAC VALVE REPLACEMENT  07/2022    CAROTID ENDARTERECTOMY Bilateral     CATARACT EXTRACTION      CHOLECYSTECTOMY      COLONOSCOPY      FEMORAL ARTERY CUTDOWN Right 07/20/2022    Procedure: FEMORAL ARTERY CUTDOWN, ANGIOGRAM;  Surgeon: Bola Whitaker MD;  Location: [facility];  Service: Vascular;  Laterality: Right;  fl-1 m 12 sec  dose- 47.29 mgy  contrast- 50 ml isovue    KNEE SURGERY Right     PACEMAKER IMPLANTATION      JF      05/20/2022 per dr. chow    TRANSESOPHAGEAL ECHOCARDIOGRAM (JF) N/A 07/20/2022    Procedure: TRANSESOPHAGEAL ECHOCARDIOGRAM PER ANESTHESIA;  Surgeon: Bola Whitaker MD;  Location: [facility];  Service: Cardiothoracic;  Laterality: N/A;       Family History   Problem Relation Age of Onset    Other Mother         enlarged heart    Stroke Father        Social History     Socioeconomic History    Marital status:     Number of children: 2    Highest education level: High school graduate   Tobacco Use    Smoking status: Never    Smokeless tobacco: Never   Vaping Use    Vaping Use: Never used   Substance and Sexual Activity    Alcohol use: Never    Drug use: Never    Sexual activity: Defer           Objective   Physical Exam  Vitals and nursing note reviewed.   Constitutional:       General: She is not in acute distress.     Appearance: She is not toxic-appearing.   HENT:      Head:      Comments: Bilateral periorbital contusions with left greater than right.  No significant open lacerations.  Cardiovascular:      Rate and Rhythm: Normal rate and regular rhythm.      Pulses: Normal pulses.   Pulmonary:      Effort: Pulmonary effort is normal. No respiratory distress.      Breath sounds: Normal breath sounds.   Musculoskeletal:         General: No  tenderness or deformity.      Comments: No focal extremity tenderness or deformity.  No focal midline C, T, L-spine tenderness palpation   Skin:     General: Skin is warm and dry.   Neurological:      Mental Status: She is alert. Mental status is at baseline.   Psychiatric:         Mood and Affect: Mood normal.         Behavior: Behavior normal.       Procedures           ED Course  ED Course as of 09/22/23 1445   Fri Sep 22, 2023   1103 Hemoglobin(!): 9.1  There is a significant drop in the patient's hemoglobin on arrival when compared to 1 month ago. [RS]   1120 Fecal Occult Blood(!): Positive  Heme positive stool. [RS]   1120 Creatinine(!): 1.43  Renal function comparable to prior values with mild chronic kidney disease. [RS]   1122 I did review the patient's recent chart.  Patient was admitted to hospital from August 9 until the 21st associated with congestive heart failure, shortness of breath, and COVID-19.  Of note, the patient was also seen here approximately 9 days ago for similar type symptoms after a fall. [RS]      ED Course User Index  [RS] Boris Kiser MD                                           Medical Decision Making  Problems Addressed:  Acute anemia: complicated acute illness or injury  Chronic anticoagulation: complicated acute illness or injury  Elevated blood pressure reading with diagnosis of hypertension: complicated acute illness or injury  Facial contusion, initial encounter: complicated acute illness or injury  Frequent falls: complicated acute illness or injury  Heme positive stool: complicated acute illness or injury    Amount and/or Complexity of Data Reviewed  Labs: ordered. Decision-making details documented in ED Course.  Radiology: ordered.    Risk  Decision regarding hospitalization.        Final diagnoses:   Frequent falls   Facial contusion, initial encounter   Acute anemia   Heme positive stool   Chronic anticoagulation   Elevated blood pressure reading with diagnosis  of hypertension       ED Disposition  ED Disposition       ED Disposition   Decision to Admit    Condition   --    Comment   Level of Care: Telemetry [5]   Diagnosis: Anemia associated with acute blood loss [531119]   Admitting Physician: DYLLAN NGUYEN [[id number]]   Attending Physician: DYLLAN NGUYEN [[id number]]                 No follow-up provider specified.       Medication List      No changes were made to your prescriptions during this visit.            Boris Kiser MD  09/22/23 3209

## 2023-09-22 NOTE — NURSING NOTE
Attempted multiple times per this RN and per daughter to contact [facility] to obtain information regarding patient's PTA meds with no success. Left a voicemail with callback extension. Provider notified. Will continue with plan of care.

## 2023-09-22 NOTE — H&P
[facility] Medicine Services  HISTORY AND PHYSICAL    Patient Name: Miryam Mckeon  : 1945  MRN: [id number]  Primary Care Physician: Rigoberto Chamberlain MD    Subjective   Subjective     Chief Complaint:    HPI:  Miryam Mckeon is a 77 y.o. female with past medical history of A-fib on Eliquis, aortic valve stenosis status post TAVR , CKD stage III, deafness, hypertension, hyperlipidemia and history of CVA who was hospitalized  for COVID and went to [facility] for rehab, then to [facility] for placement. Since there she has continued to be weak, poor appetite, frequent vomiting, and multiple falls which is hwy she returned to the ED today. She fell into the doorway of the bathroom with a hematoma on her left forehead. She has no particular pain but is very sleepy.   Patient is deaf and  was available at the bedside as well as the patients daughter.    Review of Systems   Patient denies weight loss, changes in vision, fever, chills, sore throat, shortness of breath, cough, nausea or vomiting, diarrhea, abdominal pain or distension, change in urine output or habits, joint pain, rash, itching, numbness/tingling, weakness or bleeding.    Otherwise complete ROS is negative except as mentioned in the HPI.    Personal History         PMH: She  has a past medical history of Anxiety, Aortic valve stenosis, Atrial fibrillation, Borderline diabetes, Carotid artery stenosis, CKD (chronic kidney disease), COVID-19 (2023), Deaf, GERD (gastroesophageal reflux disease), Heart murmur, Hyperlipidemia, Hypertension, Irregular heartbeat, PONV (postoperative nausea and vomiting), Stroke, and TIA (transient ischemic attack).   PSxH: She  has a past surgical history that includes Cholecystectomy; Knee surgery (Right); Carotid Endarterectomy (Bilateral); Cataract extraction; Cardiac electrophysiology procedure (N/A, 2022); Colonoscopy; Cardiac  catheterization; CARLITOS; Pacemaker Implantation; Cardiac catheterization (Left, 05/20/2022); Aortic valve replacement (N/A, 07/20/2022); transesophageal echocardiogram (carlitos) (N/A, 07/20/2022); Aortic valve replacement (N/A, 07/20/2022); Femoral Artery Cutdown (Right, 07/20/2022); and Cardiac valve replacement (07/2022).         FH: Her family history includes Other in her mother; Stroke in her father.   SH: She  reports that she has never smoked. She has never used smokeless tobacco. She reports that she does not drink alcohol and does not use drugs.   Patient was living independently prior to last admission.  Medications:  Polyethylene Glycol 3350, acetaminophen, aluminum-magnesium hydroxide, amiodarone, apixaban, aspirin, atorvastatin, bifidobacterium lactis (INFANT'S MYLICON) probiotic drops, cholecalciferol, cloNIDine, dilTIAZem CD, docusate sodium, dronabinol, guaiFENesin, hydrALAZINE, isosorbide-hydrALAZINE, latanoprost, meclizine, metoprolol succinate XL, mirtazapine, promethazine, simethicone, valsartan, and vitamin B-12    No Known Allergies    Objective   Objective     Vital Signs:   Temp:  [98.4 °F (36.9 °C)] 98.4 °F (36.9 °C)  Heart Rate:  [60] 60  Resp:  [16] 16  BP: (168-191)/() 191/75    Constitutional: asleep, but wakes easily and answers simple questions with the .  Eyes: ecchymoses around and over both eyes/Left more than right  HENT: NCAT, mucous membranes dry  Neck: no masses or lymphadenopathy, trachea midline  Respiratory: coarse breath sounds bilaterally, respirations unlabored  Cardiovascular: paced, systolic murmur,  palpable peripheral pul  Abdomen:  soft, no HSM or masses palpable, not tender or distended  Musculoskeletal: No peripheral edema, clubbing or cyanosis  Neurologic: Oriented x 3,                       Strength symmetric in all extremities                     Cranial Nerves grossly intact, speech clear  Skin: No rashes or jaundice, biut covered in scattered  bruises  Psychiatric: calm and appropriate      Result Review:  I have personally reviewed the results from the time of this admission   to 9/22/2023 11:37 EDT and agree with these findings:  [x]  Laboratory  [x]  Microbiology  [x]  Radiology  [x]  EKG/Telemetry   [x]  Cardiology/Vascular   []  Pathology  [x]  Old records  []  Other:  Most notable findings include: new anemia, CKD, low albumin, abnormal LFTs      LAB RESULTS:      Lab 09/22/23  1025   WBC 8.27   HEMOGLOBIN 9.1*   HEMATOCRIT 28.8*   PLATELETS 146   NEUTROS ABS 6.79   IMMATURE GRANS (ABS) 0.12*   LYMPHS ABS 0.67*   MONOS ABS 0.65   EOS ABS 0.02   MCV 97.3*         Lab 09/22/23  1025   SODIUM 135*   POTASSIUM 3.9   CHLORIDE 101   CO2 24.0   ANION GAP 10.0   BUN 37*   CREATININE 1.43*   EGFR 37.4*   GLUCOSE 130*   CALCIUM 8.9         Lab 09/22/23  1025   TOTAL PROTEIN 5.4*   ALBUMIN 3.1*   GLOBULIN 2.3   ALT (SGPT) 50*   AST (SGOT) 40*   BILIRUBIN 0.9   ALK PHOS 95   LIPASE 18                     Brief Urine Lab Results  (Last result in the past 365 days)        Color   Clarity   Blood   Leuk Est   Nitrite   Protein   CREAT   Urine HCG        09/22/23 1042 Yellow   Cloudy   Negative   Negative   Negative   >=300 mg/dL (3+)                 COVID19   Date Value Ref Range Status   08/09/2023 Detected (C) Not Detected - Ref. Range Final       CT Head Without Contrast    Result Date: 9/22/2023  CT HEAD WO CONTRAST Date of Exam: 9/22/2023 10:59 AM EDT Indication: fall with head injury on eliquis. Comparison: 9/13/2023 Technique: Axial CT images were obtained of the head without contrast administration.  Automated exposure control and iterative construction methods were used. Findings: There is mild parenchymal volume loss again noted. Chronic lacunar type infarct in the left thalamus appears unchanged. There is mild periventricular and scattered deep and subcortical white matter hypodensity again noted, most commonly secondary to chronic small vessel ischemic  change. Ventricles are appropriate in size and configuration. There is normal gray-white differentiation. There is no evidence of mass effect, midline shift, acute hemorrhage or edema. No abnormal fluid collections are identified. Atherosclerotic vascular calcification is noted. There is layering frothy material in the left sphenoid sinus. Correlate clinically for acute sinusitis. This appears similar to prior study. Paranasal sinuses are clear. There is partial opacification of the right mastoid air cells. No acute calvarial defects are seen. There is a large superficial scalp hematoma over the left frontal region measuring approximately 1.8 cm in thickness and extending from the orbital region through the high  convexity region, measuring approximately 5.6 cm in maximum axial dimension.     Impression: Impression: 1. No acute intracranial abnormality identified. 2. Mild parenchymal volume loss and probable chronic small vessel ischemic change, similar to prior. 3. Interval development of large periorbital/frontal  scalp hematoma. Electronically Signed: Hemal Hendrix MD  9/22/2023 11:16 AM EDT  Workstation ID: [id number]     Results for orders placed during the hospital encounter of 08/09/23    Adult Transthoracic Echo Limited W/ Cont if Necessary Per Protocol    Interpretation Summary    Left ventricular systolic function is low normal. Estimated left ventricular EF = 50%    Left ventricular wall thickness is consistent with mild concentric hypertrophy.    There is a 20 mm MARK 3 TAVR valve present.    Aortic valve mean pressure gradient is 11 mmHg.    Calculated aortic valve area 1.12 cm².    Trivial prosthesis insufficiency, paravalvular versus central.      The patient qualifies to receive the vaccine, but they have not yet received it.    Assessment & Plan   Assessment / Plan       Anemia associated with acute blood loss    Bilateral carotid artery stenosis    Aortic stenosis, severe s/p TAVR (7/20/2022)     Longstanding persistent atrial fibrillation    Hyperlipidemia LDL goal <70    Hypertension    Chronic combined systolic and diastolic congestive heart failure    Status post CVA    Presence of cardiac pacemaker secondary to sick sinus syndrome    Chronic kidney disease, stage 3b    Pulmonary hypertension    Chronic anticoagulation    Anorexia      Assessment & Plan:  Miryam Mckeon is a 77 y.o. female with past medical history of A-fib on Eliquis, aortic valve stenosis status post TAVR, CKD stage III, deafness, hypertension, hyperlipidemia and history of CVA as well as COVID in August and Mixed HFrEF who presents with recurrent falls, the dwindles and Occult positive stool.    Acute blood loss guaiac positive  -Start PPI, consult GI, consider EGD tomorrow  -Hold Eliquis  -Blood loss could be related to her multiple bruises all over her, there is no hematemesis apparently yesterday.  However peptic ulcer disease could explain her anorexia.    Recent COVID  Persistent anorexia  Worsening memory loss and apathy  Ongoing elevated inflammatory markers  We will give a few doses of Decadron to see if it improves her general wellbeing    Status post TAVR 2022  Permanent pacemaker for A-fib,/sick sinus syndrome  Hypertension  Pulmonary hypertension  -Rate controlled, continue amiodarone, consider decreasing to once a day, check TSH  -Continue metoprolol 50 mg 3 times daily, [place] L3 times a day, Cardizem 300 today?  Need to clarify clonidine and  -Continue Lipitor unless CK is elevated or LFTs worsen    History of stroke on chronic Eliquis  -Minimal right-sided weakness noted    Chronic CKD monitor creatinine is a little elevated today, check CK  DVT prophylaxis: Recent Eliquis use, hold for further doses-procedure      CODE STATUS: Daughter reports patient is to be no CPR     Expected Discharge  Expected Discharge Date: 9/25/2023; Expected Discharge Time:     Electronically signed by Esperanza Rice MD 09/22/23 11:37  EDT

## 2023-09-23 ENCOUNTER — ANESTHESIA (OUTPATIENT)
Dept: GASTROENTEROLOGY | Facility: HOSPITAL | Age: 78
End: 2023-09-23
Payer: MEDICARE

## 2023-09-23 ENCOUNTER — APPOINTMENT (OUTPATIENT)
Dept: CT IMAGING | Facility: HOSPITAL | Age: 78
End: 2023-09-23
Payer: MEDICARE

## 2023-09-23 ENCOUNTER — ANESTHESIA EVENT (OUTPATIENT)
Dept: GASTROENTEROLOGY | Facility: HOSPITAL | Age: 78
End: 2023-09-23
Payer: MEDICARE

## 2023-09-23 PROBLEM — R19.5 HEME POSITIVE STOOL: Status: ACTIVE | Noted: 2023-09-22

## 2023-09-23 LAB
ALBUMIN SERPL-MCNC: 2.9 G/DL (ref 3.5–5.2)
ALBUMIN/GLOB SERPL: 1.3 G/DL
ALP SERPL-CCNC: 89 U/L (ref 39–117)
ALT SERPL W P-5'-P-CCNC: 53 U/L (ref 1–33)
ANION GAP SERPL CALCULATED.3IONS-SCNC: 13 MMOL/L (ref 5–15)
AST SERPL-CCNC: 40 U/L (ref 1–32)
BASOPHILS # BLD AUTO: 0.01 10*3/MM3 (ref 0–0.2)
BASOPHILS NFR BLD AUTO: 0.2 % (ref 0–1.5)
BILIRUB SERPL-MCNC: 1.2 MG/DL (ref 0–1.2)
BUN SERPL-MCNC: 37 MG/DL (ref 8–23)
BUN/CREAT SERPL: 25.3 (ref 7–25)
CALCIUM SPEC-SCNC: 8.7 MG/DL (ref 8.6–10.5)
CHLORIDE SERPL-SCNC: 101 MMOL/L (ref 98–107)
CO2 SERPL-SCNC: 22 MMOL/L (ref 22–29)
CREAT SERPL-MCNC: 1.46 MG/DL (ref 0.57–1)
DEPRECATED RDW RBC AUTO: 56.9 FL (ref 37–54)
EGFRCR SERPLBLD CKD-EPI 2021: 36.5 ML/MIN/1.73
EOSINOPHIL # BLD AUTO: 0 10*3/MM3 (ref 0–0.4)
EOSINOPHIL NFR BLD AUTO: 0 % (ref 0.3–6.2)
ERYTHROCYTE [DISTWIDTH] IN BLOOD BY AUTOMATED COUNT: 16.4 % (ref 12.3–15.4)
GLOBULIN UR ELPH-MCNC: 2.3 GM/DL
GLUCOSE SERPL-MCNC: 135 MG/DL (ref 65–99)
HCT VFR BLD AUTO: 30.4 % (ref 34–46.6)
HGB BLD-MCNC: 10 G/DL (ref 12–15.9)
IMM GRANULOCYTES # BLD AUTO: 0.14 10*3/MM3 (ref 0–0.05)
IMM GRANULOCYTES NFR BLD AUTO: 2.3 % (ref 0–0.5)
INR PPP: 1.68 (ref 0.89–1.12)
LYMPHOCYTES # BLD AUTO: 0.44 10*3/MM3 (ref 0.7–3.1)
LYMPHOCYTES NFR BLD AUTO: 7.1 % (ref 19.6–45.3)
MCH RBC QN AUTO: 31 PG (ref 26.6–33)
MCHC RBC AUTO-ENTMCNC: 32.9 G/DL (ref 31.5–35.7)
MCV RBC AUTO: 94.1 FL (ref 79–97)
MONOCYTES # BLD AUTO: 0.2 10*3/MM3 (ref 0.1–0.9)
MONOCYTES NFR BLD AUTO: 3.2 % (ref 5–12)
NEUTROPHILS NFR BLD AUTO: 5.4 10*3/MM3 (ref 1.7–7)
NEUTROPHILS NFR BLD AUTO: 87.2 % (ref 42.7–76)
NRBC BLD AUTO-RTO: 0 /100 WBC (ref 0–0.2)
PLATELET # BLD AUTO: 173 10*3/MM3 (ref 140–450)
PMV BLD AUTO: 10.2 FL (ref 6–12)
POTASSIUM SERPL-SCNC: 4.5 MMOL/L (ref 3.5–5.2)
PROT SERPL-MCNC: 5.2 G/DL (ref 6–8.5)
PROTHROMBIN TIME: 19.9 SECONDS (ref 12.2–14.5)
RBC # BLD AUTO: 3.23 10*6/MM3 (ref 3.77–5.28)
SODIUM SERPL-SCNC: 136 MMOL/L (ref 136–145)
WBC NRBC COR # BLD: 6.19 10*3/MM3 (ref 3.4–10.8)

## 2023-09-23 PROCEDURE — 43239 EGD BIOPSY SINGLE/MULTIPLE: CPT | Performed by: INTERNAL MEDICINE

## 2023-09-23 PROCEDURE — G0378 HOSPITAL OBSERVATION PER HR: HCPCS

## 2023-09-23 PROCEDURE — 25010000002 PROPOFOL 10 MG/ML EMULSION: Performed by: ANESTHESIOLOGY

## 2023-09-23 PROCEDURE — A9270 NON-COVERED ITEM OR SERVICE: HCPCS | Performed by: INTERNAL MEDICINE

## 2023-09-23 PROCEDURE — 25010000002 ONDANSETRON PER 1 MG: Performed by: INTERNAL MEDICINE

## 2023-09-23 PROCEDURE — 63710000001 DILTIAZEM CD 120 MG CAPSULE SUSTAINED-RELEASE 24 HR: Performed by: INTERNAL MEDICINE

## 2023-09-23 PROCEDURE — 88305 TISSUE EXAM BY PATHOLOGIST: CPT | Performed by: INTERNAL MEDICINE

## 2023-09-23 PROCEDURE — 85610 PROTHROMBIN TIME: CPT | Performed by: INTERNAL MEDICINE

## 2023-09-23 PROCEDURE — 25010000002 HYDRALAZINE PER 20 MG: Performed by: INTERNAL MEDICINE

## 2023-09-23 PROCEDURE — 74176 CT ABD & PELVIS W/O CONTRAST: CPT

## 2023-09-23 PROCEDURE — 63710000001 ATORVASTATIN 10 MG TABLET: Performed by: INTERNAL MEDICINE

## 2023-09-23 PROCEDURE — 63710000001 MIRTAZAPINE 15 MG TABLET: Performed by: INTERNAL MEDICINE

## 2023-09-23 PROCEDURE — 80053 COMPREHEN METABOLIC PANEL: CPT | Performed by: INTERNAL MEDICINE

## 2023-09-23 PROCEDURE — 63710000001 DILTIAZEM CD 180 MG CAPSULE SUSTAINED-RELEASE 24 HR: Performed by: INTERNAL MEDICINE

## 2023-09-23 PROCEDURE — 92610 EVALUATE SWALLOWING FUNCTION: CPT

## 2023-09-23 PROCEDURE — 0 DIATRIZOATE MEGLUMINE & SODIUM PER 1 ML: Performed by: INTERNAL MEDICINE

## 2023-09-23 PROCEDURE — 96376 TX/PRO/DX INJ SAME DRUG ADON: CPT

## 2023-09-23 PROCEDURE — 99214 OFFICE O/P EST MOD 30 MIN: CPT | Performed by: NURSE PRACTITIONER

## 2023-09-23 PROCEDURE — 25010000002 ONDANSETRON PER 1 MG: Performed by: ANESTHESIOLOGY

## 2023-09-23 PROCEDURE — 85025 COMPLETE CBC W/AUTO DIFF WBC: CPT | Performed by: INTERNAL MEDICINE

## 2023-09-23 RX ORDER — LIDOCAINE HYDROCHLORIDE 10 MG/ML
0.5 INJECTION, SOLUTION EPIDURAL; INFILTRATION; INTRACAUDAL; PERINEURAL ONCE AS NEEDED
Status: DISCONTINUED | OUTPATIENT
Start: 2023-09-23 | End: 2023-09-29 | Stop reason: HOSPADM

## 2023-09-23 RX ORDER — MIRTAZAPINE 15 MG/1
7.5 TABLET, FILM COATED ORAL NIGHTLY
Status: DISCONTINUED | OUTPATIENT
Start: 2023-09-23 | End: 2023-09-29 | Stop reason: HOSPADM

## 2023-09-23 RX ORDER — SUCCINYLCHOLINE/SOD CL,ISO/PF 200MG/10ML
SYRINGE (ML) INTRAVENOUS AS NEEDED
Status: DISCONTINUED | OUTPATIENT
Start: 2023-09-23 | End: 2023-09-23 | Stop reason: SURG

## 2023-09-23 RX ORDER — SODIUM CHLORIDE 9 MG/ML
40 INJECTION, SOLUTION INTRAVENOUS AS NEEDED
Status: DISCONTINUED | OUTPATIENT
Start: 2023-09-23 | End: 2023-09-29 | Stop reason: HOSPADM

## 2023-09-23 RX ORDER — SODIUM CHLORIDE, SODIUM LACTATE, POTASSIUM CHLORIDE, CALCIUM CHLORIDE 600; 310; 30; 20 MG/100ML; MG/100ML; MG/100ML; MG/100ML
INJECTION, SOLUTION INTRAVENOUS CONTINUOUS PRN
Status: DISCONTINUED | OUTPATIENT
Start: 2023-09-23 | End: 2023-09-23 | Stop reason: SURG

## 2023-09-23 RX ORDER — FAMOTIDINE 10 MG/ML
20 INJECTION, SOLUTION INTRAVENOUS ONCE
Status: DISCONTINUED | OUTPATIENT
Start: 2023-09-23 | End: 2023-09-24

## 2023-09-23 RX ORDER — SODIUM CHLORIDE 0.9 % (FLUSH) 0.9 %
10 SYRINGE (ML) INJECTION AS NEEDED
Status: DISCONTINUED | OUTPATIENT
Start: 2023-09-23 | End: 2023-09-29 | Stop reason: HOSPADM

## 2023-09-23 RX ORDER — ATORVASTATIN CALCIUM 10 MG/1
10 TABLET, FILM COATED ORAL NIGHTLY
Status: DISCONTINUED | OUTPATIENT
Start: 2023-09-23 | End: 2023-09-29 | Stop reason: HOSPADM

## 2023-09-23 RX ORDER — ONDANSETRON 2 MG/ML
INJECTION INTRAMUSCULAR; INTRAVENOUS AS NEEDED
Status: DISCONTINUED | OUTPATIENT
Start: 2023-09-23 | End: 2023-09-23 | Stop reason: SURG

## 2023-09-23 RX ORDER — FAMOTIDINE 20 MG/1
20 TABLET, FILM COATED ORAL ONCE
Status: DISCONTINUED | OUTPATIENT
Start: 2023-09-23 | End: 2023-09-24

## 2023-09-23 RX ORDER — DRONABINOL 2.5 MG/1
2.5 CAPSULE ORAL
Status: DISCONTINUED | OUTPATIENT
Start: 2023-09-23 | End: 2023-09-24

## 2023-09-23 RX ORDER — PROPOFOL 10 MG/ML
VIAL (ML) INTRAVENOUS AS NEEDED
Status: DISCONTINUED | OUTPATIENT
Start: 2023-09-23 | End: 2023-09-23 | Stop reason: SURG

## 2023-09-23 RX ORDER — CLONIDINE HYDROCHLORIDE 0.1 MG/1
0.1 TABLET ORAL DAILY
Status: DISCONTINUED | OUTPATIENT
Start: 2023-09-23 | End: 2023-09-23

## 2023-09-23 RX ORDER — SODIUM CHLORIDE 0.9 % (FLUSH) 0.9 %
10 SYRINGE (ML) INJECTION EVERY 12 HOURS SCHEDULED
Status: DISCONTINUED | OUTPATIENT
Start: 2023-09-23 | End: 2023-09-29 | Stop reason: HOSPADM

## 2023-09-23 RX ORDER — HYDROMORPHONE HYDROCHLORIDE 1 MG/ML
0.5 INJECTION, SOLUTION INTRAMUSCULAR; INTRAVENOUS; SUBCUTANEOUS
Status: DISCONTINUED | OUTPATIENT
Start: 2023-09-23 | End: 2023-09-23 | Stop reason: HOSPADM

## 2023-09-23 RX ORDER — SODIUM CHLORIDE, SODIUM LACTATE, POTASSIUM CHLORIDE, CALCIUM CHLORIDE 600; 310; 30; 20 MG/100ML; MG/100ML; MG/100ML; MG/100ML
9 INJECTION, SOLUTION INTRAVENOUS CONTINUOUS
Status: DISCONTINUED | OUTPATIENT
Start: 2023-09-23 | End: 2023-09-24

## 2023-09-23 RX ORDER — LIDOCAINE HYDROCHLORIDE 10 MG/ML
INJECTION, SOLUTION EPIDURAL; INFILTRATION; INTRACAUDAL; PERINEURAL AS NEEDED
Status: DISCONTINUED | OUTPATIENT
Start: 2023-09-23 | End: 2023-09-23 | Stop reason: SURG

## 2023-09-23 RX ORDER — ROCURONIUM BROMIDE 10 MG/ML
INJECTION, SOLUTION INTRAVENOUS AS NEEDED
Status: DISCONTINUED | OUTPATIENT
Start: 2023-09-23 | End: 2023-09-23 | Stop reason: SURG

## 2023-09-23 RX ADMIN — PROPOFOL 100 MG: 10 INJECTION, EMULSION INTRAVENOUS at 11:26

## 2023-09-23 RX ADMIN — Medication 10 MG: at 10:34

## 2023-09-23 RX ADMIN — METOPROLOL SUCCINATE 100 MG: 50 TABLET, EXTENDED RELEASE ORAL at 08:51

## 2023-09-23 RX ADMIN — Medication 1000 UNITS: at 08:51

## 2023-09-23 RX ADMIN — PANTOPRAZOLE SODIUM 40 MG: 40 INJECTION, POWDER, LYOPHILIZED, FOR SOLUTION INTRAVENOUS at 08:51

## 2023-09-23 RX ADMIN — ATORVASTATIN CALCIUM 10 MG: 10 TABLET, FILM COATED ORAL at 22:31

## 2023-09-23 RX ADMIN — HYDRALAZINE HYDROCHLORIDE 50 MG: 50 TABLET, FILM COATED ORAL at 08:51

## 2023-09-23 RX ADMIN — DILTIAZEM HYDROCHLORIDE 300 MG: 180 CAPSULE, COATED, EXTENDED RELEASE ORAL at 12:43

## 2023-09-23 RX ADMIN — DIATRIZOATE MEGLUMINE AND DIATRIZOATE SODIUM 30 ML: 660; 100 LIQUID ORAL; RECTAL at 15:50

## 2023-09-23 RX ADMIN — Medication 100 MG: at 11:26

## 2023-09-23 RX ADMIN — HYDRALAZINE HYDROCHLORIDE 10 MG: 20 INJECTION INTRAMUSCULAR; INTRAVENOUS at 15:34

## 2023-09-23 RX ADMIN — CYANOCOBALAMIN TAB 1000 MCG 1000 MCG: 1000 TAB at 08:51

## 2023-09-23 RX ADMIN — ONDANSETRON 4 MG: 2 INJECTION INTRAMUSCULAR; INTRAVENOUS at 11:35

## 2023-09-23 RX ADMIN — Medication 10 MG: at 05:06

## 2023-09-23 RX ADMIN — LIDOCAINE HYDROCHLORIDE 50 MG: 10 INJECTION, SOLUTION EPIDURAL; INFILTRATION; INTRACAUDAL; PERINEURAL at 11:26

## 2023-09-23 RX ADMIN — ROCURONIUM BROMIDE 5 MG: 10 SOLUTION INTRAVENOUS at 11:25

## 2023-09-23 RX ADMIN — PANTOPRAZOLE SODIUM 40 MG: 40 INJECTION, POWDER, LYOPHILIZED, FOR SOLUTION INTRAVENOUS at 22:31

## 2023-09-23 RX ADMIN — ONDANSETRON 4 MG: 2 INJECTION INTRAMUSCULAR; INTRAVENOUS at 10:02

## 2023-09-23 RX ADMIN — SODIUM CHLORIDE, POTASSIUM CHLORIDE, SODIUM LACTATE AND CALCIUM CHLORIDE: 600; 310; 30; 20 INJECTION, SOLUTION INTRAVENOUS at 11:14

## 2023-09-23 RX ADMIN — Medication 10 ML: at 22:31

## 2023-09-23 RX ADMIN — ONDANSETRON 4 MG: 2 INJECTION INTRAMUSCULAR; INTRAVENOUS at 18:32

## 2023-09-23 RX ADMIN — MIRTAZAPINE 7.5 MG: 15 TABLET, FILM COATED ORAL at 22:31

## 2023-09-23 RX ADMIN — AMIODARONE HYDROCHLORIDE 200 MG: 200 TABLET ORAL at 08:51

## 2023-09-23 NOTE — ANESTHESIA POSTPROCEDURE EVALUATION
Patient: Miryam Mckeon    Procedure Summary       Date: 09/23/23 Room / [facility]:  [facility]    Anesthesia Start: 1114 Anesthesia Stop: 1151    Procedure: ESOPHAGOGASTRODUODENOSCOPY Diagnosis:       Heme positive stool      Acute blood loss anemia      (Heme positive stool [R19.5])      (Acute blood loss anemia [D62])    Surgeons: Brunner, Mark I, MD Provider: Trevor Arreaga MD    Anesthesia Type: general ASA Status: 3            Anesthesia Type: general    Vitals  Vitals Value Taken Time   /48 09/23/23 1150   Temp     Pulse 59 09/23/23 1152   Resp     SpO2 98 % 09/23/23 1152   Vitals shown include unvalidated device data.        Post Anesthesia Care and Evaluation    Patient location during evaluation: PACU  Patient participation: complete - patient participated  Level of consciousness: awake and alert  Pain management: adequate    Airway patency: patent  Anesthetic complications: No anesthetic complications  PONV Status: none  Cardiovascular status: hemodynamically stable and acceptable  Respiratory status: nonlabored ventilation, acceptable and nasal cannula  Hydration status: acceptable

## 2023-09-23 NOTE — PROGRESS NOTES
[facility] Medicine Services  PROGRESS NOTE    Patient Name: Miryam Mckeon  : 1945  MRN: [id number]    Date of Admission: 2023  Primary Care Physician: Rigoberto Chamberlain MD    Subjective   Subjective     CC:  Fall and anemia    HPI:  Patient resting in bed. Denies pain. Reports area on her backside is of concern.      Objective   Objective     Vital Signs:   Temp:  [97.4 °F (36.3 °C)-98.6 °F (37 °C)] 98.6 °F (37 °C)  Heart Rate:  [60-64] 60  Resp:  [12-18] 16  BP: (137-194)/(53-97) 194/65     Physical Exam:  Constitutional: No acute distress, awake, alert  HENT: bilateral bruising around eyes, L>R  Respiratory: Clear to auscultation bilaterally, respiratory effort normal   Cardiovascular: RRR, no murmurs, rubs, or gallops  Gastrointestinal: soft, nontender, nondistended  Musculoskeletal: No bilateral ankle edema  Psychiatric: Appropriate affect, cooperative  Neurologic: Oriented x 3, speech clear, no focal deficits  Skin: multiple bruises over upper extremities      Results Reviewed:  LAB RESULTS:      Lab 23  0400 23  0359 23  2021 23  1353 23  1025   WBC 6.19  --   --   --  8.27   HEMOGLOBIN 10.0*  --  11.0* 10.0* 9.1*   HEMATOCRIT 30.4*  --  33.3* 30.0* 28.8*   PLATELETS 173  --   --   --  146   NEUTROS ABS 5.40  --   --   --  6.79   IMMATURE GRANS (ABS) 0.14*  --   --   --  0.12*   LYMPHS ABS 0.44*  --   --   --  0.67*   MONOS ABS 0.20  --   --   --  0.65   EOS ABS 0.00  --   --   --  0.02   MCV 94.1  --   --   --  97.3*   CRP  --   --   --   --  0.65*   PROCALCITONIN  --   --   --   --  0.21   LDH  --   --   --   --  356*   PROTIME  --  19.9*  --   --   --    D DIMER QUANT  --   --   --  1.45*  --          Lab 23  0400 23  1025   SODIUM 136 135*   POTASSIUM 4.5 3.9   CHLORIDE 101 101   CO2 22.0 24.0   ANION GAP 13.0 10.0   BUN 37* 37*   CREATININE 1.46* 1.43*   EGFR 36.5* 37.4*   GLUCOSE 135* 130*   CALCIUM 8.7 8.9   TSH   --  17.120*         Lab 09/23/23  0400 09/22/23  1025   TOTAL PROTEIN 5.2* 5.4*   ALBUMIN 2.9* 3.1*   GLOBULIN 2.3 2.3   ALT (SGPT) 53* 50*   AST (SGOT) 40* 40*   BILIRUBIN 1.2 0.9   ALK PHOS 89 95   LIPASE  --  18         Lab 09/23/23  0359   PROTIME 19.9*   INR 1.68*             Lab 09/22/23  1353 09/22/23  1025   IRON  --  46   IRON SATURATION (TSAT)  --  22   TIBC  --  209*   TRANSFERRIN  --  140*   FERRITIN  --  819.80*   ABO TYPING O  --    RH TYPING Negative  --    ANTIBODY SCREEN Negative  --          Brief Urine Lab Results  (Last result in the past 365 days)        Color   Clarity   Blood   Leuk Est   Nitrite   Protein   CREAT   Urine HCG        09/22/23 1042 Yellow   Cloudy   Negative   Negative   Negative   >=300 mg/dL (3+)                   Microbiology Results Abnormal       None            CT Head Without Contrast    Result Date: 9/22/2023  CT HEAD WO CONTRAST Date of Exam: 9/22/2023 10:59 AM EDT Indication: fall with head injury on eliquis. Comparison: 9/13/2023 Technique: Axial CT images were obtained of the head without contrast administration.  Automated exposure control and iterative construction methods were used. Findings: There is mild parenchymal volume loss again noted. Chronic lacunar type infarct in the left thalamus appears unchanged. There is mild periventricular and scattered deep and subcortical white matter hypodensity again noted, most commonly secondary to chronic small vessel ischemic change. Ventricles are appropriate in size and configuration. There is normal gray-white differentiation. There is no evidence of mass effect, midline shift, acute hemorrhage or edema. No abnormal fluid collections are identified. Atherosclerotic vascular calcification is noted. There is layering frothy material in the left sphenoid sinus. Correlate clinically for acute sinusitis. This appears similar to prior study. Paranasal sinuses are clear. There is partial opacification of the right mastoid air  cells. No acute calvarial defects are seen. There is a large superficial scalp hematoma over the left frontal region measuring approximately 1.8 cm in thickness and extending from the orbital region through the high  convexity region, measuring approximately 5.6 cm in maximum axial dimension.     Impression: Impression: 1. No acute intracranial abnormality identified. 2. Mild parenchymal volume loss and probable chronic small vessel ischemic change, similar to prior. 3. Interval development of large periorbital/frontal  scalp hematoma. Electronically Signed: Hemal Hendrix MD  9/22/2023 11:16 AM EDT  Workstation ID: [id number]     Results for orders placed during the hospital encounter of 08/09/23    Adult Transthoracic Echo Limited W/ Cont if Necessary Per Protocol    Interpretation Summary    Left ventricular systolic function is low normal. Estimated left ventricular EF = 50%    Left ventricular wall thickness is consistent with mild concentric hypertrophy.    There is a 20 mm MARK 3 TAVR valve present.    Aortic valve mean pressure gradient is 11 mmHg.    Calculated aortic valve area 1.12 cm².    Trivial prosthesis insufficiency, paravalvular versus central.      Current medications:  Scheduled Meds:amiodarone, 200 mg, Oral, Q24H  cholecalciferol, 1,000 Units, Oral, Daily  hydrALAZINE, 50 mg, Oral, Daily  metoprolol succinate XL, 100 mg, Oral, Q24H  pantoprazole, 40 mg, Intravenous, Q12H  vitamin B-12, 1,000 mcg, Oral, Daily      Continuous Infusions:   PRN Meds:.  acetaminophen    docusate sodium    hydrALAZINE    labetalol    ondansetron    [COMPLETED] Insert Peripheral IV **AND** sodium chloride    Assessment & Plan   Assessment & Plan     Active Hospital Problems    Diagnosis  POA    **Anemia associated with acute blood loss [D62]  Yes    Anorexia [R63.0]  Yes    Chronic anticoagulation [Z79.01]  Not Applicable    Pulmonary hypertension [I27.20]  Yes    Chronic kidney disease, stage 3b [N18.32]  Yes     Presence of cardiac pacemaker secondary to sick sinus syndrome [Z95.0]  Yes    Chronic combined systolic and diastolic congestive heart failure [I50.42]  Yes    Hypertension [I10]  Yes    Aortic stenosis, severe s/p TAVR (7/20/2022) [I35.0]  Yes    Hyperlipidemia LDL goal <70 [E78.5]  Yes    Longstanding persistent atrial fibrillation [I48.11]  Yes    Bilateral carotid artery stenosis [I65.23]  Yes    Status post CVA [Z86.73]  Not Applicable      Resolved Hospital Problems   No resolved problems to display.        Brief Hospital Course to date:  Miryam Mckeon is a 77 y.o. female with past medical history of A-fib on Eliquis, aortic valve stenosis status post TAVR, CKD stage III, deafness, hypertension, hyperlipidemia and history of CVA as well as COVID in August and Mixed HFrEF who presents with recurrent falls, the dwindles and Occult positive stool.     Acute blood loss Anemia - guaiac positive  -Start PPI, consult GI, planning possible EGD  -Hold Eliquis for now  - monitor hemoglobin, transfuse PRN Hgb <7    Recent COVID  Persistent anorexia  Worsening memory loss and apathy  Ongoing elevated inflammatory markers  - giving few doses of Decadron to see if it improves her general wellbeing     Status post TAVR 2022  Permanent pacemaker for A-fib,/sick sinus syndrome  Hypertension  Pulmonary hypertension  -Rate controlled, continue amiodarone  -Continue metoprolol  -Continue Lipitor     HTN:  - BP significantly elevated, resume home blood pressure medications    Elevated TSH  - check FT4     History of stroke on chronic Eliquis  -Minimal right-sided weakness noted    CKD  -baseline appears to be around 1.2-1.3, currently 1.4  - repeat AM labs  - avoid nephrotoxins, hold ARB    Expected Discharge Location and Transportation: Back to [facility]  Expected Discharge   Expected Discharge Date: 9/25/2023; Expected Discharge Time:      DVT prophylaxis:  No DVT prophylaxis order currently exists.     AM-PAC 6 Clicks Score  (PT): 14 (09/23/23 0800)    CODE STATUS:   Code Status and Medical Interventions:   Ordered at: 09/22/23 1816     Code Status (Patient has no pulse and is not breathing):    No CPR (Do Not Attempt to Resuscitate)     Medical Interventions (Patient has pulse or is breathing):    Full       Lakeisha Castro, DO  09/23/23

## 2023-09-23 NOTE — THERAPY EVALUATION
Acute Care - Speech Language Pathology   Swallow Initial Evaluation [facility]    Clinical Swallow Evaluation       Patient Name: Miryam Mckeon  : 1945  MRN: [id number]  Today's Date: 2023               Admit Date: 2023    Visit Dx:     ICD-10-CM ICD-9-CM   1. Frequent falls  R29.6 V15.88   2. Facial contusion, initial encounter  S00.83XA 920   3. Acute anemia  D64.9 285.9   4. Heme positive stool  R19.5 792.1   5. Chronic anticoagulation  Z79.01 V58.61   6. Elevated blood pressure reading with diagnosis of hypertension  I10 401.9   7. Acute blood loss anemia  D62 285.1   8. Dysphagia, unspecified type  R13.10 787.20     Patient Active Problem List   Diagnosis    Bilateral carotid artery stenosis    Aortic stenosis, severe s/p TAVR (2022)    Longstanding persistent atrial fibrillation    Sick sinus syndrome    Hyperlipidemia LDL goal <70    Hypertension    Chronic combined systolic and diastolic congestive heart failure    Status post CVA    Presence of cardiac pacemaker secondary to sick sinus syndrome    Chronic kidney disease, stage 3b    Parent refuses immunizations    Chronic nausea    Pulmonary hypertension    Chronic anticoagulation    Acute HFrEF (heart failure with reduced ejection fraction)    Pleural effusion    Anemia associated with acute blood loss    Anorexia    Heme positive stool     Past Medical History:   Diagnosis Date    Anxiety     Aortic valve stenosis     Atrial fibrillation     Borderline diabetes     ???    Carotid artery stenosis     CKD (chronic kidney disease)     COVID-19 2023    Deaf     GERD (gastroesophageal reflux disease)     Heart murmur     Hyperlipidemia     Hypertension     Irregular heartbeat     PONV (postoperative nausea and vomiting)     Stroke     TIA (transient ischemic attack)      Past Surgical History:   Procedure Laterality Date    AORTIC VALVE REPAIR/REPLACEMENT N/A 2022    Procedure: TRANSCATHETER AORTIC VALVE REPLACEMENT  with angioplasty and stent to the right common and external iliac artery;  Surgeon: Bola Whitaker MD;  Location: [facility];  Service: Cardiothoracic;  Laterality: N/A;  FL-15 MIN 12 SEC  DOSE- 92.41  CONTRAST- 120 ML ISOVUE    AORTIC VALVE REPAIR/REPLACEMENT N/A 07/20/2022    Procedure: Transcatheter Aortic Valve Replacement;  Surgeon: Yashira Chow MD;  Location: [facility];  Service: Cardiovascular;  Laterality: N/A;    CARDIAC CATHETERIZATION      05/20/2022 per dr. chow    CARDIAC CATHETERIZATION Left 05/20/2022    Procedure: Left Heart Cath;  Surgeon: Yashira Chow MD;  Location: [facility] CATH INVASIVE LOCATION;  Service: Cardiology;  Laterality: Left;    CARDIAC ELECTROPHYSIOLOGY PROCEDURE N/A 04/13/2022    Procedure: DDD PPM Implant (BSC), DNS Eliquis;  Surgeon: Troy Hussein DO;  Location: [facility] EP INVASIVE LOCATION;  Service: Cardiology;  Laterality: N/A;    CARDIAC VALVE REPLACEMENT  07/2022    CAROTID ENDARTERECTOMY Bilateral     CATARACT EXTRACTION      CHOLECYSTECTOMY      COLONOSCOPY      FEMORAL ARTERY CUTDOWN Right 07/20/2022    Procedure: FEMORAL ARTERY CUTDOWN, ANGIOGRAM;  Surgeon: Bola Whitaker MD;  Location: [facility];  Service: Vascular;  Laterality: Right;  fl-1 m 12 sec  dose- 47.29 mgy  contrast- 50 ml isovue    KNEE SURGERY Right     PACEMAKER IMPLANTATION      JF      05/20/2022 per dr. chow    TRANSESOPHAGEAL ECHOCARDIOGRAM (JF) N/A 07/20/2022    Procedure: TRANSESOPHAGEAL ECHOCARDIOGRAM PER ANESTHESIA;  Surgeon: Bola Whitaker MD;  Location: [facility];  Service: Cardiothoracic;  Laterality: N/A;       SLP Recommendation and Plan  SLP Swallowing Diagnosis: functional oral phase, R/O pharyngeal dysphagia (09/23/23 1535)  SLP Diet Recommendation: regular textures, thin liquids, other (see comments) (ONCE MD OKAYS DIET ADVANCEMENT FROM CLEAR LIQUIDS) (09/23/23 1535)     SLP Rec. for Method of Medication  Administration: meds whole, with thin liquids, as tolerated (once OK'd for diet advancement by MD) (09/23/23 1535)     Monitor for Signs of Aspiration: notify SLP if any concerns (09/23/23 1535)  Recommended Diagnostics: other (see comments) (diet tolerance) (09/23/23 1535)  Swallow Criteria for Skilled Therapeutic Interventions Met: demonstrates skilled criteria (09/23/23 1535)  Anticipated Discharge Disposition (SLP): skilled nursing facility, anticipate therapy at next level of care (09/23/23 1535)  Rehab Potential/Prognosis, Swallowing: good, to achieve stated therapy goals (09/23/23 1535)  Therapy Frequency (Swallow): PRN (09/23/23 1535)  Predicted Duration Therapy Intervention (Days): until discharge (09/23/23 1535)  Oral Care Recommendations: Oral Care BID/PRN (09/23/23 1535)                                      Oral Care Recommendations: Oral Care BID/PRN (09/23/23 1535)           SWALLOW EVALUATION (last 72 hours)       SLP Adult Swallow Evaluation       Row Name 09/23/23 1535                   General Information    Current Method of Nutrition clear liquids  -CH        Prior Level of Function-Swallowing no diet consistency restrictions  -CH        Plans/Goals Discussed with patient and family;agreed upon  -        Barriers to Rehab none identified  -        Patient's Goals for Discharge patient did not state  -CH        Family Goals for Discharge family did not state  -           Pain    Additional Documentation Pain Scale: FACES Pre/Post-Treatment (Group)  -CH           Pain Scale: FACES Pre/Post-Treatment    Pain: FACES Scale, Pretreatment 0-->no hurt  -CH        Posttreatment Pain Rating 0-->no hurt  -CH           Oral Motor Structure and Function    Dentition Assessment natural, present and adequate  -CH        Secretion Management WNL/WFL  -CH        Mucosal Quality moist, healthy  -CH        Volitional Swallow WFL  -CH           Oral Musculature and Cranial Nerve Assessment    Oral Motor  General Assessment WFL  -           General Eating/Swallowing Observations    Respiratory Support Currently in Use room air  -        Eating/Swallowing Skills fed by SLP  -        Positioning During Eating upright 90 degree;upright in bed  -        Utensils Used spoon;cup;straw  -        Consistencies Trialed ice chips;thin liquids;pureed;regular textures  all clear liquid compliant (ice, H2O, jello)  -           Respiratory    Respiratory Status WFL;room air  -           Clinical Swallow Eval    Oral Prep Phase WFL  -        Oral Transit WFL  -        Oral Residue WFL  -CH        Pharyngeal Phase no overt signs/symptoms of pharyngeal impairment  -        Esophageal Phase unremarkable  -        Clinical Swallow Evaluation Summary No overt clinical s/s of aspiration with any consistency. SLP to f/u once diet is advanced by MD to further test solids before signing off. Recommend regular and thin liqudis when Ok'd for diet advancement by MD.  -           Swallowing Quality of Life Assessment    Education and counseling provided Risks of aspiration;Oral care recommendations and rationale  -           SLP Evaluation Clinical Impression    SLP Swallowing Diagnosis functional oral phase;R/O pharyngeal dysphagia  -        Functional Impact risk of aspiration/pneumonia  -        Rehab Potential/Prognosis, Swallowing good, to achieve stated therapy goals  -        Swallow Criteria for Skilled Therapeutic Interventions Met demonstrates skilled criteria  -           Recommendations    Therapy Frequency (Swallow) PRN  -        Predicted Duration Therapy Intervention (Days) until discharge  -        SLP Diet Recommendation regular textures;thin liquids;other (see comments)  ONCE MD OKAYS DIET ADVANCEMENT FROM CLEAR LIQUIDS  -        Recommended Diagnostics other (see comments)  diet tolerance  -        Oral Care Recommendations Oral Care BID/PRN  -        SLP Rec. for Method of  Medication Administration meds whole;with thin liquids;as tolerated  once OK'd for diet advancement by MD  -CH        Monitor for Signs of Aspiration notify SLP if any concerns  -CH        Anticipated Discharge Disposition (SLP) skilled nursing facility;anticipate therapy at next level of care  -                  User Key  (r) = Recorded By, (t) = Taken By, (c) = Cosigned By      Initials Name Effective Dates    Iveth Beltran MS CCC-SLP 06/16/21 -                     EDUCATION  The patient has been educated in the following areas:   Dysphagia (Swallowing Impairment) Oral Care/Hydration Modified Diet Instruction.        SLP GOALS       Row Name 09/23/23 1535             (LTG) Patient will demonstrate functional swallow for    Diet Texture (Demonstrate functional swallow) regular textures  -CH      Liquid viscosity (Demonstrate functional swallow) thin liquids  -CH      [place] (Demonstrate functional swallow) with minimal cues (75-90% accuracy)  -      Time Frame (Demonstrate functional swallow) by discharge  -         (STG) Patient will tolerate trials of    Consistencies Trialed (Tolerate trials) regular textures;thin liquids  ONCE OKAYED FOR DIET ADVANCEMENT FROM CLEAR LIQUIDS BY MD  -CH      Desired Outcome (Tolerate trials) without signs/symptoms of aspiration;with adequate oral prep/transit/clearance  -CH      [place] (Tolerate trials) with minimal cues (75-90% accuracy)  -      Time Frame (Tolerate trials) by discharge  -                User Key  (r) = Recorded By, (t) = Taken By, (c) = Cosigned By      Initials Name Provider Type    Iveth Beltran MS CCC-SLP Speech and Language Pathologist                       Time Calculation:    Time Calculation- SLP       Row Name 09/23/23 1604             Time Calculation- SLP    SLP Start Time 1535  -      SLP Received On 09/23/23  -CH         Untimed Charges    SLP Eval/Re-eval  ST Eval Oral Pharyng Swallow - 32646  -       [id number] Eval Oral Pharyng Swallow Minutes 25  -CH         Total Minutes    Untimed Charges Total Minutes 25  -CH       Total Minutes 25  -CH                User Key  (r) = Recorded By, (t) = Taken By, (c) = Cosigned By      Initials Name Provider Type    Iveth Beltran MS CCC-SLP Speech and Language Pathologist                    Therapy Charges for Today       Code Description Service Date Service Provider Modifiers Qty    [id number]  ST EVAL ORAL PHARYNG SWALLOW 2 9/23/2023 Iveth Badillo MS CCC-SLP GN 1                 Iveth Badillo MS CCC-SLP  9/23/2023

## 2023-09-23 NOTE — PLAN OF CARE
Goal Outcome Evaluation:    SLP evaluation completed. Will continue to address diet tolerance. Please see note for further details and recommendations.

## 2023-09-23 NOTE — SIGNIFICANT NOTE
09/23/23 1102   SLP Deferred Reason   SLP Deferred Reason Unable to evaluate, medical status change  (SLP Consult received. Pt is NPO for GI consult. Please let SLP know when cleared for PO and we will come back to evaluate. Thank you.)

## 2023-09-23 NOTE — CONSULTS
[facility] [place] Consult    Referring Provider: Brunner, Mark I, MD    PCP: Rigoberto Chamberlain MD    Reason for Consultation: GI bleed, anemia, on Eliquis    Chief complaint: Weakness and increased appetite    History of present illness:    Miryam Mckeon is a 77 y.o. female who is admitted with worsening weakness and fatigue who was found to have FOBT positive stools and worsening anemia.  Patient with memory loss and is contributory to history; family  (ASL) at bedside who contributes in her complaints.    Reports that patient has had a month-long decline following her recent COVID-19 infection.  Reports over the past week or so has had a significant decrease in appetite associated weakness and fatigue.  No reports of any melanotic or hematochezia stools.  Has had significant nausea and vomiting is worse postprandially is described as bilious in nature.  No reported abdominal pain, shortness of breath, chest pain, or fever/chills.  At time of admission, hemoglobin found to be markedly decreased from patient's recent discharge at which time it was 12.7 with her presenting overnight at 9.1.  FOBT is positive.  No use of NSAIDs reported though she is on Eliquis for history of A-fib and TIA. Past medical, surgical, social, and family histories are reviewed for accuracy.  No documented alleviating or exacerbating factors.  Does not endorse pain at time of exam.    Allergies:  Patient has no known allergies.    Scheduled Meds:  amiodarone, 200 mg, Oral, Q24H  atorvastatin, 10 mg, Oral, Nightly  cholecalciferol, 1,000 Units, Oral, Daily  dilTIAZem CD, 300 mg, Oral, Q24H  dronabinol, 2.5 mg, Oral, BID AC  hydrALAZINE, 50 mg, Oral, Daily  metoprolol succinate XL, 100 mg, Oral, Q24H  mirtazapine, 7.5 mg, Oral, Nightly  pantoprazole, 40 mg, Intravenous, Q12H  vitamin B-12, 1,000 mcg, Oral, Daily         Infusions:       PRN Meds:    acetaminophen    docusate sodium    hydrALAZINE    labetalol     ondansetron    [COMPLETED] Insert Peripheral IV **AND** sodium chloride    Home Meds:  Medications Prior to Admission   Medication Sig Dispense Refill Last Dose    acetaminophen (TYLENOL) 650 MG 8 hr tablet Take 1 tablet by mouth Every 8 (Eight) Hours As Needed for Mild Pain.       aluminum-magnesium hydroxide 200-200 MG/5ML suspension Take 30 mL by mouth As Needed for Heartburn.       amiodarone (PACERONE) 200 MG tablet Take 1 tablet by mouth Every 12 (Twelve) Hours.       apixaban (ELIQUIS) 2.5 MG tablet tablet Take 1 tablet by mouth Every 12 (Twelve) Hours. DO NOT RESUME TAKING UNTIL 7/24 180 tablet 1     aspirin 81 MG EC tablet Take 1 tablet by mouth Daily.       atorvastatin (LIPITOR) 10 MG tablet Take 1 tablet by mouth Every Night.       bifidobacterium lactis, INFANT'S MYLICON, probiotic drops liquid Take 6 drops by mouth Daily.       cholecalciferol (VITAMIN D3) 25 MCG (1000 UT) tablet Take 1 tablet by mouth Daily.       cloNIDine (CATAPRES) 0.1 MG tablet Take 1 tablet by mouth Daily.       dilTIAZem CD (Cardizem CD) 300 MG 24 hr capsule Take 1 capsule by mouth Daily. 90 capsule 3     docusate sodium (COLACE) 100 MG capsule Take 1 capsule by mouth As Needed for Constipation.       dronabinol (Marinol) 2.5 MG capsule Take 1 capsule by mouth 2 (Two) Times a Day Before Meals. 60 capsule 2     guaiFENesin (MUCINEX) 600 MG 12 hr tablet Take 1 tablet by mouth Daily.       hydrALAZINE (APRESOLINE) 50 MG tablet Take 1 tablet by mouth Daily.       isosorbide-hydrALAZINE (BIDIL) 20-37.5 MG per tablet Take 1.5 tablets by mouth 3 (Three) Times a Day.       latanoprost (XALATAN) 0.005 % ophthalmic solution INSTILL 1 DROP IN BOTH EYES EVERY NIGHT AT BEDTIME       meclizine (ANTIVERT) 12.5 MG tablet Take 1 tablet by mouth As Needed for Dizziness.       metoprolol succinate XL (TOPROL-XL) 50 MG 24 hr tablet Take 3 tablets by mouth Daily.       mirtazapine (REMERON) 15 MG tablet Take 0.5 tablets by mouth Every Night.        Polyethylene Glycol 3350 (MIRALAX PO) Take 1 Scoop by mouth As Needed.       promethazine (PHENERGAN) 6.25 MG/5ML syrup Take  by mouth 4 (Four) Times a Day As Needed for Nausea or Vomiting.       simethicone (MYLICON) 125 MG chewable tablet Chew 1 tablet Every 6 (Six) Hours As Needed for Flatulence. PRN       valsartan (DIOVAN) 160 MG tablet Take 1 tablet by mouth Every 12 (Twelve) Hours.       vitamin B-12 (CYANOCOBALAMIN) 1000 MCG tablet Take 1 tablet by mouth Daily.          ROS: Review of Systems   Unable to perform ROS: Dementia     PAST MED HX:  Past Medical History:   Diagnosis Date    Anxiety     Aortic valve stenosis     Atrial fibrillation     Borderline diabetes     ???    Carotid artery stenosis     CKD (chronic kidney disease)     COVID-19 08/11/2023    Deaf     GERD (gastroesophageal reflux disease)     Heart murmur     Hyperlipidemia     Hypertension     Irregular heartbeat     PONV (postoperative nausea and vomiting)     Stroke     TIA (transient ischemic attack)        PAST SURG HX:  Past Surgical History:   Procedure Laterality Date    AORTIC VALVE REPAIR/REPLACEMENT N/A 07/20/2022    Procedure: TRANSCATHETER AORTIC VALVE REPLACEMENT with angioplasty and stent to the right common and external iliac artery;  Surgeon: Bola Whitaker MD;  Location:  [facility];  Service: Cardiothoracic;  Laterality: N/A;  FL-15 MIN 12 SEC  DOSE- 92.41  CONTRAST- 120 ML ISOVUE    AORTIC VALVE REPAIR/REPLACEMENT N/A 07/20/2022    Procedure: Transcatheter Aortic Valve Replacement;  Surgeon: Yashira Chow MD;  Location:  [facility];  Service: Cardiovascular;  Laterality: N/A;    CARDIAC CATHETERIZATION      05/20/2022 per dr. chow    CARDIAC CATHETERIZATION Left 05/20/2022    Procedure: Left Heart Cath;  Surgeon: Yashira Chow MD;  Location:  [place] [facility] INVASIVE LOCATION;  Service: Cardiology;  Laterality: Left;    CARDIAC ELECTROPHYSIOLOGY PROCEDURE N/A 04/13/2022     "Procedure: DDD PPM Implant (BSC), DNS Eliquis;  Surgeon: Troy Hussein DO;  Location: [facility] EP INVASIVE LOCATION;  Service: Cardiology;  Laterality: N/A;    CARDIAC VALVE REPLACEMENT  07/2022    CAROTID ENDARTERECTOMY Bilateral     CATARACT EXTRACTION      CHOLECYSTECTOMY      COLONOSCOPY      FEMORAL ARTERY CUTDOWN Right 07/20/2022    Procedure: FEMORAL ARTERY CUTDOWN, ANGIOGRAM;  Surgeon: Bola Whitaker MD;  Location: [facility];  Service: Vascular;  Laterality: Right;  fl-1 m 12 sec  dose- 47.29 mgy  contrast- 50 ml isovue    KNEE SURGERY Right     PACEMAKER IMPLANTATION      JF      05/20/2022 per dr. chow    TRANSESOPHAGEAL ECHOCARDIOGRAM (JF) N/A 07/20/2022    Procedure: TRANSESOPHAGEAL ECHOCARDIOGRAM PER ANESTHESIA;  Surgeon: Bola Whitaker MD;  Location: [facility];  Service: Cardiothoracic;  Laterality: N/A;       FAM HX:  Family History   Problem Relation Age of Onset    Other Mother         enlarged heart    Stroke Father        SOC HX:  Social History     Socioeconomic History    Marital status:     Number of children: 2    Highest education level: High school graduate   Tobacco Use    Smoking status: Never    Smokeless tobacco: Never   Vaping Use    Vaping Use: Never used   Substance and Sexual Activity    Alcohol use: Never    Drug use: Never    Sexual activity: Defer       PHYSICAL EXAM  BP (!) 194/65 (BP Location: Right arm, Patient Position: Lying)   Pulse 60   Temp 98.6 °F (37 °C) (Oral)   Resp 16   Ht 152.4 cm (60\")   Wt 52.2 kg (115 lb)   SpO2 93%   BMI 22.46 kg/m²   Wt Readings from Last 3 Encounters:   09/22/23 52.2 kg (115 lb)   09/13/23 52.2 kg (115 lb)   08/20/23 52.1 kg (114 lb 12.8 oz)   ,body mass index is 22.46 kg/m².  Physical Exam  Vitals and nursing note reviewed.   Constitutional:       General: She is not in acute distress.     Appearance: Normal appearance. She is normal weight. She is ill-appearing. She is not toxic-appearing. "   HENT:      Head:      Comments: Facial bruising from recent fall noted  Eyes:      Extraocular Movements: Extraocular movements intact.      Pupils: Pupils are equal, round, and reactive to light.      Comments: Significant periorbital bruising noted bilaterally   Cardiovascular:      Rate and Rhythm: Normal rate and regular rhythm.      Heart sounds: Murmur heard.   Pulmonary:      Effort: Pulmonary effort is normal. No respiratory distress.      Breath sounds: Normal breath sounds.   Abdominal:      General: Abdomen is flat. Bowel sounds are normal. There is no distension.      Palpations: Abdomen is soft. There is no mass.      Tenderness: There is abdominal tenderness. There is no guarding or rebound.      Hernia: No hernia is present.   Genitourinary:     Rectum: Guaiac result positive.   Musculoskeletal:         General: Normal range of motion.      Right lower leg: No edema.      Left lower leg: No edema.   Skin:     General: Skin is warm and dry.      Capillary Refill: Capillary refill takes less than 2 seconds.      Coloration: Skin is pale. Skin is not jaundiced.      Findings: Bruising present.   Neurological:      Mental Status: She is alert. Mental status is at baseline. She is disoriented.   Psychiatric:      Comments: Flat affect       Results Review:   I reviewed the patient's new clinical results.  I reviewed the patient's new imaging results and agree with the interpretation.  I reviewed the patient's other test results and agree with the interpretation  I personally viewed and interpreted the patient's EKG/Telemetry data    Lab Results   Component Value Date    WBC 6.19 09/23/2023    HGB 10.0 (L) 09/23/2023    HGB 11.0 (L) 09/22/2023    HGB 10.0 (L) 09/22/2023    HCT 30.4 (L) 09/23/2023    MCV 94.1 09/23/2023     09/23/2023       Lab Results   Component Value Date    INR 1.68 (H) 09/23/2023    INR 1.30 (H) 07/19/2022    INR 1.20 (H) 05/20/2022       Lab Results   Component Value Date     GLUCOSE 135 (H) 09/23/2023    BUN 37 (H) 09/23/2023    CREATININE 1.46 (H) 09/23/2023    EGFRIFNONA 41 (L) 02/18/2022    EGFRIFAFRI 47 (L) 02/18/2022    BCR 25.3 (H) 09/23/2023     09/23/2023    K 4.5 09/23/2023    CO2 22.0 09/23/2023    CALCIUM 8.7 09/23/2023    PROTENTOTREF 6.5 07/19/2023    ALBUMIN 2.9 (L) 09/23/2023    ALKPHOS 89 09/23/2023    BILITOT 1.2 09/23/2023    ALT 53 (H) 09/23/2023    AST 40 (H) 09/23/2023       CT Head Without Contrast    Result Date: 9/22/2023  CT HEAD WO CONTRAST Date of Exam: 9/22/2023 10:59 AM EDT Indication: fall with head injury on eliquis. Comparison: 9/13/2023 Technique: Axial CT images were obtained of the head without contrast administration.  Automated exposure control and iterative construction methods were used. Findings: There is mild parenchymal volume loss again noted. Chronic lacunar type infarct in the left thalamus appears unchanged. There is mild periventricular and scattered deep and subcortical white matter hypodensity again noted, most commonly secondary to chronic small vessel ischemic change. Ventricles are appropriate in size and configuration. There is normal gray-white differentiation. There is no evidence of mass effect, midline shift, acute hemorrhage or edema. No abnormal fluid collections are identified. Atherosclerotic vascular calcification is noted. There is layering frothy material in the left sphenoid sinus. Correlate clinically for acute sinusitis. This appears similar to prior study. Paranasal sinuses are clear. There is partial opacification of the right mastoid air cells. No acute calvarial defects are seen. There is a large superficial scalp hematoma over the left frontal region measuring approximately 1.8 cm in thickness and extending from the orbital region through the high  convexity region, measuring approximately 5.6 cm in maximum axial dimension.     Impression: 1. No acute intracranial abnormality identified. 2. Mild parenchymal  volume loss and probable chronic small vessel ischemic change, similar to prior. 3. Interval development of large periorbital/frontal  scalp hematoma. Electronically Signed: Hemal Hendrix MD  9/22/2023 11:16 AM EDT  Workstation ID: [id number]    CT Head Without Contrast    Result Date: 9/13/2023  CT HEAD WO CONTRAST Date of Exam: 9/13/2023 6:44 AM EDT Indication: fall. Comparison: CT head dated August 9, 2023 Technique: Axial CT images were obtained of the head without contrast administration.  Automated exposure control and iterative construction methods were used. Findings: There is mild diffuse generalized atrophy. There are low-attenuation areas in the periventricular white matter consistent with chronic microvascular ischemic change. There is no mass, mass effect or midline shift. There are no abnormal extra-axial fluid collections or areas of acute hemorrhage. There are no air-fluid levels in the sinuses.. The mastoid air cells are clear.     Impression: Mild atrophy and chronic microvascular ischemia. No acute intracranial process. Electronically Signed: Hemal Santamaria MD  9/13/2023 7:03 AM EDT  Workstation ID: [id number]    CT Cervical Spine Without Contrast    Result Date: 9/13/2023  CT CERVICAL SPINE WO CONTRAST Date of Exam: 9/13/2023 6:44 AM EDT Indication: Fall, neck pain. Comparison: None available. Technique: Axial CT images were obtained of the cervical spine without contrast administration.  Reconstructed coronal and sagittal images were also obtained. Automated exposure control and iterative construction methods were used. Findings: There is no acute fracture or dislocation. There is disc base narrowing at C4-C7 consistent with degenerative disc disease. There is multilevel endplate spurring consistent with mild to moderate spondylosis. There are also mild to moderate facet arthritic changes mainly on the left side. There is no paraspinal hematoma or fluid collection. There are atherosclerotic vascular  calcifications bilaterally with surgical clips which may be secondary to a previous carotid endarterectomy. With the visualized upper chest, there are partially imaged layering pleural effusions. Assessment for a herniated disc is difficult. There are a few disc osteophyte complexes which may cause borderline canal stenosis. There is neural foraminal narrowing mainly on the left side at C4-C5 and C5-C6.     Impression: 1. No acute fracture or dislocation. 2. Degenerative changes. 3. Partially imaged layering pleural effusions in the upper chest. 4. Questionable borderline canal stenosis. There is neural foraminal narrowing as described. Electronically Signed: Diego Urena MD  9/13/2023 7:17 AM EDT  Workstation ID: [id number]      COVID19   Date Value Ref Range Status   08/09/2023 Detected (C) Not Detected - Ref. Range Final     ASSESSMENTS/PLANS  1.  Gastrointestinal bleeding, FOBT positive  2.  Acute blood loss anemia, symptomatic anemia  3.  Recent COVID-19, received steroids  4.  History of TIA, on Eliquis  5.  Anorexia, related to above.  6.  S/p TAVR 2022  7.  Atrial fibrillation, sick sinus syndrome, PPM in place  8.  Pulmonary hypertension, essential hypertension    Miryam Mckeon is a 77 y.o. female presenting to hospital with worsening weakness and decreased appetite with associated increased frequency and vomiting.  Symptoms are concerning for PUD.  Recommend EGD today for further evaluation.  If unremarkable, will likely need colonoscopy.  Additionally, if unremarkable, related further evaluation of hepatobiliary system with advanced imaging.    >>> N.p.o.  >>> Obtain informed consent for esophagogastroduodenoscopy  >>> IV PPI twice daily  >>> Obtain CT abdomen and pelvis   >>> Trend H&H and transfuse per protocol  >>> Avoid NSAIDs  >>> Hold anticoagulation in setting of acute anemia  >>> Additionally, given significance of nausea and vomiting that is ongoing, will discuss with anesthesia as patient will  need intubation for this procedure.    I discussed the patient's findings and my recommendations with patient, family, and nursing staff    BALJIT Landon  09/23/23  11:40 EDT

## 2023-09-23 NOTE — BRIEF OP NOTE
ESOPHAGOGASTRODUODENOSCOPY  Progress Note    Miryam Mckeon  9/23/2023    EGD shows mild atrophic gastritis.  Esophagus and duodenum are normal.  No cause of nausea vomiting or blood loss seen.    >> Obtain CT scan abdomen and pelvis to assess for retroperitoneal bleed and causes of nausea vomiting.  >> Pending CT, may need colonoscopy at some point.    Mark I. Brunner, MD     Date: 9/23/2023  Time: 11:44 EDT

## 2023-09-23 NOTE — ANESTHESIA PREPROCEDURE EVALUATION
Anesthesia Evaluation     Patient summary reviewed and Nursing notes reviewed   history of anesthetic complications:  PONV               Airway   Mallampati: II  TM distance: >3 FB  Neck ROM: full  No difficulty expected  Dental - normal exam     Pulmonary - normal exam   (+) pleural effusion,  Cardiovascular - normal exam    (+) pacemaker (SSS) pacemaker, hypertension, valvular problems/murmurs (S/P TAVR FOR AS), dysrhythmias Atrial Fib, CHF , hyperlipidemia,  carotid artery disease      Neuro/Psych  (+) TIA, CVA, psychiatric history Anxiety  GI/Hepatic/Renal/Endo    (+) GERD, renal disease CRI    Musculoskeletal (-) negative ROS    Abdominal  - normal exam    Bowel sounds: normal.   Substance History - negative use     OB/GYN negative ob/gyn ROS         Other                        Anesthesia Plan    ASA 4     general     (PROPOFOL)  intravenous induction     Anesthetic plan, risks, benefits, and alternatives have been provided, discussed and informed consent has been obtained with: patient.    Plan discussed with CRNA.      CODE STATUS:    Code Status (Patient has no pulse and is not breathing): No CPR (Do Not Attempt to Resuscitate)  Medical Interventions (Patient has pulse or is breathing): Full

## 2023-09-23 NOTE — PLAN OF CARE
Problem: Adult Inpatient Plan of Care  Goal: Plan of Care Review  Outcome: Ongoing, Progressing  Goal: Patient-Specific Goal (Individualized)  Outcome: Ongoing, Progressing  Goal: Absence of Hospital-Acquired Illness or Injury  Outcome: Ongoing, Progressing  Intervention: Identify and Manage Fall Risk  Recent Flowsheet Documentation  Taken 9/23/2023 1400 by Alicia Garrett RN  Safety Promotion/Fall Prevention: safety round/check completed  Taken 9/23/2023 1230 by Alicia Garrett RN  Safety Promotion/Fall Prevention: safety round/check completed  Taken 9/23/2023 1000 by Alicia Garrett RN  Safety Promotion/Fall Prevention: safety round/check completed  Taken 9/23/2023 0800 by Alicia Garrett RN  Safety Promotion/Fall Prevention:   nonskid shoes/slippers when out of bed   patient off unit   room organization consistent   safety round/check completed   toileting scheduled  Intervention: Prevent Skin Injury  Recent Flowsheet Documentation  Taken 9/23/2023 1400 by Alicia Garrett RN  Body Position: weight shifting  Skin Protection:   incontinence pads utilized   skin sealant/moisture barrier applied   skin-to-device areas padded   skin-to-skin areas padded   transparent dressing maintained   tubing/devices free from skin contact  Taken 9/23/2023 1230 by Alicia Garrett RN  Body Position: weight shifting  Skin Protection:   incontinence pads utilized   silicone foam dressing in place   skin sealant/moisture barrier applied   skin-to-device areas padded   skin-to-skin areas padded   transparent dressing maintained   tubing/devices free from skin contact  Taken 9/23/2023 1000 by Alicia Garrett RN  Body Position: weight shifting  Skin Protection:   incontinence pads utilized   silicone foam dressing in place   skin sealant/moisture barrier applied   skin-to-device areas padded   skin-to-skin areas padded   transparent dressing maintained   tubing/devices free from skin contact  Taken 9/23/2023 0800 by Alicia Garrett  RN  Body Position: weight shifting  Skin Protection:   incontinence pads utilized   adhesive use limited   protective footwear used   silicone foam dressing in place   skin sealant/moisture barrier applied   skin-to-device areas padded   skin-to-skin areas padded   transparent dressing maintained   tubing/devices free from skin contact  Intervention: Prevent and Manage VTE (Venous Thromboembolism) Risk  Recent Flowsheet Documentation  Taken 9/23/2023 1400 by Alicia Garrett RN  Activity Management: activity encouraged  Taken 9/23/2023 1230 by Alicia Garrett RN  Activity Management: activity encouraged  Taken 9/23/2023 1000 by Alicia Garrett RN  Activity Management: activity encouraged  Taken 9/23/2023 0800 by Alicia Garrett RN  Activity Management: activity encouraged  Range of Motion: active ROM (range of motion) encouraged  Intervention: Prevent Infection  Recent Flowsheet Documentation  Taken 9/23/2023 1400 by Alicia Garrett RN  Infection Prevention: hand hygiene promoted  Taken 9/23/2023 1230 by Alicia Garrett RN  Infection Prevention: hand hygiene promoted  Taken 9/23/2023 1000 by Alicia Garrett RN  Infection Prevention: hand hygiene promoted  Taken 9/23/2023 0800 by Alicia Garrett RN  Infection Prevention: hand hygiene promoted  Goal: Optimal Comfort and Wellbeing  Outcome: Ongoing, Progressing  Goal: Readiness for Transition of Care  Outcome: Ongoing, Progressing     Problem: Fall Injury Risk  Goal: Absence of Fall and Fall-Related Injury  Outcome: Ongoing, Progressing  Intervention: Identify and Manage Contributors  Recent Flowsheet Documentation  Taken 9/23/2023 1400 by Alicia Garrett RN  Medication Review/Management: medications reviewed  Taken 9/23/2023 1230 by Alicia Garrett RN  Medication Review/Management: medications reviewed  Taken 9/23/2023 1000 by Alicia Garrett RN  Medication Review/Management: medications reviewed  Taken 9/23/2023 0800 by Alicia Garrett RN  Medication  Review/Management: medications reviewed  Intervention: Promote Injury-Free Environment  Recent Flowsheet Documentation  Taken 9/23/2023 1400 by Alicia Garrett RN  Safety Promotion/Fall Prevention: safety round/check completed  Taken 9/23/2023 1230 by Alicia Garrett RN  Safety Promotion/Fall Prevention: safety round/check completed  Taken 9/23/2023 1000 by Alicia Garrett RN  Safety Promotion/Fall Prevention: safety round/check completed  Taken 9/23/2023 0800 by Alicia Garrett RN  Safety Promotion/Fall Prevention:   nonskid shoes/slippers when out of bed   patient off unit   room organization consistent   safety round/check completed   toileting scheduled     Problem: Skin Injury Risk Increased  Goal: Skin Health and Integrity  Outcome: Ongoing, Progressing  Intervention: Optimize Skin Protection  Recent Flowsheet Documentation  Taken 9/23/2023 1400 by Alicia Garrett RN  Pressure Reduction Techniques:   frequent weight shift encouraged   heels elevated off bed   positioned off wounds   pressure points protected   weight shift assistance provided  Head of Bed (HOB) Positioning: [facility] elevated  Pressure Reduction Devices:   foam padding utilized   heel offloading device utilized   positioning supports utilized  Skin Protection:   incontinence pads utilized   skin sealant/moisture barrier applied   skin-to-device areas padded   skin-to-skin areas padded   transparent dressing maintained   tubing/devices free from skin contact  Taken 9/23/2023 1230 by Alicia Garrett RN  Pressure Reduction Techniques:   frequent weight shift encouraged   heels elevated off bed   positioned off wounds   pressure points protected   weight shift assistance provided  Head of Bed (HOB) Positioning: HOB elevated  Pressure Reduction Devices:   heel offloading device utilized   foam padding utilized   positioning supports utilized  Skin Protection:   incontinence pads utilized   silicone foam dressing in place   skin sealant/moisture barrier  applied   skin-to-device areas padded   skin-to-skin areas padded   transparent dressing maintained   tubing/devices free from skin contact  Taken 9/23/2023 1000 by Alicia Garertt RN  Pressure Reduction Techniques:   frequent weight shift encouraged   heels elevated off bed   positioned off wounds   pressure points protected   weight shift assistance provided  Head of Bed (HOB) Positioning: \[facility]\"" elevated  Pressure Reduction Devices:   foam padding utilized   heel offloading device utilized   positioning supports utilized  Skin Protection:   incontinence pads utilized   silicone foam dressing in place   skin sealant/moisture barrier applied   skin-to-device areas padded   skin-to-skin areas padded   transparent dressing maintained   tubing/devices free from skin contact  Taken 9/23/2023 0800 by Alicia Garrett RN  Pressure Reduction Techniques:   frequent weight shift encouraged   heels elevated off bed   positioned off wounds   pressure points protected   weight shift assistance provided  Head of Bed (\[facility]\"") Positioning: \[facility]\"" elevated  Pressure Reduction Devices:   foam padding utilized   heel offloading device utilized   positioning supports utilized  Skin Protection:   incontinence pads utilized   adhesive use limited   protective footwear used   silicone foam dressing in place   skin sealant/moisture barrier applied   skin-to-device areas padded   skin-to-skin areas padded   transparent dressing maintained   tubing/devices free from skin contact   Goal Outcome Evaluation:

## 2023-09-24 ENCOUNTER — APPOINTMENT (OUTPATIENT)
Dept: CT IMAGING | Facility: HOSPITAL | Age: 78
End: 2023-09-24
Payer: MEDICARE

## 2023-09-24 LAB
ANION GAP SERPL CALCULATED.3IONS-SCNC: 11 MMOL/L (ref 5–15)
BASOPHILS # BLD AUTO: 0.02 10*3/MM3 (ref 0–0.2)
BASOPHILS NFR BLD AUTO: 0.2 % (ref 0–1.5)
BUN SERPL-MCNC: 38 MG/DL (ref 8–23)
BUN/CREAT SERPL: 23.8 (ref 7–25)
CALCIUM SPEC-SCNC: 8.6 MG/DL (ref 8.6–10.5)
CHLORIDE SERPL-SCNC: 100 MMOL/L (ref 98–107)
CO2 SERPL-SCNC: 23 MMOL/L (ref 22–29)
CREAT SERPL-MCNC: 1.6 MG/DL (ref 0.57–1)
DEPRECATED RDW RBC AUTO: 59.6 FL (ref 37–54)
EGFRCR SERPLBLD CKD-EPI 2021: 32.7 ML/MIN/1.73
EOSINOPHIL # BLD AUTO: 0 10*3/MM3 (ref 0–0.4)
EOSINOPHIL NFR BLD AUTO: 0 % (ref 0.3–6.2)
ERYTHROCYTE [DISTWIDTH] IN BLOOD BY AUTOMATED COUNT: 16.7 % (ref 12.3–15.4)
GLUCOSE SERPL-MCNC: 93 MG/DL (ref 65–99)
HCT VFR BLD AUTO: 28.6 % (ref 34–46.6)
HGB BLD-MCNC: 9.2 G/DL (ref 12–15.9)
IMM GRANULOCYTES # BLD AUTO: 0.17 10*3/MM3 (ref 0–0.05)
IMM GRANULOCYTES NFR BLD AUTO: 1.6 % (ref 0–0.5)
LYMPHOCYTES # BLD AUTO: 0.56 10*3/MM3 (ref 0.7–3.1)
LYMPHOCYTES NFR BLD AUTO: 5.3 % (ref 19.6–45.3)
MCH RBC QN AUTO: 31.1 PG (ref 26.6–33)
MCHC RBC AUTO-ENTMCNC: 32.2 G/DL (ref 31.5–35.7)
MCV RBC AUTO: 96.6 FL (ref 79–97)
MONOCYTES # BLD AUTO: 0.8 10*3/MM3 (ref 0.1–0.9)
MONOCYTES NFR BLD AUTO: 7.5 % (ref 5–12)
NEUTROPHILS NFR BLD AUTO: 85.4 % (ref 42.7–76)
NEUTROPHILS NFR BLD AUTO: 9.08 10*3/MM3 (ref 1.7–7)
NRBC BLD AUTO-RTO: 0 /100 WBC (ref 0–0.2)
PLATELET # BLD AUTO: 189 10*3/MM3 (ref 140–450)
PMV BLD AUTO: 10.3 FL (ref 6–12)
POTASSIUM SERPL-SCNC: 4.4 MMOL/L (ref 3.5–5.2)
RBC # BLD AUTO: 2.96 10*6/MM3 (ref 3.77–5.28)
SODIUM SERPL-SCNC: 134 MMOL/L (ref 136–145)
T4 FREE SERPL-MCNC: 1.5 NG/DL (ref 0.93–1.7)
WBC NRBC COR # BLD: 10.63 10*3/MM3 (ref 3.4–10.8)

## 2023-09-24 PROCEDURE — A9270 NON-COVERED ITEM OR SERVICE: HCPCS | Performed by: INTERNAL MEDICINE

## 2023-09-24 PROCEDURE — 63710000001 HYDRALAZINE 50 MG TABLET: Performed by: INTERNAL MEDICINE

## 2023-09-24 PROCEDURE — 63710000001 MIRTAZAPINE 15 MG TABLET: Performed by: INTERNAL MEDICINE

## 2023-09-24 PROCEDURE — G0378 HOSPITAL OBSERVATION PER HR: HCPCS

## 2023-09-24 PROCEDURE — 63710000001 METOPROLOL SUCCINATE XL 50 MG TABLET SUSTAINED-RELEASE 24 HOUR: Performed by: INTERNAL MEDICINE

## 2023-09-24 PROCEDURE — 80048 BASIC METABOLIC PNL TOTAL CA: CPT | Performed by: INTERNAL MEDICINE

## 2023-09-24 PROCEDURE — 85025 COMPLETE CBC W/AUTO DIFF WBC: CPT | Performed by: INTERNAL MEDICINE

## 2023-09-24 PROCEDURE — 84439 ASSAY OF FREE THYROXINE: CPT | Performed by: INTERNAL MEDICINE

## 2023-09-24 PROCEDURE — 63710000001 ATORVASTATIN 10 MG TABLET: Performed by: INTERNAL MEDICINE

## 2023-09-24 PROCEDURE — 99213 OFFICE O/P EST LOW 20 MIN: CPT | Performed by: INTERNAL MEDICINE

## 2023-09-24 PROCEDURE — 97165 OT EVAL LOW COMPLEX 30 MIN: CPT

## 2023-09-24 PROCEDURE — 70450 CT HEAD/BRAIN W/O DYE: CPT

## 2023-09-24 PROCEDURE — 63710000001 VITAMIN B-12 1000 MCG TABLET: Performed by: INTERNAL MEDICINE

## 2023-09-24 PROCEDURE — 63710000001 AMIODARONE 200 MG TABLET: Performed by: INTERNAL MEDICINE

## 2023-09-24 PROCEDURE — 63710000001 DILTIAZEM CD 180 MG CAPSULE SUSTAINED-RELEASE 24 HR: Performed by: INTERNAL MEDICINE

## 2023-09-24 PROCEDURE — 63710000001 ACETAMINOPHEN 325 MG TABLET: Performed by: INTERNAL MEDICINE

## 2023-09-24 PROCEDURE — 97535 SELF CARE MNGMENT TRAINING: CPT

## 2023-09-24 PROCEDURE — 63710000001 CHOLECALCIFEROL 25 MCG (1000 UT) TABLET: Performed by: INTERNAL MEDICINE

## 2023-09-24 PROCEDURE — 63710000001 DILTIAZEM CD 120 MG CAPSULE SUSTAINED-RELEASE 24 HR: Performed by: INTERNAL MEDICINE

## 2023-09-24 PROCEDURE — 63710000001 DRONABINOL PER 2.5 MG: Performed by: INTERNAL MEDICINE

## 2023-09-24 RX ORDER — PAROXETINE 10 MG/1
10 TABLET, FILM COATED ORAL EVERY MORNING
COMMUNITY
End: 2023-09-29 | Stop reason: HOSPADM

## 2023-09-24 RX ORDER — ONDANSETRON 4 MG/1
4 TABLET, FILM COATED ORAL EVERY 8 HOURS PRN
COMMUNITY

## 2023-09-24 RX ORDER — PANTOPRAZOLE SODIUM 40 MG/1
40 TABLET, DELAYED RELEASE ORAL
Status: DISCONTINUED | OUTPATIENT
Start: 2023-09-25 | End: 2023-09-29 | Stop reason: HOSPADM

## 2023-09-24 RX ORDER — METOPROLOL SUCCINATE 50 MG/1
150 TABLET, EXTENDED RELEASE ORAL
Status: DISCONTINUED | OUTPATIENT
Start: 2023-09-24 | End: 2023-09-29 | Stop reason: HOSPADM

## 2023-09-24 RX ADMIN — CYANOCOBALAMIN TAB 1000 MCG 1000 MCG: 1000 TAB at 08:33

## 2023-09-24 RX ADMIN — ACETAMINOPHEN 650 MG: 325 TABLET ORAL at 15:49

## 2023-09-24 RX ADMIN — MIRTAZAPINE 7.5 MG: 15 TABLET, FILM COATED ORAL at 21:20

## 2023-09-24 RX ADMIN — PANTOPRAZOLE SODIUM 40 MG: 40 INJECTION, POWDER, LYOPHILIZED, FOR SOLUTION INTRAVENOUS at 11:18

## 2023-09-24 RX ADMIN — Medication 1000 UNITS: at 08:33

## 2023-09-24 RX ADMIN — HYDRALAZINE HYDROCHLORIDE 50 MG: 50 TABLET, FILM COATED ORAL at 08:33

## 2023-09-24 RX ADMIN — ATORVASTATIN CALCIUM 10 MG: 10 TABLET, FILM COATED ORAL at 21:20

## 2023-09-24 RX ADMIN — DILTIAZEM HYDROCHLORIDE 300 MG: 180 CAPSULE, COATED, EXTENDED RELEASE ORAL at 08:33

## 2023-09-24 RX ADMIN — METOPROLOL SUCCINATE 150 MG: 50 TABLET, EXTENDED RELEASE ORAL at 08:30

## 2023-09-24 RX ADMIN — AMIODARONE HYDROCHLORIDE 200 MG: 200 TABLET ORAL at 08:33

## 2023-09-24 RX ADMIN — DRONABINOL 2.5 MG: 2.5 CAPSULE ORAL at 08:33

## 2023-09-24 RX ADMIN — Medication 10 ML: at 21:20

## 2023-09-24 RX ADMIN — Medication 10 ML: at 11:18

## 2023-09-24 NOTE — PLAN OF CARE
Goal Outcome Evaluation:  Pt in semi fowlers position, Alert and confused to situation.  Pt on O2@2L via NC.  No SOA noted.  Pt has bruising to forehead and bilateral eyes.  Denies any pain at this time.  White board used so that pt could write down her questions.  Questions answered.  No s/s of distress, pt denies any needs at this time.

## 2023-09-24 NOTE — PROGRESS NOTES
[facility] Medicine Services  PROGRESS NOTE    Patient Name: Miryam Mckeon  : 1945  MRN: [id number]    Date of Admission: 2023  Primary Care Physician: Rigoberto Chamberlain MD    Subjective   Subjective     CC:  Fall and anemia    HPI:  Patient resting in bed. Denies abdominal pain. No other complaints this morning.      Objective   Objective     Vital Signs:   Temp:  [97.8 °F (36.6 °C)-98.6 °F (37 °C)] 98.1 °F (36.7 °C)  Heart Rate:  [59-60] 60  Resp:  [16-18] 16  BP: (138-194)/() 179/99  Flow (L/min):  [2] 2     Physical Exam:  Constitutional: No acute distress, awake, alert  HENT: bilateral bruising around eyes, L>R  Respiratory: Clear to auscultation bilaterally, respiratory effort normal   Cardiovascular: RRR, no murmurs, rubs, or gallops  Gastrointestinal: soft, nontender, nondistended  Musculoskeletal: No bilateral ankle edema  Psychiatric: Appropriate affect, cooperative  Neurologic: Oriented to person only, hallucinating at times, no focal deficits, deaf  Skin: multiple bruises over upper extremities      Results Reviewed:  LAB RESULTS:      Lab 23  0334 23  0400 23  0359 23  1353 23  1025   WBC 10.63 6.19  --   --   --  8.27   HEMOGLOBIN 9.2* 10.0*  --  11.0* 10.0* 9.1*   HEMATOCRIT 28.6* 30.4*  --  33.3* 30.0* 28.8*   PLATELETS 189 173  --   --   --  146   NEUTROS ABS 9.08* 5.40  --   --   --  6.79   IMMATURE GRANS (ABS) 0.17* 0.14*  --   --   --  0.12*   LYMPHS ABS 0.56* 0.44*  --   --   --  0.67*   MONOS ABS 0.80 0.20  --   --   --  0.65   EOS ABS 0.00 0.00  --   --   --  0.02   MCV 96.6 94.1  --   --   --  97.3*   CRP  --   --   --   --   --  0.65*   PROCALCITONIN  --   --   --   --   --  0.21   LDH  --   --   --   --   --  356*   PROTIME  --   --  19.9*  --   --   --    D DIMER QUANT  --   --   --   --  1.45*  --          Lab 23  0334 23  0400 23  1025   SODIUM 134* 136 135*   POTASSIUM 4.4  4.5 3.9   CHLORIDE 100 101 101   CO2 23.0 22.0 24.0   ANION GAP 11.0 13.0 10.0   BUN 38* 37* 37*   CREATININE 1.60* 1.46* 1.43*   EGFR 32.7* 36.5* 37.4*   GLUCOSE 93 135* 130*   CALCIUM 8.6 8.7 8.9   TSH  --   --  17.120*         Lab 09/23/23  0400 09/22/23  1025   TOTAL PROTEIN 5.2* 5.4*   ALBUMIN 2.9* 3.1*   GLOBULIN 2.3 2.3   ALT (SGPT) 53* 50*   AST (SGOT) 40* 40*   BILIRUBIN 1.2 0.9   ALK PHOS 89 95   LIPASE  --  18         Lab 09/23/23  0359   PROTIME 19.9*   INR 1.68*             Lab 09/22/23  1353 09/22/23  1025   IRON  --  46   IRON SATURATION (TSAT)  --  22   TIBC  --  209*   TRANSFERRIN  --  140*   FERRITIN  --  819.80*   ABO TYPING O  --    RH TYPING Negative  --    ANTIBODY SCREEN Negative  --          Brief Urine Lab Results  (Last result in the past 365 days)        Color   Clarity   Blood   Leuk Est   Nitrite   Protein   CREAT   Urine HCG        09/22/23 1042 Yellow   Cloudy   Negative   Negative   Negative   >=300 mg/dL (3+)                   Microbiology Results Abnormal       None            CT Abdomen Pelvis Without Contrast    Result Date: 9/23/2023  CT ABDOMEN PELVIS WO CONTRAST Date of Exam: 9/23/2023 5:35 PM EDT Indication: Unexplained N/V, Acute blood loss--r/o retroperitoneal bleed.. Comparison: Correlation with CTA chest abdomen pelvis 6/3/2022 Technique: Axial CT images were obtained of the abdomen and pelvis without the administration of contrast. Positive oral contrast was administered. Reconstructed coronal and sagittal images were also obtained. Automated exposure control and iterative construction methods were used. Findings: There are small bilateral pleural effusions. There is multichamber cardiac enlargement of the partially imaged heart. There is hypodense blood pool compatible with anemia. There is heavy atherosclerosis of the abdominal aorta without evidence of aneurysm. A right common iliac stent is noted. There is prominent diffuse subcutaneous fat stranding throughout the  entirety of the body wall compatible with anasarca. Unremarkable appearance of the liver. The gallbladder is surgically absent. Normal appearance of the bile ducts. Unremarkable appearance of the spleen, pancreas, and adrenal glands. Mild atrophy of the left kidney with otherwise unremarkable noncontrast appearance of the kidneys. No hydronephrosis or nephrolithiasis. Normal appearance of the ureters and bladder. Unremarkable appearance of the uterus and adnexa. Redundant sigmoid colon. Moderate colonic stool burden. No bowel obstruction or definite inflammatory change of the GI tract. There is a small amount of simple appearing nonorganized pelvic free fluid. There is mesenteric lymphovascular congestion. No pneumoperitoneum. No large or conspicuous retroperitoneal or body wall hematoma. The bones are demineralized. There is chronic mild superior endplate height loss of T12, unchanged from prior CT. No acute osseous findings.     Impression: Impression: Limited assessment for active bleeding in the absence of IV contrast. No conspicuous retroperitoneal hematoma. Hypodense blood pool compatible with anemia. Fluid overload state with small bilateral pleural effusions, small pelvic free fluid, mesenteric lymphovascular congestion, and anasarca. Electronically Signed: Boris Gilliland MD  9/23/2023 6:03 PM EDT  Workstation ID: [id number]    CT Head Without Contrast    Result Date: 9/22/2023  CT HEAD WO CONTRAST Date of Exam: 9/22/2023 10:59 AM EDT Indication: fall with head injury on eliquis. Comparison: 9/13/2023 Technique: Axial CT images were obtained of the head without contrast administration.  Automated exposure control and iterative construction methods were used. Findings: There is mild parenchymal volume loss again noted. Chronic lacunar type infarct in the left thalamus appears unchanged. There is mild periventricular and scattered deep and subcortical white matter hypodensity again noted, most commonly  secondary to chronic small vessel ischemic change. Ventricles are appropriate in size and configuration. There is normal gray-white differentiation. There is no evidence of mass effect, midline shift, acute hemorrhage or edema. No abnormal fluid collections are identified. Atherosclerotic vascular calcification is noted. There is layering frothy material in the left sphenoid sinus. Correlate clinically for acute sinusitis. This appears similar to prior study. Paranasal sinuses are clear. There is partial opacification of the right mastoid air cells. No acute calvarial defects are seen. There is a large superficial scalp hematoma over the left frontal region measuring approximately 1.8 cm in thickness and extending from the orbital region through the high  convexity region, measuring approximately 5.6 cm in maximum axial dimension.     Impression: Impression: 1. No acute intracranial abnormality identified. 2. Mild parenchymal volume loss and probable chronic small vessel ischemic change, similar to prior. 3. Interval development of large periorbital/frontal  scalp hematoma. Electronically Signed: Hemal Hendrix MD  9/22/2023 11:16 AM EDT  Workstation ID: [id number]     Results for orders placed during the hospital encounter of 08/09/23    Adult Transthoracic Echo Limited W/ Cont if Necessary Per Protocol    Interpretation Summary    Left ventricular systolic function is low normal. Estimated left ventricular EF = 50%    Left ventricular wall thickness is consistent with mild concentric hypertrophy.    There is a 20 mm MARK 3 TAVR valve present.    Aortic valve mean pressure gradient is 11 mmHg.    Calculated aortic valve area 1.12 cm².    Trivial prosthesis insufficiency, paravalvular versus central.      Current medications:  Scheduled Meds:amiodarone, 200 mg, Oral, Q24H  atorvastatin, 10 mg, Oral, Nightly  cholecalciferol, 1,000 Units, Oral, Daily  dilTIAZem CD, 300 mg, Oral, Q24H  dronabinol, 2.5 mg, Oral, BID  AC  famotidine, 20 mg, Intravenous, Once  famotidine, 20 mg, Oral, Once  hydrALAZINE, 50 mg, Oral, Daily  metoprolol succinate XL, 150 mg, Oral, Q24H  mirtazapine, 7.5 mg, Oral, Nightly  pantoprazole, 40 mg, Intravenous, Q12H  sodium chloride, 10 mL, Intravenous, Q12H  vitamin B-12, 1,000 mcg, Oral, Daily      Continuous Infusions:lactated ringers, 9 mL/hr      PRN Meds:.  acetaminophen    docusate sodium    hydrALAZINE    labetalol    lidocaine PF 1%    ondansetron    [COMPLETED] Insert Peripheral IV **AND** sodium chloride    sodium chloride    sodium chloride    Assessment & Plan   Assessment & Plan     Active Hospital Problems    Diagnosis  POA    **Anemia associated with acute blood loss [D62]  Yes    Anorexia [R63.0]  Yes    Heme positive stool [R19.5]  Unknown    Chronic anticoagulation [Z79.01]  Not Applicable    Pulmonary hypertension [I27.20]  Yes    Chronic kidney disease, stage 3b [N18.32]  Yes    Presence of cardiac pacemaker secondary to sick sinus syndrome [Z95.0]  Yes    Chronic combined systolic and diastolic congestive heart failure [I50.42]  Yes    Hypertension [I10]  Yes    Aortic stenosis, severe s/p TAVR (7/20/2022) [I35.0]  Yes    Hyperlipidemia LDL goal <70 [E78.5]  Yes    Longstanding persistent atrial fibrillation [I48.11]  Yes    Bilateral carotid artery stenosis [I65.23]  Yes    Status post CVA [Z86.73]  Not Applicable      Resolved Hospital Problems   No resolved problems to display.        Brief Hospital Course to date:  Miryam Mckeon is a 77 y.o. female with past medical history of A-fib on Eliquis, aortic valve stenosis status post TAVR, CKD stage III, deafness, hypertension, hyperlipidemia and history of CVA as well as COVID in August and Mixed HFrEF who presents with recurrent falls, the dwindles and Occult positive stool.     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  Ongoing elevated inflammatory markers  - supportive care, PT/OT  - trial short course of decadron  - CT head without evidence for ICH     Status post TAVR 2022  Permanent pacemaker for A-fib,/sick sinus syndrome  Hypertension  Pulmonary hypertension  -Rate controlled, continue amiodarone  -Continue metoprolol  -Continue Lipitor     HTN:  - BP remains significantly elevated, does have some duplicate home medications, need to clarify  - restart home regimen with better control, however BP labile    Elevated TSH  - check FT4     History of stroke on chronic Eliquis  -stable    CKD  - baseline appears to be around 1.2-1.3, currently 1.6  - monitor closely  - avoid nephrotoxins, holding ARB  - consider nephrology consult    Expected Discharge Location and Transportation: Back to [facility]  Expected Discharge   Expected Discharge Date: 9/25/2023; Expected Discharge Time:      DVT prophylaxis:  No DVT prophylaxis order currently exists.     AM-PAC 6 Clicks Score (PT): 14 (09/23/23 2000)    CODE STATUS:   Code Status and Medical Interventions:   Ordered at: 09/22/23 1816     Code Status (Patient has no pulse and is not breathing):    No CPR (Do Not Attempt to Resuscitate)     Medical Interventions (Patient has pulse or is breathing):    Full       Lakeisha Castro, DO  09/24/23

## 2023-09-24 NOTE — PROGRESS NOTES
"GI Daily Progress Note  Subjective:    Chief Complaint: Follow-up nausea, vomiting, and acute anemia.    Patient has been confused.  This has been progressive for several weeks per family.  For example, she believes family members are present, who have previously .  Her appetite is poor.  She ate 1 cup of Jell-O today.  She has had no signs of GI bleeding.  She has had no further nausea or vomiting.    Objective:    /47 (BP Location: Right arm, Patient Position: Sitting)   Pulse 60   Temp 98 °F (36.7 °C) (Oral)   Resp 16   Ht 152.4 cm (60\")   Wt 52.2 kg (115 lb)   SpO2 94%   BMI 22.46 kg/m²     Physical Exam  Constitutional:       General: She is not in acute distress.     Appearance: She is ill-appearing. She is not toxic-appearing or diaphoretic.   HENT:      Head:      Comments: Ecchymosis on head and face.     Ears:      Comments: Patient is deaf.  Eyes:      General: No scleral icterus.  Cardiovascular:      Rate and Rhythm: Normal rate.      Pulses: Normal pulses.   Pulmonary:      Effort: Pulmonary effort is normal.   Abdominal:      General: Bowel sounds are normal. There is no distension.      Palpations: Abdomen is soft.      Tenderness: There is no abdominal tenderness. There is no guarding.   Musculoskeletal:      Right lower leg: Edema present.      Left lower leg: Edema present.   Neurological:      General: No focal deficit present.      Mental Status: She is alert.   Psychiatric:         Mood and Affect: Mood normal.         Behavior: Behavior normal.       Lab  Lab Results   Component Value Date    WBC 10.63 2023    HGB 9.2 (L) 2023    HGB 10.0 (L) 2023    HGB 11.0 (L) 2023    MCV 96.6 2023     2023    INR 1.68 (H) 2023    INR 1.30 (H) 2022    INR 1.20 (H) 2022    INR 1.26 (H) 2022    INR 1.04 2015       Lab Results   Component Value Date    GLUCOSE 93 2023    BUN 38 (H) 2023    CREATININE 1.60 " "(H) 09/24/2023    EGFRIFNONA 41 (L) 02/18/2022    EGFRIFAFRI 47 (L) 02/18/2022    BCR 23.8 09/24/2023     (L) 09/24/2023    K 4.4 09/24/2023    CO2 23.0 09/24/2023    CALCIUM 8.6 09/24/2023    PROTENTOTREF 6.5 07/19/2023    ALBUMIN 2.9 (L) 09/23/2023    ALKPHOS 89 09/23/2023    BILITOT 1.2 09/23/2023    ALT 53 (H) 09/23/2023    AST 40 (H) 09/23/2023     Assessment:    1.  Acute anemia.  Patient has not had signs of GI bleeding.  There is some blood loss secondary to bruising and hematoma related to recent falls.  Suspect degree of dilution due to fluid overload.  EGD yesterday was unremarkable.  CT abdomen and pelvis shows no signs of \"hidden\" bleed such as retroperitoneal hemorrhage.  2.  Heme positive stool.  3.  Nausea/vomiting, resolved.  Nothing seen on EGD or CT abdomen pelvis to explain nausea.  4.  Chronic kidney disease, worsening.  5.  Recent confusion in the past few weeks, worsening.  Suspect effects of recent COVID-19 infection, in a background of prior stroke.  6.  Fluid overload/anasarca.      Plan:    >> Continue Daily PPI. Change to PO administration.  >> Advance diet to GI soft.  >> Continue Remeron.   >> Await repeat CT scan head.  Family desires MRI of the brain to assess her recent confusion and falls.  This would need clearance with respect to her pacemaker.  >> Difficult situation of worsening renal function and concomitant fluid overload.  Agree with consideration of nephrology consult.  >> Consider colonoscopy if overt signs of GI bleeding or worsening anemia.  However, she currently would not tolerate the prep.    Mark I. Brunner, MD  09/24/23  14:21 EDT    "

## 2023-09-24 NOTE — PLAN OF CARE
Goal Outcome Evaluation:  Plan of Care Reviewed With: patient        Progress: no change  Outcome Evaluation: Pt. presents below baseline with ADLs and functional mobility. Limited by decreased activity tolerance, generalized weakness, and balance. Skilled OT services warranted to promote return to PLOF. Recommend IPR at discharge.      Anticipated Discharge Disposition (OT): inpatient rehabilitation facility

## 2023-09-25 ENCOUNTER — APPOINTMENT (OUTPATIENT)
Dept: MRI IMAGING | Facility: HOSPITAL | Age: 78
End: 2023-09-25
Payer: MEDICARE

## 2023-09-25 LAB
ANION GAP SERPL CALCULATED.3IONS-SCNC: 9 MMOL/L (ref 5–15)
BUN SERPL-MCNC: 35 MG/DL (ref 8–23)
BUN/CREAT SERPL: 24.1 (ref 7–25)
CALCIUM SPEC-SCNC: 8.6 MG/DL (ref 8.6–10.5)
CHLORIDE SERPL-SCNC: 101 MMOL/L (ref 98–107)
CO2 SERPL-SCNC: 24 MMOL/L (ref 22–29)
CREAT SERPL-MCNC: 1.45 MG/DL (ref 0.57–1)
CYTO UR: NORMAL
DEPRECATED RDW RBC AUTO: 58.1 FL (ref 37–54)
EGFRCR SERPLBLD CKD-EPI 2021: 36.8 ML/MIN/1.73
ERYTHROCYTE [DISTWIDTH] IN BLOOD BY AUTOMATED COUNT: 16.6 % (ref 12.3–15.4)
GLUCOSE SERPL-MCNC: 89 MG/DL (ref 65–99)
HCT VFR BLD AUTO: 28.6 % (ref 34–46.6)
HGB BLD-MCNC: 9.4 G/DL (ref 12–15.9)
LAB AP CASE REPORT: NORMAL
LAB AP CLINICAL INFORMATION: NORMAL
MCH RBC QN AUTO: 31.2 PG (ref 26.6–33)
MCHC RBC AUTO-ENTMCNC: 32.9 G/DL (ref 31.5–35.7)
MCV RBC AUTO: 95 FL (ref 79–97)
PATH REPORT.FINAL DX SPEC: NORMAL
PATH REPORT.GROSS SPEC: NORMAL
PLATELET # BLD AUTO: 154 10*3/MM3 (ref 140–450)
PMV BLD AUTO: 9.8 FL (ref 6–12)
POTASSIUM SERPL-SCNC: 4.1 MMOL/L (ref 3.5–5.2)
QT INTERVAL: 470 MS
QTC INTERVAL: 470 MS
RBC # BLD AUTO: 3.01 10*6/MM3 (ref 3.77–5.28)
SODIUM SERPL-SCNC: 134 MMOL/L (ref 136–145)
WBC NRBC COR # BLD: 11.9 10*3/MM3 (ref 3.4–10.8)

## 2023-09-25 PROCEDURE — 63710000001 HYDRALAZINE 50 MG TABLET: Performed by: INTERNAL MEDICINE

## 2023-09-25 PROCEDURE — 97162 PT EVAL MOD COMPLEX 30 MIN: CPT

## 2023-09-25 PROCEDURE — 85027 COMPLETE CBC AUTOMATED: CPT | Performed by: INTERNAL MEDICINE

## 2023-09-25 PROCEDURE — 63710000001 VITAMIN B-12 1000 MCG TABLET: Performed by: INTERNAL MEDICINE

## 2023-09-25 PROCEDURE — 93010 ELECTROCARDIOGRAM REPORT: CPT | Performed by: INTERNAL MEDICINE

## 2023-09-25 PROCEDURE — 63710000001 ACETAMINOPHEN 325 MG TABLET: Performed by: INTERNAL MEDICINE

## 2023-09-25 PROCEDURE — A9270 NON-COVERED ITEM OR SERVICE: HCPCS | Performed by: INTERNAL MEDICINE

## 2023-09-25 PROCEDURE — 25010000002 HYDRALAZINE PER 20 MG: Performed by: INTERNAL MEDICINE

## 2023-09-25 PROCEDURE — 97530 THERAPEUTIC ACTIVITIES: CPT

## 2023-09-25 PROCEDURE — G0378 HOSPITAL OBSERVATION PER HR: HCPCS

## 2023-09-25 PROCEDURE — 63710000001 MIRTAZAPINE 15 MG TABLET: Performed by: INTERNAL MEDICINE

## 2023-09-25 PROCEDURE — 63710000001 AMIODARONE 200 MG TABLET: Performed by: INTERNAL MEDICINE

## 2023-09-25 PROCEDURE — 82746 ASSAY OF FOLIC ACID SERUM: CPT

## 2023-09-25 PROCEDURE — 82607 VITAMIN B-12: CPT

## 2023-09-25 PROCEDURE — 63710000001 DILTIAZEM CD 120 MG CAPSULE SUSTAINED-RELEASE 24 HR: Performed by: INTERNAL MEDICINE

## 2023-09-25 PROCEDURE — 99214 OFFICE O/P EST MOD 30 MIN: CPT

## 2023-09-25 PROCEDURE — A9270 NON-COVERED ITEM OR SERVICE: HCPCS

## 2023-09-25 PROCEDURE — 63710000001 PANTOPRAZOLE 40 MG TABLET DELAYED-RELEASE: Performed by: INTERNAL MEDICINE

## 2023-09-25 PROCEDURE — 63710000001 CHOLECALCIFEROL 25 MCG (1000 UT) TABLET: Performed by: INTERNAL MEDICINE

## 2023-09-25 PROCEDURE — 93005 ELECTROCARDIOGRAM TRACING: CPT

## 2023-09-25 PROCEDURE — 25010000002 HALOPERIDOL LACTATE PER 5 MG: Performed by: NURSE PRACTITIONER

## 2023-09-25 PROCEDURE — 63710000001 QUETIAPINE 25 MG TABLET

## 2023-09-25 PROCEDURE — 63710000001 ATORVASTATIN 10 MG TABLET: Performed by: INTERNAL MEDICINE

## 2023-09-25 PROCEDURE — 63710000001 DILTIAZEM CD 180 MG CAPSULE SUSTAINED-RELEASE 24 HR: Performed by: INTERNAL MEDICINE

## 2023-09-25 PROCEDURE — 80048 BASIC METABOLIC PNL TOTAL CA: CPT | Performed by: INTERNAL MEDICINE

## 2023-09-25 PROCEDURE — 63710000001 METOPROLOL SUCCINATE XL 50 MG TABLET SUSTAINED-RELEASE 24 HOUR: Performed by: INTERNAL MEDICINE

## 2023-09-25 RX ORDER — HALOPERIDOL 5 MG/ML
1 INJECTION INTRAMUSCULAR ONCE
Status: COMPLETED | OUTPATIENT
Start: 2023-09-25 | End: 2023-09-25

## 2023-09-25 RX ORDER — ZIPRASIDONE MESYLATE 20 MG/ML
10 INJECTION, POWDER, LYOPHILIZED, FOR SOLUTION INTRAMUSCULAR EVERY 4 HOURS PRN
Status: DISCONTINUED | OUTPATIENT
Start: 2023-09-25 | End: 2023-09-29 | Stop reason: HOSPADM

## 2023-09-25 RX ORDER — QUETIAPINE FUMARATE 25 MG/1
25 TABLET, FILM COATED ORAL NIGHTLY
Status: DISCONTINUED | OUTPATIENT
Start: 2023-09-25 | End: 2023-09-26

## 2023-09-25 RX ADMIN — HYDRALAZINE HYDROCHLORIDE 50 MG: 50 TABLET, FILM COATED ORAL at 08:40

## 2023-09-25 RX ADMIN — AMIODARONE HYDROCHLORIDE 200 MG: 200 TABLET ORAL at 08:37

## 2023-09-25 RX ADMIN — HYDRALAZINE HYDROCHLORIDE 10 MG: 20 INJECTION INTRAMUSCULAR; INTRAVENOUS at 03:36

## 2023-09-25 RX ADMIN — ATORVASTATIN CALCIUM 10 MG: 10 TABLET, FILM COATED ORAL at 20:09

## 2023-09-25 RX ADMIN — HALOPERIDOL LACTATE 1 MG: 5 INJECTION, SOLUTION INTRAMUSCULAR at 03:49

## 2023-09-25 RX ADMIN — METOPROLOL SUCCINATE 150 MG: 50 TABLET, EXTENDED RELEASE ORAL at 08:38

## 2023-09-25 RX ADMIN — Medication 10 ML: at 08:43

## 2023-09-25 RX ADMIN — ACETAMINOPHEN 650 MG: 325 TABLET ORAL at 08:56

## 2023-09-25 RX ADMIN — PANTOPRAZOLE SODIUM 40 MG: 40 TABLET, DELAYED RELEASE ORAL at 08:37

## 2023-09-25 RX ADMIN — MIRTAZAPINE 7.5 MG: 15 TABLET, FILM COATED ORAL at 20:09

## 2023-09-25 RX ADMIN — QUETIAPINE FUMARATE 25 MG: 25 TABLET ORAL at 20:09

## 2023-09-25 RX ADMIN — Medication 1000 UNITS: at 08:37

## 2023-09-25 RX ADMIN — CYANOCOBALAMIN TAB 1000 MCG 1000 MCG: 1000 TAB at 08:37

## 2023-09-25 RX ADMIN — DILTIAZEM HYDROCHLORIDE 300 MG: 180 CAPSULE, COATED, EXTENDED RELEASE ORAL at 08:35

## 2023-09-25 RX ADMIN — Medication 10 ML: at 20:09

## 2023-09-25 NOTE — PLAN OF CARE
Problem: Adult Inpatient Plan of Care  Goal: Plan of Care Review  Outcome: Ongoing, Progressing  Goal: Patient-Specific Goal (Individualized)  Outcome: Ongoing, Progressing  Goal: Absence of Hospital-Acquired Illness or Injury  Outcome: Ongoing, Progressing  Intervention: Identify and Manage Fall Risk  Recent Flowsheet Documentation  Taken 9/25/2023 0400 by Edwin De Santiago RN  Safety Promotion/Fall Prevention:   clutter free environment maintained   assistive device/personal items within reach   safety round/check completed  Taken 9/25/2023 0200 by Edwin De Santiago RN  Safety Promotion/Fall Prevention:   assistive device/personal items within reach   clutter free environment maintained   safety round/check completed  Taken 9/25/2023 0000 by Edwin De Santiago RN  Safety Promotion/Fall Prevention:   assistive device/personal items within reach   clutter free environment maintained   safety round/check completed  Taken 9/24/2023 2200 by Edwin De Santiago RN  Safety Promotion/Fall Prevention:   assistive device/personal items within reach   clutter free environment maintained   safety round/check completed  Taken 9/24/2023 2000 by Edwin De Santiago RN  Safety Promotion/Fall Prevention:   safety round/check completed   clutter free environment maintained   assistive device/personal items within reach  Intervention: Prevent Skin Injury  Recent Flowsheet Documentation  Taken 9/25/2023 0400 by Edwin De Santiago RN  Body Position: position changed independently  Skin Protection: adhesive use limited  Taken 9/25/2023 0200 by Edwin De Santiago RN  Body Position: position changed independently  Skin Protection: adhesive use limited  Taken 9/25/2023 0000 by Edwin De Santiago RN  Body Position: position changed independently  Skin Protection: adhesive use limited  Taken 9/24/2023 2200 by Edwin De Santiago RN  Body Position: position changed independently  Skin Protection: adhesive use limited  Taken 9/24/2023 2000 by Edwin De Santiago  RN  Body Position: position changed independently  Skin Protection: adhesive use limited  Intervention: Prevent and Manage VTE (Venous Thromboembolism) Risk  Recent Flowsheet Documentation  Taken 9/25/2023 0400 by Edwin De Santiago RN  Activity Management: activity minimized  Taken 9/25/2023 0200 by Edwin De Santiago RN  Activity Management: activity encouraged  Taken 9/25/2023 0000 by Edwin De Santiago RN  Activity Management: activity encouraged  Taken 9/24/2023 2200 by Edwin De Santiago RN  Activity Management: activity encouraged  Taken 9/24/2023 2000 by Edwin De Santiago RN  Activity Management: activity encouraged  Range of Motion: active ROM (range of motion) encouraged  Intervention: Prevent Infection  Recent Flowsheet Documentation  Taken 9/25/2023 0400 by Edwin De Santiago RN  Infection Prevention:   environmental surveillance performed   hand hygiene promoted   rest/sleep promoted  Taken 9/25/2023 0200 by Edwin De Santiago RN  Infection Prevention:   environmental surveillance performed   hand hygiene promoted   rest/sleep promoted  Taken 9/25/2023 0000 by Edwin De Santiago RN  Infection Prevention:   environmental surveillance performed   hand hygiene promoted   rest/sleep promoted  Taken 9/24/2023 2200 by Edwin De Santiago RN  Infection Prevention:   environmental surveillance performed   hand hygiene promoted   rest/sleep promoted  Taken 9/24/2023 2000 by Edwin De Santiago RN  Infection Prevention:   environmental surveillance performed   hand hygiene promoted   rest/sleep promoted  Goal: Optimal Comfort and Wellbeing  Outcome: Ongoing, Progressing  Intervention: Provide Person-Centered Care  Recent Flowsheet Documentation  Taken 9/24/2023 2000 by Edwin De Santiago RN  Trust Relationship/Rapport: care explained  Goal: Readiness for Transition of Care  Outcome: Ongoing, Progressing     Problem: Fall Injury Risk  Goal: Absence of Fall and Fall-Related Injury  Outcome: Ongoing, Progressing  Intervention: Promote  Injury-Free Environment  Recent Flowsheet Documentation  Taken 9/25/2023 0400 by Edwin De Santiago RN  Safety Promotion/Fall Prevention:   clutter free environment maintained   assistive device/personal items within reach   safety round/check completed  Taken 9/25/2023 0200 by Edwin De Santiago RN  Safety Promotion/Fall Prevention:   assistive device/personal items within reach   clutter free environment maintained   safety round/check completed  Taken 9/25/2023 0000 by Edwin De Santiago RN  Safety Promotion/Fall Prevention:   assistive device/personal items within reach   clutter free environment maintained   safety round/check completed  Taken 9/24/2023 2200 by Edwin De Santiago RN  Safety Promotion/Fall Prevention:   assistive device/personal items within reach   clutter free environment maintained   safety round/check completed  Taken 9/24/2023 2000 by Edwin De Santiago RN  Safety Promotion/Fall Prevention:   safety round/check completed   clutter free environment maintained   assistive device/personal items within reach     Problem: Skin Injury Risk Increased  Goal: Skin Health and Integrity  Outcome: Ongoing, Progressing  Intervention: Optimize Skin Protection  Recent Flowsheet Documentation  Taken 9/25/2023 0400 by Edwin De Santiago RN  Pressure Reduction Techniques: frequent weight shift encouraged  Head of Bed (HOB) Positioning: HOB elevated  Pressure Reduction Devices: pressure-redistributing mattress utilized  Skin Protection: adhesive use limited  Taken 9/25/2023 0200 by Edwin De Santiago RN  Pressure Reduction Techniques: frequent weight shift encouraged  Head of Bed (HOB) Positioning: HOB elevated  Pressure Reduction Devices: pressure-redistributing mattress utilized  Skin Protection: adhesive use limited  Taken 9/25/2023 0000 by Edwin De Santiago RN  Pressure Reduction Techniques: frequent weight shift encouraged  Head of Bed (HOB) Positioning: HOB elevated  Pressure Reduction Devices: pressure-redistributing  mattress utilized  Skin Protection: adhesive use limited  Taken 9/24/2023 2200 by Edwin De Santiago, RN  Pressure Reduction Techniques: frequent weight shift encouraged  Head of Bed (HOB) Positioning: [facility] elevated  Pressure Reduction Devices: pressure-redistributing mattress utilized  Skin Protection: adhesive use limited  Taken 9/24/2023 2000 by Edwin De Santiago, RN  Pressure Reduction Techniques: frequent weight shift encouraged  Head of Bed (HOB) Positioning: [facility] elevated  Pressure Reduction Devices: pressure-redistributing mattress utilized  Skin Protection: adhesive use limited   Goal Outcome Evaluation:         Patient restless overnight; pulling at lines. See MAR for interventions. Patient is now resting in bed with call light within reach.

## 2023-09-25 NOTE — CONSULTS
[facility] Neurology  Consult Note    Patient Name: Miryam Mckeon  : 1945  MRN: [id number]  Primary Care Physician:  Rigoberto Chamberlain MD  Referring Physician: Mark I Brunner, MD  Date of admission: 2023    Subjective     Reason for Consult/ Chief Complaint: Altered mental status    Miryam Mckeon is a 77 y.o. female with past medical history of A-fib on Eliquis, aortic valve stenosis s/p TAVR in , CKD stage III, deafness, HTN, HLD, CVA and recent COVID infection in August who presented to [facility] ED with concern of weakness, poor appetite, frequent vomiting and multiple falls.  Of note patient was hospitalized with her COVID infection and was sent to [facility] for rehab and into [facility] for placement.  At [facility] she was initiated on Remeron 7.5 mg nightly for approximately 5 days, there was concern that it was making her weaker as it was discontinued.  She did have a fall that resulted in her falling into a doorway with a hematoma over left forehead.     Of note patient was seen in our outpatient clinic by KATERINA Mcknight for memory loss and decreased appetite.  At that time patient was started on Marinol, however, she has not been receiving this medication as family thought was going to make her weaker.    General neurology was consulted due to altered mental status with visual hallucinations.  Upon chart review as noted patient has not slept overnight, requiring a dose of Haldol.  Upon assessment patient appears to be agitated/hyperactive, she also appears to be grabbing things in the air that are not there.  She is disoriented but able to state her name and birthday. Family denies any medication changes except for those mentioned above.  Family does endorse patient had been eating.     was used.      Review Of Systems   Unable to assess due to altered mental status  Personal History     Past Medical History:   Diagnosis Date    Anxiety     Aortic valve  stenosis     Atrial fibrillation     Borderline diabetes     ???    Carotid artery stenosis     CKD (chronic kidney disease)     COVID-19 08/11/2023    Deaf     GERD (gastroesophageal reflux disease)     Heart murmur     Hyperlipidemia     Hypertension     Irregular heartbeat     PONV (postoperative nausea and vomiting)     Stroke     TIA (transient ischemic attack)        Past Surgical History:   Procedure Laterality Date    AORTIC VALVE REPAIR/REPLACEMENT N/A 07/20/2022    Procedure: TRANSCATHETER AORTIC VALVE REPLACEMENT with angioplasty and stent to the right common and external iliac artery;  Surgeon: Bola Whitaker MD;  Location:  [facility];  Service: Cardiothoracic;  Laterality: N/A;  FL-15 MIN 12 SEC  DOSE- 92.41  CONTRAST- 120 ML ISOVUE    AORTIC VALVE REPAIR/REPLACEMENT N/A 07/20/2022    Procedure: Transcatheter Aortic Valve Replacement;  Surgeon: Yashira Pyle MD;  Location:  [facility];  Service: Cardiovascular;  Laterality: N/A;    CARDIAC CATHETERIZATION      05/20/2022 per dr. pyle    CARDIAC CATHETERIZATION Left 05/20/2022    Procedure: Left Heart Cath;  Surgeon: Yashria Pyle MD;  Location:  [facility] CATH INVASIVE LOCATION;  Service: Cardiology;  Laterality: Left;    CARDIAC ELECTROPHYSIOLOGY PROCEDURE N/A 04/13/2022    Procedure: DDD PPM Implant (BSC), DNS Eliquis;  Surgeon: Troy Hussein DO;  Location:  [facility];  Service: Cardiology;  Laterality: N/A;    CARDIAC VALVE REPLACEMENT  07/2022    CAROTID ENDARTERECTOMY Bilateral     CATARACT EXTRACTION      CHOLECYSTECTOMY      COLONOSCOPY      ENDOSCOPY N/A 9/23/2023    Procedure: ESOPHAGOGASTRODUODENOSCOPY;  Surgeon: Brunner, Mark I, MD;  Location:  [facility];  Service: Gastroenterology;  Laterality: N/A;    FEMORAL ARTERY CUTDOWN Right 07/20/2022    Procedure: FEMORAL ARTERY CUTDOWN, ANGIOGRAM;  Surgeon: Bola Whitaker MD;  Location:  [facility];  Service: Vascular;   Laterality: Right;  fl-1 m 12 sec  dose- 47.29 mgy  contrast- 50 ml isovue    KNEE SURGERY Right     PACEMAKER IMPLANTATION      JF      05/20/2022 per dr. chow    TRANSESOPHAGEAL ECHOCARDIOGRAM (JF) N/A 07/20/2022    Procedure: TRANSESOPHAGEAL ECHOCARDIOGRAM PER ANESTHESIA;  Surgeon: Bola Whitaker MD;  Location: [facility];  Service: Cardiothoracic;  Laterality: N/A;       Family History: family history includes Other in her mother; Stroke in her father. Otherwise pertinent FHx was reviewed and not pertinent to current issue.    Social History:  reports that she has never smoked. She has never used smokeless tobacco. She reports that she does not drink alcohol and does not use drugs.    Home Medications:   PARoxetine, Polyethylene Glycol 3350, acetaminophen, aluminum-magnesium hydroxide, amiodarone, apixaban, aspirin, atorvastatin, bifidobacterium lactis (INFANT'S MYLICON) probiotic drops, cholecalciferol, dilTIAZem CD, docusate sodium, guaiFENesin, hydrALAZINE, latanoprost, meclizine, metoprolol succinate XL, ondansetron, promethazine, simethicone, valsartan, and vitamin B-12    Current Medications:     Current Facility-Administered Medications:     acetaminophen (TYLENOL) tablet 650 mg, 650 mg, Oral, Q6H PRN, Brunner, Mark I, MD, 650 mg at 09/25/23 0856    amiodarone (PACERONE) tablet 200 mg, 200 mg, Oral, Q24H, Brunner, Mark I, MD, 200 mg at 09/25/23 0837    atorvastatin (LIPITOR) tablet 10 mg, 10 mg, Oral, Nightly, Brunner, Mark I, MD, 10 mg at 09/24/23 2120    cholecalciferol (VITAMIN D3) tablet 1,000 Units, 1,000 Units, Oral, Daily, Brunner, Mark I, MD, 1,000 Units at 09/25/23 0837    collagenase ointment 1 application , 1 application , Topical, Q24H, Lakeisha Castro DO    dilTIAZem CD (CARDIZEM CD) 24 hr capsule 300 mg, 300 mg, Oral, Q24H, Brunner, Mark I, MD, 300 mg at 09/25/23 0835    docusate sodium (COLACE) capsule 100 mg, 100 mg, Oral, PRN, Brunner, Mark I, MD    hydrALAZINE  (APRESOLINE) injection 10 mg, 10 mg, Intravenous, Q6H PRN, Brunner, Mark I, MD, 10 mg at 09/25/23 0336    hydrALAZINE (APRESOLINE) tablet 50 mg, 50 mg, Oral, Daily, Brunner, Mark I, MD, 50 mg at 09/25/23 0840    labetalol (NORMODYNE,TRANDATE) injection 10 mg, 10 mg, Intravenous, Q4H PRN, Brunner, Mark I, MD, 10 mg at 09/23/23 1034    lidocaine PF 1% (XYLOCAINE) injection 0.5 mL, 0.5 mL, Injection, Once PRN, Trevor Arreaga MD    metoprolol succinate XL (TOPROL-XL) 24 hr tablet 150 mg, 150 mg, Oral, Q24H, Lakeisha Castro DO, 150 mg at 09/25/23 0838    mirtazapine (REMERON) tablet 7.5 mg, 7.5 mg, Oral, Nightly, Brunner, Mark I, MD, 7.5 mg at 09/24/23 2120    ondansetron (ZOFRAN) injection 4 mg, 4 mg, Intravenous, Q6H PRN, Brunner, Mark I, MD, 4 mg at 09/23/23 1832    pantoprazole (PROTONIX) EC tablet 40 mg, 40 mg, Oral, QAM AC, Brunner, Mark I, MD, 40 mg at 09/25/23 0837    [COMPLETED] Insert Peripheral IV, , , Once **AND** sodium chloride 0.9 % flush 10 mL, 10 mL, Intravenous, PRN, Brunner, Mark I, MD    sodium chloride 0.9 % flush 10 mL, 10 mL, Intravenous, Q12H, Trevor Arreaga MD, 10 mL at 09/25/23 0843    sodium chloride 0.9 % flush 10 mL, 10 mL, Intravenous, PRN, Trevor Arreaga MD    sodium chloride 0.9 % infusion 40 mL, 40 mL, Intravenous, PRN, Trevor Arreaga MD    vitamin B-12 (CYANOCOBALAMIN) tablet 1,000 mcg, 1,000 mcg, Oral, Daily, Brunner, Mark I, MD, 1,000 mcg at 09/25/23 0837     Allergies:  No Known Allergies    Objective     Physical Exam  Vitals and nursing note reviewed.   Eyes:      Extraocular Movements: Extraocular movements intact.      Pupils: Pupils are equal, round, and reactive to light.      Comments: No nystagmus noted   Cardiovascular:      Rate and Rhythm: Normal rate.   Pulmonary:      Effort: Pulmonary effort is normal.   Neurological:      Mental Status: She is alert. She is disoriented.      Cranial Nerves: Cranial nerves 2-12 are intact.      Motor: Weakness present.       Coordination: Finger-Nose-Finger Test normal.      Deep Tendon Reflexes: Reflexes are normal and symmetric. Babinski sign present on the right side. Babinski sign absent on the left side.      Comments:     Nerves   CN II: Pupils are equal, round, and reactive to light. Normal visual acuity and visual fields.    CN III IV VI: Extraocular movements are full without nystagmus.  CN V: Normal facial sensation and strength of muscles of mastication.  CN VII: Facial movements are symmetric. No weakness.  CN VIII:  Auditory acuity is normal.  CN IX & X:  Symmetric palatal movement.  CN XI: Sternocleidomastoid and trapezius are normal.  No weakness.  CN XII: The tongue is midline.  No atrophy or fasciculations.     Psychiatric:         Behavior: Behavior is agitated and hyperactive.       Vitals:  Temp:  [97.3 °F (36.3 °C)-98.2 °F (36.8 °C)] 97.5 °F (36.4 °C)  Heart Rate:  [60] 60  Resp:  [16-18] 18  BP: (132-182)/(42-74) 150/55  Flow (L/min):  [2] 2    Laboratory Results:   Lab Results   Component Value Date    GLUCOSE 89 09/25/2023    CALCIUM 8.6 09/25/2023     (L) 09/25/2023    K 4.1 09/25/2023    CO2 24.0 09/25/2023     09/25/2023    BUN 35 (H) 09/25/2023    CREATININE 1.45 (H) 09/25/2023    EGFRIFAFRI 47 (L) 02/18/2022    EGFRIFNONA 41 (L) 02/18/2022    BCR 24.1 09/25/2023    ANIONGAP 9.0 09/25/2023     Lab Results   Component Value Date    WBC 11.90 (H) 09/25/2023    HGB 9.4 (L) 09/25/2023    HCT 28.6 (L) 09/25/2023    MCV 95.0 09/25/2023     09/25/2023     Lab Results   Component Value Date    CHOL 93 08/10/2023    CHOL 96 05/20/2022     Lab Results   Component Value Date    HDL 37 (L) 08/10/2023    HDL 55 05/20/2022    HDL 55 02/18/2022     Lab Results   Component Value Date    LDL 37 08/10/2023    LDL 29 05/20/2022    LDL 45 02/18/2022     Lab Results   Component Value Date    TRIG 99 08/10/2023    TRIG 48 05/20/2022    TRIG 48 02/18/2022     Lab Results   Component Value Date    HGBA1C 6.20 (H)  08/10/2023     Lab Results   Component Value Date    INR 1.68 (H) 09/23/2023    INR 1.30 (H) 07/19/2022    INR 1.20 (H) 05/20/2022    PROTIME 19.9 (H) 09/23/2023    PROTIME 16.1 (H) 07/19/2022    PROTIME 15.1 (H) 05/20/2022     Lab Results   Component Value Date    TIJFHYAQ88 >2000 (H) 08/16/2022     Lab Results   Component Value Date    FOLATE 12.3 08/16/2022             Assessment / Plan     Brief Patient Summary:  Miryam Mckeon is a 77 y.o. female with past medical history of A-fib on Eliquis, aortic valve stenosis s/p TAVR in 2022, CKD stage III, deafness, HTN, HLD, CVA and recent COVID infection in August who presented to [facility] ED with concern of weakness, poor appetite, frequent vomiting and multiple falls.    Plan:   Altered mental status with visual hallucinations  History of CVA  [facility]  Sleep deprivation  [facility] delirium  MRI brain without contrast  Vitamin B12 and folate  EKG with QTc measurement  Trial Seroquel 25 mg nightly  Fall precautions  IM Geodon for extreme agitation  Continue Lipitor 10 mg nightly  General neurology continue to follow      I have discussed the above with the patient, patient's family bedside RN and Dr. Castro  Time spent with patient: 80 minutes in face-to-face evaluation and management of the patient.       BALJIT Omalley

## 2023-09-25 NOTE — PLAN OF CARE
Goal Outcome Evaluation:  Plan of Care Reviewed With: patient        Progress: no change  Outcome Evaluation: PT eval completed.  Sondra used. She was able to sit at EOB with supervision. She stood for about 7 minutes at EOB with CGA to assessement of sacrum and stated she needed to use restroom. She was able to ambulate to restroom with Jamir and flexed posture. She required assistance for sit to stand as she demonstrated poor safety awareness. Patient stood for clean up and began ambulating back to bed. She demonstrated LE buckling and required totalA from PT to avoid fall and return to chair. RN notified. Patient presents below baseline for functional mobility. Patient demonstrates decreased activity tolerance, deconditioning and reduced dynamic balance. Patient would continue to benefit from skilled PT services to improve dynamic balance with gait, transfers strength, and activity tolerance training in order to build endurance and reduce risk of falls.      Anticipated Discharge Disposition (PT): skilled nursing facility

## 2023-09-25 NOTE — CONSULTS
Palliative Care Initial Consult   Attending Physician: Lakeisha Castro DO  Referring Provider: Dr. Lakeisha Castro    Reason for Referral:  assistance with clarification of goals of care    Code Status:   Code Status and Medical Interventions:   Ordered at: 09/25/23 1105     Medical Intervention Limits:    NO intubation (DNI)     Level Of Support Discussed With:    Health Care Surrogate     Code Status (Patient has no pulse and is not breathing):    No CPR (Do Not Attempt to Resuscitate)     Medical Interventions (Patient has pulse or is breathing):    Limited Support      Advanced Directives: Advance Directive Status: Patient has advance directive, copy in chart   Family/Support: Marcela Masters (dtr/HCS), Vijaya Mckeon (son)  Goals of Care: TBD.    HPI: Miryam Mckeon is a 77 y.o. female with PMH significant for A-fib on Eliquis, SSS s/p pacemaker, Aortic valve stenosis s/p TAVR 2022, HFrEF, CKD III, deafness, HTN, HLD, CVA, recent COVID and hospitalization 8/23, discharge to [facility] for rehab then to [facility] for LTC. Patient presented to [facility] ED on 9/22 due to frequent falls. Work up revealed positive guaiac, persistent anorexia. EGD did not show source of bleeding, CT abd/pelvis without evidence for RP bleed. Patient with increased confusion, decreased intake. Colonoscopy deferred due to anesthesia and most likely requiring intubation due to N/V. Palliative Care consulted for GOC in the context of complex medical decision making.   Patient sitting up in chair at time of visit. Asked patient via whiteboard if she is agreeable to allow her  daughter to interpret for her. Patient able to confirm, yes. Patient aware that she is at the hospital but unable to discuss why she is in the hospital, falling asleep throughout visit. Patient reports pain 10/10 to coccyx, she denies pain to her head. Endorses decreased appetite, denies nausea. ROS limited by patient's AMS.      ROS: +decreased PO intake.  +debility. +pain, coccyx, 10/10, worse in bed. ROS limited by AMS.       Past Medical History:   Diagnosis Date    Anxiety     Aortic valve stenosis     Atrial fibrillation     Borderline diabetes     ???    Carotid artery stenosis     CKD (chronic kidney disease)     COVID-19 08/11/2023    Deaf     GERD (gastroesophageal reflux disease)     Heart murmur     Hyperlipidemia     Hypertension     Irregular heartbeat     PONV (postoperative nausea and vomiting)     Stroke     TIA (transient ischemic attack)      Past Surgical History:   Procedure Laterality Date    AORTIC VALVE REPAIR/REPLACEMENT N/A 07/20/2022    Procedure: TRANSCATHETER AORTIC VALVE REPLACEMENT with angioplasty and stent to the right common and external iliac artery;  Surgeon: Bola Whitaekr MD;  Location:  [facility];  Service: Cardiothoracic;  Laterality: N/A;  FL-15 MIN 12 SEC  DOSE- 92.41  CONTRAST- 120 ML ISOVUE    AORTIC VALVE REPAIR/REPLACEMENT N/A 07/20/2022    Procedure: Transcatheter Aortic Valve Replacement;  Surgeon: Yashira Pyle MD;  Location:  [facility];  Service: Cardiovascular;  Laterality: N/A;    CARDIAC CATHETERIZATION      05/20/2022 per dr. pyle    CARDIAC CATHETERIZATION Left 05/20/2022    Procedure: Left Heart Cath;  Surgeon: Yashira Pyle MD;  Location:  [facility] CATH [facility]VASIVE LOCATION;  Service: Cardiology;  Laterality: Left;    CARDIAC ELECTROPHYSIOLOGY PROCEDURE N/A 04/13/2022    Procedure: DDD PPM Implant (BSC), DNS Eliquis;  Surgeon: Troy Hussein DO;  Location:  [facility] INVASIVE LOCATION;  Service: Cardiology;  Laterality: N/A;    CARDIAC VALVE REPLACEMENT  07/2022    CAROTID ENDARTERECTOMY Bilateral     CATARACT EXTRACTION      CHOLECYSTECTOMY      COLONOSCOPY      ENDOSCOPY N/A 9/23/2023    Procedure: ESOPHAGOGASTRODUODENOSCOPY;  Surgeon: Brunner, Mark I, MD;  Location:  [facility];  Service: Gastroenterology;  Laterality: N/A;    FEMORAL ARTERY CUTDOWN Right  "07/20/2022    Procedure: FEMORAL ARTERY CUTDOWN, ANGIOGRAM;  Surgeon: Bola Whitaker MD;  [facility];  Service: Vascular;  Laterality: Right;  fl-1 m 12 sec  dose- 47.29 mgy  contrast- 50 ml isovue    KNEE SURGERY Right     PACEMAKER IMPLANTATION      JF      05/20/2022 per dr. chow    TRANSESOPHAGEAL ECHOCARDIOGRAM (JF) N/A 07/20/2022    Procedure: TRANSESOPHAGEAL ECHOCARDIOGRAM PER ANESTHESIA;  Surgeon: Bola Whitaker MD;  [facility];  Service: Cardiothoracic;  Laterality: N/A;     Social History     Socioeconomic History    Marital status:     Number of children: 2    Highest education level: High school graduate   Tobacco Use    Smoking status: Never    Smokeless tobacco: Never   Vaping Use    Vaping Use: Never used   Substance and Sexual Activity    Alcohol use: Never    Drug use: Never    Sexual activity: Defer     Family History   Problem Relation Age of Onset    Other Mother         enlarged heart    Stroke Father        No Known Allergies    Current medication reviewed for route, type, dose and frequency and are current per MAR at time of dictation.    Palliative Performance Scale Score:  40%    /55 (BP Location: Left arm, Patient Position: Sitting)   Pulse 60   Temp 97.5 °F (36.4 °C) (Oral)   Resp 18   Ht 152.4 cm (60\")   Wt 52.2 kg (115 lb)   SpO2 95%   BMI 22.46 kg/m²     Intake/Output Summary (Last 24 hours) at 9/25/2023 1222  Last data filed at 9/25/2023 0600  Gross per 24 hour   Intake 100 ml   Output 200 ml   Net -100 ml       Physical Exam:    General Appearance:    Patient sitting up in chair, drowsy, awake, falls asleep easily, frail, A/C ill appearing,    HEENT:    NC/AT, EOMI, anicteric, bruising to face, large hematoma to left forehead, MMM, face relaxed, NC in place   Neck:   supple, trachea midline, no JVD   Lungs:     CTA bilat, diminished in bases; respirations regular, even and unlabored; RR 16-18 on exam, 2LNC    " Heart:    RRR, normal S1 and S2, no M/R/G, HR 60 on monitor   Abdomen:     Normal bowel sounds, soft, nontender, distended   G/U:   Deferred   MSK/Extremities:   Wasting, no edema, bruising to extremities   Pulses:   Pulses palpable and equal bilaterally   Skin:   Warm, dry   Neurologic:   Drowsy, Ox1, cooperative, GALLOWAY   Psych:   Calm, appropriate         Labs:   Results from last 7 days   Lab Units 09/25/23  0747   WBC 10*3/mm3 11.90*   HEMOGLOBIN g/dL 9.4*   HEMATOCRIT % 28.6*   PLATELETS 10*3/mm3 154     Results from last 7 days   Lab Units 09/25/23  0747   SODIUM mmol/L 134*   POTASSIUM mmol/L 4.1   CHLORIDE mmol/L 101   CO2 mmol/L 24.0   BUN mg/dL 35*   CREATININE mg/dL 1.45*   GLUCOSE mg/dL 89   CALCIUM mg/dL 8.6     Results from last 7 days   Lab Units 09/25/23  0747 09/24/23  0334 09/23/23  0400   SODIUM mmol/L 134*   < > 136   POTASSIUM mmol/L 4.1   < > 4.5   CHLORIDE mmol/L 101   < > 101   CO2 mmol/L 24.0   < > 22.0   BUN mg/dL 35*   < > 37*   CREATININE mg/dL 1.45*   < > 1.46*   CALCIUM mg/dL 8.6   < > 8.7   BILIRUBIN mg/dL  --   --  1.2   ALK PHOS U/L  --   --  89   ALT (SGPT) U/L  --   --  53*   AST (SGOT) U/L  --   --  40*   GLUCOSE mg/dL 89   < > 135*    < > = values in this interval not displayed.     Imaging Results (Last 72 Hours)       Procedure Component Value Units Date/Time    CT Head Without Contrast [[id number]] Collected: 09/24/23 1902     Updated: 09/24/23 1909    Narrative:      CT HEAD WO CONTRAST    Date of Exam: 9/24/2023 4:06 PM EDT    Indication: Head trauma, abnormal mental status (Age 18-64y), change in mental status, recent fall w/head trauma on Eliquis.    Comparison: Head CT dated 9/22/2023    Technique: Axial CT images were obtained of the head without contrast administration.  Automated exposure control and iterative construction methods were used.      Findings:  The ventricles and sulci are proportionally prominent compatible with mild age-related global cerebral volume loss.  There is no mass effect or midline shift. There is no acute intracranial hemorrhage. There is no abnormal extra-axial fluid collection.   The gray-white matter differentiation is preserved. There is periventricular and subcortical white matter hypodensity likely representing sequelae of chronic small vessel ischemic disease. This appears stable from prior examination. There is a left   frontal scalp hematoma which appears similar to the prior examination. There is no evidence of underlying calvarial fracture. The mastoid air cells appear well aerated. There is a small amount of frothy secretion within the left sphenoid sinus. There is   atherosclerotic calcification of the intracranial vasculature. There is evidence of prior right-sided cataract extraction.      Impression:      Impression:  1. No acute intracranial abnormality. Specifically, no evidence of acute hemorrhage, mass effect or midline shift.  2. Left frontal scalp contusion and hematoma without underlying calvarial fracture.  3. Mild age-related volume loss with confluent periventricular white matter hypodensity likely representing sequelae of moderate chronic small vessel ischemic disease. No significant interval change compared to the prior CT from 9/22/2023.        Electronically Signed: Jaskaran Warren    9/24/2023 7:06 PM EDT    Workstation ID: [id number]    CT Abdomen Pelvis Without Contrast [[id number]] Collected: 09/23/23 1757     Updated: 09/23/23 1806    Narrative:      CT ABDOMEN PELVIS WO CONTRAST    Date of Exam: 9/23/2023 5:35 PM EDT    Indication: Unexplained N/V, Acute blood loss--r/o retroperitoneal bleed..    Comparison: Correlation with CTA chest abdomen pelvis 6/3/2022    Technique: Axial CT images were obtained of the abdomen and pelvis without the administration of contrast. Positive oral contrast was administered. Reconstructed coronal and sagittal images were also obtained. Automated exposure control and iterative   construction  methods were used.      Findings:  There are small bilateral pleural effusions. There is multichamber cardiac enlargement of the partially imaged heart. There is hypodense blood pool compatible with anemia. There is heavy atherosclerosis of the abdominal aorta without evidence of   aneurysm. A right common iliac stent is noted. There is prominent diffuse subcutaneous fat stranding throughout the entirety of the body wall compatible with anasarca. Unremarkable appearance of the liver. The gallbladder is surgically absent. Normal   appearance of the bile ducts. Unremarkable appearance of the spleen, pancreas, and adrenal glands. Mild atrophy of the left kidney with otherwise unremarkable noncontrast appearance of the kidneys. No hydronephrosis or nephrolithiasis. Normal appearance   of the ureters and bladder. Unremarkable appearance of the uterus and adnexa. Redundant sigmoid colon. Moderate colonic stool burden. No bowel obstruction or definite inflammatory change of the GI tract. There is a small amount of simple appearing   nonorganized pelvic free fluid. There is mesenteric lymphovascular congestion. No pneumoperitoneum. No large or conspicuous retroperitoneal or body wall hematoma. The bones are demineralized. There is chronic mild superior endplate height loss of T12,   unchanged from prior CT. No acute osseous findings.      Impression:      Impression:  Limited assessment for active bleeding in the absence of IV contrast. No conspicuous retroperitoneal hematoma. Hypodense blood pool compatible with anemia.    Fluid overload state with small bilateral pleural effusions, small pelvic free fluid, mesenteric lymphovascular congestion, and anasarca.            Electronically Signed: Boris Gilliland MD    9/23/2023 6:03 PM EDT    Workstation ID: [id number]                  Diagnostics: Reviewed    A:   Anemia associated with acute blood loss    Bilateral carotid artery stenosis    Aortic stenosis, severe s/p TAVR  "(7/20/2022)    Longstanding persistent atrial fibrillation    Hyperlipidemia LDL goal <70    Hypertension    Chronic combined systolic and diastolic congestive heart failure    Status post CVA    Presence of cardiac pacemaker secondary to sick sinus syndrome    Chronic kidney disease, stage 3b    Pulmonary hypertension    Chronic anticoagulation    Anorexia    Heme positive stool     77 y.o. female with anemia, malnutrition, poor appetite, CVA, positive fecal occult, A-fib.   S/S:   Pain -wound to coccyx   -continue Tylenol 650mg PO q 6 hours prn mild pain  -discussed use of opioids for pain, daughter would like to avoid due to side effects of increased sedation    2. Decreased PO intake -reviewed short term feeding tube versus comfort diet  -daughter reports they are not considering PEG    3. Debility -PT/OT following    4. GOC -DNR/DNI -per discussion with daughter  -daughter is HCS  -patient is not decisional at this time due to AMS, reviewed short term feeding tube versus comfort diet with daughter in light of progressive functional decline and chronic disease   -daughter would like to discuss with her brother and family prior to making a decision  -continue full treatment     P: Introduced Palliative Care and services to patient and daughter at bedside. Used whiteboard to ask patient if she allows daughter to interpret for her and patient circles \"yes\" on board. Patient is oriented to being in the hospital but unable to engage in GOC conversations and falling asleep easily. She does not appear to be able to understand the complexity of her medical situation. Reviewed patient's condition and progressive decline. Reviewed short term feeding tubes versus comfort diet with use of Respecting Choices Decision Aid. Daughter reports they are not considering PEG placement. Emotional support provided throughout discussion as daughter, as HCS, will be making these decisions. All questions and concerns addressed. Will " continue to follow for ongoing GOC discussion.      Thank you for this consult and allowing us to participate in patient's plan of care. Palliative Care Team will continue to follow patient. Please do not hesitate to contact us regarding further symptom management or goals of care needs.  Time: 45 minutes spent reviewing medical and medication records, assessing and examining patient, discussing with family, answering questions, providing some guidance about a plan and documentation of care, and coordinating care with other healthcare members, with > 50% time spent face to face.         Bessie Grady, APRN  9/25/2023

## 2023-09-25 NOTE — CASE MANAGEMENT/SOCIAL WORK
Discharge Planning Assessment  [facility]     Patient Name: Miryam Mckeon  MRN: [id number]  Today's Date: 9/25/2023    Admit Date: 9/22/2023    Plan: LTC vs SNF rehab   Discharge Needs Assessment       Row Name 09/25/23 1634       Living Environment    People in Home facility resident    Current Living Arrangements extended care facility    Living Arrangement Comments The pt is currently in LTC private pay at [facility].       Discharge Needs Assessment    Equipment Currently Used at Home walker, rolling    Equipment Needed After Discharge none    Discharge Coordination/Progress The facility staff assists with the pts care needs.                   Discharge Plan       Row Name 09/25/23 1636       Plan    Plan LTC vs SNF rehab    Patient/Family in Agreement with Plan other (see comments)    Plan Comments Attempted to see the pt several times today. Busy with other staff/assessments. The pt has a private pay bed hold at [facility]. She went there from [facility]. I will f/u with the pt and her daughter. May be able to go back to the facility skilled with insurance approval at [place].    Final Discharge Disposition Code 04 - intermediate care facility                  Continued Care and Services - Admitted Since 9/22/2023    Coordination has not been started for this encounter.       Selected Continued Care - Prior Encounters Includes continued care and service providers with selected services from prior encounters from 6/24/2023 to 9/25/2023      Discharged on 8/21/2023 Admission date: 8/9/2023 - Discharge disposition: Skilled Nursing Facility (DC - External)      Destination       Service Provider Selected Services Address Phone Fax Patient Preferred    [facility] [address] [facility] [address] [phone] --       Internal Comment last updated by Marcelle Ni, RN 8/20/2023 1154    8/20: Pt can got to [facility] on Monday  8/21/23                                     Expected Discharge Date and Time       Expected Discharge Date Expected Discharge Time    Sep 25, 2023            Demographic Summary    No documentation.                  Functional Status       Row Name 09/25/23 1635       Functional Status    Usual Activity Tolerance moderate       Functional Status, IADL    Medications other (see comments)    Meal Preparation other (see comments)    Housekeeping other (see comments)    Laundry other (see comments)    Shopping other (see comments)                   Psychosocial    No documentation.                  Abuse/Neglect    No documentation.                  Legal    No documentation.                  Substance Abuse    No documentation.                  Patient Forms    No documentation.                     Verónica Randle RN

## 2023-09-25 NOTE — PROGRESS NOTES
[facility] Medicine Services  PROGRESS NOTE    Patient Name: Miryam Mckeon  : 1945  MRN: [id number]    Date of Admission: 2023  Primary Care Physician: Rigoberto Chamberlain MD    Subjective   Subjective     CC:  Fall and anemia    HPI:  Patient resting in bed. Remains confused and continues to have hallucinations. Thinks her brother is in the room. Talking about her  who is no longer alive.      Objective   Objective     Vital Signs:   Temp:  [97.3 °F (36.3 °C)-98.2 °F (36.8 °C)] 97.3 °F (36.3 °C)  Heart Rate:  [60] 60  Resp:  [16-18] 16  BP: (132-182)/(42-74) 162/42  Flow (L/min):  [2] 2     Physical Exam:  Constitutional: No acute distress, awake, alert  HENT: bilateral bruising around eyes, L>R  Respiratory: Clear to auscultation bilaterally, respiratory effort normal   Cardiovascular: RRR, no murmurs, rubs, or gallops  Gastrointestinal: soft, nontender, nondistended  Musculoskeletal: No bilateral ankle edema  Psychiatric: +visual hallucinations  Neurologic: Oriented to person only, no focal deficits, deaf  Skin: multiple bruises over upper extremities      Results Reviewed:  LAB RESULTS:      Lab 23  0747 23  0334 23  0400 23  0359 23  1353 23  1025   WBC 11.90* 10.63 6.19  --   --   --  8.27   HEMOGLOBIN 9.4* 9.2* 10.0*  --  11.0* 10.0* 9.1*   HEMATOCRIT 28.6* 28.6* 30.4*  --  33.3* 30.0* 28.8*   PLATELETS 154 189 173  --   --   --  146   NEUTROS ABS  --  9.08* 5.40  --   --   --  6.79   IMMATURE GRANS (ABS)  --  0.17* 0.14*  --   --   --  0.12*   LYMPHS ABS  --  0.56* 0.44*  --   --   --  0.67*   MONOS ABS  --  0.80 0.20  --   --   --  0.65   EOS ABS  --  0.00 0.00  --   --   --  0.02   MCV 95.0 96.6 94.1  --   --   --  97.3*   CRP  --   --   --   --   --   --  0.65*   PROCALCITONIN  --   --   --   --   --   --  0.21   LDH  --   --   --   --   --   --  356*   PROTIME  --   --   --  19.9*  --   --   --    D  DIMER QUANT  --   --   --   --   --  1.45*  --          Lab 09/25/23  0747 09/24/23  0334 09/23/23  0400 09/22/23  1025   SODIUM 134* 134* 136 135*   POTASSIUM 4.1 4.4 4.5 3.9   CHLORIDE 101 100 101 101   CO2 24.0 23.0 22.0 24.0   ANION GAP 9.0 11.0 13.0 10.0   BUN 35* 38* 37* 37*   CREATININE 1.45* 1.60* 1.46* 1.43*   EGFR 36.8* 32.7* 36.5* 37.4*   GLUCOSE 89 93 135* 130*   CALCIUM 8.6 8.6 8.7 8.9   TSH  --   --   --  17.120*         Lab 09/23/23  0400 09/22/23  1025   TOTAL PROTEIN 5.2* 5.4*   ALBUMIN 2.9* 3.1*   GLOBULIN 2.3 2.3   ALT (SGPT) 53* 50*   AST (SGOT) 40* 40*   BILIRUBIN 1.2 0.9   ALK PHOS 89 95   LIPASE  --  18         Lab 09/23/23  0359   PROTIME 19.9*   INR 1.68*             Lab 09/22/23  1353 09/22/23  1025   IRON  --  46   IRON SATURATION (TSAT)  --  22   TIBC  --  209*   TRANSFERRIN  --  140*   FERRITIN  --  819.80*   ABO TYPING O  --    RH TYPING Negative  --    ANTIBODY SCREEN Negative  --          Brief Urine Lab Results  (Last result in the past 365 days)        Color   Clarity   Blood   Leuk Est   Nitrite   Protein   CREAT   Urine HCG        09/22/23 1042 Yellow   Cloudy   Negative   Negative   Negative   >=300 mg/dL (3+)                   Microbiology Results Abnormal       None            CT Abdomen Pelvis Without Contrast    Result Date: 9/23/2023  CT ABDOMEN PELVIS WO CONTRAST Date of Exam: 9/23/2023 5:35 PM EDT Indication: Unexplained N/V, Acute blood loss--r/o retroperitoneal bleed.. Comparison: Correlation with CTA chest abdomen pelvis 6/3/2022 Technique: Axial CT images were obtained of the abdomen and pelvis without the administration of contrast. Positive oral contrast was administered. Reconstructed coronal and sagittal images were also obtained. Automated exposure control and iterative construction methods were used. Findings: There are small bilateral pleural effusions. There is multichamber cardiac enlargement of the partially imaged heart. There is hypodense blood pool  compatible with anemia. There is heavy atherosclerosis of the abdominal aorta without evidence of aneurysm. A right common iliac stent is noted. There is prominent diffuse subcutaneous fat stranding throughout the entirety of the body wall compatible with anasarca. Unremarkable appearance of the liver. The gallbladder is surgically absent. Normal appearance of the bile ducts. Unremarkable appearance of the spleen, pancreas, and adrenal glands. Mild atrophy of the left kidney with otherwise unremarkable noncontrast appearance of the kidneys. No hydronephrosis or nephrolithiasis. Normal appearance of the ureters and bladder. Unremarkable appearance of the uterus and adnexa. Redundant sigmoid colon. Moderate colonic stool burden. No bowel obstruction or definite inflammatory change of the GI tract. There is a small amount of simple appearing nonorganized pelvic free fluid. There is mesenteric lymphovascular congestion. No pneumoperitoneum. No large or conspicuous retroperitoneal or body wall hematoma. The bones are demineralized. There is chronic mild superior endplate height loss of T12, unchanged from prior CT. No acute osseous findings.     Impression: Impression: Limited assessment for active bleeding in the absence of IV contrast. No conspicuous retroperitoneal hematoma. Hypodense blood pool compatible with anemia. Fluid overload state with small bilateral pleural effusions, small pelvic free fluid, mesenteric lymphovascular congestion, and anasarca. Electronically Signed: Boris Gilliland MD  9/23/2023 6:03 PM EDT  Workstation ID: [id number]    CT Head Without Contrast    Result Date: 9/24/2023  CT HEAD WO CONTRAST Date of Exam: 9/24/2023 4:06 PM EDT Indication: Head trauma, abnormal mental status (Age 18-64y), change in mental status, recent fall w/head trauma on Eliquis. Comparison: Head CT dated 9/22/2023 Technique: Axial CT images were obtained of the head without contrast administration.  Automated exposure  control and iterative construction methods were used. Findings: The ventricles and sulci are proportionally prominent compatible with mild age-related global cerebral volume loss. There is no mass effect or midline shift. There is no acute intracranial hemorrhage. There is no abnormal extra-axial fluid collection. The gray-white matter differentiation is preserved. There is periventricular and subcortical white matter hypodensity likely representing sequelae of chronic small vessel ischemic disease. This appears stable from prior examination. There is a left frontal scalp hematoma which appears similar to the prior examination. There is no evidence of underlying calvarial fracture. The mastoid air cells appear well aerated. There is a small amount of frothy secretion within the left sphenoid sinus. There is atherosclerotic calcification of the intracranial vasculature. There is evidence of prior right-sided cataract extraction.     Impression: Impression: 1. No acute intracranial abnormality. Specifically, no evidence of acute hemorrhage, mass effect or midline shift. 2. Left frontal scalp contusion and hematoma without underlying calvarial fracture. 3. Mild age-related volume loss with confluent periventricular white matter hypodensity likely representing sequelae of moderate chronic small vessel ischemic disease. No significant interval change compared to the prior CT from 9/22/2023. Electronically Signed: Jaskaran Briana  9/24/2023 7:06 PM EDT  Workstation ID: [id number]     Results for orders placed during the hospital encounter of 08/09/23    Adult Transthoracic Echo Limited W/ Cont if Necessary Per Protocol    Interpretation Summary    Left ventricular systolic function is low normal. Estimated left ventricular EF = 50%    Left ventricular wall thickness is consistent with mild concentric hypertrophy.    There is a 20 mm MARK 3 TAVR valve present.    Aortic valve mean pressure gradient is 11 mmHg.     Calculated aortic valve area 1.12 cm².    Trivial prosthesis insufficiency, paravalvular versus central.      Current medications:  Scheduled Meds:amiodarone, 200 mg, Oral, Q24H  atorvastatin, 10 mg, Oral, Nightly  cholecalciferol, 1,000 Units, Oral, Daily  dilTIAZem CD, 300 mg, Oral, Q24H  hydrALAZINE, 50 mg, Oral, Daily  metoprolol succinate XL, 150 mg, Oral, Q24H  mirtazapine, 7.5 mg, Oral, Nightly  pantoprazole, 40 mg, Oral, QAM AC  sodium chloride, 10 mL, Intravenous, Q12H  vitamin B-12, 1,000 mcg, Oral, Daily      Continuous Infusions:     PRN Meds:.  acetaminophen    docusate sodium    hydrALAZINE    labetalol    lidocaine PF 1%    ondansetron    [COMPLETED] Insert Peripheral IV **AND** sodium chloride    sodium chloride    sodium chloride    Assessment & Plan   Assessment & Plan     Active Hospital Problems    Diagnosis  POA    **Anemia associated with acute blood loss [D62]  Yes    Anorexia [R63.0]  Yes    Heme positive stool [R19.5]  Unknown    Chronic anticoagulation [Z79.01]  Not Applicable    Pulmonary hypertension [I27.20]  Yes    Chronic kidney disease, stage 3b [N18.32]  Yes    Presence of cardiac pacemaker secondary to sick sinus syndrome [Z95.0]  Yes    Chronic combined systolic and diastolic congestive heart failure [I50.42]  Yes    Hypertension [I10]  Yes    Aortic stenosis, severe s/p TAVR (7/20/2022) [I35.0]  Yes    Hyperlipidemia LDL goal <70 [E78.5]  Yes    Longstanding persistent atrial fibrillation [I48.11]  Yes    Bilateral carotid artery stenosis [I65.23]  Yes    Status post CVA [Z86.73]  Not Applicable      Resolved Hospital Problems   No resolved problems to display.        Brief Hospital Course to date:  Miryam Mckeon is a 77 y.o. female with past medical history of A-fib on Eliquis, aortic valve stenosis status post TAVR, CKD stage III, deafness, hypertension, hyperlipidemia and history of CVA as well as COVID in August and Mixed HFrEF who presents with recurrent falls, the  dwindles and Occult positive stool.     Acute blood loss Anemia - guaiac positive  -continue PPI, GI following, s/p EGD without any obvious bleeding source  -continue holding Eliquis for now given fall risk, bleeding and forehead hematoma  -monitor hemoglobin, transfuse PRN Hgb <7, overall remains stable  -CT abd/pelvis w/out any evidence for RP bleed  -hold on c-scope, patient too confused/weak to tolerate the prep, daughter would like to hold off on putting her through additional anesthesia for now.    Recent COVID - 8/2023  Persistent anorexia/FTT  Worsening memory loss and apathy -probable COVID encephalopathy?  Ongoing elevated inflammatory markers  - supportive care, PT/OT  - trial short course of decadron without significant improvement  - CT head without evidence for ICH  - Neurology consultation    Malnutrition:  - nutrition consult, family undecided if they would like to pursue any kind of artificial nutrition or feeding tube, state she is not big on protein shakes      Status post TAVR 2022  SSS/A.fib s/p PPM  Hypertension  Pulmonary hypertension  -Rate controlled, continue amiodarone  -Continue metoprolol  -Continue Lipitor     HTN:  - BP remains labile, better with resumption of home medications    Elevated TSH  - FT4 WNL, probably subclinical hypothyroidism vs. Sick euthyroid, recheck labs in 4 weeks and consider initiation of treatment.     History of stroke on chronic Eliquis  -stable    CKD  - baseline appears to be around 1.2-1.3, currently 1.4  - monitor closely  - avoid nephrotoxins, holding ARB  - consider nephrology consult if no improvement      GOC:  - d/w patient's daughter this morning, we discussed overall decline, FTT/anorexia, confusion. Discussed that this may be start of overall decline/dying process and patient may not improve despite medical interventions. Daughter voiced understanding. She did state patient is DNR/DNI, but would like to have continued medical treatment for now.  Palliative care consult today for ongoing goals discussions    Expected Discharge Location and Transportation: Back to [facility]  Expected Discharge   Expected Discharge Date: 9/25/2023; Expected Discharge Time:      DVT prophylaxis:  No DVT prophylaxis order currently exists.     AM-PAC 6 Clicks Score (PT): 14 (09/25/23 0800)    CODE STATUS:   Code Status and Medical Interventions:   Ordered at: 09/25/23 1105     Medical Intervention Limits:    NO intubation (DNI)     Level Of Support Discussed With:    Health Care Surrogate     Code Status (Patient has no pulse and is not breathing):    No CPR (Do Not Attempt to Resuscitate)     Medical Interventions (Patient has pulse or is breathing):    Limited Support       Lakeisha Castro, DO  09/25/23

## 2023-09-25 NOTE — THERAPY EVALUATION
Patient Name: Miryam Mckeon  : 1945    MRN: [id number]                              Today's Date: 2023       Admit Date: 2023    Visit Dx:     ICD-10-CM ICD-9-CM   1. Frequent falls  R29.6 V15.88   2. Facial contusion, initial encounter  S00.83XA 920   3. Acute anemia  D64.9 285.9   4. Heme positive stool  R19.5 792.1   5. Chronic anticoagulation  Z79.01 V58.61   6. Elevated blood pressure reading with diagnosis of hypertension  I10 401.9   7. Acute blood loss anemia  D62 285.1   8. Dysphagia, unspecified type  R13.10 787.20     Patient Active Problem List   Diagnosis    Bilateral carotid artery stenosis    Aortic stenosis, severe s/p TAVR (2022)    Longstanding persistent atrial fibrillation    Sick sinus syndrome    Hyperlipidemia LDL goal <70    Hypertension    Chronic combined systolic and diastolic congestive heart failure    Status post CVA    Presence of cardiac pacemaker secondary to sick sinus syndrome    Chronic kidney disease, stage 3b    Parent refuses immunizations    Chronic nausea    Pulmonary hypertension    Chronic anticoagulation    Acute HFrEF (heart failure with reduced ejection fraction)    Pleural effusion    Anemia associated with acute blood loss    Anorexia    Heme positive stool     Past Medical History:   Diagnosis Date    Anxiety     Aortic valve stenosis     Atrial fibrillation     Borderline diabetes     ???    Carotid artery stenosis     CKD (chronic kidney disease)     COVID-19 2023    Deaf     GERD (gastroesophageal reflux disease)     Heart murmur     Hyperlipidemia     Hypertension     Irregular heartbeat     PONV (postoperative nausea and vomiting)     Stroke     TIA (transient ischemic attack)      Past Surgical History:   Procedure Laterality Date    AORTIC VALVE REPAIR/REPLACEMENT N/A 2022    Procedure: TRANSCATHETER AORTIC VALVE REPLACEMENT with angioplasty and stent to the right common and external iliac artery;  Surgeon: Sherman  Bola HELLER MD;  Location:  [facility];  Service: Cardiothoracic;  Laterality: N/A;  FL-15 MIN 12 SEC  DOSE- 92.41  CONTRAST- 120 ML ISOVUE    AORTIC VALVE REPAIR/REPLACEMENT N/A 07/20/2022    Procedure: Transcatheter Aortic Valve Replacement;  Surgeon: Yashira Pyle MD;  Location: [facility];  Service: Cardiovascular;  Laterality: N/A;    CARDIAC CATHETERIZATION      05/20/2022 per dr. pyle    CARDIAC CATHETERIZATION Left 05/20/2022    Procedure: Left Heart Cath;  Surgeon: Yashira Pyle MD;  Location: [facility] CATH INVASIVE LOCATION;  Service: Cardiology;  Laterality: Left;    CARDIAC ELECTROPHYSIOLOGY PROCEDURE N/A 04/13/2022    Procedure: DDD PPM Implant (BSC), DNS Eliquis;  Surgeon: Troy Hussein DO;  Location: [facility] EP INVASIVE LOCATION;  Service: Cardiology;  Laterality: N/A;    CARDIAC VALVE REPLACEMENT  07/2022    CAROTID ENDARTERECTOMY Bilateral     CATARACT EXTRACTION      CHOLECYSTECTOMY      COLONOSCOPY      ENDOSCOPY N/A 9/23/2023    Procedure: ESOPHAGOGASTRODUODENOSCOPY;  Surgeon: Brunner, Mark I, MD;  Location: [facility] ENDOSCOPY;  Service: Gastroenterology;  Laterality: N/A;    FEMORAL ARTERY CUTDOWN Right 07/20/2022    Procedure: FEMORAL ARTERY CUTDOWN, ANGIOGRAM;  Surgeon: Bola Whitaker MD;  Location: [facility];  Service: Vascular;  Laterality: Right;  fl-1 m 12 sec  dose- 47.29 mgy  contrast- 50 ml isovue    KNEE SURGERY Right     PACEMAKER IMPLANTATION      JF      05/20/2022 per dr. pyle    TRANSESOPHAGEAL ECHOCARDIOGRAM (JF) N/A 07/20/2022    Procedure: TRANSESOPHAGEAL ECHOCARDIOGRAM PER ANESTHESIA;  Surgeon: Bola Whitaker MD;  Location:  [facility];  Service: Cardiothoracic;  Laterality: N/A;      General Information       Row Name 09/25/23 0958          Physical Therapy Time and Intention    Document Type evaluation  -KW     Mode of Treatment physical therapy  -KW       Row Name 09/25/23 0958          General  Information    Patient Profile Reviewed yes  -KW     Prior Level of Function independent:;all household mobility;gait;transfer;bed mobility  uses Rwx  -KW     Existing Precautions/Restrictions fall;oxygen therapy device and L/min  -KW     Barriers to Rehab medically complex;hearing deficit  deaf  -       Row Name 09/25/23 0958          Living Environment    People in Home other (see comments)  patient stated that she lives alone  -       Row Name 09/25/23 0958          Home Main Entrance    Number of Stairs, Main Entrance twelve  -KW     Stair Railings, Main Entrance railing on right side (ascending)  -       Row Name 09/25/23 0958          Cognition    Orientation Status (Cognition) oriented x 3;verbal cues/prompts needed for orientation  -       Row Name 09/25/23 0958          Safety Issues, Functional Mobility    Safety Issues Affecting Function (Mobility) awareness of need for assistance;insight into deficits/self-awareness;judgment;positioning of assistive device;problem-solving;safety precaution awareness;safety precautions follow-through/compliance;sequencing abilities  -KW     Impairments Affecting Function (Mobility) balance;endurance/activity tolerance;strength  -KW               User Key  (r) = Recorded By, (t) = Taken By, (c) = Cosigned By      Initials Name Provider Type    KW Josephine Sanchez, PT Physical Therapist                   Mobility       Row Name 09/25/23 0958          Bed Mobility    Bed Mobility supine-sit  -KW     Supine-Sit [place] (Bed Mobility) minimum assist (75% patient effort);nonverbal cues (demo/gesture)  -     Assistive Device (Bed Mobility) bed rails;head of bed elevated;draw sheet  -       Row Name 09/25/23 0958          Bed-Chair Transfer    Bed-Chair [place] (Transfers) minimum assist (75% patient effort);verbal cues  -     Assistive Device (Bed-Chair Transfers) walker, front-wheeled  -       Row Name 09/25/23 0958          Sit-Stand Transfer     Sit-Stand [place] (Transfers) minimum assist (75% patient effort);verbal cues  -KW     Assistive Device (Sit-Stand Transfers) walker, front-wheeled  -KW       Row Name 09/25/23 0958          Gait/Stairs (Locomotion)    [place] Level (Gait) maximum assist (25% patient effort);minimum assist (75% patient effort)  -KW     Assistive Device (Gait) walker, front-wheeled  -KW     Distance in Feet (Gait) 15  -KW     Deviations/Abnormal Patterns (Gait) festinating/shuffling;gait speed decreased;erinn decreased  -KW     Bilateral Gait Deviations forward flexed posture;heel strike decreased  -KW               User Key  (r) = Recorded By, (t) = Taken By, (c) = Cosigned By      Initials Name Provider Type    Josephine Mejias PT Physical Therapist                   Obj/Interventions       Row Name 09/25/23 0958          Balance    Balance Assessment sitting static balance;sitting dynamic balance;sit to stand dynamic balance;standing static balance  -KW     Static Sitting Balance supervision  -KW     Dynamic Sitting Balance contact guard  -KW     Position, Sitting Balance unsupported;sitting edge of bed  -KW     Static Standing Balance minimal assist  -KW     Dynamic Standing Balance minimal assist  -KW     Position/Device Used, Standing Balance walker, rolling;walker, front-wheeled  -KW     Balance Interventions sitting;standing;sit to stand  -KW               User Key  (r) = Recorded By, (t) = Taken By, (c) = Cosigned By      Initials Name Provider Type    Josephine Mejias PT Physical Therapist                   Goals/Plan       Row Name 09/25/23 0958          Bed Mobility Goal 1 (PT)    Activity/Assistive Device (Bed Mobility Goal 1, PT) sit to supine/supine to sit  -KW     [place] Level/Cues Needed (Bed Mobility Goal 1, PT) independent  -KW     Time Frame (Bed Mobility Goal 1, PT) 10 days  -KW       Row Name 09/25/23 0958          Transfer Goal 1 (PT)    Activity/Assistive Device (Transfer Goal  1, PT) sit-to-stand/stand-to-sit;bed-to-chair/chair-to-bed  -KW     [place] Level/Cues Needed (Transfer Goal 1, PT) supervision required  -KW     Time Frame (Transfer Goal 1, PT) 10 days  -KW       Row Name 09/25/23 0958          Gait Training Goal 1 (PT)    Activity/Assistive Device (Gait Training Goal 1, PT) gait (walking locomotion);assistive device use;decrease fall risk;diminish gait deviation;improve balance and speed;increase endurance/gait distance  -KW     [place] Level (Gait Training Goal 1, PT) supervision required  -KW     Distance (Gait Training Goal 1, PT) 50  -KW     Time Frame (Gait Training Goal 1, PT) 10 days  -KW               User Key  (r) = Recorded By, (t) = Taken By, (c) = Cosigned By      Initials Name Provider Type    KW Josephine Sanchez, PT Physical Therapist                   Clinical Impression       Row Name 09/25/23 0958          Pain    Pretreatment Pain Rating 0/10 - no pain  -KW       Row Name 09/25/23 0958          Plan of Care Review    Plan of Care Reviewed With patient  -KW     Progress no change  -KW     Outcome Evaluation PT eval completed.  Sondra used. She was able to sit at EOB with supervision. She stood for about 7 minutes at EOB with CGA to assessement of sacrum and stated she needed to use restroom. She was able to ambulate to restroom with Jamir and flexed posture. She required assistance for sit to stand as she demonstrated poor safety awareness. Patient stood for clean up and began ambulating back to bed. She demonstrated LE buckling and required totalA from PT to avoid fall and return to chair. RN notified. Patient presents below baseline for functional mobility. Patient demonstrates decreased activity tolerance, deconditioning and reduced dynamic balance. Patient would continue to benefit from skilled PT services to improve dynamic balance with gait, transfers strength, and activity tolerance training in order to build endurance and reduce  risk of falls.  -       Row Name 09/25/23 0958          Therapy Assessment/Plan (PT)    Rehab Potential (PT) good, to achieve stated therapy goals  -     Criteria for Skilled Interventions Met (PT) yes;skilled treatment is necessary  -KW     Therapy Frequency (PT) daily  -       Row Name 09/25/23 0958          Vital Signs    Pre Systolic BP Rehab 162  -KW     Pre Treatment Diastolic BP 42  -KW     Pretreatment Heart Rate (beats/min) 97  -KW     Pre SpO2 (%) 60  -KW       Row Name 09/25/23 0958          Positioning and Restraints    Pre-Treatment Position in bed  -KW     Post Treatment Position bed  -KW     In Chair reclined;call light within reach;encouraged to call for assist;exit alarm on;notified nsg  -               User Key  (r) = Recorded By, (t) = Taken By, (c) = Cosigned By      Initials Name Provider Type    Josephine Mejias, PT Physical Therapist                   Outcome Measures       Row Name 09/25/23 0958 09/25/23 0800       How much help from another person do you currently need...    Turning from your back to your side while in flat bed without using bedrails? 3  -KW 3  -MD    Moving from lying on back to sitting on the side of a flat bed without bedrails? 3  -KW 3  -MD    Moving to and from a bed to a chair (including a wheelchair)? 2  -KW 2  -MD    Standing up from a chair using your arms (e.g., wheelchair, bedside chair)? 2  -KW 2  -MD    Climbing 3-5 steps with a railing? 2  -KW 2  -MD    To walk in hospital room? 2  -KW 2  -MD    AM-PAC 6 Clicks Score (PT) 14  -KW 14  -MD    Highest level of mobility 4 --> Transferred to chair/commode  -KW 4 --> Transferred to chair/commode  -MD      Row Name 09/25/23 0958          Functional Assessment    Outcome Measure Options AM-PAC 6 Clicks Basic Mobility (PT)  -               User Key  (r) = Recorded By, (t) = Taken By, (c) = Cosigned By      Initials Name Provider Type    Char Bender, RN Registered Nurse    Josephine Mejias,  PT Physical Therapist                                   PT Recommendation and Plan     Plan of Care Reviewed With: patient  Progress: no change  Outcome Evaluation: PT eval completed.  Sondra used. She was able to sit at EOB with supervision. She stood for about 7 minutes at EOB with CGA to assessement of sacrum and stated she needed to use restroom. She was able to ambulate to restroom with Jamir and flexed posture. She required assistance for sit to stand as she demonstrated poor safety awareness. Patient stood for clean up and began ambulating back to bed. She demonstrated LE buckling and required totalA from PT to avoid fall and return to chair. RN notified. Patient presents below baseline for functional mobility. Patient demonstrates decreased activity tolerance, deconditioning and reduced dynamic balance. Patient would continue to benefit from skilled PT services to improve dynamic balance with gait, transfers strength, and activity tolerance training in order to build endurance and reduce risk of falls.     Time Calculation:   PT Evaluation Complexity  History, PT Evaluation Complexity: 3 or more personal factors and/or comorbidities  Examination of Body Systems (PT Eval Complexity): total of 3 or more elements  Clinical Presentation (PT Evaluation Complexity): evolving  Clinical Decision Making (PT Evaluation Complexity): moderate complexity  Overall Complexity (PT Evaluation Complexity): moderate complexity     PT Charges       Row Name 09/25/23 0958             Time Calculation    Start Time 0958  -KW      PT Received On 09/25/23  -KW      PT Goal Re-Cert Due Date 10/05/23  -KW         Timed Charges    53217 - PT Therapeutic Activity Minutes 14  -KW         Untimed Charges    PT Eval/Re-eval Minutes 59  -KW         Total Minutes    Timed Charges Total Minutes 14  -KW      Untimed Charges Total Minutes 59  -KW       Total Minutes 73  -KW                User Key  (r) = Recorded By, (t) = Taken By,  (c) = Cosigned By      Initials Name Provider Type    Josephine Mejias PT Physical Therapist                  Therapy Charges for Today       Code Description Service Date Service Provider Modifiers Qty    36276136366 HC PT THERAPEUTIC ACT EA 15 MIN 9/25/2023 Josephine Sanchez PT GP 1    [id number] HC PT EVAL MOD COMPLEXITY 4 9/25/2023 Josephine Sanchez PT GP 1            PT G-Codes  Outcome Measure Options: AM-PAC 6 Clicks Basic Mobility (PT)  AM-PAC 6 Clicks Score (PT): 14  AM-PAC 6 Clicks Score (OT): 15  PT Discharge Summary  Anticipated Discharge Disposition (PT): skilled nursing facility    Josephine Sanchez PT  9/25/2023

## 2023-09-25 NOTE — NURSING NOTE
Woc consulted for: Pressure injury to coccyx.    Wound/ Skin Assessment: Patient presents with a unstageable pressure injury to the coccyx.  2.2 cm in diameter.  Yellow slough wound bed there was drainage on the dressing that is serous in nature and little more than what I would call small the edges are hyperpigmented but they appear purple-maroon area does not alvaro cleansed with normal saline and replace the silicone foam as patient was standing and needed to walk to the bathroom.  She is admitted for a fall she has a large hematoma and multiple contusions on her face.      Recommendations/ Intervention performed: Woc recommends Santyl at this time I do not recommend sharp debridement given low potential for healing at this point.    Cleanse wound with saline apply nickel thick layer of Santyl and topped with silicone foam.  At the time the drainage stops and the wound is drying out we may need to top with saline dampened 4 x 4 until then the wound is moist enough for Santyl alone.  We will do this daily.    Patient will need protein supplements for wound healing.    Skin interventions in place.    Pressure Injury Protocol (initiate for Marquez Score of 18 or less):   *Maintain good skin care, keep dry, turn q 2 hr, keep heels elevated and offloaded with heel boots.    *Apply z-guard to sacrococcygeal area/ perineal area BID or daily and PRN per incontinent episodes.  *Follow C.A.R.E protocol if medical devices (Bipap, aquino, Ng tube, etc) are being used.     Specialty bed: LIANE bed ordered    Woc will follow.    Please contact with questions or concerns.

## 2023-09-26 LAB
ANION GAP SERPL CALCULATED.3IONS-SCNC: 10 MMOL/L (ref 5–15)
BASOPHILS # BLD AUTO: 0.01 10*3/MM3 (ref 0–0.2)
BASOPHILS NFR BLD AUTO: 0.1 % (ref 0–1.5)
BH BB BLOOD EXPIRATION DATE: NORMAL
BH BB BLOOD TYPE BARCODE: 9500
BH BB DISPENSE STATUS: NORMAL
BH BB PRODUCT CODE: NORMAL
BH BB UNIT NUMBER: NORMAL
BUN SERPL-MCNC: 39 MG/DL (ref 8–23)
BUN/CREAT SERPL: 27.3 (ref 7–25)
CALCIUM SPEC-SCNC: 8.5 MG/DL (ref 8.6–10.5)
CHLORIDE SERPL-SCNC: 100 MMOL/L (ref 98–107)
CO2 SERPL-SCNC: 23 MMOL/L (ref 22–29)
CREAT SERPL-MCNC: 1.43 MG/DL (ref 0.57–1)
CROSSMATCH INTERPRETATION: NORMAL
DEPRECATED RDW RBC AUTO: 57.5 FL (ref 37–54)
EGFRCR SERPLBLD CKD-EPI 2021: 37.4 ML/MIN/1.73
EOSINOPHIL # BLD AUTO: 0.01 10*3/MM3 (ref 0–0.4)
EOSINOPHIL NFR BLD AUTO: 0.1 % (ref 0.3–6.2)
ERYTHROCYTE [DISTWIDTH] IN BLOOD BY AUTOMATED COUNT: 16.6 % (ref 12.3–15.4)
FOLATE SERPL-MCNC: 5.3 NG/ML (ref 4.78–24.2)
GLUCOSE SERPL-MCNC: 89 MG/DL (ref 65–99)
HCT VFR BLD AUTO: 24.5 % (ref 34–46.6)
HGB BLD-MCNC: 8.3 G/DL (ref 12–15.9)
IMM GRANULOCYTES # BLD AUTO: 0.21 10*3/MM3 (ref 0–0.05)
IMM GRANULOCYTES NFR BLD AUTO: 1.9 % (ref 0–0.5)
LYMPHOCYTES # BLD AUTO: 0.4 10*3/MM3 (ref 0.7–3.1)
LYMPHOCYTES NFR BLD AUTO: 3.6 % (ref 19.6–45.3)
MCH RBC QN AUTO: 31.9 PG (ref 26.6–33)
MCHC RBC AUTO-ENTMCNC: 33.9 G/DL (ref 31.5–35.7)
MCV RBC AUTO: 94.2 FL (ref 79–97)
MONOCYTES # BLD AUTO: 0.69 10*3/MM3 (ref 0.1–0.9)
MONOCYTES NFR BLD AUTO: 6.2 % (ref 5–12)
NEUTROPHILS NFR BLD AUTO: 88.1 % (ref 42.7–76)
NEUTROPHILS NFR BLD AUTO: 9.79 10*3/MM3 (ref 1.7–7)
NRBC BLD AUTO-RTO: 0 /100 WBC (ref 0–0.2)
PLATELET # BLD AUTO: 117 10*3/MM3 (ref 140–450)
PMV BLD AUTO: 10 FL (ref 6–12)
POTASSIUM SERPL-SCNC: 4.3 MMOL/L (ref 3.5–5.2)
QT INTERVAL: 448 MS
QTC INTERVAL: 448 MS
RBC # BLD AUTO: 2.6 10*6/MM3 (ref 3.77–5.28)
SODIUM SERPL-SCNC: 133 MMOL/L (ref 136–145)
UNIT  ABO: NORMAL
UNIT  RH: NORMAL
VIT B12 BLD-MCNC: >2000 PG/ML (ref 211–946)
WBC NRBC COR # BLD: 11.11 10*3/MM3 (ref 3.4–10.8)

## 2023-09-26 PROCEDURE — 63710000001 PANTOPRAZOLE 40 MG TABLET DELAYED-RELEASE: Performed by: INTERNAL MEDICINE

## 2023-09-26 PROCEDURE — 80048 BASIC METABOLIC PNL TOTAL CA: CPT | Performed by: INTERNAL MEDICINE

## 2023-09-26 PROCEDURE — 63710000001 MIRTAZAPINE 15 MG TABLET: Performed by: INTERNAL MEDICINE

## 2023-09-26 PROCEDURE — 93010 ELECTROCARDIOGRAM REPORT: CPT | Performed by: INTERNAL MEDICINE

## 2023-09-26 PROCEDURE — A9270 NON-COVERED ITEM OR SERVICE: HCPCS | Performed by: INTERNAL MEDICINE

## 2023-09-26 PROCEDURE — 85025 COMPLETE CBC W/AUTO DIFF WBC: CPT | Performed by: INTERNAL MEDICINE

## 2023-09-26 PROCEDURE — 99213 OFFICE O/P EST LOW 20 MIN: CPT

## 2023-09-26 PROCEDURE — 93005 ELECTROCARDIOGRAM TRACING: CPT

## 2023-09-26 PROCEDURE — 63710000001 QUETIAPINE 25 MG TABLET

## 2023-09-26 PROCEDURE — G0378 HOSPITAL OBSERVATION PER HR: HCPCS

## 2023-09-26 PROCEDURE — A9270 NON-COVERED ITEM OR SERVICE: HCPCS

## 2023-09-26 PROCEDURE — 63710000001 ATORVASTATIN 10 MG TABLET: Performed by: INTERNAL MEDICINE

## 2023-09-26 RX ORDER — QUETIAPINE FUMARATE 25 MG/1
12.5 TABLET, FILM COATED ORAL NIGHTLY
Status: DISCONTINUED | OUTPATIENT
Start: 2023-09-26 | End: 2023-09-29 | Stop reason: HOSPADM

## 2023-09-26 RX ADMIN — ATORVASTATIN CALCIUM 10 MG: 10 TABLET, FILM COATED ORAL at 20:05

## 2023-09-26 RX ADMIN — QUETIAPINE FUMARATE 12.5 MG: 25 TABLET ORAL at 20:05

## 2023-09-26 RX ADMIN — Medication 10 ML: at 20:08

## 2023-09-26 RX ADMIN — PANTOPRAZOLE SODIUM 40 MG: 40 TABLET, DELAYED RELEASE ORAL at 07:54

## 2023-09-26 RX ADMIN — MIRTAZAPINE 7.5 MG: 15 TABLET, FILM COATED ORAL at 20:05

## 2023-09-26 NOTE — SIGNIFICANT NOTE
09/26/23 1646   SLP Deferred Reason   SLP Deferred Reason   (Noted that pt now comfort measures. RN reported poor intake. Attempted to f/u to determine if appropriate for further comfort diet education/adjustments & communication eval, but pt would not respond to sternal rub/oral care attempts. SLP will sign-off @ this time. Please reconsult/contact SLP if questions/needs arise.)

## 2023-09-26 NOTE — PROGRESS NOTES
[facility] Medicine Services  PROGRESS NOTE    Patient Name: Miryam Mckeon  : 1945  MRN: [id number]    Date of Admission: 2023  Primary Care Physician: Rigoberto Chamberlain MD    Subjective   Subjective     CC:  Fall and anemia    HPI:  Patient with continued decline, drowsiness, AMS/hallucinations.      Objective   Objective     Vital Signs:   Temp:  [97.5 °F (36.4 °C)-98.2 °F (36.8 °C)] 98.2 °F (36.8 °C)  Heart Rate:  [60] 60  Resp:  [18] 18  BP: (146-179)/(55-92) 149/63  Flow (L/min):  [2] 2     Physical Exam:  Constitutional: No acute distress,  drowsy  HENT: bilateral bruising around eyes, L>R  Respiratory: Clear to auscultation bilaterally, respiratory effort normal   Cardiovascular: RRR, no murmurs, rubs, or gallops  Gastrointestinal: soft, nontender, nondistended  Musculoskeletal: No bilateral ankle edema  Psychiatric: +visual hallucinations  Neurologic: too drowsy/confused for accurate neuro exam  Skin: multiple bruises over upper extremities      Results Reviewed:  LAB RESULTS:      Lab 23  0724 23  0747 23  0334 23  0400 23  0359 23  1353 23  1025   WBC 11.11* 11.90* 10.63 6.19  --   --   --  8.27   HEMOGLOBIN 8.3* 9.4* 9.2* 10.0*  --  11.0* 10.0* 9.1*   HEMATOCRIT 24.5* 28.6* 28.6* 30.4*  --  33.3* 30.0* 28.8*   PLATELETS 117* 154 189 173  --   --   --  146   NEUTROS ABS 9.79*  --  9.08* 5.40  --   --   --  6.79   IMMATURE GRANS (ABS) 0.21*  --  0.17* 0.14*  --   --   --  0.12*   LYMPHS ABS 0.40*  --  0.56* 0.44*  --   --   --  0.67*   MONOS ABS 0.69  --  0.80 0.20  --   --   --  0.65   EOS ABS 0.01  --  0.00 0.00  --   --   --  0.02   MCV 94.2 95.0 96.6 94.1  --   --   --  97.3*   CRP  --   --   --   --   --   --   --  0.65*   PROCALCITONIN  --   --   --   --   --   --   --  0.21   LDH  --   --   --   --   --   --   --  356*   PROTIME  --   --   --   --  19.9*  --   --   --    D DIMER QUANT  --   --   --    --   --   --  1.45*  --          Lab 09/26/23  0724 09/25/23  0747 09/24/23  0334 09/23/23  0400 09/22/23  1025   SODIUM 133* 134* 134* 136 135*   POTASSIUM 4.3 4.1 4.4 4.5 3.9   CHLORIDE 100 101 100 101 101   CO2 23.0 24.0 23.0 22.0 24.0   ANION GAP 10.0 9.0 11.0 13.0 10.0   BUN 39* 35* 38* 37* 37*   CREATININE 1.43* 1.45* 1.60* 1.46* 1.43*   EGFR 37.4* 36.8* 32.7* 36.5* 37.4*   GLUCOSE 89 89 93 135* 130*   CALCIUM 8.5* 8.6 8.6 8.7 8.9   TSH  --   --   --   --  17.120*         Lab 09/23/23  0400 09/22/23  1025   TOTAL PROTEIN 5.2* 5.4*   ALBUMIN 2.9* 3.1*   GLOBULIN 2.3 2.3   ALT (SGPT) 53* 50*   AST (SGOT) 40* 40*   BILIRUBIN 1.2 0.9   ALK PHOS 89 95   LIPASE  --  18         Lab 09/23/23  0359   PROTIME 19.9*   INR 1.68*             Lab 09/25/23  0747 09/22/23  1353 09/22/23  1025   IRON  --   --  46   IRON SATURATION (TSAT)  --   --  22   TIBC  --   --  209*   TRANSFERRIN  --   --  140*   FERRITIN  --   --  819.80*   FOLATE 5.30  --   --    VITAMIN B 12 >2,000*  --   --    ABO TYPING  --  O  --    RH TYPING  --  Negative  --    ANTIBODY SCREEN  --  Negative  --          Brief Urine Lab Results  (Last result in the past 365 days)        Color   Clarity   Blood   Leuk Est   Nitrite   Protein   CREAT   Urine HCG        09/22/23 1042 Yellow   Cloudy   Negative   Negative   Negative   >=300 mg/dL (3+)                   Microbiology Results Abnormal       None            CT Head Without Contrast    Result Date: 9/24/2023  CT HEAD WO CONTRAST Date of Exam: 9/24/2023 4:06 PM EDT Indication: Head trauma, abnormal mental status (Age 18-64y), change in mental status, recent fall w/head trauma on Eliquis. Comparison: Head CT dated 9/22/2023 Technique: Axial CT images were obtained of the head without contrast administration.  Automated exposure control and iterative construction methods were used. Findings: The ventricles and sulci are proportionally prominent compatible with mild age-related global cerebral volume loss.  There is no mass effect or midline shift. There is no acute intracranial hemorrhage. There is no abnormal extra-axial fluid collection. The gray-white matter differentiation is preserved. There is periventricular and subcortical white matter hypodensity likely representing sequelae of chronic small vessel ischemic disease. This appears stable from prior examination. There is a left frontal scalp hematoma which appears similar to the prior examination. There is no evidence of underlying calvarial fracture. The mastoid air cells appear well aerated. There is a small amount of frothy secretion within the left sphenoid sinus. There is atherosclerotic calcification of the intracranial vasculature. There is evidence of prior right-sided cataract extraction.     Impression: Impression: 1. No acute intracranial abnormality. Specifically, no evidence of acute hemorrhage, mass effect or midline shift. 2. Left frontal scalp contusion and hematoma without underlying calvarial fracture. 3. Mild age-related volume loss with confluent periventricular white matter hypodensity likely representing sequelae of moderate chronic small vessel ischemic disease. No significant interval change compared to the prior CT from 9/22/2023. Electronically Signed: Jaskaran Warren  9/24/2023 7:06 PM EDT  Workstation ID: [id number]     Results for orders placed during the hospital encounter of 08/09/23    Adult Transthoracic Echo Limited W/ Cont if Necessary Per Protocol    Interpretation Summary    Left ventricular systolic function is low normal. Estimated left ventricular EF = 50%    Left ventricular wall thickness is consistent with mild concentric hypertrophy.    There is a 20 mm MARK 3 TAVR valve present.    Aortic valve mean pressure gradient is 11 mmHg.    Calculated aortic valve area 1.12 cm².    Trivial prosthesis insufficiency, paravalvular versus central.      Current medications:  Scheduled Meds:amiodarone, 200 mg, Oral,  Q24H  atorvastatin, 10 mg, Oral, Nightly  cholecalciferol, 1,000 Units, Oral, Daily  collagenase, 1 application , Topical, Q24H  dilTIAZem CD, 300 mg, Oral, Q24H  hydrALAZINE, 50 mg, Oral, Daily  metoprolol succinate XL, 150 mg, Oral, Q24H  mirtazapine, 7.5 mg, Oral, Nightly  pantoprazole, 40 mg, Oral, QAM AC  QUEtiapine, 25 mg, Oral, Nightly  sodium chloride, 10 mL, Intravenous, Q12H  vitamin B-12, 1,000 mcg, Oral, Daily      Continuous Infusions:     PRN Meds:.  acetaminophen    docusate sodium    hydrALAZINE    labetalol    lidocaine PF 1%    ondansetron    [COMPLETED] Insert Peripheral IV **AND** sodium chloride    sodium chloride    sodium chloride    ziprasidone    Assessment & Plan   Assessment & Plan     Active Hospital Problems    Diagnosis  POA    **Anemia associated with acute blood loss [D62]  Yes    Anorexia [R63.0]  Yes    Heme positive stool [R19.5]  Unknown    Chronic anticoagulation [Z79.01]  Not Applicable    Pulmonary hypertension [I27.20]  Yes    Chronic kidney disease, stage 3b [N18.32]  Yes    Presence of cardiac pacemaker secondary to sick sinus syndrome [Z95.0]  Yes    Chronic combined systolic and diastolic congestive heart failure [I50.42]  Yes    Hypertension [I10]  Yes    Aortic stenosis, severe s/p TAVR (7/20/2022) [I35.0]  Yes    Hyperlipidemia LDL goal <70 [E78.5]  Yes    Longstanding persistent atrial fibrillation [I48.11]  Yes    Bilateral carotid artery stenosis [I65.23]  Yes    Status post CVA [Z86.73]  Not Applicable      Resolved Hospital Problems   No resolved problems to display.        Brief Hospital Course to date:  Miryam Mckeon is a 77 y.o. female with past medical history of A-fib on Eliquis, aortic valve stenosis status post TAVR, CKD stage III, deafness, hypertension, hyperlipidemia and history of CVA as well as COVID in August and Mixed HFrEF who presents with recurrent falls, the dwindles and Occult positive stool.     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  Ongoing elevated inflammatory markers  - supportive care  - trial short course of decadron without significant improvement  - CT head without evidence for ICH  - Neurology consulted and appreciate input. Cancel MRI as family desires comfort measures.    Severe Malnutrition  Persistent Anorexia/FTT  - nutrition consulted, patient with continued poor intake  - continue remeron for now  - family desires comfort, no artificial nutrition, no PEG or Keofeed     Status post TAVR 2022  SSS/A.fib s/p PPM  Hypertension  Pulmonary hypertension  -Rate controlled, continue amiodarone  -Continue metoprolol  -Continue Lipitor     HTN:  - BP remains labile    Elevated TSH  - FT4 WNL, probably subclinical hypothyroidism vs. Sick euthyroid, recheck labs in 4 weeks and consider initiation of treatment.     History of stroke on chronic Eliquis  -stable    CKD  - baseline appears to be around 1.2-1.3, currently 1.4  - avoid nephrotoxins, holding ARB  - stop checking labs as family would like to focus on comfort.      GOC:  - discussed with daughter at bedside, she states she had a chance to speak with her brother and they are in agreeance that they wish to have no further testing done. No additional medications for dementia. They state patient's wishes were to be made comfortable and they would like to honor these wishes. They would like to make her comfortable. I discussed hospice evaluation and will consult them today. Patient  with continued overall decline, altered mentation/hallucinations, anorexia/poor intake. Code status changed to comfort measures.    Expected Discharge Location and Transportation: Transition to hospice. Inpatient vs. At SNF  Expected Discharge   Expected Discharge Date: 9/27/2023; Expected Discharge Time:      DVT prophylaxis:  No DVT prophylaxis order currently exists.     AM-PAC 6 Clicks Score (PT): 14 (09/26/23 5779)    CODE STATUS:   Code Status and Medical Interventions:   Ordered at: 09/26/23 1120     Level Of Support Discussed With:    Health Care Surrogate     Code Status (Patient has no pulse and is not breathing):    No CPR (Do Not Attempt to Resuscitate)     Medical Interventions (Patient has pulse or is breathing):    Comfort Measures       Lakeisha Castro,   09/26/23

## 2023-09-26 NOTE — PLAN OF CARE
Problem: Adult Inpatient Plan of Care  Goal: Plan of Care Review  Outcome: Ongoing, Progressing  Goal: Patient-Specific Goal (Individualized)  Outcome: Ongoing, Progressing  Goal: Absence of Hospital-Acquired Illness or Injury  Outcome: Ongoing, Progressing  Intervention: Identify and Manage Fall Risk  Recent Flowsheet Documentation  Taken 9/26/2023 0600 by Loretta Gardner, RN  Safety Promotion/Fall Prevention:   activity supervised   assistive device/personal items within reach   clutter free environment maintained   fall prevention program maintained   safety round/check completed   room organization consistent  Taken 9/26/2023 0400 by Loretta Gardner RN  Safety Promotion/Fall Prevention:   activity supervised   assistive device/personal items within reach   clutter free environment maintained   fall prevention program maintained   room organization consistent   safety round/check completed  Taken 9/26/2023 0200 by Loretta Gardner RN  Safety Promotion/Fall Prevention:   activity supervised   assistive device/personal items within reach   clutter free environment maintained   fall prevention program maintained   room organization consistent   safety round/check completed  Taken 9/26/2023 0000 by Loretta Gardner RN  Safety Promotion/Fall Prevention:   activity supervised   assistive device/personal items within reach   clutter free environment maintained   fall prevention program maintained   room organization consistent   safety round/check completed  Taken 9/25/2023 2200 by Loretta Gardner, RN  Safety Promotion/Fall Prevention:   activity supervised   assistive device/personal items within reach   clutter free environment maintained   fall prevention program maintained   safety round/check completed   room organization consistent  Taken 9/25/2023 2000 by Loretta Gardner, RN  Safety Promotion/Fall Prevention:   activity supervised   assistive device/personal items within reach   clutter free environment  maintained   fall prevention program maintained   safety round/check completed   room organization consistent  Intervention: Prevent Skin Injury  Recent Flowsheet Documentation  Taken 9/26/2023 0600 by Loretta Gardner RN  Body Position: weight shifting  Taken 9/26/2023 0400 by Loretta Gardner RN  Body Position: weight shifting  Taken 9/26/2023 0200 by Loretta Gardner RN  Body Position: weight shifting  Taken 9/26/2023 0000 by Loretta Gardner RN  Body Position: weight shifting  Skin Protection:   adhesive use limited   incontinence pads utilized   skin-to-skin areas padded   transparent dressing maintained   tubing/devices free from skin contact  Taken 9/25/2023 2200 by Loretta Gardner RN  Body Position: weight shifting  Taken 9/25/2023 2000 by Loretta Gardner RN  Body Position: weight shifting  Skin Protection:   adhesive use limited   incontinence pads utilized   transparent dressing maintained   tubing/devices free from skin contact   skin-to-skin areas padded  Intervention: Prevent and Manage VTE (Venous Thromboembolism) Risk  Recent Flowsheet Documentation  Taken 9/26/2023 0600 by Loretta Gardner RN  Activity Management: activity minimized  Taken 9/26/2023 0400 by Loretta Gardner RN  Activity Management: activity minimized  Taken 9/26/2023 0200 by Loretta Gardner RN  Activity Management: activity minimized  Taken 9/26/2023 0000 by Loretta Gardner RN  Activity Management: activity minimized  Taken 9/25/2023 2200 by Loretta Gardner RN  Activity Management: activity minimized  Taken 9/25/2023 2000 by Loretta Gardner RN  Activity Management: activity minimized  Range of Motion: active ROM (range of motion) encouraged  Goal: Optimal Comfort and Wellbeing  Outcome: Ongoing, Progressing  Intervention: Provide Person-Centered Care  Recent Flowsheet Documentation  Taken 9/25/2023 2000 by Loretta Gardner RN  Trust Relationship/Rapport:   care explained   choices provided   emotional support  provided  Goal: Readiness for Transition of Care  Outcome: Ongoing, Progressing     Problem: Fall Injury Risk  Goal: Absence of Fall and Fall-Related Injury  Outcome: Ongoing, Progressing  Intervention: Identify and Manage Contributors  Recent Flowsheet Documentation  Taken 9/25/2023 2000 by Loretta Gardner RN  Medication Review/Management: medications reviewed  Intervention: Promote Injury-Free Environment  Recent Flowsheet Documentation  Taken 9/26/2023 0600 by Loretta Gardner, RN  Safety Promotion/Fall Prevention:   activity supervised   assistive device/personal items within reach   clutter free environment maintained   fall prevention program maintained   safety round/check completed   room organization consistent  Taken 9/26/2023 0400 by Loretta Gardner RN  Safety Promotion/Fall Prevention:   activity supervised   assistive device/personal items within reach   clutter free environment maintained   fall prevention program maintained   room organization consistent   safety round/check completed  Taken 9/26/2023 0200 by Loretta Gardner RN  Safety Promotion/Fall Prevention:   activity supervised   assistive device/personal items within reach   clutter free environment maintained   fall prevention program maintained   room organization consistent   safety round/check completed  Taken 9/26/2023 0000 by Loretta Gardner RN  Safety Promotion/Fall Prevention:   activity supervised   assistive device/personal items within reach   clutter free environment maintained   fall prevention program maintained   room organization consistent   safety round/check completed  Taken 9/25/2023 2200 by Loretta Gardner RN  Safety Promotion/Fall Prevention:   activity supervised   assistive device/personal items within reach   clutter free environment maintained   fall prevention program maintained   safety round/check completed   room organization consistent  Taken 9/25/2023 2000 by Loretta Gardner, RN  Safety Promotion/Fall  Prevention:   activity supervised   assistive device/personal items within reach   clutter free environment maintained   fall prevention program maintained   safety round/check completed   room organization consistent     Problem: Skin Injury Risk Increased  Goal: Skin Health and Integrity  Outcome: Ongoing, Progressing  Intervention: Promote and Optimize Oral Intake  Recent Flowsheet Documentation  Taken 9/25/2023 2000 by Loretta Gardner RN  Oral Nutrition Promotion: rest periods promoted  Intervention: Optimize Skin Protection  Recent Flowsheet Documentation  Taken 9/26/2023 0600 by Loretta Gardner RN  Head of Bed ([facility]) Positioning: HOB elevated  Taken 9/26/2023 0400 by Loretta Gardner RN  Head of Bed ([facility]) Positioning: HOB elevated  Taken 9/26/2023 0200 by Loretta Gardner RN  Head of Bed ([facility]) Positioning: HOB elevated  Taken 9/26/2023 0000 by Loretta Gardner RN  Pressure Reduction Techniques:   frequent weight shift encouraged   weight shift assistance provided  Head of Bed ([facility]) Positioning: [facility] elevated  Pressure Reduction Devices:   pressure-redistributing mattress utilized   positioning supports utilized  Skin Protection:   adhesive use limited   incontinence pads utilized   skin-to-skin areas padded   transparent dressing maintained   tubing/devices free from skin contact  Taken 9/25/2023 2200 by Loretta Gardner RN  Head of Bed ([facility]) Positioning: HOB elevated  Taken 9/25/2023 2000 by Loretta Gardner RN  Pressure Reduction Techniques:   frequent weight shift encouraged   positioned off wounds   pressure points protected   weight shift assistance provided  Head of Bed ([facility]) Positioning: HOB elevated  Pressure Reduction Devices: pressure-redistributing mattress utilized  Skin Protection:   adhesive use limited   incontinence pads utilized   transparent dressing maintained   tubing/devices free from skin contact   skin-to-skin areas padded

## 2023-09-26 NOTE — PROGRESS NOTES
[facility] Neurology    Progress Note    Patient Name: Miryam Mckeon  : 1945  MRN: [id number]  Primary Care Physician:  Rigoberto Chamberlain MD  Date of admission: 2023    Subjective     Chief Complaint: Failure to thrive    History of Present Illness   Patient resting comfortably in bed.  Still experiencing hallucinations. no acute events overnight.    Review of Systems   General: Negative for fever, nausea, or vomiting.   Neurological: Negative for headache, pain, or weakness.     Objective     Physical Exam  Vitals and nursing note reviewed.   Constitutional:       General: She is not in acute distress.  Eyes:      Extraocular Movements: Extraocular movements intact.      Pupils: Pupils are equal, round, and reactive to light.      Comments: No nystagmus   Cardiovascular:      Rate and Rhythm: Normal rate.   Pulmonary:      Effort: Pulmonary effort is normal.   Neurological:      Mental Status: She is alert.      Cranial Nerves: No facial asymmetry.      Sensory: No sensory deficit.   Psychiatric:         Attention and Perception: She does not perceive visual hallucinations.        Vitals:   Temp:  [97.6 °F (36.4 °C)-98.2 °F (36.8 °C)] 97.9 °F (36.6 °C)  Heart Rate:  [60] 60  Resp:  [18] 18  BP: (143-179)/(63-99) 143/99  Flow (L/min):  [0] 0    Current Medications    Current Facility-Administered Medications:     acetaminophen (TYLENOL) tablet 650 mg, 650 mg, Oral, Q6H PRN, Brunner, Mark I, MD, 650 mg at 23 0856    amiodarone (PACERONE) tablet 200 mg, 200 mg, Oral, Q24H, Brunner, Mark I, MD, 200 mg at 23 0837    atorvastatin (LIPITOR) tablet 10 mg, 10 mg, Oral, Nightly, Brunner, Mark I, MD, 10 mg at 23    cholecalciferol (VITAMIN D3) tablet 1,000 Units, 1,000 Units, Oral, Daily, Brunner, Mark I, MD, 1,000 Units at 23 0837    collagenase ointment 1 application , 1 application , Topical, Q24H, Lakeisha Castro R,     dilTIAZem CD (CARDIZEM CD) 24 hr capsule 300  mg, 300 mg, Oral, Q24H, Brunner, Mark I, MD, 300 mg at 09/25/23 0835    docusate sodium (COLACE) capsule 100 mg, 100 mg, Oral, PRN, Brunner, Mark I, MD    hydrALAZINE (APRESOLINE) injection 10 mg, 10 mg, Intravenous, Q6H PRN, Brunner, Mark I, MD, 10 mg at 09/25/23 0336    hydrALAZINE (APRESOLINE) tablet 50 mg, 50 mg, Oral, Daily, Brunner, Mark I, MD, 50 mg at 09/25/23 0840    labetalol (NORMODYNE,TRANDATE) injection 10 mg, 10 mg, Intravenous, Q4H PRN, Brunner, Mark I, MD, 10 mg at 09/23/23 1034    lidocaine PF 1% (XYLOCAINE) injection 0.5 mL, 0.5 mL, Injection, Once PRN, Trevor Arreaga MD    metoprolol succinate XL (TOPROL-XL) 24 hr tablet 150 mg, 150 mg, Oral, Q24H, Lakeisha Castro, , 150 mg at 09/25/23 0838    mirtazapine (REMERON) tablet 7.5 mg, 7.5 mg, Oral, Nightly, Brunner, Mark I, MD, 7.5 mg at 09/25/23 2009    ondansetron (ZOFRAN) injection 4 mg, 4 mg, Intravenous, Q6H PRN, Brunner, Mark I, MD, 4 mg at 09/23/23 1832    pantoprazole (PROTONIX) EC tablet 40 mg, 40 mg, Oral, QAM AC, Brunner, Mark I, MD, 40 mg at 09/26/23 0754    QUEtiapine (SEROquel) tablet 25 mg, 25 mg, Oral, Nightly, Johanna Zamora, APRN, 25 mg at 09/25/23 2009    [COMPLETED] Insert Peripheral IV, , , Once **AND** sodium chloride 0.9 % flush 10 mL, 10 mL, Intravenous, PRN, Brunner, Mark I, MD    sodium chloride 0.9 % flush 10 mL, 10 mL, Intravenous, Q12H, Trevor Arreaga MD, 10 mL at 09/25/23 2009    sodium chloride 0.9 % flush 10 mL, 10 mL, Intravenous, PRN, Trevor Arreaga MD    sodium chloride 0.9 % infusion 40 mL, 40 mL, Intravenous, PRN, Trevor Arreaga MD    vitamin B-12 (CYANOCOBALAMIN) tablet 1,000 mcg, 1,000 mcg, Oral, Daily, Brunner, Mark I, MD, 1,000 mcg at 09/25/23 0837    ziprasidone (GEODON) injection 10 mg, 10 mg, Intramuscular, Q4H PRN, Johanna Zamora K, APRN    Laboratory Results:   Lab Results   Component Value Date    GLUCOSE 89 09/26/2023    CALCIUM 8.5 (L) 09/26/2023     (L) 09/26/2023    K 4.3 09/26/2023     CO2 23.0 09/26/2023     09/26/2023    BUN 39 (H) 09/26/2023    CREATININE 1.43 (H) 09/26/2023    EGFRIFAFRI 47 (L) 02/18/2022    EGFRIFNONA 41 (L) 02/18/2022    BCR 27.3 (H) 09/26/2023    ANIONGAP 10.0 09/26/2023     Lab Results   Component Value Date    WBC 11.11 (H) 09/26/2023    HGB 8.3 (L) 09/26/2023    HCT 24.5 (L) 09/26/2023    MCV 94.2 09/26/2023     (L) 09/26/2023     Lab Results   Component Value Date    CHOL 93 08/10/2023    CHOL 96 05/20/2022     Lab Results   Component Value Date    HDL 37 (L) 08/10/2023    HDL 55 05/20/2022    HDL 55 02/18/2022     Lab Results   Component Value Date    LDL 37 08/10/2023    LDL 29 05/20/2022    LDL 45 02/18/2022     Lab Results   Component Value Date    TRIG 99 08/10/2023    TRIG 48 05/20/2022    TRIG 48 02/18/2022     Lab Results   Component Value Date    HGBA1C 6.20 (H) 08/10/2023     Lab Results   Component Value Date    INR 1.68 (H) 09/23/2023    INR 1.30 (H) 07/19/2022    INR 1.20 (H) 05/20/2022    PROTIME 19.9 (H) 09/23/2023    PROTIME 16.1 (H) 07/19/2022    PROTIME 15.1 (H) 05/20/2022     Lab Results   Component Value Date    FOLATE 5.30 09/25/2023     Lab Results   Component Value Date    [id number] >2,000 (H) 09/25/2023             Assessment / Plan        Brief Patient Summary:    Miryam Mckeon is a 77 y.o. female with past medical history of A-fib on Eliquis, aortic valve stenosis s/p TAVR in 2022, CKD stage III, deafness, HTN, HLD, CVA and recent COVID infection in August who presented to [facility] ED with concern of weakness, poor appetite, frequent vomiting and multiple falls.     Plan:   Altered mental status with visual hallucinations  History of CVA  Anorexia  Sleep deprivation  Hospital delirium    Palliative care following appreciate recommendations, patient was to be transition to comfort measures at this time.   ALS  used  In light of measures will discontinue MRI.  Decrease Seroquel to 12.5. nightly  General neurology will  sign off at this time, please feel free reach out with any questions.    I have discussed the above with the patient, patient's family member and Dr. Castro  Time spent with patient: 20 minutes in face-to-face evaluation and management of the patient.      Johanna Zamora, APRN

## 2023-09-26 NOTE — PLAN OF CARE
Goal Outcome Evaluation:  Plan of Care Reviewed With: patient        Progress: no change  Outcome Evaluation: New palliative consult for assistance with GOC per Dr. Castro.  ACP on file names daughter Mila Warren as HCS with secondary HCS son Vijaya Mckeon.  BALJIT Hernandez saw pt yesterday for initial palliative consult.  Palliative care introduced and GOC discussed with dtr at .  Pt. was asleep this morning and did not demonstrate any visible signs of discomfort during palliative nursing visit.   at  interpreted conversation.  Pt. denied pain and then swiftly fell back to sleep.  She appeared very lethargic and had difficulty staying awake during visit.  Per  report, pt has been drowsy and sleeping most of morning.  She only took a few bites of ground diet.  Pt. did ask if Sara was beside her and motioned to her left.  Left palliative brochure with Mila's name on it.   was set to leave at 1000 with another  to come on around noon.  Could not get much information from patient due to AMS.  Hospitalist has changed code status to comfort measures.  Palliative care to continue to follow for support, POC and ongoing GOC.      Problem: Palliative Care  Goal: Enhanced Quality of Life  Intervention: Promote Advance Care Planning  Flowsheets (Taken 9/26/2023 1131)  Life Transition/Adjustment:   palliative care initiated   palliative care discussed

## 2023-09-26 NOTE — PROGRESS NOTES
Continued Stay Note  [facility]     Patient Name: Miryam Mckeon  MRN: [id number]  Today's Date: 9/26/2023    Admit Date: 9/22/2023    Plan: LTC vs SNF rehab   Discharge Plan       Row Name 09/26/23 1454       Plan    Plan Comments EDEN is a 78yo female 30% pps. Hospice consult received, chart reviewed, visit to bedside. Present at bedside are patient, daughter, Mila, and later in visit Afshan (), patient’s sister and son in law arrive. Rn notes patient opens eyes several times and makes direct eye contact with Rn. Mila attempts to communicate with her mother. The patient only acknowledges one time mouthing “Fine” to the question how are you. Rn observes patient with relaxed face, jaw, body posture, and respirations. Patient to sleep and sleeps throughout visit. Rn actively listens as Mila relays journey to this point and goals of comfort care. RN validates, provides for reflective communication, support provided. Mila relays that her mother does experience discomfort with her coccyx wound and would like her mother comfortable but without increased drowsiness. Rn discusses nonverbal indicators of comfort, medications, and turning and repositioning. Rn discusses requesting medications for comfort for patient and interventions for comfort with the patient’s coccyx. Mila verbalized understanding. Rn discusses hospice, inpatient hospice, guidelines for inpatient hospice, patient symptoms, current assessment, patient’s medication requirements, and disposition. Rn updated Dr. Scales. At present patient does not meet requirements for inpatient hospice admission. She has no unmanaged symptoms requiring injectable medications. Her current level of care can be managed in an alternate setting. She is able to take po medications for symptom management. RN discussed with Mila keeping bed hold at [facility]. She relays that she has done this. RN discussed picture of hospice at [facility] and that hospice would  continue to follow patient. Rn updated Adriana RN with palliative care, Dr. Castro, and Char MCCAIN. Hospice will continue to follow with daily visits.                   Discharge Codes    No documentation.                         Jessica Emmanuel RN

## 2023-09-26 NOTE — PROGRESS NOTES
Malnutrition Severity Assessment    Patient Name:  Miryam Mckeon  YOB: 1945  MRN: [id number]  Admit Date:  9/22/2023    Patient meets criteria for : Severe Malnutrition (Pt meets criteria for severe chronic malnutrition based on intake hx w wasting.)    Comments:      Malnutrition Severity Assessment  Malnutrition Type: Chronic Disease - Related Malnutrition  Malnutrition Type (last 8 hours)       Malnutrition Severity Assessment       Row Name 09/25/23 2147       Malnutrition Severity Assessment    Malnutrition Type Chronic Disease - Related Malnutrition      Row Name 09/25/23 2147       Insufficient Energy Intake     Insufficient Energy Intake Findings Severe    Insufficient Energy Intake  <75% of est. energy requirement for > or equal to 1 month      Row Name 09/25/23 2147       Unintentional Weight Loss     Unintentional Weight Loss Findings --  unable to confirm hx      Row Name 09/25/23 2147       Muscle Loss    Loss of Muscle Mass Findings Moderate    Rastafarian Region Moderate - slight depression    Clavicle Bone Region Severe - protruding prominent bone    Acromion Bone Region Severe - squared shoulders, bones, and acromion process protrusion prominent    Scapular Bone Region Severe - prominent bones, depressions easily visible between ribs, scapula, spine, shoulders    Dorsal Hand Region --  mild    Patellar Region Moderate - patella more prominent, less muscle definition around patella    Anterior Thigh Region --  mild    Posterior Calf Region Moderate - some roundness, slight firmness      Row Name 09/25/23 2147       Fat Loss    Subcutaneous Fat Loss Findings Severe    Orbital Region  Severe - pronounced hollowness/depression, dark circles, loose saggy skin    Upper Arm Region Severe - mostly skin, very little space between folds, fingers touch    Thoracic & Lumbar Region Severe - ribs visible with prominent depressions, iliac crest very prominent      Row Name 09/25/23 2147        Criteria Met (Must meet criteria for severity in at least 2 of these categories: M Wasting, Fat Loss, Fluid, Secondary Signs, Wt. Status, Intake)    Patient meets criteria for  Severe Malnutrition  Pt meets criteria for severe chronic malnutrition based on intake hx w wasting.                    Electronically signed by:  Marlin Martino RD  09/25/23 22:06 EDT

## 2023-09-26 NOTE — CONSULTS
Clinical Nutrition   Nutrition Support Assessment  Reason for Visit: Identified at risk by screening criteria, MST score 2+, Malnutrition Severity Assessment      Patient Name: Miryam Mckeon  YOB: 1945  MRN: [id number]  Date of Encounter: 09/25/23 21:50 EDT  Admission date: 9/22/2023    Comments: Pt meets criteria for severe chronic malnutrition based on intake hx w wasting. See MSA note.    Nutrition Assessment   Admission Diagnosis:  Anemia associated with acute blood loss [D62]      Problem List:    Anemia associated with acute blood loss    Bilateral carotid artery stenosis    Aortic stenosis, severe s/p TAVR (7/20/2022)    Longstanding persistent atrial fibrillation    Hyperlipidemia LDL goal <70    Hypertension    Chronic combined systolic and diastolic congestive heart failure    Status post CVA    Presence of cardiac pacemaker secondary to sick sinus syndrome    Chronic kidney disease, stage 3b    Pulmonary hypertension    Chronic anticoagulation    Anorexia    Heme positive stool    Hallucinations      PMH:   She  has a past medical history of Anxiety, Aortic valve stenosis, Atrial fibrillation, Borderline diabetes, Carotid artery stenosis, CKD (chronic kidney disease), COVID-19 (08/11/2023), Deaf, GERD (gastroesophageal reflux disease), Heart murmur, Hyperlipidemia, Hypertension, Irregular heartbeat, PONV (postoperative nausea and vomiting), Stroke, and TIA (transient ischemic attack).  Falls    PSH:  She  has a past surgical history that includes Cholecystectomy; Knee surgery (Right); Carotid Endarterectomy (Bilateral); Cataract extraction; Cardiac electrophysiology procedure (N/A, 04/13/2022); Colonoscopy; Cardiac catheterization; CARLITOS; Pacemaker Implantation; Cardiac catheterization (Left, 05/20/2022); Aortic valve replacement (N/A, 07/20/2022); transesophageal echocardiogram (carlitos) (N/A, 07/20/2022); Aortic valve replacement (N/A, 07/20/2022); Femoral Artery Cutdown  "(Right, 07/20/2022); Cardiac valve replacement (07/2022); and Esophagogastroduodenoscopy (N/A, 9/23/2023).    Applicable Nutrition Concerns:   Skin: unstagaeble PI coccyx   Oral:  GI:    Applicable Interval History:   9/23 SLP rec reg texture thin liquid  9/23 EGD: mild atrophic gastriits, Note N/V resolved.   Await repeat CT scan.      Reported/Observed/Food/Nutrition Related History:     9/25  Family rpt protracted period of poor intake. Note pt alert at time of visit; able to follow commands..       Anthropometrics     Flowsheet Rows      Flowsheet Row First Filed Value   Admission Height 152.4 cm (60\") Documented at 09/22/2023 0929   Admission Weight 52.2 kg (115 lb) Documented at 09/22/2023 0929       Height: Height: 152.4 cm (60\")  Last Filed Weight: Weight: 52.2 kg (115 lb 1.3 oz) (09/25/23 1224)  Method:    BMI: BMI (Calculated): 22.5  BMI classification: Normal: 18.5-24.9kg/m2    UBW:   Per EMR wt of 116 lbs on 8/16   Weight change:  no apparent recent signif loss     Nutrition Focused Physical Exam     Date: 9/25        Patient meets criteria for severe chronic malnutrition diagnosis, see MSA note.    Current Nutrition Prescription   PO: Diet: Cardiac Diets; Healthy Heart (2-3 Na+); Texture: Soft to Chew (NDD 3); Soft to Chew: Chopped Meat; Fluid Consistency: Thin (IDDSI 0)  Oral Nutrition Supplement: Boost Plus daily added per RD  Intake: 2 Days: 75% x 4 meals recorded  but ? accuracy      Nutrition Diagnosis   Date: 9/25             Updated:    Problem Malnutrition severe chronic   Etiology Period depressed intake including r/t Covid   Signs/Symptoms Intake hx and wasting   Status:       Goal:   General: Nutrition to support treatment  PO: Maintain intake per documentation but ? accuracy  EN/PN: N/A    Nutrition Intervention      Follow treatment progress, Care plan reviewed, Advise alternate selection, Supplement provided        Monitoring/Evaluation:   Per protocol, I&O, PO intake, Supplement intake, " Pertinent labs, Weight, Symptoms      Marlin Martino RD  Time Spent: 30  min

## 2023-09-26 NOTE — CASE MANAGEMENT/SOCIAL WORK
Continued Stay Note  [facility]     Patient Name: Miryam Mckeon  MRN: [id number]  Today's Date: 9/26/2023    Admit Date: 9/22/2023    Plan: LTC at [facility]   Discharge Plan       Row Name 09/26/23 1523       Plan    Plan LTC at [facility]    Patient/Family in Agreement with Plan yes    Plan Comments I spoke with the pts daughter. She is holding the bed at [facility]. She is hoping the pt can be inpt Hospice but is aware the pt is not meeting that criteria at this time. Hospice and I will continue to follow.    Final Discharge Disposition Code 04 - intermediate care facility      Row Name 09/26/23 1454       Plan    Plan Comments SM is a 76yo female 30% pps. Hospice consult received, chart reviewed, visit to bedside. Present at bedside are patient, daughter, Mila, and later in visit Afshan (), patient’s sister and son in law arrive. Rn notes patient opens eyes several times and makes direct eye contact with Rn. Mila attempts to communicate with her mother. The patient only acknowledges one time mouthing “Fine” to the question how are you. Rn observes patient with relaxed face, jaw, body posture, and respirations. Patient to sleep and sleeps throughout visit. Rn actively listens as Mila relays journey to this point and goals of comfort care. RN validates, provides for reflective communication, support provided. Mila relays that her mother does experience discomfort with her coccyx wound and would like her mother comfortable but without increased drowsiness. Rn discusses nonverbal indicators of comfort, medications, and turning and repositioning. Rn discusses requesting medications for comfort for patient and interventions for comfort with the patient’s coccyx. Mila verbalized understanding. Rn discusses hospice, inpatient hospice, guidelines for inpatient hospice, patient symptoms, current assessment, patient’s medication requirements, and disposition. Rn updated Dr. Scales. At present  patient does not meet requirements for inpatient hospice admission. She has no unmanaged symptoms requiring injectable medications. Her current level of care can be managed in an alternate setting. She is able to take po medications for symptom management. RN discussed with Mila keeping bed hold at [facility]. She relays that she has done this. RN discussed picture of hospice at [facility] and that hospice would continue to follow patient. Rn updated Adriana MCCAIN with palliative care, Dr. Castro, and Char MCCAIN. Hospice will continue to follow with daily visits.                   Discharge Codes    No documentation.                 Expected Discharge Date and Time       Expected Discharge Date Expected Discharge Time    Sep 27, 2023               Verónica Randle, RN

## 2023-09-26 NOTE — PROGRESS NOTES
Continued decline and transition to comfort measures noted.    Will sign off.  Please call with questions or concerns.

## 2023-09-26 NOTE — DISCHARGE PLACEMENT REQUEST
"Miryam Crawford (77 y.o. Female)       Date of Birth   1945    Social Security Number       Address   [address] [address]    Home Phone   [phone]    MRN   [id number]       Faith   Mosque    Marital Status                               Admission Date   9/22/23    Admission Type   Emergency    Admitting Provider   Lakeisha Castro DO    Attending Provider   Lakeisha Castro DO    Department, Room/Bed   [address], [address]/1       Discharge Date       Discharge Disposition       Discharge Destination                                 Attending Provider: Lakeisha Castro DO    Allergies: No Known Allergies    Isolation: None   Infection: COVID (History) (09/13/23)   Code Status: No CPR    Ht: 152.4 cm (60\")   Wt: 52.2 kg (115 lb 1.3 oz)    Admission Cmt: None   Principal Problem: Anemia associated with acute blood loss [D62]                   Active Insurance as of 9/22/2023       Primary Coverage       Payor Plan Insurance Group Employer/Plan Group    HUMANA MEDICARE REPLACEMENT HUMANA MEDICARE REPLACEMENT [id number]       Payor Plan Address Payor Plan Phone Number Payor Plan Fax Number Effective Dates    [address] [address] [phone]  1/1/2018 - None Entered    [facility] [id number]         Subscriber Name Subscriber Birth Date Member ID       MIRYAM CRAWFORD 1945 [id number]                     Emergency Contacts        (Rel.) Home Phone Work Phone Mobile Phone    Mila Masters (Daughter) [phone] -- [phone]    YadielvalentinVijaya arevalo (Son) [phone] -- --              Emergency Contact Information       Name Relation Home Work Mobile    Mila Masters Daughter [phone]    Vijaya Crawford Son [phone]            Insurance Information                  HUMANA MEDICARE REPLACEMENT/HUMANA MEDICARE REPLACEMENT Phone: [phone]    Subscriber: Miryam Crawford Subscriber#: [id number]    Group#: [id number] " Precert#: --             History & Physical        Esperanza Rice MD at 23 1137              [facility] Medicine Services  HISTORY AND PHYSICAL    Patient Name: Miryam Mckeon  : 1945  MRN: [id number]  Primary Care Physician: Rigoberto Chamberlain MD    Subjective  Subjective     Chief Complaint:    HPI:  Miryam Mckeon is a 77 y.o. female with past medical history of A-fib on Eliquis, aortic valve stenosis status post TAVR , CKD stage III, deafness, hypertension, hyperlipidemia and history of CVA who was hospitalized  for COVID and went to [facility] for rehab, then to [facility] for placement. Since there she has continued to be weak, poor appetite, frequent vomiting, and multiple falls which is hwy she returned to the ED today. She fell into the doorway of the bathroom with a hematoma on her left forehead. She has no particular pain but is very sleepy.   Patient is deaf and  was available at the bedside as well as the patients daughter.    Review of Systems   Patient denies weight loss, changes in vision, fever, chills, sore throat, shortness of breath, cough, nausea or vomiting, diarrhea, abdominal pain or distension, change in urine output or habits, joint pain, rash, itching, numbness/tingling, weakness or bleeding.    Otherwise complete ROS is negative except as mentioned in the HPI.    Personal History         PMH: She  has a past medical history of Anxiety, Aortic valve stenosis, Atrial fibrillation, Borderline diabetes, Carotid artery stenosis, CKD (chronic kidney disease), COVID-19 (2023), Deaf, GERD (gastroesophageal reflux disease), Heart murmur, Hyperlipidemia, Hypertension, Irregular heartbeat, PONV (postoperative nausea and vomiting), Stroke, and TIA (transient ischemic attack).   PSxH: She  has a past surgical history that includes Cholecystectomy; Knee surgery (Right); Carotid Endarterectomy (Bilateral); Cataract  extraction; Cardiac electrophysiology procedure (N/A, 04/13/2022); Colonoscopy; Cardiac catheterization; CARLITOS; Pacemaker Implantation; Cardiac catheterization (Left, 05/20/2022); Aortic valve replacement (N/A, 07/20/2022); transesophageal echocardiogram (carlitos) (N/A, 07/20/2022); Aortic valve replacement (N/A, 07/20/2022); Femoral Artery Cutdown (Right, 07/20/2022); and Cardiac valve replacement (07/2022).         FH: Her family history includes Other in her mother; Stroke in her father.   SH: She  reports that she has never smoked. She has never used smokeless tobacco. She reports that she does not drink alcohol and does not use drugs.   Patient was living independently prior to last admission.  Medications:  Polyethylene Glycol 3350, acetaminophen, aluminum-magnesium hydroxide, amiodarone, apixaban, aspirin, atorvastatin, bifidobacterium lactis (INFANT'S MYLICON) probiotic drops, cholecalciferol, cloNIDine, dilTIAZem CD, docusate sodium, dronabinol, guaiFENesin, hydrALAZINE, isosorbide-hydrALAZINE, latanoprost, meclizine, metoprolol succinate XL, mirtazapine, promethazine, simethicone, valsartan, and vitamin B-12    No Known Allergies    Objective  Objective     Vital Signs:   Temp:  [98.4 °F (36.9 °C)] 98.4 °F (36.9 °C)  Heart Rate:  [60] 60  Resp:  [16] 16  BP: (168-191)/() 191/75    Constitutional: asleep, but wakes easily and answers simple questions with the .  Eyes: ecchymoses around and over both eyes/Left more than right  HENT: NCAT, mucous membranes dry  Neck: no masses or lymphadenopathy, trachea midline  Respiratory: coarse breath sounds bilaterally, respirations unlabored  Cardiovascular: paced, systolic murmur,  palpable peripheral pul  Abdomen:  soft, no HSM or masses palpable, not tender or distended  Musculoskeletal: No peripheral edema, clubbing or cyanosis  Neurologic: Oriented x 3,                       Strength symmetric in all extremities                     Cranial Nerves  grossly intact, speech clear  Skin: No rashes or jaundice, biut covered in scattered bruises  Psychiatric: calm and appropriate      Result Review:  I have personally reviewed the results from the time of this admission   to 9/22/2023 11:37 EDT and agree with these findings:  [x]  Laboratory  [x]  Microbiology  [x]  Radiology  [x]  EKG/Telemetry   [x]  Cardiology/Vascular   []  Pathology  [x]  Old records  []  Other:  Most notable findings include: new anemia, CKD, low albumin, abnormal LFTs      LAB RESULTS:      Lab 09/22/23  1025   WBC 8.27   HEMOGLOBIN 9.1*   HEMATOCRIT 28.8*   PLATELETS 146   NEUTROS ABS 6.79   IMMATURE GRANS (ABS) 0.12*   LYMPHS ABS 0.67*   MONOS ABS 0.65   EOS ABS 0.02   MCV 97.3*         Lab 09/22/23  1025   SODIUM 135*   POTASSIUM 3.9   CHLORIDE 101   CO2 24.0   ANION GAP 10.0   BUN 37*   CREATININE 1.43*   EGFR 37.4*   GLUCOSE 130*   CALCIUM 8.9         Lab 09/22/23  1025   TOTAL PROTEIN 5.4*   ALBUMIN 3.1*   GLOBULIN 2.3   ALT (SGPT) 50*   AST (SGOT) 40*   BILIRUBIN 0.9   ALK PHOS 95   LIPASE 18                     Brief Urine Lab Results  (Last result in the past 365 days)        Color   Clarity   Blood   Leuk Est   Nitrite   Protein   CREAT   Urine HCG        09/22/23 1042 Yellow   Cloudy   Negative   Negative   Negative   >=300 mg/dL (3+)                 COVID19   Date Value Ref Range Status   08/09/2023 Detected (C) Not Detected - Ref. Range Final       CT Head Without Contrast    Result Date: 9/22/2023  CT HEAD WO CONTRAST Date of Exam: 9/22/2023 10:59 AM EDT Indication: fall with head injury on eliquis. Comparison: 9/13/2023 Technique: Axial CT images were obtained of the head without contrast administration.  Automated exposure control and iterative construction methods were used. Findings: There is mild parenchymal volume loss again noted. Chronic lacunar type infarct in the left thalamus appears unchanged. There is mild periventricular and scattered deep and subcortical white  matter hypodensity again noted, most commonly secondary to chronic small vessel ischemic change. Ventricles are appropriate in size and configuration. There is normal gray-white differentiation. There is no evidence of mass effect, midline shift, acute hemorrhage or edema. No abnormal fluid collections are identified. Atherosclerotic vascular calcification is noted. There is layering frothy material in the left sphenoid sinus. Correlate clinically for acute sinusitis. This appears similar to prior study. Paranasal sinuses are clear. There is partial opacification of the right mastoid air cells. No acute calvarial defects are seen. There is a large superficial scalp hematoma over the left frontal region measuring approximately 1.8 cm in thickness and extending from the orbital region through the high  convexity region, measuring approximately 5.6 cm in maximum axial dimension.     Impression: Impression: 1. No acute intracranial abnormality identified. 2. Mild parenchymal volume loss and probable chronic small vessel ischemic change, similar to prior. 3. Interval development of large periorbital/frontal  scalp hematoma. Electronically Signed: Hemal Hendrix MD  9/22/2023 11:16 AM EDT  Workstation ID: [id number]     Results for orders placed during the hospital encounter of 08/09/23    Adult Transthoracic Echo Limited W/ Cont if Necessary Per Protocol    Interpretation Summary    Left ventricular systolic function is low normal. Estimated left ventricular EF = 50%    Left ventricular wall thickness is consistent with mild concentric hypertrophy.    There is a 20 mm MARK 3 TAVR valve present.    Aortic valve mean pressure gradient is 11 mmHg.    Calculated aortic valve area 1.12 cm².    Trivial prosthesis insufficiency, paravalvular versus central.      The patient qualifies to receive the vaccine, but they have not yet received it.    Assessment & Plan  Assessment / Plan       Anemia associated with acute blood loss     Bilateral carotid artery stenosis    Aortic stenosis, severe s/p TAVR (7/20/2022)    Longstanding persistent atrial fibrillation    Hyperlipidemia LDL goal <70    Hypertension    Chronic combined systolic and diastolic congestive heart failure    Status post CVA    Presence of cardiac pacemaker secondary to sick sinus syndrome    Chronic kidney disease, stage 3b    Pulmonary hypertension    Chronic anticoagulation    Anorexia      Assessment & Plan:  Miryam Mckeon is a 77 y.o. female with past medical history of A-fib on Eliquis, aortic valve stenosis status post TAVR, CKD stage III, deafness, hypertension, hyperlipidemia and history of CVA as well as COVID in August and Mixed HFrEF who presents with recurrent falls, the dwindles and Occult positive stool.    Acute blood loss guaiac positive  -Start PPI, consult GI, consider EGD tomorrow  -Hold Eliquis  -Blood loss could be related to her multiple bruises all over her, there is no hematemesis apparently yesterday.  However peptic ulcer disease could explain her anorexia.    Recent COVID  Persistent anorexia  Worsening memory loss and apathy  Ongoing elevated inflammatory markers  We will give a few doses of Decadron to see if it improves her general wellbeing    Status post TAVR 2022  Permanent pacemaker for A-fib,/sick sinus syndrome  Hypertension  Pulmonary hypertension  -Rate controlled, continue amiodarone, consider decreasing to once a day, check TSH  -Continue metoprolol 50 mg 3 times daily, [place] L3 times a day, Cardizem 300 today?  Need to clarify clonidine and  -Continue Lipitor unless CK is elevated or LFTs worsen    History of stroke on chronic Eliquis  -Minimal right-sided weakness noted    Chronic CKD monitor creatinine is a little elevated today, check CK  DVT prophylaxis: Recent Eliquis use, hold for further doses-procedure      CODE STATUS: Daughter reports patient is to be no CPR     Expected Discharge  Expected Discharge Date: 9/25/2023;  Expected Discharge Time:     Electronically signed by Esperanza Rice MD 09/22/23 11:37 EDT            Electronically signed by Esperanza Rice MD at 09/22/23 [id number]

## 2023-09-27 PROCEDURE — 63710000001 MIRTAZAPINE 15 MG TABLET: Performed by: INTERNAL MEDICINE

## 2023-09-27 PROCEDURE — 63710000001 PANTOPRAZOLE 40 MG TABLET DELAYED-RELEASE: Performed by: INTERNAL MEDICINE

## 2023-09-27 PROCEDURE — 63710000001 DILTIAZEM CD 120 MG CAPSULE SUSTAINED-RELEASE 24 HR: Performed by: INTERNAL MEDICINE

## 2023-09-27 PROCEDURE — A9270 NON-COVERED ITEM OR SERVICE: HCPCS | Performed by: INTERNAL MEDICINE

## 2023-09-27 PROCEDURE — 63710000001 COLLAGENASE 250 UNIT/GM OINTMENT 30 G TUBE: Performed by: INTERNAL MEDICINE

## 2023-09-27 PROCEDURE — 25010000002 HYDRALAZINE PER 20 MG: Performed by: INTERNAL MEDICINE

## 2023-09-27 PROCEDURE — 63710000001 QUETIAPINE 25 MG TABLET

## 2023-09-27 PROCEDURE — 63710000001 CHOLECALCIFEROL 25 MCG (1000 UT) TABLET: Performed by: INTERNAL MEDICINE

## 2023-09-27 PROCEDURE — G0378 HOSPITAL OBSERVATION PER HR: HCPCS

## 2023-09-27 PROCEDURE — 63710000001 DILTIAZEM CD 180 MG CAPSULE SUSTAINED-RELEASE 24 HR: Performed by: INTERNAL MEDICINE

## 2023-09-27 PROCEDURE — 63710000001 TRAMADOL 50 MG TABLET

## 2023-09-27 PROCEDURE — 63710000001 VITAMIN B-12 1000 MCG TABLET: Performed by: INTERNAL MEDICINE

## 2023-09-27 PROCEDURE — 63710000001 HYDRALAZINE 50 MG TABLET: Performed by: INTERNAL MEDICINE

## 2023-09-27 PROCEDURE — A9270 NON-COVERED ITEM OR SERVICE: HCPCS

## 2023-09-27 PROCEDURE — 63710000001 METOPROLOL SUCCINATE XL 50 MG TABLET SUSTAINED-RELEASE 24 HOUR: Performed by: INTERNAL MEDICINE

## 2023-09-27 PROCEDURE — 63710000001 ACETAMINOPHEN 325 MG TABLET: Performed by: INTERNAL MEDICINE

## 2023-09-27 PROCEDURE — 63710000001 AMIODARONE 200 MG TABLET: Performed by: INTERNAL MEDICINE

## 2023-09-27 PROCEDURE — 63710000001 ATORVASTATIN 10 MG TABLET: Performed by: INTERNAL MEDICINE

## 2023-09-27 RX ORDER — TRAMADOL HYDROCHLORIDE 50 MG/1
25 TABLET ORAL EVERY 8 HOURS PRN
Status: DISCONTINUED | OUTPATIENT
Start: 2023-09-27 | End: 2023-09-29 | Stop reason: HOSPADM

## 2023-09-27 RX ADMIN — TRAMADOL HYDROCHLORIDE 25 MG: 50 TABLET ORAL at 20:53

## 2023-09-27 RX ADMIN — ACETAMINOPHEN 650 MG: 325 TABLET ORAL at 11:04

## 2023-09-27 RX ADMIN — Medication 10 ML: at 11:57

## 2023-09-27 RX ADMIN — METOPROLOL SUCCINATE 150 MG: 50 TABLET, EXTENDED RELEASE ORAL at 11:06

## 2023-09-27 RX ADMIN — Medication 10 ML: at 20:55

## 2023-09-27 RX ADMIN — PANTOPRAZOLE SODIUM 40 MG: 40 TABLET, DELAYED RELEASE ORAL at 08:15

## 2023-09-27 RX ADMIN — HYDRALAZINE HYDROCHLORIDE 50 MG: 50 TABLET, FILM COATED ORAL at 11:05

## 2023-09-27 RX ADMIN — COLLAGENASE SANTYL 1 APPLICATION: 250 OINTMENT TOPICAL at 11:58

## 2023-09-27 RX ADMIN — Medication 1000 UNITS: at 11:06

## 2023-09-27 RX ADMIN — TRAMADOL HYDROCHLORIDE 25 MG: 50 TABLET ORAL at 13:51

## 2023-09-27 RX ADMIN — DILTIAZEM HYDROCHLORIDE 300 MG: 180 CAPSULE, COATED, EXTENDED RELEASE ORAL at 15:08

## 2023-09-27 RX ADMIN — MIRTAZAPINE 7.5 MG: 15 TABLET, FILM COATED ORAL at 20:54

## 2023-09-27 RX ADMIN — AMIODARONE HYDROCHLORIDE 200 MG: 200 TABLET ORAL at 11:05

## 2023-09-27 RX ADMIN — CYANOCOBALAMIN TAB 1000 MCG 1000 MCG: 1000 TAB at 11:06

## 2023-09-27 RX ADMIN — ATORVASTATIN CALCIUM 10 MG: 10 TABLET, FILM COATED ORAL at 20:54

## 2023-09-27 RX ADMIN — QUETIAPINE FUMARATE 12.5 MG: 25 TABLET ORAL at 20:54

## 2023-09-27 NOTE — PROGRESS NOTES
Continued Stay Note  [facility]     Patient Name: Miryam Mckeon  MRN: [id number]  Today's Date: 9/27/2023    Admit Date: 9/22/2023    Plan: LTC at [facility]   Discharge Plan       Row Name 09/27/23 1339       Plan    Plan Comments SM is a 78yo female with a diagnosis of anemia. Chart reviewed, visit to bedside. Present for visit are patient, her daughter, Mila, , Lucille MCCAIN (Palliative Care), and Char MCCAIN [facility]. Patient noted awake and alert this am. She relays pain at her coccyx rated 7/10. Nothing relieves it per her. Getting up to the chair makes it worse. She is agreeable to trial po medication for symptom management. Rn observes patient take po medications this am one at a time with water with some difficulty. Noted possible pill burden. Patient falls asleep at times during visit but awakens per self spontaneously. She is an active participant in conversation much of the time. Rn actively listened to Mila’s concern over mother’s care should she return to [facility]. Validated. Discussed hospice care provided at [facility]. Discussed assess today, inpatient hospice guidelines per medicare. At present patient does not meet guidelines for inpatient hospice admission. She is able to take po medications to manage symptoms. Palliative care to order po symptom management medications. Hospice will visit tomorrow to follow up. Rn provided support and open communication. Discussed with Dr. Arteaga.                   Discharge Codes    No documentation.                              Jessica Emmanuel RN

## 2023-09-27 NOTE — PLAN OF CARE
Goal Outcome Evaluation:  Plan of Care Reviewed With: patient, daughter, other (see comments) ( present at time of Palliative RN encounter)        Progress: no change  Outcome Evaluation: Pt seen with daughter and  present, joined by NICHOLE Emmanuel RN of Hospice. Pt c/o pain due to sacral wound, signed agreement to offer of medication stronger than Tylenol; notified Palliative APRN, Tramadol ordered, monitor response. Reviewed Hospice services in the facility; currently has a bed hold at [facility]. Dtr agreeable to MAR review by Palliative APRN for reduction of pill burden if possible. Palliative following for continued symptom management, ongoing GOC/POC discussion.    1300 Palliative IDT meeting:  MD, BALJIT, SW, RN,   After hours, weekends and holidays, contact Palliative Provider by calling [phone]     Problem: Palliative Care  Goal: Enhanced Quality of Life  Outcome: Ongoing, Progressing  Intervention: Maximize Comfort  Flowsheets (Taken 9/27/2023 1700)  Pain Management Interventions: (Add tramadol, monitor response) pain management plan reviewed with patient/caregiver  Intervention: Optimize Function  Flowsheets (Taken 9/27/2023 1700)  Fatigue Management: frequent rest breaks encouraged  Sleep/Rest Enhancement:   consistent schedule promoted   family presence promoted  Intervention: Optimize Psychosocial Wellbeing  Flowsheets (Taken 9/27/2023 1700)  Supportive Measures:   decision-making supported   positive reinforcement provided   self-responsibility promoted   verbalization of feelings encouraged  Family/Support System Care:   caregiver stress acknowledged   involvement promoted   presence promoted   self-care encouraged   support provided

## 2023-09-27 NOTE — PLAN OF CARE
Problem: Adult Inpatient Plan of Care  Goal: Plan of Care Review  9/27/2023 0623 by Loretta Gardner, RN  Outcome: Ongoing, Not Progressing  Flowsheets (Taken 9/26/2023 1131 by Vanessa Kebede, RN)  Outcome Evaluation: New palliative consult for assistance with GOC per Dr. Castro.  ACP on file names daughter Mila Warren as HCS with secondary HCS son Vijaya Mckeon.  BALJIT Hernandez saw pt yesterday for initial palliative consult.  Palliative care introduced and GOC discussed with dtr at .  Pt. was asleep this morning and did not demonstrate any visible signs of discomfort during palliative nursing visit.   at  interpreted conversation.  Pt. denied pain and then swiftly fell back to sleep.  She appeared very lethargic and had difficulty staying awake during visit.  Per  report, pt has been drowsy and sleeping most of morning.  She only took a few bites of ground diet.  Pt. did ask if Sara was beside her and motioned to her left.  Left palliative brochure with Mila's name on it.   was set to leave at 1000 with another  to come on around noon.  Could not get much information from patient due to AMS.  Hospitalist has changed code status to comfort measures.  Palliative care to continue to follow for support, POC and ongoing GOC.  9/27/2023 0622 by Loretta Gardner RN  Outcome: Ongoing, Not Progressing  Goal: Patient-Specific Goal (Individualized)  9/27/2023 0623 by Loretta Gardner, RN  Outcome: Ongoing, Not Progressing  9/27/2023 0622 by Loretta Gardner RN  Outcome: Ongoing, Not Progressing  Goal: Absence of Hospital-Acquired Illness or Injury  9/27/2023 0623 by Loretta Gardner, RN  Outcome: Ongoing, Not Progressing  9/27/2023 0622 by Loretta Gardner, RN  Outcome: Ongoing, Not Progressing  Intervention: Identify and Manage Fall Risk  Recent Flowsheet Documentation  Taken 9/27/2023 0600 by Loretta Gardner, RN  Safety Promotion/Fall Prevention:    activity supervised   assistive device/personal items within reach   clutter free environment maintained   fall prevention program maintained   room organization consistent   safety round/check completed  Taken 9/27/2023 0400 by Loretta Gardner RN  Safety Promotion/Fall Prevention:   activity supervised   assistive device/personal items within reach   clutter free environment maintained   safety round/check completed   room organization consistent  Taken 9/27/2023 0200 by Loretta Gardner, RN  Safety Promotion/Fall Prevention:   activity supervised   assistive device/personal items within reach   clutter free environment maintained   fall prevention program maintained   room organization consistent   safety round/check completed  Taken 9/27/2023 0000 by Loretta Gardner RN  Safety Promotion/Fall Prevention:   activity supervised   assistive device/personal items within reach   clutter free environment maintained   fall prevention program maintained   room organization consistent   safety round/check completed  Taken 9/26/2023 2200 by Loretta Gardner RN  Safety Promotion/Fall Prevention:   activity supervised   assistive device/personal items within reach   clutter free environment maintained   fall prevention program maintained   room organization consistent   safety round/check completed  Taken 9/26/2023 2000 by Loretta Gardner RN  Safety Promotion/Fall Prevention:   activity supervised   assistive device/personal items within reach   clutter free environment maintained   fall prevention program maintained   room organization consistent   safety round/check completed  Intervention: Prevent Skin Injury  Recent Flowsheet Documentation  Taken 9/27/2023 0600 by Loretta Gardner RN  Body Position: position changed independently  Taken 9/27/2023 0400 by Loretta Gardner RN  Body Position: position changed independently  Skin Protection:   adhesive use limited   incontinence pads utilized   transparent dressing  maintained   skin-to-skin areas padded   tubing/devices free from skin contact  Taken 9/27/2023 0200 by Loretta Gardner RN  Body Position: position changed independently  Taken 9/27/2023 0000 by Loretta Gardner RN  Body Position: position changed independently  Skin Protection:   adhesive use limited   incontinence pads utilized   skin-to-skin areas padded   transparent dressing maintained   tubing/devices free from skin contact  Taken 9/26/2023 2200 by Loretta Gardner RN  Body Position: position changed independently  Taken 9/26/2023 2000 by Loretta Gardner RN  Body Position: position changed independently  Skin Protection:   adhesive use limited   incontinence pads utilized   transparent dressing maintained   tubing/devices free from skin contact  Intervention: Prevent and Manage VTE (Venous Thromboembolism) Risk  Recent Flowsheet Documentation  Taken 9/27/2023 0600 by Loretta Gardner RN  Activity Management: activity minimized  Taken 9/27/2023 0400 by Loretta Gardner RN  Activity Management: activity minimized  Taken 9/27/2023 0200 by Loretta Gardner RN  Activity Management: activity minimized  Taken 9/27/2023 0000 by Loretta Gardner RN  Activity Management: activity minimized  Taken 9/26/2023 2200 by Loretta Gardner RN  Activity Management: activity minimized  Taken 9/26/2023 2000 by Loretta Gardner RN  Activity Management: activity minimized  Range of Motion: active ROM (range of motion) encouraged  Goal: Optimal Comfort and Wellbeing  9/27/2023 0623 by Loretta Gardner RN  Outcome: Ongoing, Not Progressing  9/27/2023 0622 by Loretta Gardner RN  Outcome: Ongoing, Not Progressing  Intervention: Provide Person-Centered Care  Recent Flowsheet Documentation  Taken 9/26/2023 2000 by Loretta Gardner RN  Trust Relationship/Rapport:   care explained   choices provided   emotional support provided  Goal: Readiness for Transition of Care  9/27/2023 0623 by Loretta Gardner RN  Outcome: Ongoing,  Not Progressing  9/27/2023 0622 by Loretta Gardner RN  Outcome: Ongoing, Not Progressing     Problem: Fall Injury Risk  Goal: Absence of Fall and Fall-Related Injury  9/27/2023 0623 by Loretta Gardner RN  Outcome: Ongoing, Not Progressing  9/27/2023 0622 by Loretta Gardner RN  Outcome: Ongoing, Not Progressing  Intervention: Identify and Manage Contributors  Recent Flowsheet Documentation  Taken 9/26/2023 2000 by Loretta Gardner RN  Medication Review/Management: medications reviewed  Intervention: Promote Injury-Free Environment  Recent Flowsheet Documentation  Taken 9/27/2023 0600 by Loretta Gardner, RN  Safety Promotion/Fall Prevention:   activity supervised   assistive device/personal items within reach   clutter free environment maintained   fall prevention program maintained   room organization consistent   safety round/check completed  Taken 9/27/2023 0400 by Loretta Gardner RN  Safety Promotion/Fall Prevention:   activity supervised   assistive device/personal items within reach   clutter free environment maintained   safety round/check completed   room organization consistent  Taken 9/27/2023 0200 by Loretta Gardner RN  Safety Promotion/Fall Prevention:   activity supervised   assistive device/personal items within reach   clutter free environment maintained   fall prevention program maintained   room organization consistent   safety round/check completed  Taken 9/27/2023 0000 by Loretta Gardner RN  Safety Promotion/Fall Prevention:   activity supervised   assistive device/personal items within reach   clutter free environment maintained   fall prevention program maintained   room organization consistent   safety round/check completed  Taken 9/26/2023 2200 by Loretta Gardner, RN  Safety Promotion/Fall Prevention:   activity supervised   assistive device/personal items within reach   clutter free environment maintained   fall prevention program maintained   room organization consistent    safety round/check completed  Taken 9/26/2023 2000 by Loretta Gardner, RN  Safety Promotion/Fall Prevention:   activity supervised   assistive device/personal items within reach   clutter free environment maintained   fall prevention program maintained   room organization consistent   safety round/check completed     Problem: Skin Injury Risk Increased  Goal: Skin Health and Integrity  9/27/2023 0623 by Loretta Gardner, RN  Outcome: Ongoing, Not Progressing  9/27/2023 0622 by Loretta Gardner, RN  Outcome: Ongoing, Not Progressing  Intervention: Promote and Optimize Oral Intake  Recent Flowsheet Documentation  Taken 9/26/2023 2000 by Loretta Gardner RN  Oral Nutrition Promotion: rest periods promoted  Intervention: Optimize Skin Protection  Recent Flowsheet Documentation  Taken 9/27/2023 0600 by Loretta Gardner, RN  Head of Bed (HOB) Positioning: HOB elevated  Taken 9/27/2023 0400 by Loretta Gardner, RN  Pressure Reduction Techniques:   frequent weight shift encouraged   weight shift assistance provided  Head of Bed (HOB) Positioning: HOB elevated  Pressure Reduction Devices:   pressure-redistributing mattress utilized   specialty bed utilized   foam padding utilized  Skin Protection:   adhesive use limited   incontinence pads utilized   transparent dressing maintained   skin-to-skin areas padded   tubing/devices free from skin contact  Taken 9/27/2023 0200 by Loretta Gardner RN  Head of Bed (HOB) Positioning: HOB elevated  Taken 9/27/2023 0000 by Loretta Gardner, RN  Pressure Reduction Techniques:   frequent weight shift encouraged   weight shift assistance provided  Head of Bed (HOB) Positioning: HOB elevated  Pressure Reduction Devices:   foam padding utilized   pressure-redistributing mattress utilized   specialty bed utilized  Skin Protection:   adhesive use limited   incontinence pads utilized   skin-to-skin areas padded   transparent dressing maintained   tubing/devices free from skin contact  Taken  9/26/2023 2200 by Loretta Gardner RN  Head of Bed ([facility]) Positioning: HOB elevated  Taken 9/26/2023 2000 by Loretta Gardner RN  Pressure Reduction Techniques:   frequent weight shift encouraged   weight shift assistance provided  Head of Bed (HOB) Positioning: HOB elevated  Pressure Reduction Devices:   foam padding utilized   pressure-redistributing mattress utilized   specialty bed utilized  Skin Protection:   adhesive use limited   incontinence pads utilized   transparent dressing maintained   tubing/devices free from skin contact     Problem: Palliative Care  Goal: Enhanced Quality of Life  9/27/2023 0623 by Loretta Gardner RN  Outcome: Ongoing, Not Progressing  9/27/2023 0622 by Loretta Gardner RN  Outcome: Ongoing, Not Progressing  Intervention: Optimize Function  Recent Flowsheet Documentation  Taken 9/26/2023 2000 by Loretta Gardner RN  Sensory Stimulation Regulation:   care clustered   lighting decreased  Intervention: Optimize Psychosocial Wellbeing  Recent Flowsheet Documentation  Taken 9/26/2023 2000 by Loretta Gardner RN  Supportive Measures: active listening utilized  Family/Support System Care: support provided

## 2023-09-27 NOTE — PROGRESS NOTES
[facility]  PROGRESS NOTE    Patient Name: Miryam Mckeon  : 1945  MRN: [id number]    Date of Admission: 2023  Primary Care Physician: Rigoberto Chamberlain MD    Subjective   Subjective     CC: f/u weakness    HPI: Up in bed eating with nursing in room. Communicating with me via white board this am. Says she is comfortable.      Objective   Objective     Vital Signs:   Temp:  [97.6 °F (36.4 °C)-98.4 °F (36.9 °C)] 97.8 °F (36.6 °C)  Heart Rate:  [60] 60  Resp:  [18] 18  BP: (143-183)/(55-99) 183/55     Physical Exam:  Constitutional: No acute distress, awake, alert, elderly, hearing impaired  HENT: NCAT, mucous membranes moist, extensive bruising noted.  Respiratory: Clear to auscultation bilaterally, respiratory effort normal   Cardiovascular: RRR, no murmurs, rubs, or gallops  Gastrointestinal: Positive bowel sounds, soft, nontender, nondistended  Musculoskeletal: No bilateral ankle edema  Psychiatric: Appropriate affect, cooperative  Neurologic: Answers questions appropriately, strength symmetric in all extremities, Cranial Nerves grossly intact to confrontation, speech clear  Skin: No rashes     Results Reviewed:  LAB RESULTS:      Lab 23  0724 23  0747 23  0334 23  0400 23  0359 23  2021 23  1353 23  1025   WBC 11.11* 11.90* 10.63 6.19  --   --   --  8.27   HEMOGLOBIN 8.3* 9.4* 9.2* 10.0*  --  11.0* 10.0* 9.1*   HEMATOCRIT 24.5* 28.6* 28.6* 30.4*  --  33.3* 30.0* 28.8*   PLATELETS 117* 154 189 173  --   --   --  146   NEUTROS ABS 9.79*  --  9.08* 5.40  --   --   --  6.79   IMMATURE GRANS (ABS) 0.21*  --  0.17* 0.14*  --   --   --  0.12*   LYMPHS ABS 0.40*  --  0.56* 0.44*  --   --   --  0.67*   MONOS ABS 0.69  --  0.80 0.20  --   --   --  0.65   EOS ABS 0.01  --  0.00 0.00  --   --   --  0.02   MCV 94.2 95.0 96.6 94.1  --   --   --  97.3*   CRP  --   --   --   --   --   --   --  0.65*   PROCALCITONIN  --    --   --   --   --   --   --  0.21   LDH  --   --   --   --   --   --   --  356*   PROTIME  --   --   --   --  19.9*  --   --   --    D DIMER QUANT  --   --   --   --   --   --  1.45*  --          Lab 09/26/23  0724 09/25/23  0747 09/24/23  0334 09/23/23  0400 09/22/23  1025   SODIUM 133* 134* 134* 136 135*   POTASSIUM 4.3 4.1 4.4 4.5 3.9   CHLORIDE 100 101 100 101 101   CO2 23.0 24.0 23.0 22.0 24.0   ANION GAP 10.0 9.0 11.0 13.0 10.0   BUN 39* 35* 38* 37* 37*   CREATININE 1.43* 1.45* 1.60* 1.46* 1.43*   EGFR 37.4* 36.8* 32.7* 36.5* 37.4*   GLUCOSE 89 89 93 135* 130*   CALCIUM 8.5* 8.6 8.6 8.7 8.9   TSH  --   --   --   --  17.120*         Lab 09/23/23  0400 09/22/23  1025   TOTAL PROTEIN 5.2* 5.4*   ALBUMIN 2.9* 3.1*   GLOBULIN 2.3 2.3   ALT (SGPT) 53* 50*   AST (SGOT) 40* 40*   BILIRUBIN 1.2 0.9   ALK PHOS 89 95   LIPASE  --  18         Lab 09/23/23  0359   PROTIME 19.9*   INR 1.68*             Lab 09/25/23  0747 09/22/23  1353 09/22/23  1025   IRON  --   --  46   IRON SATURATION (TSAT)  --   --  22   TIBC  --   --  209*   TRANSFERRIN  --   --  140*   FERRITIN  --   --  819.80*   FOLATE 5.30  --   --    VITAMIN B 12 >2,000*  --   --    ABO TYPING  --  O  --    RH TYPING  --  Negative  --    ANTIBODY SCREEN  --  Negative  --          Brief Urine Lab Results  (Last result in the past 365 days)        Color   Clarity   Blood   Leuk Est   Nitrite   Protein   CREAT   Urine HCG        09/22/23 1042 Yellow   Cloudy   Negative   Negative   Negative   >=300 mg/dL (3+)                   Microbiology Results Abnormal       None            No radiology results from the last 24 hrs    Results for orders placed during the hospital encounter of 08/09/23    Adult Transthoracic Echo Limited W/ Cont if Necessary Per Protocol    Interpretation Summary    Left ventricular systolic function is low normal. Estimated left ventricular EF = 50%    Left ventricular wall thickness is consistent with mild concentric hypertrophy.    There is a  20 mm MARK 3 TAVR valve present.    Aortic valve mean pressure gradient is 11 mmHg.    Calculated aortic valve area 1.12 cm².    Trivial prosthesis insufficiency, paravalvular versus central.      Current medications:  Scheduled Meds:amiodarone, 200 mg, Oral, Q24H  atorvastatin, 10 mg, Oral, Nightly  cholecalciferol, 1,000 Units, Oral, Daily  collagenase, 1 application , Topical, Q24H  dilTIAZem CD, 300 mg, Oral, Q24H  hydrALAZINE, 50 mg, Oral, Daily  metoprolol succinate XL, 150 mg, Oral, Q24H  mirtazapine, 7.5 mg, Oral, Nightly  pantoprazole, 40 mg, Oral, QAM AC  QUEtiapine, 12.5 mg, Oral, Nightly  sodium chloride, 10 mL, Intravenous, Q12H  vitamin B-12, 1,000 mcg, Oral, Daily      Continuous Infusions:   PRN Meds:.  acetaminophen    docusate sodium    hydrALAZINE    labetalol    lidocaine PF 1%    ondansetron    [COMPLETED] Insert Peripheral IV **AND** sodium chloride    sodium chloride    sodium chloride    ziprasidone    Assessment & Plan   Assessment & Plan     Active Hospital Problems    Diagnosis  POA    **Anemia associated with acute blood loss [D62]  Yes    Anorexia [R63.0]  Yes    Heme positive stool [R19.5]  Unknown    Chronic anticoagulation [Z79.01]  Not Applicable    Pulmonary hypertension [I27.20]  Yes    Chronic kidney disease, stage 3b [N18.32]  Yes    Presence of cardiac pacemaker secondary to sick sinus syndrome [Z95.0]  Yes    Chronic combined systolic and diastolic congestive heart failure [I50.42]  Yes    Hypertension [I10]  Yes    Aortic stenosis, severe s/p TAVR (7/20/2022) [I35.0]  Yes    Hyperlipidemia LDL goal <70 [E78.5]  Yes    Longstanding persistent atrial fibrillation [I48.11]  Yes    Bilateral carotid artery stenosis [I65.23]  Yes    Status post CVA [Z86.73]  Not Applicable      Resolved Hospital Problems   No resolved problems to display.        Brief Hospital Course to date:  Miryam Mckeon is a 77 y.o. female with past medical history of A-fib on Eliquis, aortic valve  stenosis status post TAVR, CKD stage III, deafness, hypertension, hyperlipidemia and history of CVA as well as COVID in August and Mixed HFrEF who presents with recurrent falls, the dwindles and Occult positive stool.     Acute blood loss Anemia - guaiac positive  -continue PPI, GI following, s/p EGD without any obvious bleeding source  -continue holding Eliquis for now given fall risk, bleeding and forehead hematoma  -monitor hemoglobin, transfuse PRN Hgb <7, overall remains stable  -CT abd/pelvis w/out any evidence for RP bleed. 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  Ongoing elevated inflammatory markers  - supportive care  - trial short course of decadron without significant improvement  - CT head without evidence for ICH  - Neurology consulted and appreciate input. Cancelled MRI as family desires comfort measures.     Severe Malnutrition  Persistent Anorexia/FTT  - nutrition consulted, patient with continued poor intake  - continue remeron for now  - family desires comfort, no artificial nutrition, no PEG or Keofeed     Status post TAVR 2022  SSS/A.fib s/p PPM  Hypertension  Pulmonary hypertension  -Rate controlled, continue amiodarone  -Continue metoprolol  -Continue Lipitor      HTN:  - BP remains labile     Elevated TSH  - FT4 WNL, probably subclinical hypothyroidism vs. Sick euthyroid, recheck labs in 4 weeks and consider initiation of treatment.     History of stroke on chronic Eliquis  -stable     CKD  - baseline appears to be around 1.2-1.3, currently 1.4  - avoid nephrotoxins, holding ARB  - stop checking labs as family would like to focus on comfort.        GOC:  - My partner discussed with daughter at bedside, she states she had a chance to speak with her brother and they are in  agreeance that they wish to have no further testing done. No additional medications for dementia. They state patient's wishes were to be made comfortable and they would like to honor these wishes. They would like to make her comfortable. Hospice has been consulted. Patient with continued overall decline, altered mentation/hallucinations, anorexia/poor intake. Code status changed to comfort measures.    Expected Discharge Location and Transportation:   Expected Discharge   Expected Discharge Date: 9/28/2023; Expected Discharge Time:      DVT prophylaxis:  No DVT prophylaxis order currently exists.     AM-PAC 6 Clicks Score (PT): 14 (09/26/23 7374)    CODE STATUS:   Code Status and Medical Interventions:   Ordered at: 09/26/23 1120     Level Of Support Discussed With:    Health Care Surrogate     Code Status (Patient has no pulse and is not breathing):    No CPR (Do Not Attempt to Resuscitate)     Medical Interventions (Patient has pulse or is breathing):    Comfort Measures       Blossom Sewell II, DO  09/27/23

## 2023-09-28 PROCEDURE — G0378 HOSPITAL OBSERVATION PER HR: HCPCS

## 2023-09-28 PROCEDURE — 63710000001 CHOLECALCIFEROL 25 MCG (1000 UT) TABLET: Performed by: INTERNAL MEDICINE

## 2023-09-28 PROCEDURE — A9270 NON-COVERED ITEM OR SERVICE: HCPCS

## 2023-09-28 PROCEDURE — A9270 NON-COVERED ITEM OR SERVICE: HCPCS | Performed by: INTERNAL MEDICINE

## 2023-09-28 PROCEDURE — 63710000001 ATORVASTATIN 10 MG TABLET: Performed by: INTERNAL MEDICINE

## 2023-09-28 PROCEDURE — 97530 THERAPEUTIC ACTIVITIES: CPT | Performed by: OCCUPATIONAL THERAPIST

## 2023-09-28 PROCEDURE — 63710000001 MIRTAZAPINE 15 MG TABLET: Performed by: INTERNAL MEDICINE

## 2023-09-28 PROCEDURE — 63710000001 MINERAL OIL OIL

## 2023-09-28 PROCEDURE — 63710000001 BIOTENE DRY MOUTH LIQUID

## 2023-09-28 PROCEDURE — 63710000001 PANTOPRAZOLE 40 MG TABLET DELAYED-RELEASE: Performed by: INTERNAL MEDICINE

## 2023-09-28 PROCEDURE — 63710000001 AMIODARONE 200 MG TABLET: Performed by: INTERNAL MEDICINE

## 2023-09-28 PROCEDURE — 63710000001 VITAMIN B-12 1000 MCG TABLET: Performed by: INTERNAL MEDICINE

## 2023-09-28 PROCEDURE — 63710000001 QUETIAPINE 25 MG TABLET

## 2023-09-28 PROCEDURE — 97535 SELF CARE MNGMENT TRAINING: CPT | Performed by: OCCUPATIONAL THERAPIST

## 2023-09-28 RX ADMIN — PANTOPRAZOLE SODIUM 40 MG: 40 TABLET, DELAYED RELEASE ORAL at 09:35

## 2023-09-28 RX ADMIN — CYANOCOBALAMIN TAB 1000 MCG 1000 MCG: 1000 TAB at 14:13

## 2023-09-28 RX ADMIN — MIRTAZAPINE 7.5 MG: 15 TABLET, FILM COATED ORAL at 22:25

## 2023-09-28 RX ADMIN — COLLAGENASE SANTYL 1 APPLICATION: 250 OINTMENT TOPICAL at 09:39

## 2023-09-28 RX ADMIN — AMIODARONE HYDROCHLORIDE 200 MG: 200 TABLET ORAL at 09:35

## 2023-09-28 RX ADMIN — MINERAL OIL: 1000 LIQUID ORAL at 19:02

## 2023-09-28 RX ADMIN — ATORVASTATIN CALCIUM 10 MG: 10 TABLET, FILM COATED ORAL at 22:25

## 2023-09-28 RX ADMIN — Medication 1000 UNITS: at 09:35

## 2023-09-28 RX ADMIN — QUETIAPINE FUMARATE 12.5 MG: 25 TABLET ORAL at 22:25

## 2023-09-28 NOTE — PROGRESS NOTES
Continued Stay Note  [facility]     Patient Name: Miryam Mckeon  MRN: [id number]  Today's Date: 9/28/2023    Admit Date: 9/22/2023    Plan: TBD   Discharge Plan       Row Name 09/28/23 1617       Plan    Plan TBD    Plan Comments Patient sleeping. Awakens easily when touched on shoulder.  at bedside. Asked patient is she was in any pain and she says no. She quickly falls back asleep before she could be asked anymore questions. Discussed with Briseida SMILEY. Patient is not inpatient hospice appropriate at this time as she does not have unmanaged symptoms that require IV medication. She is able to take PO medications. Her care can be provided in an alternate setting. Called daughter and made her aware that patient did not meet inpatient criteria but that the outpatient hospice team would continue to follow patient. For assist, call ext [phone].      Row Name 09/28/23 6377       Plan    Plan [facility] private pay    Patient/Family in Agreement with Plan yes    Plan Comments I spoke with Yanni at [facility]. The pt can have Hospice services at the SNF. She will not be able to have rehab if she elects Hospice services at the facility.  I spoke with the pts daughter. She would like the pt to be comfort with Hospice services at the SNF. I have advised the daughter to call Yanni at [facility] to discuss care needs. I will f/u in the am. Will need ambulance transport at [place].    Final Discharge Disposition Code 04 - intermediate care facility                   Discharge Codes    No documentation.                 Expected Discharge Date and Time       Expected Discharge Date Expected Discharge Time    Sep 28, 2023               Luz Elena Valerio RN

## 2023-09-28 NOTE — CASE MANAGEMENT/SOCIAL WORK
Continued Stay Note  [facility]     Patient Name: Miryam Mckeon  MRN: [id number]  Today's Date: 9/28/2023    Admit Date: 9/22/2023    Plan: [facility] private pay   Discharge Plan       Row Name 09/28/23 1518       Plan    Plan [facility] private pay    Patient/Family in Agreement with Plan yes    Plan Comments I spoke with Yanni at [facility]. The pt can have Hospice services at the SNF. She will not be able to have rehab if she elects Hospice services at the facility.  I spoke with the pts daughter. She would like the pt to be comfort with Hospice services at the SNF. I have advised the daughter to call Yanni at [facility] to discuss care needs. I will f/u in the am. Will need ambulance transport at [place].    Final Discharge Disposition Code 04 - intermediate care facility                   Discharge Codes    No documentation.                 Expected Discharge Date and Time       Expected Discharge Date Expected Discharge Time    Sep 28, 2023               Verónica Randle RN

## 2023-09-28 NOTE — PROGRESS NOTES
"Palliative Care Daily Progress Note     C/C: Patient denies pain at time.     S: Medical record reviewed. Follow up visit for symptom management. Events noted. Patient very drowsy and awakens to physical stimuli, falls asleep. ASL interpretor used. Patient denies pain. No family at bedside.     ROS: Denies pain. ROS limited by fatigue.     O: Code Status:   Code Status and Medical Interventions:   Ordered at: 09/26/23 1120     Level Of Support Discussed With:    Health Care Surrogate     Code Status (Patient has no pulse and is not breathing):    No CPR (Do Not Attempt to Resuscitate)     Medical Interventions (Patient has pulse or is breathing):    Comfort Measures      Advanced Directives: Advance Directive Status: Patient has advance directive, copy in chart   Goals of Care: Ongoing.   Palliative Performance Scale Score: 30%     /57   Pulse 61   Temp 98.4 °F (36.9 °C) (Axillary)   Resp 16   Ht 152.4 cm (60\")   Wt 52.2 kg (115 lb 1.3 oz)   SpO2 92%   BMI 22.48 kg/m²     Intake/Output Summary (Last 24 hours) at 9/28/2023 1042  Last data filed at 9/28/2023 0352  Gross per 24 hour   Intake 480 ml   Output 895 ml   Net -415 ml       PE:  General Appearance:    Patient laying in bed, falls asleep easily, frail, A/C ill appearing,    HEENT:    NC/AT, EOMI, anicteric, bruising to face, large hematoma to left forehead, MMM, face relaxed,    Neck:   supple, trachea midline, no JVD   Lungs:     CTA bilat, diminished in bases; respirations regular, even and unlabored; RR 16-18 on exam, on RA    Heart:    RRR, normal S1 and S2, no M/R/G, HR 61 on monitor   Abdomen:     Normal bowel sounds, soft, nontender, distended   G/U:   Deferred   MSK/Extremities:   Wasting, no edema, bruising to extremities   Pulses:   Pulses palpable and equal bilaterally   Skin:   Warm, dry   Neurologic:   Drowsy, Ox1, cooperative, GALLOWAY   Psych:   Calm, appropriate     Meds: Reviewed and changes noted    Labs:   Results from last 7 days "   Lab Units 09/26/23  0724   WBC 10*3/mm3 11.11*   HEMOGLOBIN g/dL 8.3*   HEMATOCRIT % 24.5*   PLATELETS 10*3/mm3 117*     Results from last 7 days   Lab Units 09/26/23  0724   SODIUM mmol/L 133*   POTASSIUM mmol/L 4.3   CHLORIDE mmol/L 100   CO2 mmol/L 23.0   BUN mg/dL 39*   CREATININE mg/dL 1.43*   GLUCOSE mg/dL 89   CALCIUM mg/dL 8.5*     Results from last 7 days   Lab Units 09/26/23  0724 09/24/23  0334 09/23/23  0400   SODIUM mmol/L 133*   < > 136   POTASSIUM mmol/L 4.3   < > 4.5   CHLORIDE mmol/L 100   < > 101   CO2 mmol/L 23.0   < > 22.0   BUN mg/dL 39*   < > 37*   CREATININE mg/dL 1.43*   < > 1.46*   CALCIUM mg/dL 8.5*   < > 8.7   BILIRUBIN mg/dL  --   --  1.2   ALK PHOS U/L  --   --  89   ALT (SGPT) U/L  --   --  53*   AST (SGOT) U/L  --   --  40*   GLUCOSE mg/dL 89   < > 135*    < > = values in this interval not displayed.     Imaging Results (Last 72 Hours)       ** No results found for the last 72 hours. **                  Diagnostics: Reviewed    A:   Anemia associated with acute blood loss    Bilateral carotid artery stenosis    Aortic stenosis, severe s/p TAVR (7/20/2022)    Longstanding persistent atrial fibrillation    Hyperlipidemia LDL goal <70    Hypertension    Chronic combined systolic and diastolic congestive heart failure    Status post CVA    Presence of cardiac pacemaker secondary to sick sinus syndrome    Chronic kidney disease, stage 3b    Pulmonary hypertension    Chronic anticoagulation    Anorexia    Heme positive stool     77 y.o. female with anemia, malnutrition, poor appetite, CVA, positive fecal occult, A-fib.   S/S:   Pain -wound to coccyx   -continue Tylenol 650mg PO q 6 hours prn mild pain  -continue Tramadol 25mg PO q 8 hours prn moderate pain     2. Decreased PO intake -comfort diet     3. Debility -PT/OT following     4. GOC -DNR/DNI/comfort measures -per discussion with daughter  -daughter is HCS  -hospice following    P: Follow up visit for symptom management. Patient  struggling to stay awake and answer ASL interpretor. Patient denies pain, taking PO meds. Hospice following, comfort focused plan of care.   Palliative Care Team will continue to follow patient. Please do not hesitate to contact us regarding further sx mgmt or GOC needs.  Bessie Grady, APRN  9/28/2023  Time spent: 20 minutes

## 2023-09-28 NOTE — THERAPY TREATMENT NOTE
Patient Name: Miryam Mcekon  : 1945    MRN: [id number]                              Today's Date: 2023       Admit Date: 2023    Visit Dx:     ICD-10-CM ICD-9-CM   1. Frequent falls  R29.6 V15.88   2. Facial contusion, initial encounter  S00.83XA 920   3. Acute anemia  D64.9 285.9   4. Heme positive stool  R19.5 792.1   5. Chronic anticoagulation  Z79.01 V58.61   6. Elevated blood pressure reading with diagnosis of hypertension  I10 401.9   7. Acute blood loss anemia  D62 285.1   8. Dysphagia, unspecified type  R13.10 787.20     Patient Active Problem List   Diagnosis    Bilateral carotid artery stenosis    Aortic stenosis, severe s/p TAVR (2022)    Longstanding persistent atrial fibrillation    Sick sinus syndrome    Hyperlipidemia LDL goal <70    Hypertension    Chronic combined systolic and diastolic congestive heart failure    Status post CVA    Presence of cardiac pacemaker secondary to sick sinus syndrome    Chronic kidney disease, stage 3b    Parent refuses immunizations    Chronic nausea    Pulmonary hypertension    Chronic anticoagulation    Acute HFrEF (heart failure with reduced ejection fraction)    Pleural effusion    Anemia associated with acute blood loss    Anorexia    Heme positive stool     Past Medical History:   Diagnosis Date    Anxiety     Aortic valve stenosis     Atrial fibrillation     Borderline diabetes     ???    Carotid artery stenosis     CKD (chronic kidney disease)     COVID-19 2023    Deaf     GERD (gastroesophageal reflux disease)     Heart murmur     Hyperlipidemia     Hypertension     Irregular heartbeat     PONV (postoperative nausea and vomiting)     Stroke     TIA (transient ischemic attack)      Past Surgical History:   Procedure Laterality Date    AORTIC VALVE REPAIR/REPLACEMENT N/A 2022    Procedure: TRANSCATHETER AORTIC VALVE REPLACEMENT with angioplasty and stent to the right common and external iliac artery;  Surgeon: Sherman  Bola HELLER MD;  Location:  [facility];  Service: Cardiothoracic;  Laterality: N/A;  FL-15 MIN 12 SEC  DOSE- 92.41  CONTRAST- 120 ML ISOVUE    AORTIC VALVE REPAIR/REPLACEMENT N/A 07/20/2022    Procedure: Transcatheter Aortic Valve Replacement;  Surgeon: Yashira Pyle MD;  Location: [facility];  Service: Cardiovascular;  Laterality: N/A;    CARDIAC CATHETERIZATION      05/20/2022 per dr. pyle    CARDIAC CATHETERIZATION Left 05/20/2022    Procedure: Left Heart Cath;  Surgeon: Yashira Pyle MD;  Location: [facility] CATH INVASIVE LOCATION;  Service: Cardiology;  Laterality: Left;    CARDIAC ELECTROPHYSIOLOGY PROCEDURE N/A 04/13/2022    Procedure: DDD PPM Implant (BSC), DNS Eliquis;  Surgeon: Troy Hussein DO;  Location: [facility] EP INVASIVE LOCATION;  Service: Cardiology;  Laterality: N/A;    CARDIAC VALVE REPLACEMENT  07/2022    CAROTID ENDARTERECTOMY Bilateral     CATARACT EXTRACTION      CHOLECYSTECTOMY      COLONOSCOPY      ENDOSCOPY N/A 9/23/2023    Procedure: ESOPHAGOGASTRODUODENOSCOPY;  Surgeon: Brunner, Mark I, MD;  Location: [facility] ENDOSCOPY;  Service: Gastroenterology;  Laterality: N/A;    FEMORAL ARTERY CUTDOWN Right 07/20/2022    Procedure: FEMORAL ARTERY CUTDOWN, ANGIOGRAM;  Surgeon: Bola Whitaker MD;  Location: [facility];  Service: Vascular;  Laterality: Right;  fl-1 m 12 sec  dose- 47.29 mgy  contrast- 50 ml isovue    KNEE SURGERY Right     PACEMAKER IMPLANTATION      JF      05/20/2022 per dr. pyle    TRANSESOPHAGEAL ECHOCARDIOGRAM (JF) N/A 07/20/2022    Procedure: TRANSESOPHAGEAL ECHOCARDIOGRAM PER ANESTHESIA;  Surgeon: Bola Whitaker MD;  Location:  [facility];  Service: Cardiothoracic;  Laterality: N/A;      General Information       Row Name 09/28/23 1045          OT Time and Intention    Document Type therapy note (daily note)  -SD     Mode of Treatment occupational therapy  -SD       Row Name 09/28/23 1045          General  Information    Patient Profile Reviewed yes  -SD     Existing Precautions/Restrictions fall  -SD     Barriers to Rehab medically complex;hearing deficit  deaf  -SD       Row Name 09/28/23 1045          Occupational Profile    Environmental Supports and Barriers (Occupational Profile) pt.'s brother & sister present in room to assist with interpretation   -SD       Row Name 09/28/23 1045          Cognition    Orientation Status (Cognition) oriented to;person;place;situation;verbal cues/prompts needed for orientation  -SD       Row Name 09/28/23 1045          Safety Issues, Functional Mobility    Safety Issues Affecting Function (Mobility) insight into deficits/self-awareness;safety precaution awareness;safety precautions follow-through/compliance;sequencing abilities  -SD     Impairments Affecting Function (Mobility) balance;pain;endurance/activity tolerance;strength  -SD               User Key  (r) = Recorded By, (t) = Taken By, (c) = Cosigned By      Initials Name Provider Type    Rosa Srinivasan OT Occupational Therapist                     Mobility/ADL's       Row Name 09/28/23 1113          Bed Mobility    Bed Mobility rolling left;rolling right;scooting/bridging;supine-sit;sit-supine  -SD     Rolling Left Guinda (Bed Mobility) moderate assist (50% patient effort);1 person assist;nonverbal cues (demo/gesture)  -SD     Rolling Right Guinda (Bed Mobility) moderate assist (50% patient effort);1 person assist;nonverbal cues (demo/gesture)  -SD     Scooting/Bridging Guinda (Bed Mobility) moderate assist (50% patient effort);1 person assist;nonverbal cues (demo/gesture)  -SD     Supine-Sit Guinda (Bed Mobility) moderate assist (50% patient effort);nonverbal cues (demo/gesture);1 person assist  -SD     Sit-Supine Guinda (Bed Mobility) moderate assist (50% patient effort);nonverbal cues (demo/gesture);1 person assist  -SD     Bed Mobility, Safety Issues decreased use of legs for  bridging/pushing;decreased use of arms for pushing/pulling;impaired trunk control for bed mobility  -SD     Assistive Device (Bed Mobility) bed rails;draw sheet;head of bed elevated  -SD       Row Name 09/28/23 1113          Transfers    Comment, (Transfers) pt. declined transfers due to faitgue & pain; pt. offered pain med by RN, but declined  -SD       Row Name 09/28/23 1113          Activities of Daily Living    BADL Assessment/Intervention grooming  -SD       [facility] Name 09/28/23 1113          Grooming Assessment/Training    [place] Level (Grooming) hair care, combing/brushing;oral care regimen;wash face, hands;contact guard assist  -SD     Position (Grooming) edge of bed sitting   pt. initially sitting EOB with SUP, then as pt. completed grooming tasks, she began to weaken & lean to L side; therapist sat on pt.'s L side for support & pt. regained sitting EOB with CGA  -SD               User Key  (r) = Recorded By, (t) = Taken By, (c) = Cosigned By      Initials Name Provider Type    Rosa Srinivasan OT Occupational Therapist                   Obj/Interventions       Row Name 09/28/23 1116          Balance    Static Sitting Balance contact guard  -SD     Dynamic Sitting Balance minimal assist  -SD     Position, Sitting Balance unsupported;supported;sitting edge of bed  -SD     Balance Interventions sitting;dynamic reaching;occupation based/functional task;UE activity with balance activity  -SD               User Key  (r) = Recorded By, (t) = Taken By, (c) = Cosigned By      Initials Name Provider Type    Rosa Srinivasan OT Occupational Therapist                   Goals/Plan    No documentation.                  Clinical Impression       Row Name 09/28/23 1117          Pain Assessment    Pain Location - back  -SD     Pre/Posttreatment Pain Comment site of bed sore  -SD     Pain Intervention(s) Emotional support;Repositioned;Nursing Notified  -SD       [facility] Name 09/28/23 1117          Pain  Scale: FACES Pre/Post-Treatment    Pain: FACES Scale, Pretreatment 2-->hurts little bit  -SD     Posttreatment Pain Rating 2-->hurts little bit  -SD       Row Name 09/28/23 1117          Plan of Care Review    Plan of Care Reviewed With patient;sibling  -SD     Progress no change  -SD     Outcome Evaluation Pt. continues to present below functional baseline. Pt. faitgued quickly with EOB sitting. Pt. able to completed grooming skills in sitting EOB with support on L side. OT skilled services continue to be warranted to return pt. to PLOF. Recommend SNF upon d/c.  -SD       Row Name 09/28/23 1117          Therapy Assessment/Plan (OT)    Rehab Potential (OT) fair, will monitor progress closely  -SD     Criteria for Skilled Therapeutic Interventions Met (OT) yes;meets criteria;skilled treatment is necessary  -SD     Therapy Frequency (OT) daily  -SD       Row Name 09/28/23 1117          Therapy Plan Review/Discharge Plan (OT)    Anticipated Discharge Disposition (OT) skilled nursing facility  -SD       Row Name 09/28/23 1117          Vital Signs    O2 Delivery Pre Treatment room air  -SD     O2 Delivery Intra Treatment room air  -SD     O2 Delivery Post Treatment room air  -SD     Pre Patient Position Supine  -SD     Intra Patient Position Sitting  -SD     Post Patient Position Supine  -SD       Row Name 09/28/23 1117          Positioning and Restraints    Pre-Treatment Position in bed  -SD     Post Treatment Position bed  -SD     In Bed notified nsg;fowlers;call light within reach;exit alarm on;waffle boots/both;heels elevated  foam wedge placed under L side  -SD               User Key  (r) = Recorded By, (t) = Taken By, (c) = Cosigned By      Initials Name Provider Type    Rosa Srinivasan, OT Occupational Therapist                   Outcome Measures       Row Name 09/28/23 1045          How much help from another is currently needed...    Putting on and taking off regular lower body clothing? 1  -SD      Bathing (including washing, rinsing, and drying) 2  -SD     Toileting (which includes using toilet bed pan or urinal) 2  -SD     Putting on and taking off regular upper body clothing 3  -SD     Taking care of personal grooming (such as brushing teeth) 3  -SD     Eating meals 3  -SD     AM-PAC 6 Clicks Score (OT) 14  -SD       Row Name 09/28/23 0940          How much help from another person do you currently need...    Turning from your back to your side while in flat bed without using bedrails? 3  -BU     Moving from lying on back to sitting on the side of a flat bed without bedrails? 3  -BU     Moving to and from a bed to a chair (including a wheelchair)? 2  -BU     Standing up from a chair using your arms (e.g., wheelchair, bedside chair)? 2  -BU     Climbing 3-5 steps with a railing? 2  -BU     To walk in hospital room? 2  -BU     AM-PAC 6 Clicks Score (PT) 14  -BU     Highest level of mobility 4 --> Transferred to chair/commode  -BU       Row Name 09/28/23 1045          Functional Assessment    Outcome Measure Options AM-PAC 6 Clicks Daily Activity (OT)  -SD               User Key  (r) = Recorded By, (t) = Taken By, (c) = Cosigned By      Initials Name Provider Type    Rosa Srinivasan OT Occupational Therapist    Emelia Clarke RN Registered Nurse                    Occupational Therapy Education       Title: PT OT SLP Therapies (In Progress)       Topic: Occupational Therapy (In Progress)       Point: ADL training (In Progress)       Description:   Instruct learner(s) on proper safety adaptation and remediation techniques during self care or transfers.   Instruct in proper use of assistive devices.                  Learning Progress Summary             Patient Acceptance, E, NR by ALEXIS at 9/24/2023 0912                         Point: Home exercise program (Not Started)       Description:   Instruct learner(s) on appropriate technique for monitoring, assisting and/or progressing therapeutic  exercises/activities.                  Learner Progress:  Not documented in this visit.              Point: Precautions (In Progress)       Description:   Instruct learner(s) on prescribed precautions during self-care and functional transfers.                  Learning Progress Summary             Patient Acceptance, E, NR by  at 9/24/2023 0938                         Point: Body mechanics (In Progress)       Description:   Instruct learner(s) on proper positioning and spine alignment during self-care, functional mobility activities and/or exercises.                  Learning Progress Summary             Patient Acceptance, E, NR by  at 9/24/2023 0938                                         User Key       Initials Effective Dates Name Provider Type [facility] 06/16/21 -  Loretta Arreaga, CHLOÉ Occupational Therapist OT                  OT Recommendation and Plan  Therapy Frequency (OT): daily  Plan of Care Review  Plan of Care Reviewed With: patient, sibling  Progress: no change  Outcome Evaluation: Pt. continues to present below functional baseline. Pt. faitgued quickly with EOB sitting. Pt. able to completed grooming skills in sitting EOB with support on L side. OT skilled services continue to be warranted to return pt. to PLOF. Recommend SNF upon d/c.     Time Calculation:         Time Calculation- OT       Row Name 09/28/23 1121             Time Calculation- OT    OT Received On 09/28/23  -SD      OT Goal Re-Cert Due Date 10/04/23  -SD         Timed Charges    83857 - OT Therapeutic Activity Minutes 10  -SD      66323 - OT Self Care/Mgmt Minutes 15  -SD         Total Minutes    Timed Charges Total Minutes 25  -SD       Total Minutes 25  -SD                User Key  (r) = Recorded By, (t) = Taken By, (c) = Cosigned By      Initials Name Provider Type    Rosa Srinivasan, OT Occupational Therapist                  Therapy Charges for Today       Code Description Service Date Service Provider  Modifiers Qty    05777538747 HC OT THERAPEUTIC ACT EA 15 MIN 9/28/2023 Rosa Dinh, OT GO 1    97148892555 HC OT SELF CARE/MGMT/TRAIN EA 15 MIN 9/28/2023 Rosa Dinh, OT GO 1                 Rosa Dinh, OT  9/28/2023

## 2023-09-28 NOTE — PLAN OF CARE
Problem: Adult Inpatient Plan of Care  Goal: Plan of Care Review  9/27/2023 2219 by Maricruz Parekh RN  Outcome: Ongoing, Progressing  9/27/2023 2217 by Maricruz Parekh RN  Outcome: Ongoing, Progressing  Goal: Patient-Specific Goal (Individualized)  9/27/2023 2219 by Maricruz Parekh RN  Outcome: Ongoing, Progressing  9/27/2023 2217 by Maricruz Parekh RN  Outcome: Ongoing, Progressing  Goal: Absence of Hospital-Acquired Illness or Injury  9/27/2023 2219 by Maricruz Parekh RN  Outcome: Ongoing, Progressing  9/27/2023 2217 by Maricruz Parekh RN  Outcome: Ongoing, Progressing  Goal: Optimal Comfort and Wellbeing  9/27/2023 2219 by Maricruz Parekh RN  Outcome: Ongoing, Not Progressing  9/27/2023 2217 by Maricruz Parekh RN  Outcome: Ongoing, Progressing  Goal: Readiness for Transition of Care  9/27/2023 2219 by Maricruz Parekh RN  Outcome: Ongoing, Progressing  9/27/2023 2217 by Maricruz Parekh RN  Outcome: Ongoing, Progressing   Goal Outcome Evaluation:   Patient had no acute events during shift  Hypertensive and bradycardic - see flowsheets - notified OCD- see orders  Loss of IV access - unable to obtain - notified OCD - advised to not attempt   Skin is edematous with widespread grossly scattered hematomas, abrasions, and swelling from falls  further unless we have to - patient on comfort measures and able to swallow   Patient A&O x 2 (self, location)   Oral intake encouraged but refused  Patient deaf - no  at bedside using dry erase boards and other means of communication  Very pleasant patient

## 2023-09-28 NOTE — PLAN OF CARE
Goal Outcome Evaluation:  Plan of Care Reviewed With: patient        Progress: no change  Outcome Evaluation: Visited pt with aid of .  Pt. was asleep and required stimulation to stay awake.  Pt. denied pain, nausea and dyspnea on RA during palliative nursing visit.  Pt. did not demonstrate any visible signs of discomfort during visit.  No family at BS.  Pt. is not inpatient appropriate at this time.  Palliative care to continue to follow for support, POC and ongoing GOC.

## 2023-09-28 NOTE — PLAN OF CARE
Goal Outcome Evaluation:  Plan of Care Reviewed With: patient       Comfort measures continued. Meds given as ordered. Turned and repositioned. Safety precautions in place.

## 2023-09-28 NOTE — PLAN OF CARE
Problem: Adult Inpatient Plan of Care  Goal: Plan of Care Review  Recent Flowsheet Documentation  Taken 9/28/2023 1117 by Rosa Dinh, OT  Progress: no change  Plan of Care Reviewed With:   patient   sibling  Outcome Evaluation: Pt. continues to present below functional baseline. Pt. faitgued quickly with EOB sitting. Pt. able to completed grooming skills in sitting EOB with support on L side. OT skilled services continue to be warranted to return pt. to PLOF. Recommend SNF upon d/c.   Goal Outcome Evaluation:  Plan of Care Reviewed With: patient, sibling        Progress: no change  Outcome Evaluation: Pt. continues to present below functional baseline. Pt. faitgued quickly with EOB sitting. Pt. able to completed grooming skills in sitting EOB with support on L side. OT skilled services continue to be warranted to return pt. to PLOF. Recommend SNF upon d/c.      Anticipated Discharge Disposition (OT): skilled nursing facility

## 2023-09-28 NOTE — PROGRESS NOTES
[facility]  PROGRESS NOTE    Patient Name: Miryam Mckeon  : 1945  MRN: [id number]    Date of Admission: 2023  Primary Care Physician: Rigoberto Chamberlain MD    Subjective   Subjective     CC: f/u weakness    HPI: Patient sleeping comfortably upon arrival. Exam conducted with ASL interpretor. She says she is fairly comfortably, denies pain.      Objective   Objective     Vital Signs:   Temp:  [97.6 °F (36.4 °C)-99.4 °F (37.4 °C)] 98.4 °F (36.9 °C)  Heart Rate:  [59-94] 61  Resp:  [16-20] 16  BP: (114-199)/() 127/57  Flow (L/min):  [0] 0     Physical Exam:  Constitutional: No acute distress, awake, alert, hearing impaired  HENT: NCAT, mucous membranes moist, extensive facial/neck bruising noted.  Respiratory: Clear to auscultation bilaterally, respiratory effort normal   Cardiovascular: RRR, no murmurs, rubs, or gallops  Gastrointestinal: Positive bowel sounds, soft, nontender, nondistended  Musculoskeletal: No bilateral ankle edema  Psychiatric: Appropriate affect, cooperative  Neurologic: Oriented x 3, strength symmetric in all extremities, Cranial Nerves grossly intact to confrontation, speech clear  Skin: No rashes     Results Reviewed:  LAB RESULTS:      Lab 23  0724 23  0747 23  0334 23  0400 23  0359 23  2021 23  1353 23  1025   WBC 11.11* 11.90* 10.63 6.19  --   --   --  8.27   HEMOGLOBIN 8.3* 9.4* 9.2* 10.0*  --  11.0* 10.0* 9.1*   HEMATOCRIT 24.5* 28.6* 28.6* 30.4*  --  33.3* 30.0* 28.8*   PLATELETS 117* 154 189 173  --   --   --  146   NEUTROS ABS 9.79*  --  9.08* 5.40  --   --   --  6.79   IMMATURE GRANS (ABS) 0.21*  --  0.17* 0.14*  --   --   --  0.12*   LYMPHS ABS 0.40*  --  0.56* 0.44*  --   --   --  0.67*   MONOS ABS 0.69  --  0.80 0.20  --   --   --  0.65   EOS ABS 0.01  --  0.00 0.00  --   --   --  0.02   MCV 94.2 95.0 96.6 94.1  --   --   --  97.3*   CRP  --   --   --   --   --   --    --  0.65*   PROCALCITONIN  --   --   --   --   --   --   --  0.21   LDH  --   --   --   --   --   --   --  356*   PROTIME  --   --   --   --  19.9*  --   --   --    D DIMER QUANT  --   --   --   --   --   --  1.45*  --          Lab 09/26/23  0724 09/25/23  0747 09/24/23  0334 09/23/23  0400 09/22/23  1025   SODIUM 133* 134* 134* 136 135*   POTASSIUM 4.3 4.1 4.4 4.5 3.9   CHLORIDE 100 101 100 101 101   CO2 23.0 24.0 23.0 22.0 24.0   ANION GAP 10.0 9.0 11.0 13.0 10.0   BUN 39* 35* 38* 37* 37*   CREATININE 1.43* 1.45* 1.60* 1.46* 1.43*   EGFR 37.4* 36.8* 32.7* 36.5* 37.4*   GLUCOSE 89 89 93 135* 130*   CALCIUM 8.5* 8.6 8.6 8.7 8.9   TSH  --   --   --   --  17.120*         Lab 09/23/23  0400 09/22/23  1025   TOTAL PROTEIN 5.2* 5.4*   ALBUMIN 2.9* 3.1*   GLOBULIN 2.3 2.3   ALT (SGPT) 53* 50*   AST (SGOT) 40* 40*   BILIRUBIN 1.2 0.9   ALK PHOS 89 95   LIPASE  --  18         Lab 09/23/23  0359   PROTIME 19.9*   INR 1.68*             Lab 09/25/23  0747 09/22/23  1353 09/22/23  1025   IRON  --   --  46   IRON SATURATION (TSAT)  --   --  22   TIBC  --   --  209*   TRANSFERRIN  --   --  140*   FERRITIN  --   --  819.80*   FOLATE 5.30  --   --    VITAMIN B 12 >2,000*  --   --    ABO TYPING  --  O  --    RH TYPING  --  Negative  --    ANTIBODY SCREEN  --  Negative  --          Brief Urine Lab Results  (Last result in the past 365 days)        Color   Clarity   Blood   Leuk Est   Nitrite   Protein   CREAT   Urine HCG        09/22/23 1042 Yellow   Cloudy   Negative   Negative   Negative   >=300 mg/dL (3+)                   Microbiology Results Abnormal       None            No radiology results from the last 24 hrs    Results for orders placed during the hospital encounter of 08/09/23    Adult Transthoracic Echo Limited W/ Cont if Necessary Per Protocol    Interpretation Summary    Left ventricular systolic function is low normal. Estimated left ventricular EF = 50%    Left ventricular wall thickness is consistent with mild  concentric hypertrophy.    There is a 20 mm MARK 3 TAVR valve present.    Aortic valve mean pressure gradient is 11 mmHg.    Calculated aortic valve area 1.12 cm².    Trivial prosthesis insufficiency, paravalvular versus central.      Current medications:  Scheduled Meds:amiodarone, 200 mg, Oral, Q24H  atorvastatin, 10 mg, Oral, Nightly  cholecalciferol, 1,000 Units, Oral, Daily  collagenase, 1 application , Topical, Q24H  dilTIAZem CD, 300 mg, Oral, Q24H  hydrALAZINE, 50 mg, Oral, Daily  metoprolol succinate XL, 150 mg, Oral, Q24H  mirtazapine, 7.5 mg, Oral, Nightly  pantoprazole, 40 mg, Oral, QAM AC  QUEtiapine, 12.5 mg, Oral, Nightly  sodium chloride, 10 mL, Intravenous, Q12H  vitamin B-12, 1,000 mcg, Oral, Daily      Continuous Infusions:   PRN Meds:.  acetaminophen    docusate sodium    hydrALAZINE    lidocaine PF 1%    ondansetron    [COMPLETED] Insert Peripheral IV **AND** sodium chloride    sodium chloride    sodium chloride    traMADol    ziprasidone    Assessment & Plan   Assessment & Plan     Active Hospital Problems    Diagnosis  POA    **Anemia associated with acute blood loss [D62]  Yes    Anorexia [R63.0]  Yes    Heme positive stool [R19.5]  Unknown    Chronic anticoagulation [Z79.01]  Not Applicable    Pulmonary hypertension [I27.20]  Yes    Chronic kidney disease, stage 3b [N18.32]  Yes    Presence of cardiac pacemaker secondary to sick sinus syndrome [Z95.0]  Yes    Chronic combined systolic and diastolic congestive heart failure [I50.42]  Yes    Hypertension [I10]  Yes    Aortic stenosis, severe s/p TAVR (7/20/2022) [I35.0]  Yes    Hyperlipidemia LDL goal <70 [E78.5]  Yes    Longstanding persistent atrial fibrillation [I48.11]  Yes    Bilateral carotid artery stenosis [I65.23]  Yes    Status post CVA [Z86.73]  Not Applicable      Resolved Hospital Problems   No resolved problems to display.        Brief Hospital Course to date:  Miryam Mckeon is a 77 y.o. female with past medical history of  A-fib on Eliquis, aortic valve stenosis status post TAVR, CKD stage III, deafness, hypertension, hyperlipidemia and history of CVA as well as COVID in August and Mixed HFrEF who presents with recurrent falls, the dwindles and Occult positive stool.     Acute blood loss Anemia - guaiac positive  -continue PPI, GI following, s/p EGD without any obvious bleeding source  -continue holding Eliquis for now given fall risk, bleeding and forehead hematoma  -monitor hemoglobin, transfuse PRN Hgb <7, overall remains stable  -CT abd/pelvis w/out any evidence for RP bleed. 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  Ongoing elevated inflammatory markers  - supportive care  - trial short course of decadron without significant improvement  - CT head without evidence for ICH  - Neurology consulted and appreciate input. Cancelled MRI as family desires comfort measures.     Severe Malnutrition  Persistent Anorexia/FTT  - nutrition consulted, patient with continued poor intake  - continue remeron for now  - family desires comfort, no artificial nutrition, no PEG or Keofeed     Status post TAVR 2022  SSS/A.fib s/p PPM  Hypertension  Pulmonary hypertension  -Rate controlled, continue amiodarone  -Continue metoprolol  -Continue Lipitor      HTN:  - BP remains labile     Elevated TSH  - FT4 WNL, probably subclinical hypothyroidism vs. Sick euthyroid, recheck labs in 4 weeks and consider initiation of treatment.     History of stroke on chronic Eliquis  -stable     CKD  - baseline appears to be around 1.2-1.3, currently 1.4  - avoid nephrotoxins, holding ARB  - stop checking labs as family would like to focus on comfort.     GOC:  - My partner discussed with daughter at bedside, she states she had a chance to speak with her  brother and they are in agreeance that they wish to have no further testing done. No additional medications for dementia. They state patient's wishes were to be made comfortable and they would like to honor these wishes. They would like to make her comfortable. Hospice has been consulted. She does not meet requirement for inpt hospice. Is medically ready to return to [facility] with hospice whenever transportation can be arranged.       Expected Discharge Location and Transportation:   Expected Discharge   Expected Discharge Date: 9/28/2023; Expected Discharge Time:      DVT prophylaxis:  No DVT prophylaxis order currently exists.     AM-PAC 6 Clicks Score (PT): 14 (09/27/23 0800)    CODE STATUS:   Code Status and Medical Interventions:   Ordered at: 09/26/23 1120     Level Of Support Discussed With:    Health Care Surrogate     Code Status (Patient has no pulse and is not breathing):    No CPR (Do Not Attempt to Resuscitate)     Medical Interventions (Patient has pulse or is breathing):    Comfort Measures       Blossom Sewell II, DO  09/28/23

## 2023-09-29 VITALS
HEART RATE: 60 BPM | WEIGHT: 115.08 LBS | BODY MASS INDEX: 22.59 KG/M2 | SYSTOLIC BLOOD PRESSURE: 173 MMHG | DIASTOLIC BLOOD PRESSURE: 57 MMHG | RESPIRATION RATE: 14 BRPM | TEMPERATURE: 99.1 F | OXYGEN SATURATION: 90 % | HEIGHT: 60 IN

## 2023-09-29 PROBLEM — D62 ANEMIA ASSOCIATED WITH ACUTE BLOOD LOSS: Status: RESOLVED | Noted: 2023-09-22 | Resolved: 2023-09-29

## 2023-09-29 PROCEDURE — A9270 NON-COVERED ITEM OR SERVICE: HCPCS | Performed by: INTERNAL MEDICINE

## 2023-09-29 PROCEDURE — 63710000001 VITAMIN B-12 1000 MCG TABLET: Performed by: INTERNAL MEDICINE

## 2023-09-29 PROCEDURE — G0378 HOSPITAL OBSERVATION PER HR: HCPCS

## 2023-09-29 PROCEDURE — 63710000001 HYDRALAZINE 50 MG TABLET: Performed by: INTERNAL MEDICINE

## 2023-09-29 PROCEDURE — 63710000001 DILTIAZEM CD 180 MG CAPSULE SUSTAINED-RELEASE 24 HR: Performed by: INTERNAL MEDICINE

## 2023-09-29 PROCEDURE — 63710000001 TRAMADOL 50 MG TABLET

## 2023-09-29 PROCEDURE — 63710000001 CHOLECALCIFEROL 25 MCG (1000 UT) TABLET: Performed by: INTERNAL MEDICINE

## 2023-09-29 PROCEDURE — 63710000001 DILTIAZEM CD 120 MG CAPSULE SUSTAINED-RELEASE 24 HR: Performed by: INTERNAL MEDICINE

## 2023-09-29 PROCEDURE — 63710000001 PANTOPRAZOLE 40 MG TABLET DELAYED-RELEASE: Performed by: INTERNAL MEDICINE

## 2023-09-29 PROCEDURE — 63710000001 METOPROLOL SUCCINATE XL 50 MG TABLET SUSTAINED-RELEASE 24 HOUR: Performed by: INTERNAL MEDICINE

## 2023-09-29 PROCEDURE — 63710000001 AMIODARONE 200 MG TABLET: Performed by: INTERNAL MEDICINE

## 2023-09-29 PROCEDURE — A9270 NON-COVERED ITEM OR SERVICE: HCPCS

## 2023-09-29 RX ORDER — PANTOPRAZOLE SODIUM 40 MG/1
40 TABLET, DELAYED RELEASE ORAL 2 TIMES DAILY
Start: 2023-09-29

## 2023-09-29 RX ORDER — MIRTAZAPINE 15 MG/1
7.5 TABLET, FILM COATED ORAL NIGHTLY
Start: 2023-09-29

## 2023-09-29 RX ORDER — QUETIAPINE FUMARATE 25 MG/1
12.5 TABLET, FILM COATED ORAL NIGHTLY
Start: 2023-09-29

## 2023-09-29 RX ORDER — TRAMADOL HYDROCHLORIDE 50 MG/1
25 TABLET ORAL EVERY 8 HOURS PRN
Qty: 5 TABLET | Refills: 0 | Status: SHIPPED | OUTPATIENT
Start: 2023-09-29 | End: 2023-10-02

## 2023-09-29 RX ADMIN — TRAMADOL HYDROCHLORIDE 25 MG: 50 TABLET ORAL at 03:47

## 2023-09-29 RX ADMIN — CYANOCOBALAMIN TAB 1000 MCG 1000 MCG: 1000 TAB at 08:32

## 2023-09-29 RX ADMIN — Medication 1000 UNITS: at 08:31

## 2023-09-29 RX ADMIN — TRAMADOL HYDROCHLORIDE 25 MG: 50 TABLET ORAL at 14:40

## 2023-09-29 RX ADMIN — PANTOPRAZOLE SODIUM 40 MG: 40 TABLET, DELAYED RELEASE ORAL at 07:59

## 2023-09-29 RX ADMIN — AMIODARONE HYDROCHLORIDE 200 MG: 200 TABLET ORAL at 08:32

## 2023-09-29 RX ADMIN — METOPROLOL SUCCINATE 150 MG: 50 TABLET, EXTENDED RELEASE ORAL at 08:32

## 2023-09-29 RX ADMIN — DILTIAZEM HYDROCHLORIDE 300 MG: 180 CAPSULE, COATED, EXTENDED RELEASE ORAL at 08:31

## 2023-09-29 RX ADMIN — COLLAGENASE SANTYL 1 APPLICATION: 250 OINTMENT TOPICAL at 08:32

## 2023-09-29 RX ADMIN — HYDRALAZINE HYDROCHLORIDE 50 MG: 50 TABLET, FILM COATED ORAL at 08:32

## 2023-09-29 NOTE — CONSULTS
Clinical Nutrition   Nutrition Support Assessment  Reason for Visit: Follow-up protocol      Patient Name: Miryam Mckeon  YOB: 1945  MRN: [id number]  Date of Encounter: 09/28/23 20:19 EDT  Admission date: 9/22/2023    Comments: Family struggle to entice pt to eat. Pt allows no appetite..  Disliked Boost, trying Magic Cup.    Nutrition Assessment   Admission Diagnosis:  Anemia associated with acute blood loss [D62]      Problem List:    Anemia associated with acute blood loss    Bilateral carotid artery stenosis    Aortic stenosis, severe s/p TAVR (7/20/2022)    Longstanding persistent atrial fibrillation    Hyperlipidemia LDL goal <70    Hypertension    Chronic combined systolic and diastolic congestive heart failure    Status post CVA    Presence of cardiac pacemaker secondary to sick sinus syndrome    Chronic kidney disease, stage 3b    Pulmonary hypertension    Chronic anticoagulation    Anorexia    Heme positive stool    Hallucinations      PMH:   She  has a past medical history of Anxiety, Aortic valve stenosis, Atrial fibrillation, Borderline diabetes, Carotid artery stenosis, CKD (chronic kidney disease), COVID-19 (08/11/2023), Deaf, GERD (gastroesophageal reflux disease), Heart murmur, Hyperlipidemia, Hypertension, Irregular heartbeat, PONV (postoperative nausea and vomiting), Stroke, and TIA (transient ischemic attack).  Falls    PSH:  She  has a past surgical history that includes Cholecystectomy; Knee surgery (Right); Carotid Endarterectomy (Bilateral); Cataract extraction; Cardiac electrophysiology procedure (N/A, 04/13/2022); Colonoscopy; Cardiac catheterization; CARLITOS; Pacemaker Implantation; Cardiac catheterization (Left, 05/20/2022); Aortic valve replacement (N/A, 07/20/2022); transesophageal echocardiogram (carlitos) (N/A, 07/20/2022); Aortic valve replacement (N/A, 07/20/2022); Femoral Artery Cutdown (Right, 07/20/2022); Cardiac valve replacement (07/2022); and  "Esophagogastroduodenoscopy (N/A, 9/23/2023).    Applicable Nutrition Concerns:   Skin: unstagaeble PI coccyx   Oral:  GI:    Applicable Interval History:   9/23 SLP rec reg texture thin liquid  9/23 EGD: mild atrophic gastriits, Note N/V resolved.       Reported/Observed/Food/Nutrition Related History:     9/28  Family struggle to entice pt to eat. Pt allows no appetite. Not taking Boost.  Might try Magic Cup Family completing menu choices.    9/25  Family rpt protracted period of poor intake. Note pt alert at time of visit; able to follow commands..       Anthropometrics     Flowsheet Rows      Flowsheet Row First Filed Value   Admission Height 152.4 cm (60\") Documented at 09/22/2023 0929   Admission Weight 52.2 kg (115 lb) Documented at 09/22/2023 0929       Height: Height: 152.4 cm (60\")  Last Filed Weight: Weight: 52.2 kg (115 lb 1.3 oz) (09/25/23 1224)  Method:    BMI: BMI (Calculated): 22.5  BMI classification: Normal: 18.5-24.9kg/m2    UBW:   Per EMR wt of 116 lbs on 8/16   Weight change:  no apparent recent signif loss     Nutrition Focused Physical Exam     Date: 9/25        Patient meets criteria for severe chronic malnutrition diagnosis, see MSA note.    Current Nutrition Prescription   PO: Diet: Cardiac Diets; Healthy Heart (2-3 Na+); Texture: Soft to Chew (NDD 3); Soft to Chew: Chopped Meat; Fluid Consistency: Thin (IDDSI 0)  Oral Nutrition Supplement: Magic Cup 2x/Da added per RD  Intake: 2 Days: 25% x 5 meals recorded      Nutrition Diagnosis   Date: 9/25             Updated:    Problem Malnutrition severe chronic   Etiology Period depressed intake including r/t Covid   Signs/Symptoms Intake hx and wasting   Status: ongoing      Goal:   General: Nutrition to support treatment  PO: Increase intake as feasible  EN/PN: N/A    Nutrition Intervention      Follow treatment progress, Care plan reviewed, Advise alternate selection, Menu provided, Adjusted supplement        Monitoring/Evaluation:   Per " protocol, I&O, PO intake, Supplement intake, Pertinent labs, Weight, Symptoms      Marlin Martino RD  Time Spent: 25 min

## 2023-09-29 NOTE — PROGRESS NOTES
Continued Stay Note  [facility]     Patient Name: Miryam Mckeon  MRN: [id number]  Today's Date: 9/29/2023    Admit Date: 9/22/2023    Plan: [facility] with Hospice   Discharge Plan       Row Name 09/29/23 1353       Plan    Plan [facility] with Hospice    Plan Comments Patient will discharge today to [facility]. Patient will be private pay for room and board there. Spoke with daughter who is agreeable with the plan and would like for hospice to follow when the patient arrives there. JAZ mattress to be delivered to the patient's room at [facility] before discharge.  Ambulance arranged for today at 1615.  Spoke with Dr. Baldwin who approved with admission to outpatient hospice. Hospice DX: End stage heart disease, related DX: post covid. Notified team and [facility]. Will place EMS/DNR and PCS in the chart.    Final Discharge Disposition Code 03 - skilled nursing facility (SNF)    Final Note Going to [facility] with hospice.      Row Name 09/29/23 1152       Plan    Plan [facility] with Hospice    Plan Comments I spoke with the Hospice CM. She is arranging ambulance transport back to [facility]. I have placed the SNF pharmacy [facility] in [employer] for any controlled meds.    Final Discharge Disposition Code 04 - intermediate care facility                   Discharge Codes    No documentation.                 Expected Discharge Date and Time       Expected Discharge Date Expected Discharge Time    Sep 29, 2023               Lara Hargrove

## 2023-09-29 NOTE — PLAN OF CARE
Goal Outcome Evaluation:                      Have not seen patient awake or family present during multiple attempts for palliative care referral to provide spiritual care. Chaplains available to patient and family upon request.

## 2023-09-29 NOTE — DISCHARGE SUMMARY
[facility] Medicine Services  DISCHARGE SUMMARY    Patient Name: Miryam Mckeon  : 1945  MRN: [id number]    Date of Admission: 2023  9:34 AM  Date of Discharge:  2023  Primary Care Physician: Rigoberto Chamberlain MD    Consults       Date and Time Order Name Status Description    2023 11:14 AM Inpatient Palliative Care MD Consult Completed     2023  7:31 AM Inpatient Neurology Consult General Completed     2023 12:33 AM Inpatient Gastroenterology Consult Completed             Hospital Course     Presenting Problem: FTT    Active Hospital Problems    Diagnosis  POA    Anorexia [R63.0]  Yes    Heme positive stool [R19.5]  Yes    Chronic anticoagulation [Z79.01]  Not Applicable    Pulmonary hypertension [I27.20]  Yes    Chronic kidney disease, stage 3b [N18.32]  Yes    Presence of cardiac pacemaker secondary to sick sinus syndrome [Z95.0]  Yes    Chronic combined systolic and diastolic congestive heart failure [I50.42]  Yes    Hypertension [I10]  Yes    Aortic stenosis, severe s/p TAVR (2022) [I35.0]  Yes    Hyperlipidemia LDL goal <70 [E78.5]  Yes    Longstanding persistent atrial fibrillation [I48.11]  Yes    Bilateral carotid artery stenosis [I65.23]  Yes    Status post CVA [Z86.73]  Not Applicable      Resolved Hospital Problems    Diagnosis Date Resolved POA    Anemia associated with acute blood loss [D62] 2023 Yes          Hospital Course:  Miryam Mckeon is a 77 y.o. female with past medical history of A-fib on Eliquis, aortic valve stenosis status post TAVR, CKD stage III, deafness, hypertension, hyperlipidemia and history of CVA as well as COVID in August and Mixed HFrEF who presents with recurrent falls, the dwindles and Occult positive stool.     Acute blood loss Anemia - guaiac positive  -Upon arrvial patient placed on PPI and GI was consulted. She underwent EGD without any obvious bleeding source CT abd/pelvis w/out any evidence  for RP bleed. Based on GOC discussion c-scope was deferred. 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  Ongoing elevated inflammatory markers  - CT head without evidence for ICH  - Neurology consulted and appreciate input. Cancelled MRI as family desires comfort measures.     Severe Malnutrition  Persistent Anorexia/FTT  - nutrition consulted, patient with continued poor intake  - continue remeron for now  - family desires comfort, no artificial nutrition, no PEG or Keofeed    GOC  - My partner discussed with daughter at bedside, she states she had a chance to speak with her brother and they are in agreeance that they wish to have no further testing done. No additional medications for dementia. They state patient's wishes were to be made comfortable and they would like to honor these wishes. They would like to make her comfortable. Hospice has been consulted. She does not meet requirement for inpt hospice. Is medically ready to return to [facility]TC with hospice whenever transportation can be arranged.    Day of Discharge     HPI:  Lying in bed. Falls asleep during interview.    Review of Systems  UTO due to AMS.    Vital Signs:   Temp:  [98.5 °F (36.9 °C)-99.8 °F (37.7 °C)] 99.1 °F (37.3 °C)  Heart Rate:  [60] 60  Resp:  [14-18] 14  BP: (113-180)/(50-68) 173/57      Physical Exam:  Constitutional: No acute distress, elderly female, comfortable.  HENT: NCAT, mucous membranes moist, hearing impaired.  Respiratory: Clear to auscultation bilaterally, respiratory effort normal   Cardiovascular: RRR, no murmurs, rubs, or gallops  Gastrointestinal: Positive bowel sounds, soft, nontender, nondistended  Musculoskeletal: No bilateral ankle edema  Psychiatric: Appropriate affect, cooperative  Neurologic: Oriented x 3, strength symmetric in all extremities, Cranial  Nerves grossly intact to confrontation, speech clear  Skin: No rashes     Pertinent  and/or Most Recent Results     LAB RESULTS:      Lab 09/26/23  0724 09/25/23  0747 09/24/23  0334 09/23/23  0400 09/23/23 0359 09/22/23 2021   WBC 11.11* 11.90* 10.63 6.19  --   --    HEMOGLOBIN 8.3* 9.4* 9.2* 10.0*  --  11.0*   HEMATOCRIT 24.5* 28.6* 28.6* 30.4*  --  33.3*   PLATELETS 117* 154 189 173  --   --    NEUTROS ABS 9.79*  --  9.08* 5.40  --   --    IMMATURE GRANS (ABS) 0.21*  --  0.17* 0.14*  --   --    LYMPHS ABS 0.40*  --  0.56* 0.44*  --   --    MONOS ABS 0.69  --  0.80 0.20  --   --    EOS ABS 0.01  --  0.00 0.00  --   --    MCV 94.2 95.0 96.6 94.1  --   --    PROTIME  --   --   --   --  19.9*  --          Lab 09/26/23  0724 09/25/23  0747 09/24/23  0334 09/23/23 0400   SODIUM 133* 134* 134* 136   POTASSIUM 4.3 4.1 4.4 4.5   CHLORIDE 100 101 100 101   CO2 23.0 24.0 23.0 22.0   ANION GAP 10.0 9.0 11.0 13.0   BUN 39* 35* 38* 37*   CREATININE 1.43* 1.45* 1.60* 1.46*   EGFR 37.4* 36.8* 32.7* 36.5*   GLUCOSE 89 89 93 135*   CALCIUM 8.5* 8.6 8.6 8.7         Lab 09/23/23  0400   TOTAL PROTEIN 5.2*   ALBUMIN 2.9*   GLOBULIN 2.3   ALT (SGPT) 53*   AST (SGOT) 40*   BILIRUBIN 1.2   ALK PHOS 89         Lab 09/23/23 0359   PROTIME 19.9*   INR 1.68*             Lab 09/25/23  0747   FOLATE 5.30   VITAMIN B 12 >2,000*         Brief Urine Lab Results  (Last result in the past 365 days)        Color   Clarity   Blood   Leuk Est   Nitrite   Protein   CREAT   Urine HCG        09/22/23 1042 Yellow   Cloudy   Negative   Negative   Negative   >=300 mg/dL (3+)                 Microbiology Results (last 10 days)       ** No results found for the last 240 hours. **            CT Abdomen Pelvis Without Contrast    Result Date: 9/23/2023  CT ABDOMEN PELVIS WO CONTRAST Date of Exam: 9/23/2023 5:35 PM EDT Indication: Unexplained N/V, Acute blood loss--r/o retroperitoneal bleed.. Comparison: Correlation with CTA chest abdomen pelvis 6/3/2022  Technique: Axial CT images were obtained of the abdomen and pelvis without the administration of contrast. Positive oral contrast was administered. Reconstructed coronal and sagittal images were also obtained. Automated exposure control and iterative construction methods were used. Findings: There are small bilateral pleural effusions. There is multichamber cardiac enlargement of the partially imaged heart. There is hypodense blood pool compatible with anemia. There is heavy atherosclerosis of the abdominal aorta without evidence of aneurysm. A right common iliac stent is noted. There is prominent diffuse subcutaneous fat stranding throughout the entirety of the body wall compatible with anasarca. Unremarkable appearance of the liver. The gallbladder is surgically absent. Normal appearance of the bile ducts. Unremarkable appearance of the spleen, pancreas, and adrenal glands. Mild atrophy of the left kidney with otherwise unremarkable noncontrast appearance of the kidneys. No hydronephrosis or nephrolithiasis. Normal appearance of the ureters and bladder. Unremarkable appearance of the uterus and adnexa. Redundant sigmoid colon. Moderate colonic stool burden. No bowel obstruction or definite inflammatory change of the GI tract. There is a small amount of simple appearing nonorganized pelvic free fluid. There is mesenteric lymphovascular congestion. No pneumoperitoneum. No large or conspicuous retroperitoneal or body wall hematoma. The bones are demineralized. There is chronic mild superior endplate height loss of T12, unchanged from prior CT. No acute osseous findings.     Impression: Limited assessment for active bleeding in the absence of IV contrast. No conspicuous retroperitoneal hematoma. Hypodense blood pool compatible with anemia. Fluid overload state with small bilateral pleural effusions, small pelvic free fluid, mesenteric lymphovascular congestion, and anasarca. Electronically Signed: Boris Gilliland MD   9/23/2023 6:03 PM EDT  Workstation ID: [id number]    CT Head Without Contrast    Result Date: 9/24/2023  CT HEAD WO CONTRAST Date of Exam: 9/24/2023 4:06 PM EDT Indication: Head trauma, abnormal mental status (Age 18-64y), change in mental status, recent fall w/head trauma on Eliquis. Comparison: Head CT dated 9/22/2023 Technique: Axial CT images were obtained of the head without contrast administration.  Automated exposure control and iterative construction methods were used. Findings: The ventricles and sulci are proportionally prominent compatible with mild age-related global cerebral volume loss. There is no mass effect or midline shift. There is no acute intracranial hemorrhage. There is no abnormal extra-axial fluid collection. The gray-white matter differentiation is preserved. There is periventricular and subcortical white matter hypodensity likely representing sequelae of chronic small vessel ischemic disease. This appears stable from prior examination. There is a left frontal scalp hematoma which appears similar to the prior examination. There is no evidence of underlying calvarial fracture. The mastoid air cells appear well aerated. There is a small amount of frothy secretion within the left sphenoid sinus. There is atherosclerotic calcification of the intracranial vasculature. There is evidence of prior right-sided cataract extraction.     Impression: 1. No acute intracranial abnormality. Specifically, no evidence of acute hemorrhage, mass effect or midline shift. 2. Left frontal scalp contusion and hematoma without underlying calvarial fracture. 3. Mild age-related volume loss with confluent periventricular white matter hypodensity likely representing sequelae of moderate chronic small vessel ischemic disease. No significant interval change compared to the prior CT from 9/22/2023. Electronically Signed: Jaskaran Warren  9/24/2023 7:06 PM EDT  Workstation ID: [id number]    CT Head Without Contrast    Result  Date: 9/22/2023  CT HEAD WO CONTRAST Date of Exam: 9/22/2023 10:59 AM EDT Indication: fall with head injury on eliquis. Comparison: 9/13/2023 Technique: Axial CT images were obtained of the head without contrast administration.  Automated exposure control and iterative construction methods were used. Findings: There is mild parenchymal volume loss again noted. Chronic lacunar type infarct in the left thalamus appears unchanged. There is mild periventricular and scattered deep and subcortical white matter hypodensity again noted, most commonly secondary to chronic small vessel ischemic change. Ventricles are appropriate in size and configuration. There is normal gray-white differentiation. There is no evidence of mass effect, midline shift, acute hemorrhage or edema. No abnormal fluid collections are identified. Atherosclerotic vascular calcification is noted. There is layering frothy material in the left sphenoid sinus. Correlate clinically for acute sinusitis. This appears similar to prior study. Paranasal sinuses are clear. There is partial opacification of the right mastoid air cells. No acute calvarial defects are seen. There is a large superficial scalp hematoma over the left frontal region measuring approximately 1.8 cm in thickness and extending from the orbital region through the high  convexity region, measuring approximately 5.6 cm in maximum axial dimension.     Impression: 1. No acute intracranial abnormality identified. 2. Mild parenchymal volume loss and probable chronic small vessel ischemic change, similar to prior. 3. Interval development of large periorbital/frontal  scalp hematoma. Electronically Signed: Hemal Hendrix MD  9/22/2023 11:16 AM EDT  Workstation ID: [id number]     Results for orders placed in visit on 02/18/22    Duplex Carotid Ultrasound CAR    Interpretation Summary  · There is 60 to 80% stenosis LICA  · Less than 40% stenosis in the JOHN  · Moderate plaques in both primary carotid  arteries  · Normal antegrade vertebral flow bilateral      Results for orders placed in visit on 02/18/22    Duplex Carotid Ultrasound CAR    Interpretation Summary  · There is 60 to 80% stenosis LICA  · Less than 40% stenosis in the JOHN  · Moderate plaques in both primary carotid arteries  · Normal antegrade vertebral flow bilateral      Results for orders placed during the hospital encounter of 08/09/23    Adult Transthoracic Echo Limited W/ Cont if Necessary Per Protocol    Interpretation Summary    Left ventricular systolic function is low normal. Estimated left ventricular EF = 50%    Left ventricular wall thickness is consistent with mild concentric hypertrophy.    There is a 20 mm MARK 3 TAVR valve present.    Aortic valve mean pressure gradient is 11 mmHg.    Calculated aortic valve area 1.12 cm².    Trivial prosthesis insufficiency, paravalvular versus central.      Plan for Follow-up of Pending Labs/Results:     Discharge Details        Discharge Medications        New Medications        Instructions Start Date   collagenase 250 UNIT/GM ointment   1 application , Topical, Every 24 Hours Scheduled   Start Date: September 30, 2023     PALLIATIVE CARE ORAL RINSE (MICHAEL)   5 mL, Mouth/Throat, 4 Times Daily      pantoprazole 40 MG EC tablet  Commonly known as: PROTONIX   40 mg, Oral, 2 Times Daily      QUEtiapine 25 MG tablet  Commonly known as: SEROquel   12.5 mg, Oral, Nightly      traMADol 50 MG tablet  Commonly known as: ULTRAM   25 mg, Oral, Every 8 Hours PRN             Continue These Medications        Instructions Start Date   acetaminophen 650 MG 8 hr tablet  Commonly known as: TYLENOL   650 mg, Oral, Every 8 Hours PRN      aluminum-magnesium hydroxide 200-200 MG/5ML suspension   30 mL, Oral, As Needed      amiodarone 200 MG tablet  Commonly known as: PACERONE   200 mg, Oral, Every 12 Hours Scheduled      atorvastatin 10 MG tablet  Commonly known as: LIPITOR   10 mg, Oral, Nightly      dilTIAZem CD  300 MG 24 hr capsule  Commonly known as: Cardizem CD   300 mg, Oral, Daily      docusate sodium 100 MG capsule  Commonly known as: COLACE   100 mg, Oral, As Needed      guaiFENesin 600 MG 12 hr tablet  Commonly known as: MUCINEX   600 mg, Oral, Daily      hydrALAZINE 50 MG tablet  Commonly known as: APRESOLINE   50 mg, Oral, Daily      latanoprost 0.005 % ophthalmic solution  Commonly known as: XALATAN   INSTILL 1 DROP IN BOTH EYES EVERY NIGHT AT BEDTIME      metoprolol succinate XL 50 MG 24 hr tablet  Commonly known as: TOPROL-XL   150 mg, Oral, Every 24 Hours Scheduled      MIRALAX PO   1 Scoop, Oral, As Needed      mirtazapine 15 MG tablet  Commonly known as: REMERON   7.5 mg, Oral, Nightly      ondansetron 4 MG tablet  Commonly known as: ZOFRAN   4 mg, Oral, Every 8 Hours PRN      promethazine 6.25 MG/5ML syrup  Commonly known as: PHENERGAN   Oral, 4 Times Daily PRN      simethicone 125 MG chewable tablet  Commonly known as: MYLICON   125 mg, Oral, Every 6 Hours PRN, PRN              Stop These Medications      apixaban 2.5 MG tablet tablet  Commonly known as: ELIQUIS     aspirin 81 MG EC tablet     bifidobacterium lactis (INFANT'S MYLICON) probiotic drops liquid     cholecalciferol 25 MCG (1000 UT) tablet  Commonly known as: VITAMIN D3     meclizine 12.5 MG tablet  Commonly known as: ANTIVERT     PARoxetine 10 MG tablet  Commonly known as: PAXIL     valsartan 160 MG tablet  Commonly known as: DIOVAN     vitamin B-12 1000 MCG tablet  Commonly known as: CYANOCOBALAMIN              No Known Allergies      Discharge Disposition:  Skilled Nursing Facility (DC - External)    Diet:  Hospital:  Diet Order   Procedures    Diet: Cardiac Diets; Healthy Heart (2-3 Na+); Texture: Soft to Chew (NDD 3); Soft to Chew: Chopped Meat; Fluid Consistency: Thin (IDDSI 0)            Activity:      Restrictions or Other Recommendations:         CODE STATUS:    Code Status and Medical Interventions:   Ordered at: 09/26/23 1120      Level Of Support Discussed With:    Health Care Surrogate     Code Status (Patient has no pulse and is not breathing):    No CPR (Do Not Attempt to Resuscitate)     Medical Interventions (Patient has pulse or is breathing):    Comfort Measures       Future Appointments   Date Time Provider Department Center   11/27/2023  2:15 PM Troy Hussein DO [facility]   12/15/2023  2:30 PM Ely Mcknight APRN [facility] [place] II, DO  09/29/23      Time Spent on Discharge:  I spent   34 minutes on this discharge activity which included: face-to-face encounter with the patient, reviewing the data in the system, coordination of the care with the nursing staff as well as consultants, documentation, and entering orders.

## 2023-09-29 NOTE — CASE MANAGEMENT/SOCIAL WORK
Continued Stay Note  [facility]     Patient Name: Miryam Mckeon  MRN: [id number]  Today's Date: 9/29/2023    Admit Date: 9/22/2023    Plan: [facility] with Hospice   Discharge Plan       Row Name 09/29/23 1152       Plan    Plan [facility] with Hospice    Plan Comments I spoke with the Hospice CM. She is arranging ambulance transport back to [facility]. I have placed the SNF pharmacy [facility] for any controlled meds.    Final Discharge Disposition Code 04 - intermediate care facility                   Discharge Codes    No documentation.                 Expected Discharge Date and Time       Expected Discharge Date Expected Discharge Time    Sep 28, 2023               Verónica Randle RN

## 2024-11-07 NOTE — THERAPY EVALUATION
Patient Name: Miryam Mckeon  : 1945    MRN: [id number]                              Today's Date: 2023       Admit Date: 2023    Visit Dx:     ICD-10-CM ICD-9-CM   1. Frequent falls  R29.6 V15.88   2. Facial contusion, initial encounter  S00.83XA 920   3. Acute anemia  D64.9 285.9   4. Heme positive stool  R19.5 792.1   5. Chronic anticoagulation  Z79.01 V58.61   6. Elevated blood pressure reading with diagnosis of hypertension  I10 401.9   7. Acute blood loss anemia  D62 285.1   8. Dysphagia, unspecified type  R13.10 787.20     Patient Active Problem List   Diagnosis    Bilateral carotid artery stenosis    Aortic stenosis, severe s/p TAVR (2022)    Longstanding persistent atrial fibrillation    Sick sinus syndrome    Hyperlipidemia LDL goal <70    Hypertension    Chronic combined systolic and diastolic congestive heart failure    Status post CVA    Presence of cardiac pacemaker secondary to sick sinus syndrome    Chronic kidney disease, stage 3b    Parent refuses immunizations    Chronic nausea    Pulmonary hypertension    Chronic anticoagulation    Acute HFrEF (heart failure with reduced ejection fraction)    Pleural effusion    Anemia associated with acute blood loss    Anorexia    Heme positive stool     Past Medical History:   Diagnosis Date    Anxiety     Aortic valve stenosis     Atrial fibrillation     Borderline diabetes     ???    Carotid artery stenosis     CKD (chronic kidney disease)     COVID-19 2023    Deaf     GERD (gastroesophageal reflux disease)     Heart murmur     Hyperlipidemia     Hypertension     Irregular heartbeat     PONV (postoperative nausea and vomiting)     Stroke     TIA (transient ischemic attack)      Past Surgical History:   Procedure Laterality Date    AORTIC VALVE REPAIR/REPLACEMENT N/A 2022    Procedure: TRANSCATHETER AORTIC VALVE REPLACEMENT with angioplasty and stent to the right common and external iliac artery;  Surgeon: Sherman  Physical Therapy    Patient not seen in therapy.     On hold due to medical condition    Pending podiatry consult and weightbearing orders    Per podiatry, to hold off therapy at this time and keep patient non-weightbearing                                Therapy procedure time and total treatment time can be found documented on the Time Entry flowsheet   Bola HELLER MD;  Location: [facility];  Service: Cardiothoracic;  Laterality: N/A;  FL-15 MIN 12 SEC  DOSE- 92.41  CONTRAST- 120 ML ISOVUE    AORTIC VALVE REPAIR/REPLACEMENT N/A 07/20/2022    Procedure: Transcatheter Aortic Valve Replacement;  Surgeon: Yashira Pyle MD;  Location: [facility];  Service: Cardiovascular;  Laterality: N/A;    CARDIAC CATHETERIZATION      05/20/2022 per dr. pyle    CARDIAC CATHETERIZATION Left 05/20/2022    Procedure: Left Heart Cath;  Surgeon: Yashira Pyle MD;  Location: [facility] CATH INVASIVE LOCATION;  Service: Cardiology;  Laterality: Left;    CARDIAC ELECTROPHYSIOLOGY PROCEDURE N/A 04/13/2022    Procedure: DDD PPM Implant (BSC), DNS Eliquis;  Surgeon: Troy Hussein DO;  Location: [facility] EP INVASIVE LOCATION;  Service: Cardiology;  Laterality: N/A;    CARDIAC VALVE REPLACEMENT  07/2022    CAROTID ENDARTERECTOMY Bilateral     CATARACT EXTRACTION      CHOLECYSTECTOMY      COLONOSCOPY      FEMORAL ARTERY CUTDOWN Right 07/20/2022    Procedure: FEMORAL ARTERY CUTDOWN, ANGIOGRAM;  Surgeon: Bola Whitaker MD;  Location: [facility];  Service: Vascular;  Laterality: Right;  fl-1 m 12 sec  dose- 47.29 mgy  contrast- 50 ml isovue    KNEE SURGERY Right     PACEMAKER IMPLANTATION      JF      05/20/2022 per dr. pyle    TRANSESOPHAGEAL ECHOCARDIOGRAM (JF) N/A 07/20/2022    Procedure: TRANSESOPHAGEAL ECHOCARDIOGRAM PER ANESTHESIA;  Surgeon: Bola Whitaker MD;  Location: [facility];  Service: Cardiothoracic;  Laterality: N/A;      General Information       Row Name 09/24/23 1041          OT Time and Intention    Document Type evaluation  -     Mode of Treatment occupational therapy  -       Row Name 09/24/23 1041          General Information    Patient Profile Reviewed yes  -     Prior Level of Function independent:;all household mobility;transfer;ADL's  RW at baseline  -     Existing Precautions/Restrictions  fall;oxygen therapy device and L/min  -     Barriers to Rehab medically complex;hearing deficit  -       Row Name 09/24/23 1041          Occupational Profile    Environmental Supports and Barriers (Occupational Profile) Deaf,  present to translate.   -       Row Name 09/24/23 1041          Living Environment    People in Home alone  -       Row Name 09/24/23 1041          Home Main Entrance    Number of Stairs, Main Entrance two  -     Stair Railings, Main Entrance railings safe and in good condition  -       Row Name 09/24/23 1041          Stairs Within Home, Primary    Number of Stairs, Within Home, Primary none  -       Row Name 09/24/23 1041          Cognition    Orientation Status (Cognition) oriented x 3  -       Row Name 09/24/23 1041          Safety Issues, Functional Mobility    Safety Issues Affecting Function (Mobility) insight into deficits/self-awareness;problem-solving;positioning of assistive device;safety precaution awareness;safety precautions follow-through/compliance;sequencing abilities  -     Impairments Affecting Function (Mobility) balance;endurance/activity tolerance;strength  -               User Key  (r) = Recorded By, (t) = Taken By, (c) = Cosigned By      Initials Name Provider Type     Loretta Arreaga, OT Occupational Therapist                     Mobility/ADL's       Row Name 09/24/23 1043          Bed Mobility    Bed Mobility scooting/bridging;supine-sit  -     Scooting/Bridging [place] (Bed Mobility) moderate assist (50% patient effort);nonverbal cues (demo/gesture);verbal cues  -     Supine-Sit [place] (Bed Mobility) moderate assist (50% patient effort);nonverbal cues (demo/gesture);verbal cues  -     Assistive Device (Bed Mobility) bed rails;head of bed elevated;draw sheet  -     Comment, (Bed Mobility) Tactile cues to sequence  -       Row Name 09/24/23 1043          Transfers    Transfers sit-stand transfer;toilet  transfer;bed-chair transfer  -       Row Name 09/24/23 1043          Bed-Chair Transfer    Bed-Chair [place] (Transfers) minimum assist (75% patient effort);verbal cues  -     Assistive Device (Bed-Chair Transfers) walker, front-wheeled  -LC       Row Name 09/24/23 1043          Sit-Stand Transfer    Sit-Stand [place] (Transfers) minimum assist (75% patient effort);verbal cues  -     Assistive Device (Sit-Stand Transfers) walker, front-wheeled  -       Row Name 09/24/23 1043          Toilet Transfer    Type (Toilet Transfer) sit-stand;stand-sit  -     [place] Level (Toilet Transfer) minimum assist (75% patient effort);verbal cues  -     Assistive Device (Toilet Transfer) commode;grab bars/safety frame;walker, front-wheeled  -       Row Name 09/24/23 1043          Functional Mobility    Functional Mobility- Ind. Level minimum assist (75% patient effort);verbal cues required  -     Functional Mobility- Device walker, front-wheeled  -     Functional Mobility-Distance (Feet) 30  -     Functional Mobility- Safety Issues balance decreased during turns  -     Functional Mobility- Comment Assist to manage RW needed.  -       Row Name 09/24/23 1043          Activities of Daily Living    BADL Assessment/Intervention toileting;lower body dressing  -       Row Name 09/24/23 1043          Toileting Assessment/Training    [place] Level (Toileting) minimum assist (75% patient effort);adjust/manage clothing;perform perineal hygiene  -     Assistive Devices (Toileting) commode;grab bar/safety frame  -     Position (Toileting) unsupported sitting  -       Row Name 09/24/23 1043          Lower Body Dressing Assessment/Training    [place] Level (Lower Body Dressing) don;socks;dependent (less than 25% patient effort)  -     Position (Lower Body Dressing) edge of bed sitting  -               User Key  (r) = Recorded By, (t) = Taken By, (c) = Cosigned By      Initials Name  Provider Type     Loretta rAreaga OT Occupational Therapist                   Obj/Interventions       Row Name 09/24/23 1051          Sensory Assessment (Somatosensory)    Sensory Assessment (Somatosensory) unable/difficult to assess  -       Row Name 09/24/23 1051          Vision Assessment/Intervention    Visual Impairment/Limitations WFL  -       Row Name 09/24/23 1051          Range of Motion Comprehensive    General Range of Motion bilateral upper extremity ROM WFL  -       Row Name 09/24/23 1051          Strength Comprehensive (MMT)    General Manual Muscle Testing (MMT) Assessment upper extremity strength deficits identified  -       Row Name 09/24/23 1051          Upper Extremity (Manual Muscle Testing)    Upper Extremity: Manual Muscle Testing (MMT) left UE strength is WFL;right UE strength is WFL  -       Row Name 09/24/23 1051          Motor Skills    Motor Skills functional endurance  -     Functional Endurance decreased activity tolerance  -       Row Name 09/24/23 1051          Balance    Balance Assessment sitting static balance;sitting dynamic balance;standing static balance;standing dynamic balance  -     Static Sitting Balance contact guard  -     Dynamic Sitting Balance contact guard  -     Position, Sitting Balance unsupported;sitting edge of bed  -     Static Standing Balance verbal cues;minimal assist;non-verbal cues (demo/gesture)  -     Dynamic Standing Balance minimal assist;verbal cues;non-verbal cues (demo/gesture)  -     Position/Device Used, Standing Balance supported;walker, front-wheeled  -     Balance Interventions sitting;standing;sit to stand;supported;occupation based/functional task;weight shifting activity  -     Comment, Balance MIn A to steady  -               User Key  (r) = Recorded By, (t) = Taken By, (c) = Cosigned By      Initials Name Provider Type     Loretta Arreaga OT Occupational Therapist                   Goals/Plan       Row Name  09/24/23 1101          Transfer Goal 1 (OT)    Activity/Assistive Device (Transfer Goal 1, OT) sit-to-stand/stand-to-sit;bed-to-chair/chair-to-bed;walker, rolling  -     [place] Level/Cues Needed (Transfer Goal 1, OT) contact guard required;verbal cues required;tactile cues required  -     Time Frame (Transfer Goal 1, OT) long term goal (LTG);by discharge  -     Progress/Outcome (Transfer Goal 1, OT) goal ongoing  -       Row Name 09/24/23 1101          Dressing Goal 1 (OT)    Activity/Device (Dressing Goal 1, OT) lower body dressing  -     [place]/Cues Needed (Dressing Goal 1, OT) moderate assist (50-74% patient effort)  -     Time Frame (Dressing Goal 1, OT) long term goal (LTG);by discharge  -     Progress/Outcome (Dressing Goal 1, OT) goal ongoing  -       Row Name 09/24/23 1101          Toileting Goal 1 (OT)    Activity/Device (Toileting Goal 1, OT) adjust/manage clothing;perform perineal hygiene;commode;grab bar/safety frame  -     [place] Level/Cues Needed (Toileting Goal 1, OT) contact guard required  -     Time Frame (Toileting Goal 1, OT) long term goal (LTG);by discharge  -     Progress/Outcome (Toileting Goal 1, OT) goal ongoing  -               User Key  (r) = Recorded By, (t) = Taken By, (c) = Cosigned By      Initials Name Provider Type     Loretta Arreaga, OT Occupational Therapist                   Clinical Impression       Row Name 09/24/23 1050          Pain Assessment    Pretreatment Pain Rating 0/10 - no pain  -LC     Posttreatment Pain Rating 0/10 - no pain  -LC     Additional Documentation Pain Scale: Word Pre/Post-Treatment (Group)  -       Row Name 09/24/23 1056          Plan of Care Review    Plan of Care Reviewed With patient  -     Progress no change  -     Outcome Evaluation Pt. presents below baseline with ADLs and functional mobility. Limited by decreased activity tolerance, generalized weakness, and balance. Skilled OT services warranted  to promote return to PLOF. Recommend IPR at discharge.  -       Row Name 09/24/23 1055          Therapy Assessment/Plan (OT)    Therapy Frequency (OT) daily  -       Row Name 09/24/23 1055          Therapy Plan Review/Discharge Plan (OT)    Anticipated Discharge Disposition (OT) inpatient rehabilitation facility  -       Row Name 09/24/23 1055          Positioning and Restraints    Pre-Treatment Position in bed  -     Post Treatment Position chair  -     In Chair notified nsg;reclined;call light within reach;encouraged to call for assist;with nsg;with other staff;legs elevated;waffle cushion  -               User Key  (r) = Recorded By, (t) = Taken By, (c) = Cosigned By      Initials Name Provider Type    Loretta De Leon OT Occupational Therapist                   Outcome Measures       Row Name 09/24/23 1128          How much help from another is currently needed...    Putting on and taking off regular lower body clothing? 1  -LC     Bathing (including washing, rinsing, and drying) 2  -LC     Toileting (which includes using toilet bed pan or urinal) 3  -LC     Putting on and taking off regular upper body clothing 3  -LC     Taking care of personal grooming (such as brushing teeth) 3  -LC     Eating meals 3  -LC     AM-PAC 6 Clicks Score (OT) 15  -       Row Name 09/24/23 1128          Functional Assessment    Outcome Measure Options AM-PAC 6 Clicks Daily Activity (OT)  -               User Key  (r) = Recorded By, (t) = Taken By, (c) = Cosigned By      Initials Name Provider Type    Loretta De Leon OT Occupational Therapist                    Occupational Therapy Education       Title: PT OT SLP Therapies (In Progress)       Topic: Occupational Therapy (In Progress)       Point: ADL training (In Progress)       Description:   Instruct learner(s) on proper safety adaptation and remediation techniques during self care or transfers.   Instruct in proper use of assistive devices.                   Learning Progress Summary             Patient Acceptance, E, NR by  at 9/24/2023 0938                         Point: Home exercise program (Not Started)       Description:   Instruct learner(s) on appropriate technique for monitoring, assisting and/or progressing therapeutic exercises/activities.                  Learner Progress:  Not documented in this visit.              Point: Precautions (In Progress)       Description:   Instruct learner(s) on prescribed precautions during self-care and functional transfers.                  Learning Progress Summary             Patient Acceptance, E, NR by  at 9/24/2023 0938                         Point: Body mechanics (In Progress)       Description:   Instruct learner(s) on proper positioning and spine alignment during self-care, functional mobility activities and/or exercises.                  Learning Progress Summary             Patient Acceptance, E, NR by  at 9/24/2023 0938                                         User Key       Initials Effective Dates Name Provider Type Discipline     06/16/21 -  Loretta Arreaga, OT Occupational Therapist OT                  OT Recommendation and Plan  Therapy Frequency (OT): daily  Plan of Care Review  Plan of Care Reviewed With: patient  Progress: no change  Outcome Evaluation: Pt. presents below baseline with ADLs and functional mobility. Limited by decreased activity tolerance, generalized weakness, and balance. Skilled OT services warranted to promote return to PLOF. Recommend IPR at discharge.     Time Calculation:   Evaluation Complexity (OT)  Review Occupational Profile/Medical/Therapy History Complexity: brief/low complexity  Assessment, Occupational Performance/Identification of Deficit Complexity: 1-3 performance deficits  Clinical Decision Making Complexity (OT): problem focused assessment/low complexity  Overall Complexity of Evaluation (OT): low complexity     Time Calculation- OT       Row Name 09/24/23 3517              Time Calculation- OT    OT Start Time 0938  -      OT Received On 09/24/23  -      OT Goal Re-Cert Due Date 10/04/23  -         Timed Charges    88919 - OT Self Care/Mgmt Minutes 12  -         Untimed Charges    OT Eval/Re-eval Minutes 47  -         Total Minutes    Timed Charges Total Minutes 12  -LC      Untimed Charges Total Minutes 47  -LC       Total Minutes 59  -LC                User Key  (r) = Recorded By, (t) = Taken By, (c) = Cosigned By      Initials Name Provider Type     Loretta Arreaga OT Occupational Therapist                  Therapy Charges for Today       Code Description Service Date Service Provider Modifiers Qty    [id number]  OT EVAL LOW COMPLEXITY 4 9/24/2023 Loretta Arreaga OT GO 1    [id number]  OT SELF CARE/MGMT/TRAIN EA 15 MIN 9/24/2023 Loretta Arreaga OT GO 1                 Loretta Arreaga OT  9/24/2023

## (undated) DEVICE — BOWL UTIL STRL 32OZ

## (undated) DEVICE — GW THRUWAY .018 190CM

## (undated) DEVICE — SUT SILK 0 SH 30IN K834H

## (undated) DEVICE — PINNACLE INTRODUCER SHEATH: Brand: PINNACLE

## (undated) DEVICE — CANN NASL CO2 DIVIDED A/

## (undated) DEVICE — ANGIOGRAPHIC CATHETER: Brand: EXPO™

## (undated) DEVICE — INTRO TEAR AWAY/LVD W/SD PRT 6F 13CM

## (undated) DEVICE — DECANTER BAG 9": Brand: MEDLINE INDUSTRIES, INC.

## (undated) DEVICE — ANTIBACTERIAL UNDYED BRAIDED (POLYGLACTIN 910), SYNTHETIC ABSORBABLE SUTURE: Brand: COATED VICRYL

## (undated) DEVICE — COVER,MAYO STAND,XL,STERILE: Brand: MEDLINE

## (undated) DEVICE — ELECTRD NDL EZ CLN STD 2.75IN

## (undated) DEVICE — GW PERIPH VASC ADX J/TP SS .035 150CM 3MM

## (undated) DEVICE — PATIENT RETURN ELECTRODE, SINGLE-USE, CONTACT QUALITY MONITORING, ADULT, WITH 9FT CORD, FOR PATIENTS WEIGING OVER 33LBS. (15KG): Brand: MEGADYNE

## (undated) DEVICE — PK ATS CUST W CARDIOTOMY RESEVOIR

## (undated) DEVICE — FIRST STEP BEDSIDE ADD WATER KIT - RESEALABLE STAND-UP POUCH, ENDOSCOPIC CLEANING PAD - 1 POUCH: Brand: FIRST STEP BEDSIDE ADD WATER KIT - RESEALABLE STAND-UP POUCH, ENDOSCOPIC CLEANIN

## (undated) DEVICE — CABL PACE ATRIAL PT BLU

## (undated) DEVICE — SHEET, DRAPE, SPLIT, STERILE: Brand: MEDLINE

## (undated) DEVICE — CATH DIAG EXPO M/ PK 6FR FL4/FR4 PIG 3PK

## (undated) DEVICE — Device

## (undated) DEVICE — GOWN,NON-REINFORCED,SIRUS,SET IN SLV,XL: Brand: MEDLINE

## (undated) DEVICE — PK MINOR SPLT 10

## (undated) DEVICE — ST INF PRI SMRTSTE 20DRP 2VLV 24ML 117

## (undated) DEVICE — GW CERBRL FC PTFE STD/STR .035 260CM

## (undated) DEVICE — SUT PROLN 7/0 BV1 D/A 24IN 8702H

## (undated) DEVICE — NDL PERC 1PRT THNWALL W/BASEPLT 18G 7CM

## (undated) DEVICE — ELECTRD DEFIB M/FUNC PROPADZ STRL 2PK

## (undated) DEVICE — GLV SURG BIOGEL LTX PF 7 1/2

## (undated) DEVICE — 3M™ STERI-DRAPE™ FLUOROSCOPE DRAPE, 10 PER CARTON / 4 CARTONS PER CASE, 1012: Brand: STERI-DRAPE™

## (undated) DEVICE — BLANKT WARM UNDER/BDY A/ 100X195CM DISP LF

## (undated) DEVICE — SENSR CERBRL O2 PK/2

## (undated) DEVICE — GW AMPLTZ SUPERSTIFF STR .035IN 180CM

## (undated) DEVICE — TUBING, SUCTION, 1/4" X 10', STRAIGHT: Brand: MEDLINE

## (undated) DEVICE — RADIFOCUS GLIDEWIRE: Brand: GLIDEWIRE

## (undated) DEVICE — MEDI-VAC YANKAUER SUCTION HANDLE W/BULBOUS TIP: Brand: CARDINAL HEALTH

## (undated) DEVICE — HNDL BLAD BARD/PARKER POLY REUS

## (undated) DEVICE — ADHS SKIN PREMIERPRO EXOFIN TOPICAL HI/VISC .5ML

## (undated) DEVICE — LIMB HOLDER, WRIST/ANKLE: Brand: DEROYAL

## (undated) DEVICE — HDRST POSTIN FM CRDL TRACH SLOT 9X8X4IN

## (undated) DEVICE — SYR LUERLOK 50ML

## (undated) DEVICE — COVER,TABLE,HVY DUTY,60"X90",STRL: Brand: MEDLINE

## (undated) DEVICE — 3M™ STERI-DRAPE™ INSTRUMENT POUCH 1018: Brand: STERI-DRAPE™

## (undated) DEVICE — TBG PENCL TELESCP MEGADYNE SMOKE EVAC 10FT

## (undated) DEVICE — DRAPE,TOP,102X53,STERILE: Brand: MEDLINE

## (undated) DEVICE — ST EXT IV SMARTSITE 2VLV SP M LL 5ML IV1

## (undated) DEVICE — INTRO ACCSR BLNT TP

## (undated) DEVICE — SYR LUERLOK 30CC

## (undated) DEVICE — SUCTION CANISTER 2500CC: Brand: DEROYAL

## (undated) DEVICE — APPL CHLORAPREP TINTED 26ML TEAL

## (undated) DEVICE — BOOT SUT XRAY DETECT STD YEL/BLU CA/50

## (undated) DEVICE — DRSNG TELFA PAD NONADH STR 1S 3X8IN

## (undated) DEVICE — HYBRID CO2 TUBING/CAP SET FOR OLYMPUS® SCOPES & CO2 SOURCE: Brand: ERBE

## (undated) DEVICE — ELECTRD BLD EZ CLN STD 2.5IN

## (undated) DEVICE — INTENDED FOR TISSUE SEPARATION, AND OTHER PROCEDURES THAT REQUIRE A SHARP SURGICAL BLADE TO PUNCTURE OR CUT.: Brand: BARD-PARKER ® STAINLESS STEEL BLADES

## (undated) DEVICE — SET PRIMARY GRVTY 10DP MALE LL 104IN

## (undated) DEVICE — RADIFOCUS GLIDEWIRE ADVANTAGE GUIDEWIRE: Brand: GLIDEWIRE ADVANTAGE

## (undated) DEVICE — 3M™ IOBAN™ 2 ANTIMICROBIAL INCISE DRAPE 6651EZ: Brand: IOBAN™ 2

## (undated) DEVICE — SOL IRR H2O BTL 1000ML STRL

## (undated) DEVICE — GW SAFARI2 PRESH XSM CRV .035IN 3.2X2.9X275CM

## (undated) DEVICE — SOL NACL 0.9PCT 1000ML

## (undated) DEVICE — SI AVANTI+ 7F STD W/GW  NO OBT: Brand: AVANTI

## (undated) DEVICE — GLV SURG BIOGEL LTX PF 8

## (undated) DEVICE — CATH DIAG EXPO .056 AL1 6F 100CM

## (undated) DEVICE — STERILE PVP: Brand: MEDLINE INDUSTRIES, INC.

## (undated) DEVICE — THE BITE BLOCK MAXI, LATEX FREE STRAP IS USED TO PROTECT THE ENDOSCOPE INSERTION TUBE FROM BEING BITTEN BY THE PATIENT.

## (undated) DEVICE — ADULT, W/LG. BACK PAD, RADIOTRANSPARENT ELEMENT AND LEAD WIRE: Brand: DEFIBRILLATION ELECTRODES

## (undated) DEVICE — LUBE JELLY FOIL PACK 1.4 OZ: Brand: MEDLINE INDUSTRIES, INC.

## (undated) DEVICE — ELECTRD RETRN/GRND MEGADYNE SGL/PLT W/CORD 9FT DISP

## (undated) DEVICE — PK VASC 10

## (undated) DEVICE — TRAP FLD MINIVAC MEGADYNE 100ML

## (undated) DEVICE — DECANT BG O JET

## (undated) DEVICE — CAUTERY TIP POLISHER: Brand: DEVON

## (undated) DEVICE — PROVIDES A STERILE INTERFACE BETWEEN THE OPERATING ROOM SURGICAL LAMPS (NON-STERILE) AND THE SURGEON OR NURSE (STERILE).: Brand: STERION®CLAMP COVER FABRIC

## (undated) DEVICE — CATH GUIDE BERN 5F 65CM

## (undated) DEVICE — PK CATH CARD 10

## (undated) DEVICE — DRSNG SURESITE123 4X4.8IN

## (undated) DEVICE — SUT PROLN 6/0 C1 D/A 30IN 8706H

## (undated) DEVICE — DRSNG SURESITE WNDW 4X4.5

## (undated) DEVICE — SLV REPOSTNG CATH STRL 60CM

## (undated) DEVICE — CATH PACE PACEL BIPOL 5F110CM

## (undated) DEVICE — LEX ELECTRO PHYSIOLOGY: Brand: MEDLINE INDUSTRIES, INC.

## (undated) DEVICE — 3M™ IOBAN™ 2 ANTIMICROBIAL INCISE DRAPE 6650EZ: Brand: IOBAN™ 2

## (undated) DEVICE — GLIDEX™ COATED HYDROPHILIC GUIDEWIRE: Brand: MAGIC TORQUE™

## (undated) DEVICE — CONTN GRAD MEAS TRIANG 32OZ BLK

## (undated) DEVICE — ST LINER SAFECAP GRN RED CP STRL

## (undated) DEVICE — PENCL ROCKRSWCH MEGADYNE W/HOLSTR 10FT SS

## (undated) DEVICE — SOLIDIFIER LIQ PREMISORB 1500CC

## (undated) DEVICE — KT MANIFOLD CATHLAB CUST

## (undated) DEVICE — CATH GUIDE SOFTVU FLUSH HT PIG .035 4F 65CM

## (undated) DEVICE — INFLATION DEVICE: Brand: ENCORE™ 26

## (undated) DEVICE — ADAPT CLN LUM OLYMP AIR/H20

## (undated) DEVICE — PK PERFUS CUST W/CARDIOPLEGIA

## (undated) DEVICE — ULTRAVERSE 035 PTA DILATATION CATHETER 9.0MM X 100MM BALLOON, 130CM SHAFT: Brand: ULTRAVERSE® 035 PTA DILATATION CATHETER

## (undated) DEVICE — KT ORCA ORCAPOD DISP STRL